# Patient Record
Sex: MALE | Race: WHITE | NOT HISPANIC OR LATINO | Employment: OTHER | URBAN - METROPOLITAN AREA
[De-identification: names, ages, dates, MRNs, and addresses within clinical notes are randomized per-mention and may not be internally consistent; named-entity substitution may affect disease eponyms.]

---

## 2017-08-04 ENCOUNTER — HOSPITAL ENCOUNTER (INPATIENT)
Facility: HOSPITAL | Age: 72
LOS: 2 days | Discharge: HOME | DRG: 841 | End: 2017-08-06
Attending: EMERGENCY MEDICINE | Admitting: INTERNAL MEDICINE
Payer: COMMERCIAL

## 2017-08-04 ENCOUNTER — APPOINTMENT (EMERGENCY)
Dept: RADIOLOGY | Facility: HOSPITAL | Age: 72
DRG: 841 | End: 2017-08-04
Payer: COMMERCIAL

## 2017-08-04 DIAGNOSIS — R55 SYNCOPE AND COLLAPSE: ICD-10-CM

## 2017-08-04 DIAGNOSIS — D72.829 LEUKOCYTOSIS: Primary | ICD-10-CM

## 2017-08-04 LAB
ALBUMIN SERPL BCP-MCNC: 3.4 G/DL (ref 3.5–5)
ALP SERPL-CCNC: 52 U/L (ref 46–116)
ALT SERPL W P-5'-P-CCNC: 26 U/L (ref 12–78)
AMPHETAMINES SERPL QL SCN: NEGATIVE
ANION GAP SERPL CALCULATED.3IONS-SCNC: 12 MMOL/L (ref 4–13)
AST SERPL W P-5'-P-CCNC: 25 U/L (ref 5–45)
BARBITURATES UR QL: NEGATIVE
BASOPHILS # BLD MANUAL: 0 THOUSAND/UL (ref 0–0.1)
BASOPHILS NFR MAR MANUAL: 0 % (ref 0–1)
BENZODIAZ UR QL: NEGATIVE
BILIRUB SERPL-MCNC: 0.43 MG/DL (ref 0.2–1)
BUN SERPL-MCNC: 27 MG/DL (ref 5–25)
CALCIUM SERPL-MCNC: 8.4 MG/DL (ref 8.3–10.1)
CHLORIDE SERPL-SCNC: 108 MMOL/L (ref 100–108)
CO2 SERPL-SCNC: 21 MMOL/L (ref 21–32)
COCAINE UR QL: NEGATIVE
CREAT SERPL-MCNC: 1.62 MG/DL (ref 0.6–1.3)
EOSINOPHIL # BLD MANUAL: 0 THOUSAND/UL (ref 0–0.4)
EOSINOPHIL NFR BLD MANUAL: 0 % (ref 0–6)
ERYTHROCYTE [DISTWIDTH] IN BLOOD BY AUTOMATED COUNT: 15.5 % (ref 11.6–15.1)
ETHANOL SERPL-MCNC: 53 MG/DL (ref 0–3)
GFR SERPL CREATININE-BSD FRML MDRD: 42 ML/MIN/1.73SQ M
GLUCOSE SERPL-MCNC: 125 MG/DL (ref 65–140)
HCT VFR BLD AUTO: 42.8 % (ref 36.5–49.3)
HGB BLD-MCNC: 13.8 G/DL (ref 12–17)
LYMPHOCYTES # BLD AUTO: 58.41 THOUSAND/UL (ref 0.6–4.47)
LYMPHOCYTES # BLD AUTO: 89 % (ref 14–44)
MCH RBC QN AUTO: 27.8 PG (ref 26.8–34.3)
MCHC RBC AUTO-ENTMCNC: 32.2 G/DL (ref 31.4–37.4)
MCV RBC AUTO: 86 FL (ref 82–98)
METHADONE UR QL: NEGATIVE
MONOCYTES # BLD AUTO: 0.66 THOUSAND/UL (ref 0–1.22)
MONOCYTES NFR BLD: 1 % (ref 4–12)
NEUTROPHILS # BLD MANUAL: 5.25 THOUSAND/UL (ref 1.85–7.62)
NEUTS SEG NFR BLD AUTO: 8 % (ref 43–75)
NRBC BLD AUTO-RTO: 0 /100 WBCS
OPIATES UR QL SCN: NEGATIVE
PCP UR QL: NEGATIVE
PLATELET # BLD AUTO: 109 THOUSANDS/UL (ref 149–390)
PLATELET BLD QL SMEAR: ABNORMAL
PMV BLD AUTO: 9.7 FL (ref 8.9–12.7)
POTASSIUM SERPL-SCNC: 4.2 MMOL/L (ref 3.5–5.3)
PROT SERPL-MCNC: 6.1 G/DL (ref 6.4–8.2)
RBC # BLD AUTO: 4.97 MILLION/UL (ref 3.88–5.62)
SODIUM SERPL-SCNC: 141 MMOL/L (ref 136–145)
THC UR QL: NEGATIVE
TOTAL CELLS COUNTED SPEC: 100
TROPONIN I SERPL-MCNC: <0.02 NG/ML
VARIANT LYMPHS # BLD AUTO: 2 %
WBC # BLD AUTO: 65.63 THOUSAND/UL (ref 4.31–10.16)

## 2017-08-04 PROCEDURE — 36415 COLL VENOUS BLD VENIPUNCTURE: CPT | Performed by: EMERGENCY MEDICINE

## 2017-08-04 PROCEDURE — 81263 IGH VARI REGIONAL MUTATION: CPT | Performed by: INTERNAL MEDICINE

## 2017-08-04 PROCEDURE — 80320 DRUG SCREEN QUANTALCOHOLS: CPT | Performed by: EMERGENCY MEDICINE

## 2017-08-04 PROCEDURE — 85007 BL SMEAR W/DIFF WBC COUNT: CPT | Performed by: EMERGENCY MEDICINE

## 2017-08-04 PROCEDURE — G0480 DRUG TEST DEF 1-7 CLASSES: HCPCS | Performed by: EMERGENCY MEDICINE

## 2017-08-04 PROCEDURE — 80053 COMPREHEN METABOLIC PANEL: CPT | Performed by: EMERGENCY MEDICINE

## 2017-08-04 PROCEDURE — 71020 HB CHEST X-RAY 2VW FRONTAL&LATL: CPT

## 2017-08-04 PROCEDURE — 80307 DRUG TEST PRSMV CHEM ANLYZR: CPT | Performed by: EMERGENCY MEDICINE

## 2017-08-04 PROCEDURE — 85027 COMPLETE CBC AUTOMATED: CPT | Performed by: EMERGENCY MEDICINE

## 2017-08-04 PROCEDURE — 93005 ELECTROCARDIOGRAM TRACING: CPT | Performed by: EMERGENCY MEDICINE

## 2017-08-04 PROCEDURE — 84484 ASSAY OF TROPONIN QUANT: CPT | Performed by: EMERGENCY MEDICINE

## 2017-08-04 PROCEDURE — 70450 CT HEAD/BRAIN W/O DYE: CPT

## 2017-08-04 PROCEDURE — 96360 HYDRATION IV INFUSION INIT: CPT

## 2017-08-04 RX ORDER — SIMVASTATIN 20 MG
20 TABLET ORAL
COMMUNITY
End: 2021-07-04 | Stop reason: HOSPADM

## 2017-08-04 RX ORDER — ASPIRIN 81 MG/1
81 TABLET, CHEWABLE ORAL DAILY
COMMUNITY
End: 2019-12-10

## 2017-08-04 RX ORDER — FOSINOPRIL SODIUM 20 MG/1
20 TABLET ORAL DAILY
COMMUNITY

## 2017-08-04 RX ORDER — MULTIVITAMIN
1 TABLET ORAL DAILY
COMMUNITY

## 2017-08-04 RX ORDER — MOMETASONE FUROATE 50 UG/1
2 SPRAY, METERED NASAL DAILY
COMMUNITY

## 2017-08-04 RX ORDER — UREA 10 %
500 LOTION (ML) TOPICAL ONCE
COMMUNITY
End: 2020-11-20 | Stop reason: HOSPADM

## 2017-08-04 RX ADMIN — SODIUM CHLORIDE 1000 ML: 0.9 INJECTION, SOLUTION INTRAVENOUS at 21:26

## 2017-08-04 RX ADMIN — SODIUM CHLORIDE 1000 ML: 0.9 INJECTION, SOLUTION INTRAVENOUS at 21:09

## 2017-08-05 ENCOUNTER — APPOINTMENT (INPATIENT)
Dept: RADIOLOGY | Facility: HOSPITAL | Age: 72
DRG: 841 | End: 2017-08-05
Payer: COMMERCIAL

## 2017-08-05 PROBLEM — I10 HYPERTENSION, ESSENTIAL: Status: ACTIVE | Noted: 2017-08-05

## 2017-08-05 PROBLEM — N28.9 ACUTE KIDNEY INSUFFICIENCY: Status: ACTIVE | Noted: 2017-08-05

## 2017-08-05 PROBLEM — R68.83 CHILLS: Status: ACTIVE | Noted: 2017-08-05

## 2017-08-05 PROBLEM — D72.820 LYMPHOCYTOSIS: Status: ACTIVE | Noted: 2017-08-05

## 2017-08-05 PROBLEM — R11.0 NAUSEA: Status: ACTIVE | Noted: 2017-08-05

## 2017-08-05 PROBLEM — E78.5 HYPERLIPIDEMIA, UNSPECIFIED: Status: ACTIVE | Noted: 2017-08-05

## 2017-08-05 LAB
ALBUMIN SERPL BCP-MCNC: 3.1 G/DL (ref 3.5–5)
ALP SERPL-CCNC: 54 U/L (ref 46–116)
ALT SERPL W P-5'-P-CCNC: 20 U/L (ref 12–78)
ANION GAP SERPL CALCULATED.3IONS-SCNC: 6 MMOL/L (ref 4–13)
APTT PPP: 24 SECONDS (ref 23–35)
AST SERPL W P-5'-P-CCNC: 22 U/L (ref 5–45)
ATRIAL RATE: 60 BPM
BASOPHILS # BLD MANUAL: 0 THOUSAND/UL (ref 0–0.1)
BASOPHILS NFR MAR MANUAL: 0 % (ref 0–1)
BILIRUB SERPL-MCNC: 0.37 MG/DL (ref 0.2–1)
BILIRUB UR QL STRIP: NEGATIVE
BUN SERPL-MCNC: 25 MG/DL (ref 5–25)
CALCIUM SERPL-MCNC: 8.2 MG/DL (ref 8.3–10.1)
CHLORIDE SERPL-SCNC: 109 MMOL/L (ref 100–108)
CLARITY UR: CLEAR
CO2 SERPL-SCNC: 25 MMOL/L (ref 21–32)
COLOR UR: YELLOW
CREAT SERPL-MCNC: 1.22 MG/DL (ref 0.6–1.3)
EOSINOPHIL # BLD MANUAL: 0 THOUSAND/UL (ref 0–0.4)
EOSINOPHIL NFR BLD MANUAL: 0 % (ref 0–6)
ERYTHROCYTE [DISTWIDTH] IN BLOOD BY AUTOMATED COUNT: 15.6 % (ref 11.6–15.1)
GFR SERPL CREATININE-BSD FRML MDRD: 59 ML/MIN/1.73SQ M
GLUCOSE SERPL-MCNC: 232 MG/DL (ref 65–140)
GLUCOSE UR STRIP-MCNC: NEGATIVE MG/DL
HCT VFR BLD AUTO: 41.4 % (ref 36.5–49.3)
HGB BLD-MCNC: 13.2 G/DL (ref 12–17)
HGB UR QL STRIP.AUTO: NEGATIVE
INR PPP: 1.01 (ref 0.86–1.16)
KETONES UR STRIP-MCNC: ABNORMAL MG/DL
LEUKOCYTE ESTERASE UR QL STRIP: NEGATIVE
LYMPHOCYTES # BLD AUTO: 58.74 THOUSAND/UL (ref 0.6–4.47)
LYMPHOCYTES # BLD AUTO: 82 % (ref 14–44)
MAGNESIUM SERPL-MCNC: 2.3 MG/DL (ref 1.6–2.6)
MCH RBC QN AUTO: 27.8 PG (ref 26.8–34.3)
MCHC RBC AUTO-ENTMCNC: 31.9 G/DL (ref 31.4–37.4)
MCV RBC AUTO: 87 FL (ref 82–98)
MONOCYTES # BLD AUTO: 0 THOUSAND/UL (ref 0–1.22)
MONOCYTES NFR BLD: 0 % (ref 4–12)
NEUTROPHILS # BLD MANUAL: 12.89 THOUSAND/UL (ref 1.85–7.62)
NEUTS SEG NFR BLD AUTO: 18 % (ref 43–75)
NITRITE UR QL STRIP: NEGATIVE
NRBC BLD AUTO-RTO: 0 /100 WBCS
P AXIS: -29 DEGREES
PH UR STRIP.AUTO: 5.5 [PH] (ref 4.5–8)
PHOSPHATE SERPL-MCNC: 2.8 MG/DL (ref 2.3–4.1)
PLATELET # BLD AUTO: 107 THOUSANDS/UL (ref 149–390)
PLATELET BLD QL SMEAR: ABNORMAL
PMV BLD AUTO: 9.8 FL (ref 8.9–12.7)
POIKILOCYTOSIS BLD QL SMEAR: PRESENT
POTASSIUM SERPL-SCNC: 4.8 MMOL/L (ref 3.5–5.3)
PR INTERVAL: 128 MS
PROT SERPL-MCNC: 5.8 G/DL (ref 6.4–8.2)
PROT UR STRIP-MCNC: NEGATIVE MG/DL
PROTHROMBIN TIME: 13.3 SECONDS (ref 12.1–14.4)
QRS AXIS: 60 DEGREES
QRSD INTERVAL: 86 MS
QT INTERVAL: 416 MS
QTC INTERVAL: 416 MS
RBC # BLD AUTO: 4.74 MILLION/UL (ref 3.88–5.62)
RBC MORPH BLD: PRESENT
RETICS # AUTO: NORMAL 10*3/UL (ref 14356–105094)
RETICS # CALC: 0.99 % (ref 0.37–1.87)
SMUDGE CELLS BLD QL SMEAR: PRESENT
SODIUM SERPL-SCNC: 140 MMOL/L (ref 136–145)
SP GR UR STRIP.AUTO: 1.02 (ref 1–1.03)
T WAVE AXIS: 54 DEGREES
TSH SERPL DL<=0.05 MIU/L-ACNC: 1 UIU/ML (ref 0.36–3.74)
URATE SERPL-MCNC: 5.7 MG/DL (ref 4.2–8)
UROBILINOGEN UR QL STRIP.AUTO: 0.2 E.U./DL
VENTRICULAR RATE: 60 BPM
WBC # BLD AUTO: 71.63 THOUSAND/UL (ref 4.31–10.16)

## 2017-08-05 PROCEDURE — 84165 PROTEIN E-PHORESIS SERUM: CPT | Performed by: INTERNAL MEDICINE

## 2017-08-05 PROCEDURE — 86618 LYME DISEASE ANTIBODY: CPT | Performed by: INTERNAL MEDICINE

## 2017-08-05 PROCEDURE — 71260 CT THORAX DX C+: CPT

## 2017-08-05 PROCEDURE — 85045 AUTOMATED RETICULOCYTE COUNT: CPT | Performed by: INTERNAL MEDICINE

## 2017-08-05 PROCEDURE — 88185 FLOWCYTOMETRY/TC ADD-ON: CPT

## 2017-08-05 PROCEDURE — 99285 EMERGENCY DEPT VISIT HI MDM: CPT

## 2017-08-05 PROCEDURE — 88373 M/PHMTRC ALYS ISHQUANT/SEMIQ: CPT | Performed by: INTERNAL MEDICINE

## 2017-08-05 PROCEDURE — 85007 BL SMEAR W/DIFF WBC COUNT: CPT | Performed by: INTERNAL MEDICINE

## 2017-08-05 PROCEDURE — 84443 ASSAY THYROID STIM HORMONE: CPT | Performed by: INTERNAL MEDICINE

## 2017-08-05 PROCEDURE — 84550 ASSAY OF BLOOD/URIC ACID: CPT | Performed by: INTERNAL MEDICINE

## 2017-08-05 PROCEDURE — 85730 THROMBOPLASTIN TIME PARTIAL: CPT | Performed by: INTERNAL MEDICINE

## 2017-08-05 PROCEDURE — 85027 COMPLETE CBC AUTOMATED: CPT | Performed by: INTERNAL MEDICINE

## 2017-08-05 PROCEDURE — 84166 PROTEIN E-PHORESIS/URINE/CSF: CPT | Performed by: INTERNAL MEDICINE

## 2017-08-05 PROCEDURE — 80053 COMPREHEN METABOLIC PANEL: CPT | Performed by: INTERNAL MEDICINE

## 2017-08-05 PROCEDURE — 81003 URINALYSIS AUTO W/O SCOPE: CPT | Performed by: INTERNAL MEDICINE

## 2017-08-05 PROCEDURE — 84100 ASSAY OF PHOSPHORUS: CPT | Performed by: INTERNAL MEDICINE

## 2017-08-05 PROCEDURE — 88367 INSITU HYBRIDIZATION AUTO: CPT | Performed by: INTERNAL MEDICINE

## 2017-08-05 PROCEDURE — 85610 PROTHROMBIN TIME: CPT | Performed by: INTERNAL MEDICINE

## 2017-08-05 PROCEDURE — 74177 CT ABD & PELVIS W/CONTRAST: CPT

## 2017-08-05 PROCEDURE — 88184 FLOWCYTOMETRY/ TC 1 MARKER: CPT | Performed by: INTERNAL MEDICINE

## 2017-08-05 PROCEDURE — 87040 BLOOD CULTURE FOR BACTERIA: CPT | Performed by: INTERNAL MEDICINE

## 2017-08-05 PROCEDURE — 83735 ASSAY OF MAGNESIUM: CPT | Performed by: INTERNAL MEDICINE

## 2017-08-05 RX ORDER — DOCUSATE SODIUM 100 MG/1
100 CAPSULE, LIQUID FILLED ORAL 2 TIMES DAILY PRN
Status: DISCONTINUED | OUTPATIENT
Start: 2017-08-05 | End: 2017-08-06 | Stop reason: HOSPADM

## 2017-08-05 RX ORDER — ACETAMINOPHEN 325 MG/1
650 TABLET ORAL EVERY 6 HOURS PRN
Status: DISCONTINUED | OUTPATIENT
Start: 2017-08-05 | End: 2017-08-06 | Stop reason: HOSPADM

## 2017-08-05 RX ORDER — UREA 10 %
500 LOTION (ML) TOPICAL 2 TIMES DAILY
Status: DISCONTINUED | OUTPATIENT
Start: 2017-08-05 | End: 2017-08-06 | Stop reason: HOSPADM

## 2017-08-05 RX ORDER — ONDANSETRON 2 MG/ML
4 INJECTION INTRAMUSCULAR; INTRAVENOUS EVERY 6 HOURS PRN
Status: DISCONTINUED | OUTPATIENT
Start: 2017-08-05 | End: 2017-08-06 | Stop reason: HOSPADM

## 2017-08-05 RX ORDER — MAGNESIUM HYDROXIDE/ALUMINUM HYDROXICE/SIMETHICONE 120; 1200; 1200 MG/30ML; MG/30ML; MG/30ML
30 SUSPENSION ORAL EVERY 6 HOURS PRN
Status: DISCONTINUED | OUTPATIENT
Start: 2017-08-05 | End: 2017-08-06 | Stop reason: HOSPADM

## 2017-08-05 RX ORDER — FLUTICASONE PROPIONATE 50 MCG
2 SPRAY, SUSPENSION (ML) NASAL DAILY
Status: DISCONTINUED | OUTPATIENT
Start: 2017-08-05 | End: 2017-08-06 | Stop reason: HOSPADM

## 2017-08-05 RX ORDER — SODIUM CHLORIDE 9 MG/ML
50 INJECTION, SOLUTION INTRAVENOUS CONTINUOUS
Status: DISCONTINUED | OUTPATIENT
Start: 2017-08-05 | End: 2017-08-06 | Stop reason: HOSPADM

## 2017-08-05 RX ORDER — PRAVASTATIN SODIUM 40 MG
40 TABLET ORAL
Status: DISCONTINUED | OUTPATIENT
Start: 2017-08-05 | End: 2017-08-06 | Stop reason: HOSPADM

## 2017-08-05 RX ORDER — ONDANSETRON 2 MG/ML
INJECTION INTRAMUSCULAR; INTRAVENOUS
Status: COMPLETED
Start: 2017-08-05 | End: 2017-08-05

## 2017-08-05 RX ORDER — ASPIRIN 81 MG/1
81 TABLET, CHEWABLE ORAL DAILY
Status: DISCONTINUED | OUTPATIENT
Start: 2017-08-05 | End: 2017-08-06 | Stop reason: HOSPADM

## 2017-08-05 RX ORDER — CHOLECALCIFEROL (VITAMIN D3) 125 MCG
200 CAPSULE ORAL DAILY
Status: DISCONTINUED | OUTPATIENT
Start: 2017-08-05 | End: 2017-08-06 | Stop reason: HOSPADM

## 2017-08-05 RX ORDER — PANTOPRAZOLE SODIUM 40 MG/1
40 TABLET, DELAYED RELEASE ORAL
Status: DISCONTINUED | OUTPATIENT
Start: 2017-08-05 | End: 2017-08-06 | Stop reason: HOSPADM

## 2017-08-05 RX ORDER — FOSINOPRIL SODIUM 20 MG/1
20 TABLET ORAL DAILY
Status: DISCONTINUED | OUTPATIENT
Start: 2017-08-05 | End: 2017-08-06 | Stop reason: HOSPADM

## 2017-08-05 RX ORDER — DIAZEPAM 2 MG/1
1 TABLET ORAL EVERY 12 HOURS PRN
Status: DISCONTINUED | OUTPATIENT
Start: 2017-08-05 | End: 2017-08-06 | Stop reason: HOSPADM

## 2017-08-05 RX ADMIN — ONDANSETRON 4 MG: 2 INJECTION INTRAMUSCULAR; INTRAVENOUS at 01:15

## 2017-08-05 RX ADMIN — PRAVASTATIN SODIUM 40 MG: 40 TABLET ORAL at 17:04

## 2017-08-05 RX ADMIN — FLUTICASONE PROPIONATE 2 SPRAY: 50 SPRAY, METERED NASAL at 11:42

## 2017-08-05 RX ADMIN — ASPIRIN 81 MG 81 MG: 81 TABLET ORAL at 11:41

## 2017-08-05 RX ADMIN — IOHEXOL 100 ML: 350 INJECTION, SOLUTION INTRAVENOUS at 10:51

## 2017-08-05 RX ADMIN — SODIUM CHLORIDE 50 ML/HR: 0.9 INJECTION, SOLUTION INTRAVENOUS at 18:38

## 2017-08-05 RX ADMIN — Medication 500 MG: at 11:41

## 2017-08-05 RX ADMIN — SERTRALINE HYDROCHLORIDE 50 MG: 50 TABLET ORAL at 11:41

## 2017-08-05 RX ADMIN — IOHEXOL 50 ML: 240 INJECTION, SOLUTION INTRATHECAL; INTRAVASCULAR; INTRAVENOUS; ORAL at 08:00

## 2017-08-05 RX ADMIN — Medication 200 MG: at 11:42

## 2017-08-05 RX ADMIN — PANTOPRAZOLE SODIUM 40 MG: 40 TABLET, DELAYED RELEASE ORAL at 18:44

## 2017-08-05 RX ADMIN — ZINC 1 TABLET: TAB ORAL at 11:42

## 2017-08-05 RX ADMIN — Medication 500 MG: at 17:04

## 2017-08-05 RX ADMIN — FOSINOPRIL 20 MG: 20 TABLET ORAL at 11:42

## 2017-08-06 VITALS
BODY MASS INDEX: 29.26 KG/M2 | HEART RATE: 56 BPM | TEMPERATURE: 98.7 F | HEIGHT: 72 IN | WEIGHT: 216.05 LBS | DIASTOLIC BLOOD PRESSURE: 83 MMHG | RESPIRATION RATE: 18 BRPM | OXYGEN SATURATION: 96 % | SYSTOLIC BLOOD PRESSURE: 154 MMHG

## 2017-08-06 LAB
ALBUMIN SERPL BCP-MCNC: 3 G/DL (ref 3.5–5)
ALP SERPL-CCNC: 49 U/L (ref 46–116)
ALT SERPL W P-5'-P-CCNC: 18 U/L (ref 12–78)
ANION GAP SERPL CALCULATED.3IONS-SCNC: 4 MMOL/L (ref 4–13)
AST SERPL W P-5'-P-CCNC: 15 U/L (ref 5–45)
BILIRUB SERPL-MCNC: 0.68 MG/DL (ref 0.2–1)
BUN SERPL-MCNC: 19 MG/DL (ref 5–25)
CALCIUM SERPL-MCNC: 8.4 MG/DL (ref 8.3–10.1)
CHLORIDE SERPL-SCNC: 108 MMOL/L (ref 100–108)
CO2 SERPL-SCNC: 28 MMOL/L (ref 21–32)
CREAT SERPL-MCNC: 1.04 MG/DL (ref 0.6–1.3)
EST. AVERAGE GLUCOSE BLD GHB EST-MCNC: 134 MG/DL
GFR SERPL CREATININE-BSD FRML MDRD: 72 ML/MIN/1.73SQ M
GLUCOSE SERPL-MCNC: 102 MG/DL (ref 65–140)
HBA1C MFR BLD: 6.3 % (ref 4.2–6.3)
POTASSIUM SERPL-SCNC: 4.3 MMOL/L (ref 3.5–5.3)
PROT SERPL-MCNC: 5.6 G/DL (ref 6.4–8.2)
PSA SERPL-MCNC: 1.6 NG/ML (ref 0–4)
SODIUM SERPL-SCNC: 140 MMOL/L (ref 136–145)

## 2017-08-06 PROCEDURE — 85007 BL SMEAR W/DIFF WBC COUNT: CPT | Performed by: NURSE PRACTITIONER

## 2017-08-06 PROCEDURE — 80053 COMPREHEN METABOLIC PANEL: CPT | Performed by: NURSE PRACTITIONER

## 2017-08-06 PROCEDURE — 84153 ASSAY OF PSA TOTAL: CPT | Performed by: NURSE PRACTITIONER

## 2017-08-06 PROCEDURE — 85027 COMPLETE CBC AUTOMATED: CPT | Performed by: NURSE PRACTITIONER

## 2017-08-06 PROCEDURE — 85025 COMPLETE CBC W/AUTO DIFF WBC: CPT | Performed by: NURSE PRACTITIONER

## 2017-08-06 PROCEDURE — 83036 HEMOGLOBIN GLYCOSYLATED A1C: CPT | Performed by: NURSE PRACTITIONER

## 2017-08-06 RX ORDER — PANTOPRAZOLE SODIUM 40 MG/1
40 TABLET, DELAYED RELEASE ORAL
Qty: 30 TABLET | Refills: 0 | Status: SHIPPED | OUTPATIENT
Start: 2017-08-06 | End: 2018-03-27

## 2017-08-06 RX ADMIN — Medication 500 MG: at 09:03

## 2017-08-06 RX ADMIN — FOSINOPRIL 20 MG: 20 TABLET ORAL at 09:03

## 2017-08-06 RX ADMIN — ENOXAPARIN SODIUM 40 MG: 40 INJECTION SUBCUTANEOUS at 09:03

## 2017-08-06 RX ADMIN — PANTOPRAZOLE SODIUM 40 MG: 40 TABLET, DELAYED RELEASE ORAL at 05:01

## 2017-08-06 RX ADMIN — ASPIRIN 81 MG 81 MG: 81 TABLET ORAL at 09:03

## 2017-08-06 RX ADMIN — SERTRALINE HYDROCHLORIDE 50 MG: 50 TABLET ORAL at 09:03

## 2017-08-06 RX ADMIN — ZINC 1 TABLET: TAB ORAL at 09:03

## 2017-08-06 RX ADMIN — FLUTICASONE PROPIONATE 2 SPRAY: 50 SPRAY, METERED NASAL at 09:04

## 2017-08-06 RX ADMIN — Medication 200 MG: at 09:03

## 2017-08-07 LAB
ALBUMIN SERPL ELPH-MCNC: 3.71 G/DL (ref 3.5–5)
ALBUMIN SERPL ELPH-MCNC: 66.3 % (ref 52–65)
ALBUMIN UR ELPH-MCNC: 41.8 %
ALPHA1 GLOB MFR UR ELPH: 10.2 %
ALPHA1 GLOB SERPL ELPH-MCNC: 0.25 G/DL (ref 0.1–0.4)
ALPHA1 GLOB SERPL ELPH-MCNC: 4.5 % (ref 2.5–5)
ALPHA2 GLOB MFR UR ELPH: 10.3 %
ALPHA2 GLOB SERPL ELPH-MCNC: 0.49 G/DL (ref 0.4–1.2)
ALPHA2 GLOB SERPL ELPH-MCNC: 8.8 % (ref 7–13)
B BURGDOR IGG SER IA-ACNC: 0.14
B BURGDOR IGM SER IA-ACNC: 0.18
B-GLOBULIN MFR UR ELPH: 14.8 %
BETA GLOB ABNORMAL SERPL ELPH-MCNC: 0.35 G/DL (ref 0.4–0.8)
BETA1 GLOB SERPL ELPH-MCNC: 6.3 % (ref 5–13)
BETA2 GLOB SERPL ELPH-MCNC: 4.4 % (ref 2–8)
BETA2+GAMMA GLOB SERPL ELPH-MCNC: 0.25 G/DL (ref 0.2–0.5)
GAMMA GLOB ABNORMAL SERPL ELPH-MCNC: 0.54 G/DL (ref 0.5–1.6)
GAMMA GLOB MFR UR ELPH: 22.9 %
GAMMA GLOB SERPL ELPH-MCNC: 9.7 % (ref 12–22)
IGG/ALB SER: 1.97 {RATIO} (ref 1.1–1.8)
PATHOLOGIST INTERPRETATION: NORMAL
PROT PATTERN SERPL ELPH-IMP: ABNORMAL
PROT PATTERN UR ELPH-IMP: NORMAL
PROT SERPL-MCNC: 5.6 G/DL (ref 6.4–8.2)
PROT UR-MCNC: 11 MG/DL

## 2017-08-08 LAB
BASOPHILS # BLD MANUAL: 0 THOUSAND/UL (ref 0–0.1)
BASOPHILS NFR MAR MANUAL: 0 % (ref 0–1)
EOSINOPHIL # BLD MANUAL: 0 THOUSAND/UL (ref 0–0.4)
EOSINOPHIL NFR BLD MANUAL: 0 % (ref 0–6)
ERYTHROCYTE [DISTWIDTH] IN BLOOD BY AUTOMATED COUNT: 15.9 % (ref 11.6–15.1)
HCT VFR BLD AUTO: 40.3 % (ref 36.5–49.3)
HGB BLD-MCNC: 12.9 G/DL (ref 12–17)
LYMPHOCYTES # BLD AUTO: 43.41 THOUSAND/UL (ref 0.6–4.47)
LYMPHOCYTES # BLD AUTO: 82 % (ref 14–44)
MCH RBC QN AUTO: 28 PG (ref 26.8–34.3)
MCHC RBC AUTO-ENTMCNC: 32 G/DL (ref 31.4–37.4)
MCV RBC AUTO: 87 FL (ref 82–98)
MONOCYTES # BLD AUTO: 1.59 THOUSAND/UL (ref 0–1.22)
MONOCYTES NFR BLD: 3 % (ref 4–12)
NEUTROPHILS # BLD MANUAL: 4.24 THOUSAND/UL (ref 1.85–7.62)
NEUTS SEG NFR BLD AUTO: 8 % (ref 43–75)
NRBC BLD AUTO-RTO: 0 /100 WBCS
PATHOLOGY REVIEW: YES
PLATELET # BLD AUTO: 89 THOUSANDS/UL (ref 149–390)
PLATELET BLD QL SMEAR: ABNORMAL
PMV BLD AUTO: 9.7 FL (ref 8.9–12.7)
POIKILOCYTOSIS BLD QL SMEAR: PRESENT
RBC # BLD AUTO: 4.61 MILLION/UL (ref 3.88–5.62)
SCAN RESULT: NORMAL
SMUDGE CELLS BLD QL SMEAR: PRESENT
TOTAL CELLS COUNTED SPEC: 100
VARIANT LYMPHS # BLD AUTO: 7 %
WBC # BLD AUTO: 52.94 THOUSAND/UL (ref 4.31–10.16)

## 2017-08-10 LAB
BACTERIA BLD CULT: NORMAL
BACTERIA BLD CULT: NORMAL

## 2017-08-15 LAB
MISCELLANEOUS LAB TEST RESULT: NORMAL
SCAN RESULT: NORMAL

## 2017-08-25 ENCOUNTER — ALLSCRIPTS OFFICE VISIT (OUTPATIENT)
Dept: OTHER | Facility: OTHER | Age: 72
End: 2017-08-25

## 2017-08-25 DIAGNOSIS — C91.10 CHRONIC LYMPHOCYTIC LEUKEMIA OF B-CELL TYPE NOT HAVING ACHIEVED REMISSION (HCC): ICD-10-CM

## 2017-08-28 ENCOUNTER — GENERIC CONVERSION - ENCOUNTER (OUTPATIENT)
Dept: OTHER | Facility: OTHER | Age: 72
End: 2017-08-28

## 2017-08-28 ENCOUNTER — APPOINTMENT (OUTPATIENT)
Dept: LAB | Facility: CLINIC | Age: 72
End: 2017-08-28
Payer: COMMERCIAL

## 2017-08-28 DIAGNOSIS — C91.10 CHRONIC LYMPHOCYTIC LEUKEMIA OF B-CELL TYPE NOT HAVING ACHIEVED REMISSION (HCC): ICD-10-CM

## 2017-08-28 LAB
ALBUMIN SERPL BCP-MCNC: 3.4 G/DL (ref 3.5–5)
ALP SERPL-CCNC: 53 U/L (ref 46–116)
ALT SERPL W P-5'-P-CCNC: 26 U/L (ref 12–78)
ANION GAP SERPL CALCULATED.3IONS-SCNC: 8 MMOL/L (ref 4–13)
ANISOCYTOSIS BLD QL SMEAR: PRESENT
AST SERPL W P-5'-P-CCNC: 20 U/L (ref 5–45)
BASOPHILS # BLD MANUAL: 0.51 THOUSAND/UL (ref 0–0.1)
BASOPHILS NFR MAR MANUAL: 1 % (ref 0–1)
BILIRUB SERPL-MCNC: 0.5 MG/DL (ref 0.2–1)
BUN SERPL-MCNC: 20 MG/DL (ref 5–25)
CALCIUM SERPL-MCNC: 8.5 MG/DL (ref 8.3–10.1)
CHLORIDE SERPL-SCNC: 104 MMOL/L (ref 100–108)
CO2 SERPL-SCNC: 27 MMOL/L (ref 21–32)
CREAT SERPL-MCNC: 1.14 MG/DL (ref 0.6–1.3)
EOSINOPHIL # BLD MANUAL: 0 THOUSAND/UL (ref 0–0.4)
EOSINOPHIL NFR BLD MANUAL: 0 % (ref 0–6)
ERYTHROCYTE [DISTWIDTH] IN BLOOD BY AUTOMATED COUNT: 15.2 % (ref 11.6–15.1)
GFR SERPL CREATININE-BSD FRML MDRD: 64 ML/MIN/1.73SQ M
GLUCOSE SERPL-MCNC: 180 MG/DL (ref 65–140)
HCT VFR BLD AUTO: 45 % (ref 36.5–49.3)
HGB BLD-MCNC: 14.2 G/DL (ref 12–17)
IGG SERPL-MCNC: 644 MG/DL (ref 700–1600)
LDH SERPL-CCNC: 164 U/L (ref 81–234)
LYMPHOCYTES # BLD AUTO: 43.82 THOUSAND/UL (ref 0.6–4.47)
LYMPHOCYTES # BLD AUTO: 86 % (ref 14–44)
MCH RBC QN AUTO: 27.7 PG (ref 26.8–34.3)
MCHC RBC AUTO-ENTMCNC: 31.6 G/DL (ref 31.4–37.4)
MCV RBC AUTO: 88 FL (ref 82–98)
MONOCYTES # BLD AUTO: 0 THOUSAND/UL (ref 0–1.22)
MONOCYTES NFR BLD: 0 % (ref 4–12)
NEUTROPHILS # BLD MANUAL: 4.59 THOUSAND/UL (ref 1.85–7.62)
NEUTS SEG NFR BLD AUTO: 9 % (ref 43–75)
PLATELET # BLD AUTO: 107 THOUSANDS/UL (ref 149–390)
PLATELET BLD QL SMEAR: ABNORMAL
PMV BLD AUTO: 9.5 FL (ref 8.9–12.7)
POTASSIUM SERPL-SCNC: 4.4 MMOL/L (ref 3.5–5.3)
PROT SERPL-MCNC: 6.2 G/DL (ref 6.4–8.2)
RBC # BLD AUTO: 5.12 MILLION/UL (ref 3.88–5.62)
SMUDGE CELLS BLD QL SMEAR: PRESENT
SODIUM SERPL-SCNC: 139 MMOL/L (ref 136–145)
TOTAL CELLS COUNTED SPEC: 100
URATE SERPL-MCNC: 5.5 MG/DL (ref 4.2–8)
VARIANT LYMPHS # BLD AUTO: 4 %
WBC # BLD AUTO: 50.95 THOUSAND/UL (ref 4.31–10.16)

## 2017-08-28 PROCEDURE — 82232 ASSAY OF BETA-2 PROTEIN: CPT

## 2017-08-28 PROCEDURE — 85007 BL SMEAR W/DIFF WBC COUNT: CPT

## 2017-08-28 PROCEDURE — 82784 ASSAY IGA/IGD/IGG/IGM EACH: CPT

## 2017-08-28 PROCEDURE — 36415 COLL VENOUS BLD VENIPUNCTURE: CPT

## 2017-08-28 PROCEDURE — 84550 ASSAY OF BLOOD/URIC ACID: CPT

## 2017-08-28 PROCEDURE — 83615 LACTATE (LD) (LDH) ENZYME: CPT

## 2017-08-28 PROCEDURE — 85027 COMPLETE CBC AUTOMATED: CPT

## 2017-08-28 PROCEDURE — 80053 COMPREHEN METABOLIC PANEL: CPT

## 2017-08-29 LAB — B2 MICROGLOB SERPL-MCNC: 2.8 MG/L (ref 0.6–2.4)

## 2017-09-28 ENCOUNTER — TRANSCRIBE ORDERS (OUTPATIENT)
Dept: LAB | Facility: CLINIC | Age: 72
End: 2017-09-28

## 2017-09-28 ENCOUNTER — GENERIC CONVERSION - ENCOUNTER (OUTPATIENT)
Dept: OTHER | Facility: OTHER | Age: 72
End: 2017-09-28

## 2017-09-28 ENCOUNTER — APPOINTMENT (OUTPATIENT)
Dept: LAB | Facility: CLINIC | Age: 72
End: 2017-09-28
Payer: COMMERCIAL

## 2017-09-28 DIAGNOSIS — C91.10 CHRONIC LYMPHOCYTIC LEUKEMIA OF B-CELL TYPE NOT HAVING ACHIEVED REMISSION (HCC): ICD-10-CM

## 2017-09-28 LAB
ALBUMIN SERPL BCP-MCNC: 3.6 G/DL (ref 3.5–5)
ALP SERPL-CCNC: 60 U/L (ref 46–116)
ALT SERPL W P-5'-P-CCNC: 32 U/L (ref 12–78)
ANION GAP SERPL CALCULATED.3IONS-SCNC: 2 MMOL/L (ref 4–13)
AST SERPL W P-5'-P-CCNC: 22 U/L (ref 5–45)
BASOPHILS # BLD MANUAL: 0 THOUSAND/UL (ref 0–0.1)
BASOPHILS NFR MAR MANUAL: 0 % (ref 0–1)
BILIRUB SERPL-MCNC: 0.6 MG/DL (ref 0.2–1)
BUN SERPL-MCNC: 26 MG/DL (ref 5–25)
CALCIUM SERPL-MCNC: 8.7 MG/DL (ref 8.3–10.1)
CHLORIDE SERPL-SCNC: 103 MMOL/L (ref 100–108)
CO2 SERPL-SCNC: 32 MMOL/L (ref 21–32)
CREAT SERPL-MCNC: 1.2 MG/DL (ref 0.6–1.3)
EOSINOPHIL # BLD MANUAL: 0 THOUSAND/UL (ref 0–0.4)
EOSINOPHIL NFR BLD MANUAL: 0 % (ref 0–6)
ERYTHROCYTE [DISTWIDTH] IN BLOOD BY AUTOMATED COUNT: 15.1 % (ref 11.6–15.1)
GFR SERPL CREATININE-BSD FRML MDRD: 60 ML/MIN/1.73SQ M
GLUCOSE SERPL-MCNC: 163 MG/DL (ref 65–140)
HCT VFR BLD AUTO: 46.5 % (ref 36.5–49.3)
HGB BLD-MCNC: 14.7 G/DL (ref 12–17)
IGG SERPL-MCNC: 695 MG/DL (ref 700–1600)
LDH SERPL-CCNC: 184 U/L (ref 81–234)
LYMPHOCYTES # BLD AUTO: 28.55 THOUSAND/UL (ref 0.6–4.47)
LYMPHOCYTES # BLD AUTO: 47 % (ref 14–44)
MCH RBC QN AUTO: 27.5 PG (ref 26.8–34.3)
MCHC RBC AUTO-ENTMCNC: 31.6 G/DL (ref 31.4–37.4)
MCV RBC AUTO: 87 FL (ref 82–98)
MONOCYTES # BLD AUTO: 0 THOUSAND/UL (ref 0–1.22)
MONOCYTES NFR BLD: 0 % (ref 4–12)
NEUTROPHILS # BLD MANUAL: 3.65 THOUSAND/UL (ref 1.85–7.62)
NEUTS SEG NFR BLD AUTO: 6 % (ref 43–75)
PLATELET # BLD AUTO: 111 THOUSANDS/UL (ref 149–390)
PLATELET BLD QL SMEAR: ABNORMAL
PMV BLD AUTO: 10 FL (ref 8.9–12.7)
POTASSIUM SERPL-SCNC: 4.8 MMOL/L (ref 3.5–5.3)
PROT SERPL-MCNC: 6.5 G/DL (ref 6.4–8.2)
RBC # BLD AUTO: 5.34 MILLION/UL (ref 3.88–5.62)
SMUDGE CELLS BLD QL SMEAR: PRESENT
SODIUM SERPL-SCNC: 137 MMOL/L (ref 136–145)
TOTAL CELLS COUNTED SPEC: 100
URATE SERPL-MCNC: 6.3 MG/DL (ref 4.2–8)
VARIANT LYMPHS # BLD AUTO: 47 %
WBC # BLD AUTO: 60.75 THOUSAND/UL (ref 4.31–10.16)

## 2017-09-28 PROCEDURE — 82232 ASSAY OF BETA-2 PROTEIN: CPT

## 2017-09-28 PROCEDURE — 80053 COMPREHEN METABOLIC PANEL: CPT

## 2017-09-28 PROCEDURE — 84550 ASSAY OF BLOOD/URIC ACID: CPT

## 2017-09-28 PROCEDURE — 82784 ASSAY IGA/IGD/IGG/IGM EACH: CPT

## 2017-09-28 PROCEDURE — 85007 BL SMEAR W/DIFF WBC COUNT: CPT

## 2017-09-28 PROCEDURE — 36415 COLL VENOUS BLD VENIPUNCTURE: CPT

## 2017-09-28 PROCEDURE — 83615 LACTATE (LD) (LDH) ENZYME: CPT

## 2017-09-28 PROCEDURE — 85027 COMPLETE CBC AUTOMATED: CPT

## 2017-09-29 LAB — B2 MICROGLOB SERPL-MCNC: 2.3 MG/L (ref 0.6–2.4)

## 2017-10-17 ENCOUNTER — ALLSCRIPTS OFFICE VISIT (OUTPATIENT)
Dept: OTHER | Facility: OTHER | Age: 72
End: 2017-10-17

## 2017-10-18 NOTE — PROGRESS NOTES
Assessment  1  CLL (chronic lymphocytic leukemia) (204 10) (C91 10)   2  Thrombocytopenia (287 5) (D69 6)    Plan  CLL (chronic lymphocytic leukemia)    · Drink plenty of fluids ; Status:Complete;   Done: [id number]   Ordered; For:CLL (chronic lymphocytic leukemia); Ordered By:Carroll Castro;   · Follow-up visit in 2 months Evaluation and Treatment  Follow-up  Status: Hold For -  Scheduling  Requested for: 43KUJ0848   Ordered; For: CLL (chronic lymphocytic leukemia); Ordered By: Martinez Schulz Performed:  Due: 40VYC9617   · (Q) IGVH MUTATION ANALYSIS; Status:Active; Requested IFI:[id number];    Perform:[facility]; Due:17Oct2018; Ordered; For:CLL (chronic lymphocytic leukemia); Ordered By:Carroll Castro;    Discussion/Summary  Discussion Summary:   In August 2017 patient was diagnosed to have 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, IgVH mutation, lack of CD38 expression, deletion of 13 q14 in 6% and no 11 q deletion    WBC count was 71,630  Lymphocyte count was 58,740  Hemoglobin 13 2  Platelets 173,706  There was no trisomy 12  Lymphocytes were CD5 and CD23 positive, dim kappa expression and moderate CD20 expressionhad splenomegaly on CT scan but not on palpation  No CLL related symptoms then and now  He was on a cruise and he has gained weight examination and test results are as recorded  No significant change and hematological picture  Patient has chronic lymphocytic leukemia as described above with unfavorable favorable features  Stage is zero  Spleen is not palpably enlarged  Platelet count is not less than 100,000  discussed treatment indications  No indication of treatment at this time  Because of 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 his first line of treatment will be IBRUTINIB when indicated  is surveillance for now  Condition and plan discussed  Questions answered  Counseling Documentation With Imm: The patient was counseled regarding diagnostic results,-- prognosis,-- patient and family education,-- impressions   total time of encounter was 30 minutes-- and-- 20 minutes was spent counseling  Goals and Barriers: The patient has the current Goals: Monitoring of CLL parameters  The patent has the current Barriers: No barriers  Patient's Capacity to Self-Care: Patient is able to Self-Care  Medication SE Review and Pt Understands Tx: The treatment plan was reviewed with the patient/guardian  The patient/guardian understands and agrees with the treatment plan   Self Referrals:   Self Referrals: No      Chief Complaint  Chief Complaint Free Text Note Form: CLL and follow-up visit      History of Present Illness  HPI: In August 2017 patient was diagnosed to have 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, IgVH mutation, lack of CD38 expression, deletion of 13 q14 in 6% and no 11 q deletion    WBC count was 71,630  Lymphocyte count was 58,740  Hemoglobin 13 2  Platelets 412,352  There was no trisomy 12  Lymphocytes were CD5 and CD23 positive, dim kappa expression and moderate CD20 expressionhad splenomegaly on CT scan but not on palpation  No CLL related symptoms then and now  He was on a cruise and he has gained weight  Review of Systems    No headaches, seizures, diplopia, dysphagia, hoarseness, angina pain, chest pain, palpitations, shortness of breath, cough, hemoptysis, abdominal pain, melena, or hematuria  No fever, chills, bleeding, bone pains, skin rash, weight loss, nausea, vomiting and no change in bowel habits  Appetite is fair  No night sweats  Patient has minor arthritic symptoms  No leg cramps  No swelling of the ankles  No swollen glands  Anxious  Not unusually sensitive to heat or cold  No recent or frequent infections  Reviewed 13 systems  ROS Reviewed:   ROS reviewed  Active Problems  1  CLL (chronic lymphocytic leukemia) (204 10) (C91 10)   2  Thrombocytopenia (287 5) (D69 6)      Reviewed     Past Medical History  Active Problems And Past Medical History Reviewed: Hypertension  Dyslipidemia     The active problems and past medical history were reviewed and updated today  Hypertension  Dyslipidemia  Surgical History  Surgical History Reviewed: The surgical history was reviewed and updated today  Family History  Father    1  Family history of cardiac disorder (V17 49) (Z82 49)  Family History Reviewed: The family history was reviewed and updated today  Social History   · Never a smoker   · Social alcohol use (Z78 9)  Social History Reviewed: The social history was reviewed and updated today  Current Meds   1  Baby Aspirin 81 MG CHEW; CHEW AND SWALLOW 1 TABLET DAILY; Therapy: (Dario James) to Recorded   2  Fosinopril Sodium 20 MG Oral Tablet; TAKE 1 TABLET DAILY; Therapy: (Dario James) to Recorded   3  Green Tea 250 MG Oral Capsule; TAKE 2 CAPSULE Daily; Therapy: (Recorded:17Oct2017) to Recorded   4  Magnesium 500 MG Oral Capsule; TAKE AS DIRECTED; Therapy: (Dario James) to Recorded   5  Multi Vitamin Mens TABS; TAKE 1 TABLET DAILY; Therapy: (Dario James) to Recorded   6  Nasacort Allergy 24HR 55 MCG/ACT Nasal Aerosol; SPRAY 2 PUFF Daily; Therapy: (Dario James) to Recorded   7  Pantoprazole Sodium 40 MG Oral Tablet Delayed Release; Take 1 tablet daily; Therapy: (Dario James) to Recorded   8  Sertraline HCl - 50 MG Oral Tablet; TAKE 1 TABLET DAILY; Therapy: (Dario James) to Recorded   9  Zocor 20 MG Oral Tablet; TAKE 1 TABLET DAILY; Therapy: (Dario James) to Recorded    Allergies  1  Sulfa Drugs  2  No Known Food Allergies    Vitals  Vital Signs    Recorded: [id number] 01:15PM   Temperature 96 9 F   Heart Rate 70   Respiration 16   Systolic 103   Diastolic 74   Height 5 ft 11 in   Weight 220 lb    BMI Calculated 30 68   BSA Calculated 2 2   O2 Saturation 96   Pain Scale 0     Physical Exam    Patient is alert and oriented and not in  distress  Stable vital signs  No icterus  No oral thrush  No palpable neck mass   Lung fields are clear to percussion and auscultation  Heart rate is regular  Systolic murmur  Abdomen soft and nontender  No palpable abdominal mass  No ascites  No edema of ankles  No calf tenderness  No focal neurological deficit  No skin rash  No palpable lymphadenopathy  Good arterial pulses  No clubbing  Anxious  Performance status 0  ECOG 0       Results/Data  (1) B-2 MICROGLOBULIN [id number] 08:34AM Jony Yousif Order Number: [id number]     Test Name Result Flag Reference   B-2 MICROGLOBUL 2 3 mg/L  0 6 - 2 4   Siemens Immulite 2000 Immunochemiluminometric assay [facility])    Performed at:  [employer] [facility] [email or web address] [address]  [id number]  : Danica Bradley MD, Phone:  [phone]     (1) CBC/PLT/DIFF [id number] 08:34AM Nury Castro    Order Number: [id number]     Test Name Result Flag Reference   WBC COUNT 60 75 Thousand/uL HH 4 31-10 16   This result has been called to [facility] by Jean Pierre Monaco on 09 28 2017 at [phone], and has been read back     RBC COUNT 5 34 Million/uL  3 88-5 62   HEMOGLOBIN 14 7 g/dL  12 0-17 0   HEMATOCRIT 46 5 %  36 5-49 3   MCV 87 fL  82-98   MCH 27 5 pg  26 8-34 3   MCHC 31 6 g/dL  31 4-37 4   RDW 15 1 %  11 6-15 1   MPV 10 0 fL  8 9-12 7   PLATELET COUNT 225 Thousands/uL L 149-390   NEUTROPHILS - REL 6 % L 43-75   LYMPHOCYTES - REL 47 % H 14-44   MONOCYTES - REL 0 % L 4-12   EOSINOPHILS - REL 0 %  0-6   BASOPHILS - REL 0 %  0-1   ATYPICAL LYMPH 47 % H <=0   NEUTROPHILS ABS 3 65 Thousand/uL  1 85-7 62   LYMPHOTCYTES ABS 28 55 Thousand/uL H 0 60-4 47   MONOCYTES ABS 0 00 Thousand/uL  0 00-1 22   EOSINOPHILS ABS 0 00 Thousand/uL  0 00-0 40   BASOPHILS ABS 0 00 Thousand/uL  0 00-0 10   TOTAL COUNTED 100     Smudge Cells Present     PLT ESTIMATE Borderline A Adequate     (1) COMPREHENSIVE METABOLIC PANEL [id number] 63:51EB Gema Castro Order Number: [id number]     Test Name Result Flag Reference   GLUCOSE,RANDM 163 mg/dL H    If the patient is fasting, the ADA then defines impaired fasting glucose as > 100 mg/dL and diabetes as > or equal to 123 mg/dL  Specimen collection should occur prior to Sulfasalazine administration due to the potential for falsely depressed results  Specimen collection should occur prior to Sulfapyridine administration due to the potential for falsely elevated results  SODIUM 137 mmol/L  136-145   POTASSIUM 4 8 mmol/L  3 5-5 3   CHLORIDE 103 mmol/L  100-108   CARBON DIOXIDE 32 mmol/L  21-32   ANION GAP (CALC) 2 mmol/L L 4-13   BLOOD UREA NITROGEN 26 mg/dL H 5-25   CREATININE 1 20 mg/dL  0 60-1 30   Standardized to IDMS reference method   CALCIUM 8 7 mg/dL  8 3-10 1   BILI, TOTAL 0 60 mg/dL  0 20-1 00   ALK PHOSPHATAS 60 U/L     ALT (SGPT) 32 U/L  12-78   Specimen collection should occur prior to Sulfasalazine administration due to the potential for falsely depressed results  AST(SGOT) 22 U/L  5-45   Specimen collection should occur prior to Sulfasalazine administration due to the potential for falsely depressed results  ALBUMIN 3 6 g/dL  3 5-5 0   TOTAL PROTEIN 6 5 g/dL  6 4-8 2   eGFR 60 ml/min/1 73sq m     [facility] Disease Education Program recommendations are as follows:  GFR calculation is accurate only with a steady state creatinine  Chronic Kidney disease less than 60 ml/min/1 73 sq  meters  Kidney failure less than 15 ml/min/1 73 sq  meters       (1) IGG [id number] 08:34AM Ted Six Order Number: [id number]     Test Name Result Flag Reference   GAMMAGLOBULIN;  0 mg/dL L 700 0-1600 0     (1) LD (LDH) [id number] 08:34AM ProothiDaniela Order Number: [id number]     Test Name Result Flag Reference   LD (LDH) 184 U/L       (1) URIC ACID [id number] 08:34AM Proothi Harlin Cullens Order Number: [id number]     Test Name Result Flag Reference   URIC ACID 6 3 mg/dL  4 2-8 0   Specimen collection should occur prior to Metamizole administration due to the potential for falsely depressed results       Future Appointments    Date/Time Provider Specialty Site   12/26/2017 08:15 AM Saniya Castro MD Hematology Oncology CANCER [facility] ONCOLOGY [place]     Signatures   Electronically signed by : Derk Dakin, MD; Oct 17 2017 11:12PM EST                       (Author)

## 2017-10-20 DIAGNOSIS — C91.10 CHRONIC LYMPHOCYTIC LEUKEMIA OF B-CELL TYPE NOT HAVING ACHIEVED REMISSION (HCC): ICD-10-CM

## 2017-10-27 ENCOUNTER — APPOINTMENT (OUTPATIENT)
Dept: LAB | Facility: CLINIC | Age: 72
End: 2017-10-27
Payer: COMMERCIAL

## 2017-10-27 ENCOUNTER — GENERIC CONVERSION - ENCOUNTER (OUTPATIENT)
Dept: OTHER | Facility: OTHER | Age: 72
End: 2017-10-27

## 2017-10-27 DIAGNOSIS — C91.10 CHRONIC LYMPHOCYTIC LEUKEMIA OF B-CELL TYPE NOT HAVING ACHIEVED REMISSION (HCC): ICD-10-CM

## 2017-10-27 LAB
ALBUMIN SERPL BCP-MCNC: 3.6 G/DL (ref 3.5–5)
ALP SERPL-CCNC: 66 U/L (ref 46–116)
ALT SERPL W P-5'-P-CCNC: 26 U/L (ref 12–78)
ANION GAP SERPL CALCULATED.3IONS-SCNC: 6 MMOL/L (ref 4–13)
AST SERPL W P-5'-P-CCNC: 23 U/L (ref 5–45)
BASOPHILS # BLD MANUAL: 0 THOUSAND/UL (ref 0–0.1)
BASOPHILS NFR MAR MANUAL: 0 % (ref 0–1)
BILIRUB SERPL-MCNC: 0.6 MG/DL (ref 0.2–1)
BUN SERPL-MCNC: 26 MG/DL (ref 5–25)
CALCIUM SERPL-MCNC: 8.8 MG/DL (ref 8.3–10.1)
CHLORIDE SERPL-SCNC: 103 MMOL/L (ref 100–108)
CO2 SERPL-SCNC: 30 MMOL/L (ref 21–32)
CREAT SERPL-MCNC: 1.18 MG/DL (ref 0.6–1.3)
EOSINOPHIL # BLD MANUAL: 0 THOUSAND/UL (ref 0–0.4)
EOSINOPHIL NFR BLD MANUAL: 0 % (ref 0–6)
ERYTHROCYTE [DISTWIDTH] IN BLOOD BY AUTOMATED COUNT: 14.8 % (ref 11.6–15.1)
GFR SERPL CREATININE-BSD FRML MDRD: 62 ML/MIN/1.73SQ M
GLUCOSE SERPL-MCNC: 139 MG/DL (ref 65–140)
HCT VFR BLD AUTO: 45.5 % (ref 36.5–49.3)
HGB BLD-MCNC: 14.6 G/DL (ref 12–17)
IGG SERPL-MCNC: 735 MG/DL (ref 700–1600)
LDH SERPL-CCNC: 203 U/L (ref 81–234)
LYMPHOCYTES # BLD AUTO: 51.96 THOUSAND/UL (ref 0.6–4.47)
LYMPHOCYTES # BLD AUTO: 82 % (ref 14–44)
MCH RBC QN AUTO: 27.9 PG (ref 26.8–34.3)
MCHC RBC AUTO-ENTMCNC: 32.1 G/DL (ref 31.4–37.4)
MCV RBC AUTO: 87 FL (ref 82–98)
MONOCYTES # BLD AUTO: 0.63 THOUSAND/UL (ref 0–1.22)
MONOCYTES NFR BLD: 1 % (ref 4–12)
NEUTROPHILS # BLD MANUAL: 4.44 THOUSAND/UL (ref 1.85–7.62)
NEUTS SEG NFR BLD AUTO: 7 % (ref 43–75)
PLATELET # BLD AUTO: 112 THOUSANDS/UL (ref 149–390)
PLATELET BLD QL SMEAR: ABNORMAL
PMV BLD AUTO: 9.8 FL (ref 8.9–12.7)
POTASSIUM SERPL-SCNC: 4.9 MMOL/L (ref 3.5–5.3)
PROT SERPL-MCNC: 6.6 G/DL (ref 6.4–8.2)
RBC # BLD AUTO: 5.24 MILLION/UL (ref 3.88–5.62)
SMUDGE CELLS BLD QL SMEAR: PRESENT
SODIUM SERPL-SCNC: 139 MMOL/L (ref 136–145)
TOTAL CELLS COUNTED SPEC: 100
URATE SERPL-MCNC: 5.6 MG/DL (ref 4.2–8)
VARIANT LYMPHS # BLD AUTO: 10 %
WBC # BLD AUTO: 63.36 THOUSAND/UL (ref 4.31–10.16)

## 2017-10-27 PROCEDURE — 82232 ASSAY OF BETA-2 PROTEIN: CPT

## 2017-10-27 PROCEDURE — 83615 LACTATE (LD) (LDH) ENZYME: CPT

## 2017-10-27 PROCEDURE — 85007 BL SMEAR W/DIFF WBC COUNT: CPT

## 2017-10-27 PROCEDURE — 85027 COMPLETE CBC AUTOMATED: CPT

## 2017-10-27 PROCEDURE — 36415 COLL VENOUS BLD VENIPUNCTURE: CPT

## 2017-10-27 PROCEDURE — 84550 ASSAY OF BLOOD/URIC ACID: CPT

## 2017-10-27 PROCEDURE — 82784 ASSAY IGA/IGD/IGG/IGM EACH: CPT

## 2017-10-27 PROCEDURE — 80053 COMPREHEN METABOLIC PANEL: CPT

## 2017-10-28 LAB — B2 MICROGLOB SERPL-MCNC: 2.3 MG/L (ref 0.6–2.4)

## 2017-11-28 ENCOUNTER — GENERIC CONVERSION - ENCOUNTER (OUTPATIENT)
Dept: OTHER | Facility: OTHER | Age: 72
End: 2017-11-28

## 2017-11-28 ENCOUNTER — APPOINTMENT (OUTPATIENT)
Dept: LAB | Facility: CLINIC | Age: 72
End: 2017-11-28
Payer: COMMERCIAL

## 2017-11-28 DIAGNOSIS — C91.10 CHRONIC LYMPHOCYTIC LEUKEMIA OF B-CELL TYPE NOT HAVING ACHIEVED REMISSION (HCC): ICD-10-CM

## 2017-11-28 LAB
ALBUMIN SERPL BCP-MCNC: 3.5 G/DL (ref 3.5–5)
ALP SERPL-CCNC: 65 U/L (ref 46–116)
ALT SERPL W P-5'-P-CCNC: 27 U/L (ref 12–78)
ANION GAP SERPL CALCULATED.3IONS-SCNC: 5 MMOL/L (ref 4–13)
AST SERPL W P-5'-P-CCNC: 21 U/L (ref 5–45)
BASOPHILS # BLD MANUAL: 0 THOUSAND/UL (ref 0–0.1)
BASOPHILS NFR MAR MANUAL: 0 % (ref 0–1)
BILIRUB SERPL-MCNC: 0.6 MG/DL (ref 0.2–1)
BUN SERPL-MCNC: 25 MG/DL (ref 5–25)
CALCIUM SERPL-MCNC: 8.7 MG/DL (ref 8.3–10.1)
CHLORIDE SERPL-SCNC: 102 MMOL/L (ref 100–108)
CO2 SERPL-SCNC: 30 MMOL/L (ref 21–32)
CREAT SERPL-MCNC: 1.23 MG/DL (ref 0.6–1.3)
EOSINOPHIL # BLD MANUAL: 0 THOUSAND/UL (ref 0–0.4)
EOSINOPHIL NFR BLD MANUAL: 0 % (ref 0–6)
ERYTHROCYTE [DISTWIDTH] IN BLOOD BY AUTOMATED COUNT: 15 % (ref 11.6–15.1)
GFR SERPL CREATININE-BSD FRML MDRD: 59 ML/MIN/1.73SQ M
GLUCOSE SERPL-MCNC: 138 MG/DL (ref 65–140)
HCT VFR BLD AUTO: 45.7 % (ref 36.5–49.3)
HGB BLD-MCNC: 14.3 G/DL (ref 12–17)
IGG SERPL-MCNC: 704 MG/DL (ref 700–1600)
LDH SERPL-CCNC: 203 U/L (ref 81–234)
LYMPHOCYTES # BLD AUTO: 59.02 THOUSAND/UL (ref 0.6–4.47)
LYMPHOCYTES # BLD AUTO: 82 % (ref 14–44)
MCH RBC QN AUTO: 27.4 PG (ref 26.8–34.3)
MCHC RBC AUTO-ENTMCNC: 31.3 G/DL (ref 31.4–37.4)
MCV RBC AUTO: 88 FL (ref 82–98)
MONOCYTES # BLD AUTO: 0.72 THOUSAND/UL (ref 0–1.22)
MONOCYTES NFR BLD: 1 % (ref 4–12)
NEUTROPHILS # BLD MANUAL: 4.3 THOUSAND/UL (ref 1.85–7.62)
NEUTS SEG NFR BLD AUTO: 6 % (ref 43–75)
PLATELET # BLD AUTO: 104 THOUSANDS/UL (ref 149–390)
PLATELET BLD QL SMEAR: ABNORMAL
PMV BLD AUTO: 9.9 FL (ref 8.9–12.7)
POTASSIUM SERPL-SCNC: 5.4 MMOL/L (ref 3.5–5.3)
PROT SERPL-MCNC: 6.6 G/DL (ref 6.4–8.2)
RBC # BLD AUTO: 5.21 MILLION/UL (ref 3.88–5.62)
SODIUM SERPL-SCNC: 137 MMOL/L (ref 136–145)
TOTAL CELLS COUNTED SPEC: 100
URATE SERPL-MCNC: 6.1 MG/DL (ref 4.2–8)
VARIANT LYMPHS # BLD AUTO: 11 %
WBC # BLD AUTO: 71.97 THOUSAND/UL (ref 4.31–10.16)

## 2017-11-28 PROCEDURE — 83615 LACTATE (LD) (LDH) ENZYME: CPT

## 2017-11-28 PROCEDURE — 84550 ASSAY OF BLOOD/URIC ACID: CPT

## 2017-11-28 PROCEDURE — 80053 COMPREHEN METABOLIC PANEL: CPT

## 2017-11-28 PROCEDURE — 85027 COMPLETE CBC AUTOMATED: CPT

## 2017-11-28 PROCEDURE — 82784 ASSAY IGA/IGD/IGG/IGM EACH: CPT

## 2017-11-28 PROCEDURE — 36415 COLL VENOUS BLD VENIPUNCTURE: CPT

## 2017-11-28 PROCEDURE — 82232 ASSAY OF BETA-2 PROTEIN: CPT

## 2017-11-28 PROCEDURE — 85007 BL SMEAR W/DIFF WBC COUNT: CPT

## 2017-11-30 LAB — B2 MICROGLOB SERPL-MCNC: 2.2 MG/L (ref 0.6–2.4)

## 2017-12-22 ENCOUNTER — APPOINTMENT (OUTPATIENT)
Dept: LAB | Facility: CLINIC | Age: 72
End: 2017-12-22
Payer: COMMERCIAL

## 2017-12-22 ENCOUNTER — TRANSCRIBE ORDERS (OUTPATIENT)
Dept: LAB | Facility: CLINIC | Age: 72
End: 2017-12-22

## 2017-12-22 DIAGNOSIS — C91.10 LEUKEMIA, LYMPHOCYTIC, CHRONIC (HCC): ICD-10-CM

## 2017-12-22 DIAGNOSIS — C91.10 LEUKEMIA, LYMPHOCYTIC, CHRONIC (HCC): Primary | ICD-10-CM

## 2017-12-22 LAB
ALBUMIN SERPL BCP-MCNC: 3.6 G/DL (ref 3.5–5)
ALP SERPL-CCNC: 63 U/L (ref 46–116)
ALT SERPL W P-5'-P-CCNC: 27 U/L (ref 12–78)
ANION GAP SERPL CALCULATED.3IONS-SCNC: 7 MMOL/L (ref 4–13)
AST SERPL W P-5'-P-CCNC: 20 U/L (ref 5–45)
BASOPHILS # BLD MANUAL: 0 THOUSAND/UL (ref 0–0.1)
BASOPHILS NFR MAR MANUAL: 0 % (ref 0–1)
BILIRUB SERPL-MCNC: 0.6 MG/DL (ref 0.2–1)
BUN SERPL-MCNC: 32 MG/DL (ref 5–25)
CALCIUM SERPL-MCNC: 8.8 MG/DL (ref 8.3–10.1)
CHLORIDE SERPL-SCNC: 103 MMOL/L (ref 100–108)
CO2 SERPL-SCNC: 29 MMOL/L (ref 21–32)
CREAT SERPL-MCNC: 1.21 MG/DL (ref 0.6–1.3)
EOSINOPHIL # BLD MANUAL: 0.72 THOUSAND/UL (ref 0–0.4)
EOSINOPHIL NFR BLD MANUAL: 1 % (ref 0–6)
ERYTHROCYTE [DISTWIDTH] IN BLOOD BY AUTOMATED COUNT: 15.3 % (ref 11.6–15.1)
GFR SERPL CREATININE-BSD FRML MDRD: 60 ML/MIN/1.73SQ M
GLUCOSE SERPL-MCNC: 169 MG/DL (ref 65–140)
HCT VFR BLD AUTO: 45.7 % (ref 36.5–49.3)
HGB BLD-MCNC: 14.4 G/DL (ref 12–17)
IGA SERPL-MCNC: 145 MG/DL (ref 70–400)
IGG SERPL-MCNC: 641 MG/DL (ref 700–1600)
IGM SERPL-MCNC: 30 MG/DL (ref 40–230)
LDH SERPL-CCNC: 197 U/L (ref 81–234)
LYMPHOCYTES # BLD AUTO: 51.72 THOUSAND/UL (ref 0.6–4.47)
LYMPHOCYTES # BLD AUTO: 72 % (ref 14–44)
MCH RBC QN AUTO: 27.4 PG (ref 26.8–34.3)
MCHC RBC AUTO-ENTMCNC: 31.5 G/DL (ref 31.4–37.4)
MCV RBC AUTO: 87 FL (ref 82–98)
MONOCYTES # BLD AUTO: 1.44 THOUSAND/UL (ref 0–1.22)
MONOCYTES NFR BLD: 2 % (ref 4–12)
NEUTROPHILS # BLD MANUAL: 3.59 THOUSAND/UL (ref 1.85–7.62)
NEUTS SEG NFR BLD AUTO: 5 % (ref 43–75)
PLATELET # BLD AUTO: 105 THOUSANDS/UL (ref 149–390)
PLATELET BLD QL SMEAR: ABNORMAL
PMV BLD AUTO: 9.6 FL (ref 8.9–12.7)
POTASSIUM SERPL-SCNC: 5.2 MMOL/L (ref 3.5–5.3)
PROT SERPL-MCNC: 6.6 G/DL (ref 6.4–8.2)
RBC # BLD AUTO: 5.26 MILLION/UL (ref 3.88–5.62)
SODIUM SERPL-SCNC: 139 MMOL/L (ref 136–145)
TOTAL CELLS COUNTED SPEC: 100
URATE SERPL-MCNC: 5.7 MG/DL (ref 4.2–8)
VARIANT LYMPHS # BLD AUTO: 20 %
WBC # BLD AUTO: 71.83 THOUSAND/UL (ref 4.31–10.16)

## 2017-12-22 PROCEDURE — 83615 LACTATE (LD) (LDH) ENZYME: CPT

## 2017-12-22 PROCEDURE — 85027 COMPLETE CBC AUTOMATED: CPT

## 2017-12-22 PROCEDURE — 36415 COLL VENOUS BLD VENIPUNCTURE: CPT

## 2017-12-22 PROCEDURE — 82232 ASSAY OF BETA-2 PROTEIN: CPT

## 2017-12-22 PROCEDURE — 80053 COMPREHEN METABOLIC PANEL: CPT

## 2017-12-22 PROCEDURE — 84550 ASSAY OF BLOOD/URIC ACID: CPT

## 2017-12-22 PROCEDURE — 82784 ASSAY IGA/IGD/IGG/IGM EACH: CPT

## 2017-12-22 PROCEDURE — 85007 BL SMEAR W/DIFF WBC COUNT: CPT

## 2017-12-23 LAB — B2 MICROGLOB SERPL-MCNC: 2.5 MG/L (ref 0.6–2.4)

## 2017-12-28 ENCOUNTER — GENERIC CONVERSION - ENCOUNTER (OUTPATIENT)
Dept: OTHER | Facility: OTHER | Age: 72
End: 2017-12-28

## 2018-01-10 NOTE — MISCELLANEOUS
Message   Recorded as Task   Date: 10/27/2017 01:12 PM, Created By: Milagros Ibrahim   Task Name: Call Back   Assigned To: Roxane Buenrostro   Regarding Patient: Jorge Mckeon, Status: Active   Comment:    Soo Rodriguez - 27 Oct 4645 5:28 PM     TASK CREATED  critical results    wbc 63  36    called by lavell toure   Noted  Active Problems    1  CLL (chronic lymphocytic leukemia) (204 10) (C91 10)   2  Thrombocytopenia (287 5) (D69 6)    Current Meds   1  Baby Aspirin 81 MG CHEW; CHEW AND SWALLOW 1 TABLET DAILY; Therapy: (Ignacio Rodriguez) to Recorded   2  Fosinopril Sodium 20 MG Oral Tablet; TAKE 1 TABLET DAILY; Therapy: (Sinda Michael) to Recorded   3  Green Tea 250 MG Oral Capsule; TAKE 2 CAPSULE Daily; Therapy: (Recorded:17Oct2017) to Recorded   4  Magnesium 500 MG Oral Capsule; TAKE AS DIRECTED; Therapy: (Sinda Michael) to Recorded   5  Multi Vitamin Mens TABS; TAKE 1 TABLET DAILY; Therapy: (Sinda Michael) to Recorded   6  Nasacort Allergy 24HR 55 MCG/ACT Nasal Aerosol; SPRAY 2 PUFF Daily; Therapy: (Sinda Michael) to Recorded   7  Pantoprazole Sodium 40 MG Oral Tablet Delayed Release; Take 1 tablet daily; Therapy: (Sinda Michael) to Recorded   8  Sertraline HCl - 50 MG Oral Tablet; TAKE 1 TABLET DAILY; Therapy: (Sinda Michael) to Recorded   9  Zocor 20 MG Oral Tablet (Simvastatin); TAKE 1 TABLET DAILY; Therapy: (Sinda Michael) to Recorded    Allergies    1  Sulfa Drugs    2   No Known Food Allergies    Signatures   Electronically signed by : Akshat Murguia RN; Oct 27 2017  1:50PM EST                       (Author)

## 2018-01-10 NOTE — MISCELLANEOUS
Message   Recorded as Task   Date: 09/28/2017 09:43 AM, Created By: Darcy Russell   Task Name: Hospital Call   Assigned To: Racheal Osuna   Regarding Patient: Allison Vásquez, Status: Active   Yenny Jean - 28 Sep 2017 9:43 AM     TASK CREATED  Caller: Patric Sheehan; Hospital Call  Blanca Marin rehana the lab called with a Critical WBC 60 75   Noted  Active Problems    1  CLL (chronic lymphocytic leukemia) (204 10) (C91 10)   2  Thrombocytopenia (287 5) (D69 6)    Current Meds   1  Baby Aspirin 81 MG CHEW; CHEW AND SWALLOW 1 TABLET DAILY; [facility]ed   2  Co Q10 200 MG Oral Capsule; TAKE AS DIRECTED; [facility] to Recorded   3  Fosinopril Sodium 20 MG Oral Tablet; TAKE 1 TABLET DAILY; [facility] to Recorded   4  Magnesium 500 MG Oral Capsule; TAKE AS DIRECTED; [facility] to Recorded   5  Multi Vitamin Mens TABS; TAKE 1 TABLET DAILY; [facility] to Recorded   6  Nasacort Allergy 24HR 55 MCG/ACT Nasal Aerosol; SPRAY 2 PUFF Daily; Therapy: (Pheobe Lesley) to Recorded   7  Pantoprazole Sodium 40 MG Oral Tablet Delayed Release; Take 1 tablet daily; Therapy: (Pheobe Lesley) to Recorded   8  Sertraline HCl - 50 MG Oral Tablet; TAKE 1 TABLET DAILY; Therapy: (Pheobe Lesley) to Recorded   9  Zocor 20 MG Oral Tablet (Simvastatin); TAKE 1 TABLET DAILY; Therapy: (Pheobe Lesley) to Recorded    Allergies    1  Sulfa Drugs    2   No Known Food Allergies    Signatures   Electronically signed by : Aram Koch RN; Sep 28 2017 10:17AM EST                       (Author)

## 2018-01-13 VITALS
TEMPERATURE: 96.9 F | DIASTOLIC BLOOD PRESSURE: 74 MMHG | WEIGHT: 220 LBS | SYSTOLIC BLOOD PRESSURE: 140 MMHG | BODY MASS INDEX: 30.8 KG/M2 | OXYGEN SATURATION: 96 % | RESPIRATION RATE: 16 BRPM | HEART RATE: 70 BPM | HEIGHT: 71 IN

## 2018-01-13 VITALS
SYSTOLIC BLOOD PRESSURE: 146 MMHG | HEART RATE: 72 BPM | OXYGEN SATURATION: 97 % | DIASTOLIC BLOOD PRESSURE: 72 MMHG | RESPIRATION RATE: 15 BRPM | WEIGHT: 211.13 LBS | BODY MASS INDEX: 29.56 KG/M2 | TEMPERATURE: 97 F | HEIGHT: 71 IN

## 2018-01-16 NOTE — MISCELLANEOUS
Message   Recorded as Task   Date: 08/28/2017 10:53 AM, Created By: Heaven Coley   Task Name: Miscellaneous   Assigned To: Roxane Buenrostro   Regarding Patient: Magi Murphy, Status: Active   CommentKaran Lozano - 28 Aug 2017 10:53 AM     TASK CREATED  Caller: PAT, Other; Other  PAT CALLED FROM [facility] WITH CRITICAL VALUE ON PT-DRAWN TODAY  WBC-50 95    HEMOG-14 2   Noted  Active Problems    1  CLL (chronic lymphocytic leukemia) (204 10) (C91 10)   2  Thrombocytopenia (287 5) (D69 6)    Current Meds   1  Baby Aspirin 81 MG CHEW; CHEW AND SWALLOW 1 TABLET DAILY; Therapy: (Wilhelminia Red) to Recorded   2  Co Q10 200 MG Oral Capsule; TAKE AS DIRECTED; Therapy: (Wilhelminia Red) to Recorded   3  Fosinopril Sodium 20 MG Oral Tablet; TAKE 1 TABLET DAILY; Therapy: (Wilhelminia Red) to Recorded   4  Magnesium 500 MG Oral Capsule; TAKE AS DIRECTED; Therapy: (Wilhelminia Red) to Recorded   5  Multi Vitamin Mens TABS; TAKE 1 TABLET DAILY; Therapy: (Wilhelminia Red) to Recorded   6  Nasacort Allergy 24HR 55 MCG/ACT Nasal Aerosol; SPRAY 2 PUFF Daily; Therapy: (Wilhelminia Red) to Recorded   7  Pantoprazole Sodium 40 MG Oral Tablet Delayed Release; Take 1 tablet daily; Therapy: (Wilhelminia Red) to Recorded   8  Sertraline HCl - 50 MG Oral Tablet; TAKE 1 TABLET DAILY; Therapy: (Wilhelminia Red) to Recorded   9  Zocor 20 MG Oral Tablet (Simvastatin); TAKE 1 TABLET DAILY; Therapy: (Wilhelminia Red) to Recorded    Allergies    1  Sulfa Drugs    2   No Known Food Allergies    Signatures   Electronically signed by : Neelima Erazo RN; Aug 28 2017 10:58AM EST                       (Author)

## 2018-01-24 VITALS
BODY MASS INDEX: 31.11 KG/M2 | HEIGHT: 71 IN | DIASTOLIC BLOOD PRESSURE: 82 MMHG | SYSTOLIC BLOOD PRESSURE: 144 MMHG | HEART RATE: 70 BPM | RESPIRATION RATE: 16 BRPM | OXYGEN SATURATION: 95 % | TEMPERATURE: 96.8 F | WEIGHT: 222.25 LBS

## 2018-01-26 ENCOUNTER — TELEPHONE (OUTPATIENT)
Dept: HEMATOLOGY ONCOLOGY | Facility: CLINIC | Age: 73
End: 2018-01-26

## 2018-01-26 ENCOUNTER — TRANSCRIBE ORDERS (OUTPATIENT)
Dept: LAB | Facility: CLINIC | Age: 73
End: 2018-01-26

## 2018-01-26 ENCOUNTER — APPOINTMENT (OUTPATIENT)
Dept: LAB | Facility: CLINIC | Age: 73
End: 2018-01-26
Payer: COMMERCIAL

## 2018-01-26 DIAGNOSIS — C91.10 CHRONIC LYMPHOCYTIC LEUKEMIA OF B-CELL TYPE NOT HAVING ACHIEVED REMISSION (HCC): ICD-10-CM

## 2018-01-26 DIAGNOSIS — R53.83 OTHER FATIGUE: ICD-10-CM

## 2018-01-26 LAB
ALBUMIN SERPL BCP-MCNC: 3.6 G/DL (ref 3.5–5)
ALP SERPL-CCNC: 71 U/L (ref 46–116)
ALT SERPL W P-5'-P-CCNC: 26 U/L (ref 12–78)
ANION GAP SERPL CALCULATED.3IONS-SCNC: 8 MMOL/L (ref 4–13)
AST SERPL W P-5'-P-CCNC: 23 U/L (ref 5–45)
BASOPHILS # BLD MANUAL: 0 THOUSAND/UL (ref 0–0.1)
BASOPHILS NFR MAR MANUAL: 0 % (ref 0–1)
BILIRUB SERPL-MCNC: 0.5 MG/DL (ref 0.2–1)
BUN SERPL-MCNC: 28 MG/DL (ref 5–25)
CALCIUM SERPL-MCNC: 8.6 MG/DL (ref 8.3–10.1)
CHLORIDE SERPL-SCNC: 104 MMOL/L (ref 100–108)
CO2 SERPL-SCNC: 29 MMOL/L (ref 21–32)
CREAT SERPL-MCNC: 1.11 MG/DL (ref 0.6–1.3)
EOSINOPHIL # BLD MANUAL: 0 THOUSAND/UL (ref 0–0.4)
EOSINOPHIL NFR BLD MANUAL: 0 % (ref 0–6)
ERYTHROCYTE [DISTWIDTH] IN BLOOD BY AUTOMATED COUNT: 15.5 % (ref 11.6–15.1)
GFR SERPL CREATININE-BSD FRML MDRD: 66 ML/MIN/1.73SQ M
GLUCOSE SERPL-MCNC: 132 MG/DL (ref 65–140)
HCT VFR BLD AUTO: 43.4 % (ref 36.5–49.3)
HGB BLD-MCNC: 13.6 G/DL (ref 12–17)
IGG SERPL-MCNC: 652 MG/DL (ref 700–1600)
LDH SERPL-CCNC: 231 U/L (ref 81–234)
LYMPHOCYTES # BLD AUTO: 53.69 THOUSAND/UL (ref 0.6–4.47)
LYMPHOCYTES # BLD AUTO: 76 % (ref 14–44)
MCH RBC QN AUTO: 27.3 PG (ref 26.8–34.3)
MCHC RBC AUTO-ENTMCNC: 31.3 G/DL (ref 31.4–37.4)
MCV RBC AUTO: 87 FL (ref 82–98)
MONOCYTES # BLD AUTO: 2.12 THOUSAND/UL (ref 0–1.22)
MONOCYTES NFR BLD: 3 % (ref 4–12)
NEUTROPHILS # BLD MANUAL: 4.94 THOUSAND/UL (ref 1.85–7.62)
NEUTS SEG NFR BLD AUTO: 7 % (ref 43–75)
PLATELET # BLD AUTO: 111 THOUSANDS/UL (ref 149–390)
PLATELET BLD QL SMEAR: ABNORMAL
PMV BLD AUTO: 9.8 FL (ref 8.9–12.7)
POTASSIUM SERPL-SCNC: 5.3 MMOL/L (ref 3.5–5.3)
PROT SERPL-MCNC: 6.2 G/DL (ref 6.4–8.2)
RBC # BLD AUTO: 4.99 MILLION/UL (ref 3.88–5.62)
SMUDGE CELLS BLD QL SMEAR: PRESENT
SODIUM SERPL-SCNC: 141 MMOL/L (ref 136–145)
T3FREE SERPL-MCNC: 2.39 PG/ML (ref 2.3–4.2)
T4 FREE SERPL-MCNC: 1.02 NG/DL (ref 0.76–1.46)
TOTAL CELLS COUNTED SPEC: 100
TSH SERPL DL<=0.05 MIU/L-ACNC: 1.7 UIU/ML (ref 0.36–3.74)
URATE SERPL-MCNC: 5.8 MG/DL (ref 4.2–8)
VARIANT LYMPHS # BLD AUTO: 14 %
WBC # BLD AUTO: 70.64 THOUSAND/UL (ref 4.31–10.16)

## 2018-01-26 PROCEDURE — 84443 ASSAY THYROID STIM HORMONE: CPT

## 2018-01-26 PROCEDURE — 82784 ASSAY IGA/IGD/IGG/IGM EACH: CPT

## 2018-01-26 PROCEDURE — 84439 ASSAY OF FREE THYROXINE: CPT

## 2018-01-26 PROCEDURE — 85027 COMPLETE CBC AUTOMATED: CPT

## 2018-01-26 PROCEDURE — 84550 ASSAY OF BLOOD/URIC ACID: CPT

## 2018-01-26 PROCEDURE — 85007 BL SMEAR W/DIFF WBC COUNT: CPT

## 2018-01-26 PROCEDURE — 82232 ASSAY OF BETA-2 PROTEIN: CPT

## 2018-01-26 PROCEDURE — 84481 FREE ASSAY (FT-3): CPT

## 2018-01-26 PROCEDURE — 83615 LACTATE (LD) (LDH) ENZYME: CPT

## 2018-01-26 PROCEDURE — 80053 COMPREHEN METABOLIC PANEL: CPT

## 2018-01-26 PROCEDURE — 36415 COLL VENOUS BLD VENIPUNCTURE: CPT

## 2018-01-29 LAB — B2 MICROGLOB SERPL-MCNC: 2.3 MG/L (ref 0.6–2.4)

## 2018-02-13 NOTE — MISCELLANEOUS
Message   Recorded as Task   Date: 11/28/2017 10:20 AM, Created By: Daljit Stiles   Task Name: Call Back   Assigned To: Roxane Buenrostro   Regarding Patient: Monica Morillo, Status: Active   CommentBuren Bijou - 28 Nov 2017 10:20 AM     TASK CREATED  Caller: Anny Mcmanus; Results Inquiry; [phone]  called from [facility] lab with critical results of 71 63 wbc  Call back at [phone]   NOTED  Active Problems    1  CLL (chronic lymphocytic leukemia) (204 10) (C91 10)   2  Thrombocytopenia (287 5) (D69 6)    Current Meds   1  Baby Aspirin 81 MG CHEW; CHEW AND SWALLOW 1 TABLET DAILY; Therapy: (Kim Noel) to Recorded   2  Fosinopril Sodium 20 MG Oral Tablet; TAKE 1 TABLET DAILY; Therapy: (Kim Noel) to Recorded   3  Green Tea 250 MG Oral Capsule; TAKE 2 CAPSULE Daily; Therapy: (Recorded:17Oct2017) to Recorded   4  Magnesium 500 MG Oral Capsule; TAKE AS DIRECTED; Therapy: (Kim Noel) to Recorded   5  Multi Vitamin Mens TABS; TAKE 1 TABLET DAILY; Therapy: (Kim Noel) to Recorded   6  Nasacort Allergy 24HR 55 MCG/ACT Nasal Aerosol; SPRAY 2 PUFF Daily; Therapy: (Kim Noel) to Recorded   7  Pantoprazole Sodium 40 MG Oral Tablet Delayed Release; Take 1 tablet daily; Therapy: (Kim Noel) to Recorded   8  Sertraline HCl - 50 MG Oral Tablet; TAKE 1 TABLET DAILY; Therapy: (Kim Noel) to Recorded   9  Zocor 20 MG Oral Tablet (Simvastatin); TAKE 1 TABLET DAILY; Therapy: (Kim Noel) to Recorded    Allergies    1  Sulfa Drugs    2   No Known Food Allergies    Signatures   Electronically signed by : Sony Reina RN; Nov 28 2017 10:32AM EST                       (Author)

## 2018-02-28 ENCOUNTER — TRANSCRIBE ORDERS (OUTPATIENT)
Dept: LAB | Facility: CLINIC | Age: 73
End: 2018-02-28

## 2018-02-28 ENCOUNTER — TELEPHONE (OUTPATIENT)
Dept: HEMATOLOGY ONCOLOGY | Facility: CLINIC | Age: 73
End: 2018-02-28

## 2018-02-28 ENCOUNTER — APPOINTMENT (OUTPATIENT)
Dept: LAB | Facility: CLINIC | Age: 73
End: 2018-02-28
Payer: COMMERCIAL

## 2018-02-28 PROCEDURE — 84550 ASSAY OF BLOOD/URIC ACID: CPT | Performed by: INTERNAL MEDICINE

## 2018-02-28 PROCEDURE — 80053 COMPREHEN METABOLIC PANEL: CPT | Performed by: INTERNAL MEDICINE

## 2018-02-28 PROCEDURE — 85007 BL SMEAR W/DIFF WBC COUNT: CPT | Performed by: INTERNAL MEDICINE

## 2018-02-28 PROCEDURE — 82232 ASSAY OF BETA-2 PROTEIN: CPT | Performed by: INTERNAL MEDICINE

## 2018-02-28 PROCEDURE — 82784 ASSAY IGA/IGD/IGG/IGM EACH: CPT | Performed by: INTERNAL MEDICINE

## 2018-02-28 PROCEDURE — 36415 COLL VENOUS BLD VENIPUNCTURE: CPT | Performed by: INTERNAL MEDICINE

## 2018-02-28 PROCEDURE — 83615 LACTATE (LD) (LDH) ENZYME: CPT | Performed by: INTERNAL MEDICINE

## 2018-02-28 PROCEDURE — 85027 COMPLETE CBC AUTOMATED: CPT | Performed by: INTERNAL MEDICINE

## 2018-02-28 NOTE — TELEPHONE ENCOUNTER
Patient's WBC was 80 49 on 2/28/18 and 70 64  Patient is currently on surveillance  Patient is scheduled for f/u appt with Dr Scooter Gimenez on 3/12/18

## 2018-03-12 DIAGNOSIS — C91.10 CHRONIC LYMPHOCYTIC LEUKEMIA OF B-CELL TYPE NOT HAVING ACHIEVED REMISSION (HCC): ICD-10-CM

## 2018-03-12 DIAGNOSIS — R53.83 OTHER FATIGUE: ICD-10-CM

## 2018-03-26 ENCOUNTER — APPOINTMENT (OUTPATIENT)
Dept: LAB | Facility: CLINIC | Age: 73
End: 2018-03-26
Payer: COMMERCIAL

## 2018-03-26 ENCOUNTER — TRANSCRIBE ORDERS (OUTPATIENT)
Dept: LAB | Facility: CLINIC | Age: 73
End: 2018-03-26

## 2018-03-26 ENCOUNTER — TELEPHONE (OUTPATIENT)
Dept: HEMATOLOGY ONCOLOGY | Facility: CLINIC | Age: 73
End: 2018-03-26

## 2018-03-26 DIAGNOSIS — C91.10 LEUKEMIA, LYMPHOCYTIC, CHRONIC (HCC): Primary | ICD-10-CM

## 2018-03-26 DIAGNOSIS — C91.10 LEUKEMIA, LYMPHOCYTIC, CHRONIC (HCC): ICD-10-CM

## 2018-03-26 PROCEDURE — 84550 ASSAY OF BLOOD/URIC ACID: CPT | Performed by: INTERNAL MEDICINE

## 2018-03-26 PROCEDURE — 80053 COMPREHEN METABOLIC PANEL: CPT | Performed by: INTERNAL MEDICINE

## 2018-03-26 PROCEDURE — 36415 COLL VENOUS BLD VENIPUNCTURE: CPT | Performed by: INTERNAL MEDICINE

## 2018-03-26 PROCEDURE — 83615 LACTATE (LD) (LDH) ENZYME: CPT | Performed by: INTERNAL MEDICINE

## 2018-03-26 PROCEDURE — 85027 COMPLETE CBC AUTOMATED: CPT | Performed by: INTERNAL MEDICINE

## 2018-03-26 PROCEDURE — 85007 BL SMEAR W/DIFF WBC COUNT: CPT | Performed by: INTERNAL MEDICINE

## 2018-03-26 PROCEDURE — 82784 ASSAY IGA/IGD/IGG/IGM EACH: CPT | Performed by: INTERNAL MEDICINE

## 2018-03-26 PROCEDURE — 82232 ASSAY OF BETA-2 PROTEIN: CPT | Performed by: INTERNAL MEDICINE

## 2018-03-26 NOTE — TELEPHONE ENCOUNTER
Patient with hx of CLL had a WBC of 88 70 on 3/26/18 (was previously 80 49 on 2/28/18)  Patient is scheduled for an appt with Dr Marylene Bottom tomorrow on 3/27/18

## 2018-03-27 ENCOUNTER — OFFICE VISIT (OUTPATIENT)
Dept: HEMATOLOGY ONCOLOGY | Facility: CLINIC | Age: 73
End: 2018-03-27
Payer: COMMERCIAL

## 2018-03-27 VITALS
WEIGHT: 230 LBS | OXYGEN SATURATION: 95 % | BODY MASS INDEX: 32.2 KG/M2 | RESPIRATION RATE: 16 BRPM | DIASTOLIC BLOOD PRESSURE: 84 MMHG | HEART RATE: 69 BPM | HEIGHT: 71 IN | SYSTOLIC BLOOD PRESSURE: 142 MMHG | TEMPERATURE: 97 F

## 2018-03-27 DIAGNOSIS — C91.10 CLL (CHRONIC LYMPHOCYTIC LEUKEMIA) (HCC): Primary | ICD-10-CM

## 2018-03-27 PROCEDURE — 99214 OFFICE O/P EST MOD 30 MIN: CPT | Performed by: INTERNAL MEDICINE

## 2018-03-27 NOTE — PROGRESS NOTES
HPI:   Charlette Spence is a 71 y o  male with 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 ,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, IgVH mutation, lack of CD38 expression, deletion of 13 q14 in 6%, no 11 q deletion and no trisomy 15  Lymphocytes were CD5 and CD23 positive, dim kappa expression and moderate CD20 expression  CT scan showed  splenomegaly but not on palpation  Patient feels tired  He feels anxious  No CLL related symptoms   He has not received any treatment and is on surveillance  Current Outpatient Prescriptions:     aspirin 81 mg chewable tablet, Chew 81 mg daily, Disp: , Rfl:     co-enzyme Q-10 50 MG capsule, Take 200 mg by mouth daily, Disp: , Rfl:     fosinopril (MONOPRIL) 20 mg tablet, Take 20 mg by mouth daily, Disp: , Rfl:     magnesium gluconate (MAGONATE) 500 mg tablet, Take 500 mg by mouth 2 (two) times a day, Disp: , Rfl:     mometasone (NASONEX) 50 mcg/act nasal spray, 2 sprays into each nostril daily, Disp: , Rfl:     Multiple Vitamin (MULTIVITAMIN) tablet, Take 1 tablet by mouth daily, Disp: , Rfl:     sertraline (ZOLOFT) 50 mg tablet, Take 75 mg by mouth daily  , Disp: , Rfl:     simvastatin (ZOCOR) 20 mg tablet, Take 20 mg by mouth daily at bedtime, Disp: , Rfl:     Allergies   Allergen Reactions    Sulfa Antibiotics        ROS:  03/27/18 Reviewed 13 systems:  Presently no headaches, seizures, dizziness, diplopia, dysphagia, hoarseness, chest pain, palpitations, shortness of breath, cough, hemoptysis, abdominal pain, nausea, vomiting, change in bowel habits, melena, hematuria, fever, chills, bleeding, bone pains, skin rash, weight loss, arthritic symptoms, numbness,  weakness, claudication and gait problem  No frequent infections  No hot flashes  Not unusually sensitive to heat or cold  No swelling of the ankles  No swollen glands  Patient is anxious   Other symptoms are in HPI        /84 (BP Location: Left arm, Cuff Size: Adult)   Pulse 69   Temp (!) 97 °F (36 1 °C) (Tympanic)   Resp 16   Ht 5' 11" (1 803 m)   Wt 104 kg (230 lb)   SpO2 95%   BMI 32 08 kg/m²     Physical Exam:  Alert, oriented, not in distress, no icterus, no oral thrush, no palpable neck mass, clear lung fields, regular heart rate, abdomen  soft and non tender, no palpable abdominal mass, no ascites, no edema of ankles, no calf tenderness, no focal neurological deficit, no skin rash, no palpable lymphadenopathy, good arterial pulses, no clubbing  Patient is anxious  Performance status 0      IMAGING:  No orders to display       LABS:  Results for orders placed or performed in visit on 03/16/18   CBC and differential   Result Value Ref Range    WBC 88 70 (HH) 4 31 - 10 16 Thousand/uL    RBC 5 11 3 88 - 5 62 Million/uL    Hemoglobin 13 9 12 0 - 17 0 g/dL    Hematocrit 44 3 36 5 - 49 3 %    MCV 87 82 - 98 fL    MCH 27 2 26 8 - 34 3 pg    MCHC 31 4 31 4 - 37 4 g/dL    RDW 15 1 11 6 - 15 1 %    MPV 9 6 8 9 - 12 7 fL    Platelets 023 (L) 144 - 390 Thousands/uL   Comprehensive metabolic panel   Result Value Ref Range    Sodium 139 136 - 145 mmol/L    Potassium 5 1 3 5 - 5 3 mmol/L    Chloride 104 100 - 108 mmol/L    CO2 30 21 - 32 mmol/L    Anion Gap 5 4 - 13 mmol/L    BUN 29 (H) 5 - 25 mg/dL    Creatinine 1 19 0 60 - 1 30 mg/dL    Glucose 160 (H) 65 - 140 mg/dL    Calcium 8 4 8 3 - 10 1 mg/dL    AST 20 5 - 45 U/L    ALT 23 12 - 78 U/L    Alkaline Phosphatase 67 46 - 116 U/L    Total Protein 6 4 6 4 - 8 2 g/dL    Albumin 3 6 3 5 - 5 0 g/dL    Total Bilirubin 0 50 0 20 - 1 00 mg/dL    eGFR 61 ml/min/1 73sq m   IgG, IgA, IgM   Result Value Ref Range     0 70 0 - 400 0 mg/dL     0 (L) 700 0 - 1,600 0 mg/dL    IGM 35 0 (L) 40 0 - 230 0 mg/dL   LD,Blood   Result Value Ref Range     81 - 234 U/L   Uric acid   Result Value Ref Range    Uric Acid 4 8 4 2 - 8 0 mg/dL   Manual Differential(PHLEBS Do Not Order)   Result Value Ref Range    Segmented % 1 (L) 43 - 75 %    Lymphocytes % 85 (H) 14 - 44 %    Monocytes % 3 (L) 4 - 12 %    Eosinophils % 0 0 - 6 %    Basophils % 0 0 - 1 %    Atypical Lymphocytes % 11 (H) <=0 %    Absolute Neutrophils 0 89 (L) 1 85 - 7 62 Thousand/uL    Lymphocytes Absolute 75 40 (H) 0 60 - 4 47 Thousand/uL    Monocytes Absolute 2 66 (H) 0 00 - 1 22 Thousand/uL    Eosinophils Absolute 0 00 0 00 - 0 40 Thousand/uL    Basophils Absolute 0 00 0 00 - 0 10 Thousand/uL    Total Counted 100     Toxic Granulation Present     Platelet Estimate Decreased (A) Adequate         Reviewed and discussed with patient  Assessment and plan:  Salma Jha is a 71 y o  male with 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 ,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, IgVH mutation, lack of CD38 expression, deletion of 13 q14 in 6%, no 11 q deletion and no trisomy 15  Lymphocytes were CD5 and CD23 positive, dim kappa expression and moderate CD20 expression  CT scan showed  splenomegaly but not on palpation  Patient feels tired  He feels anxious  No CLL related symptoms   He has not received any treatment and is on surveillance  [facility] Physical examination and test results are as recorded and discussed  There is increase in WBC and lymphocyte count and no other change     Chances are he will be needing treatment for CLL sooner rather than later  I have discussed this with patient  He has information on ibrutinib  Blood counts and CLL related blood tests are being monitored every month  He plans to come back in 3 months  Condition and plan discussed  Questions answered  He will stay in touch  He will keep himself hydrated  Patient voiced understanding and agreement in the discussion  Counseling / Coordination of Care   Greater than 50% of total time was spent with the patient and / or family counseling and / or coordination of care

## 2018-03-27 NOTE — LETTER
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 ,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, IgVH mutation, lack of CD38 expression, deletion of 13 q14 in 6%, no 11 q deletion and no trisomy 12  Lymphocytes were CD5 and CD23 positive, dim kappa expression and moderate CD20 expression  CT scan showed  splenomegaly but not on palpation  Patient feels tired  He feels anxious  No CLL related symptoms   He has not received any treatment and is on surveillance           Current Outpatient Prescriptions:     aspirin 81 mg chewable tablet, Chew 81 mg daily, Disp: , Rfl:     co-enzyme Q-10 50 MG capsule, Take 200 mg by mouth daily, Disp: , Rfl:     fosinopril (MONOPRIL) 20 mg tablet, Take 20 mg by mouth daily, Disp: , Rfl:     magnesium gluconate (MAGONATE) 500 mg tablet, Take 500 mg by mouth 2 (two) times a day, Disp: , Rfl:     mometasone (NASONEX) 50 mcg/act nasal spray, 2 sprays into each nostril daily, Disp: , Rfl:     Multiple Vitamin (MULTIVITAMIN) tablet, Take 1 tablet by mouth daily, Disp: , Rfl:     sertraline (ZOLOFT) 50 mg tablet, Take 75 mg by mouth daily  , Disp: , Rfl:     simvastatin (ZOCOR) 20 mg tablet, Take 20 mg by mouth daily at bedtime, Disp: , Rfl:     Allergies   Allergen Reactions    Sulfa Antibiotics        ROS:  03/27/18 Reviewed 13 systems:  Presently no headaches, seizures, dizziness, diplopia, dysphagia, hoarseness, chest pain, palpitations, shortness of breath, cough, hemoptysis, abdominal pain, nausea, vomiting, change in bowel habits, melena, hematuria, fever, chills, bleeding, bone pains, skin rash, weight loss, arthritic symptoms, numbness,  weakness, claudication and gait problem  No frequent infections  No hot flashes  Not unusually sensitive to heat or cold  No swelling of the ankles  No swollen glands  Patient is anxious  Other symptoms are in HPI        /84 (BP Location: Left arm, Cuff Size: Adult)   Pulse 69   Temp (!) 97 °F (36 1 °C) (Tympanic)   Resp 16   Ht 5' 11" (1 803 m)   Wt 104 kg (230 lb)   SpO2 95%   BMI 32 08 kg/m²      Physical Exam:  Alert, oriented, not in distress, no icterus, no oral thrush, no palpable neck mass, clear lung fields, regular heart rate, abdomen  soft and non tender, no palpable abdominal mass, no ascites, no edema of ankles, no calf tenderness, no focal neurological deficit, no skin rash, no palpable lymphadenopathy, good arterial pulses, no clubbing  Patient is anxious  Performance status 0      IMAGING:  No orders to display       LABS:  Results for orders placed or performed in visit on 03/16/18   CBC and differential   Result Value Ref Range    WBC 88 70 (HH) 4 31 - 10 16 Thousand/uL    RBC 5 11 3 88 - 5 62 Million/uL    Hemoglobin 13 9 12 0 - 17 0 g/dL    Hematocrit 44 3 36 5 - 49 3 %    MCV 87 82 - 98 fL    MCH 27 2 26 8 - 34 3 pg    MCHC 31 4 31 4 - 37 4 g/dL    RDW 15 1 11 6 - 15 1 %    MPV 9 6 8 9 - 12 7 fL    Platelets 061 (L) 577 - 390 Thousands/uL   Comprehensive metabolic panel   Result Value Ref Range    Sodium 139 136 - 145 mmol/L    Potassium 5 1 3 5 - 5 3 mmol/L    Chloride 104 100 - 108 mmol/L    CO2 30 21 - 32 mmol/L    Anion Gap 5 4 - 13 mmol/L    BUN 29 (H) 5 - 25 mg/dL    Creatinine 1 19 0 60 - 1 30 mg/dL    Glucose 160 (H) 65 - 140 mg/dL    Calcium 8 4 8 3 - 10 1 mg/dL    AST 20 5 - 45 U/L    ALT 23 12 - 78 U/L    Alkaline Phosphatase 67 46 - 116 U/L    Total Protein 6 4 6 4 - 8 2 g/dL    Albumin 3 6 3 5 - 5 0 g/dL    Total Bilirubin 0 50 0 20 - 1 00 mg/dL    eGFR 61 ml/min/1 73sq m   IgG, IgA, IgM   Result Value Ref Range     0 70 0 - 400 0 mg/dL     0 (L) 700 0 - 1,600 0 mg/dL    IGM 35 0 (L) 40 0 - 230 0 mg/dL   LD,Blood   Result Value Ref Range     81 - 234 U/L   Uric acid   Result Value Ref Range    Uric Acid 4 8 4 2 - 8 0 mg/dL   Manual Differential(PHLEBS Do Not Order)   Result Value Ref Range    Segmented % 1 (L) 43 - 75 %    Lymphocytes % 85 (H) 14 - 44 %    Monocytes % 3 (L) 4 - 12 %    Eosinophils % 0 0 - 6 %    Basophils % 0 0 - 1 %    Atypical Lymphocytes % 11 (H) <=0 %    Absolute Neutrophils 0 89 (L) 1 85 - 7 62 Thousand/uL    Lymphocytes Absolute 75 40 (H) 0 60 - 4 47 Thousand/uL    Monocytes Absolute 2 66 (H) 0 00 - 1 22 Thousand/uL    Eosinophils Absolute 0 00 0 00 - 0 40 Thousand/uL    Basophils Absolute 0 00 0 00 - 0 10 Thousand/uL    Total Counted 100     Toxic Granulation Present     Platelet Estimate Decreased (A) Adequate         Reviewed and discussed with patient  Assessment and plan:  Yuko Harrison is a 71 y o  male with 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 ,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, IgVH mutation, lack of CD38 expression, deletion of 13 q14 in 6%, no 11 q deletion and no trisomy 15  Lymphocytes were CD5 and CD23 positive, dim kappa expression and moderate CD20 expression  CT scan showed  splenomegaly but not on palpation  Patient feels tired  He feels anxious  No CLL related symptoms   He has not received any treatment and is on surveillance  Buster Manifold Physical examination and test results are as recorded and discussed  There is increase in WBC and lymphocyte count and no other change     Chances are he will be needing treatment for CLL sooner rather than later  I have discussed this with patient  He has information on ibrutinib    Blood counts and CLL related blood tests are being monitored every month  He plans to come back in 3 months  Condition and plan discussed  Questions answered  He will stay in touch  He will keep himself hydrated  Patient voiced understanding and agreement in the discussion  Counseling / Coordination of Care   Greater than 50% of total time was spent with the patient and / or family counseling and / or coordination of care

## 2018-04-27 ENCOUNTER — APPOINTMENT (OUTPATIENT)
Dept: LAB | Facility: CLINIC | Age: 73
End: 2018-04-27
Payer: COMMERCIAL

## 2018-04-27 ENCOUNTER — TELEPHONE (OUTPATIENT)
Dept: HEMATOLOGY ONCOLOGY | Facility: CLINIC | Age: 73
End: 2018-04-27

## 2018-04-27 NOTE — TELEPHONE ENCOUNTER
Patient with history of CLL and is currently on surveillance  Patient's 3/26/18 WBC was 88 70  Patient's WBC is stable

## 2018-05-29 ENCOUNTER — TELEPHONE (OUTPATIENT)
Dept: HEMATOLOGY ONCOLOGY | Facility: CLINIC | Age: 73
End: 2018-05-29

## 2018-05-29 ENCOUNTER — LAB (OUTPATIENT)
Dept: LAB | Facility: CLINIC | Age: 73
End: 2018-05-29
Payer: COMMERCIAL

## 2018-05-29 NOTE — TELEPHONE ENCOUNTER
Patient with a hx of CLL had a WBC of 91 13 on 5/29/18  Patient's 4/27 WBC was 84 59 and 3/26 was 88 70  Patient is currently on surveillance and is to have monthly CBC's drawn

## 2018-06-08 ENCOUNTER — HOSPITAL ENCOUNTER (EMERGENCY)
Facility: HOSPITAL | Age: 73
Discharge: HOME | End: 2018-06-08
Attending: EMERGENCY MEDICINE | Admitting: EMERGENCY MEDICINE
Payer: COMMERCIAL

## 2018-06-08 VITALS
HEIGHT: 71 IN | OXYGEN SATURATION: 98 % | BODY MASS INDEX: 31.11 KG/M2 | WEIGHT: 222.22 LBS | HEART RATE: 65 BPM | SYSTOLIC BLOOD PRESSURE: 159 MMHG | RESPIRATION RATE: 16 BRPM | TEMPERATURE: 98.4 F | DIASTOLIC BLOOD PRESSURE: 83 MMHG

## 2018-06-08 DIAGNOSIS — F41.0 ANXIETY ATTACK: ICD-10-CM

## 2018-06-08 DIAGNOSIS — R55 NEAR SYNCOPE: Primary | ICD-10-CM

## 2018-06-08 LAB
ANION GAP SERPL CALCULATED.3IONS-SCNC: 6 MMOL/L (ref 4–13)
ATRIAL RATE: 60 BPM
BASOPHILS # BLD MANUAL: 0 THOUSAND/UL (ref 0–0.1)
BASOPHILS NFR MAR MANUAL: 0 % (ref 0–1)
BUN SERPL-MCNC: 34 MG/DL (ref 5–25)
CALCIUM SERPL-MCNC: 8.9 MG/DL (ref 8.3–10.1)
CHLORIDE SERPL-SCNC: 103 MMOL/L (ref 100–108)
CO2 SERPL-SCNC: 27 MMOL/L (ref 21–32)
CREAT SERPL-MCNC: 0.95 MG/DL (ref 0.6–1.3)
EOSINOPHIL # BLD MANUAL: 1.85 THOUSAND/UL (ref 0–0.4)
EOSINOPHIL NFR BLD MANUAL: 2 % (ref 0–6)
ERYTHROCYTE [DISTWIDTH] IN BLOOD BY AUTOMATED COUNT: 15.2 % (ref 11.6–15.1)
GFR SERPL CREATININE-BSD FRML MDRD: 80 ML/MIN/1.73SQ M
GLUCOSE SERPL-MCNC: 114 MG/DL (ref 65–140)
HCT VFR BLD AUTO: 43 % (ref 36.5–49.3)
HGB BLD-MCNC: 13.8 G/DL (ref 12–17)
LYMPHOCYTES # BLD AUTO: 77.53 THOUSAND/UL (ref 0.6–4.47)
LYMPHOCYTES # BLD AUTO: 84 % (ref 14–44)
MAGNESIUM SERPL-MCNC: 2.1 MG/DL (ref 1.6–2.6)
MCH RBC QN AUTO: 27.4 PG (ref 26.8–34.3)
MCHC RBC AUTO-ENTMCNC: 32.1 G/DL (ref 31.4–37.4)
MCV RBC AUTO: 86 FL (ref 82–98)
MONOCYTES # BLD AUTO: 2.77 THOUSAND/UL (ref 0–1.22)
MONOCYTES NFR BLD: 3 % (ref 4–12)
NEUTROPHILS # BLD MANUAL: 6.46 THOUSAND/UL (ref 1.85–7.62)
NEUTS SEG NFR BLD AUTO: 7 % (ref 43–75)
P AXIS: -39 DEGREES
PLATELET # BLD AUTO: 110 THOUSANDS/UL (ref 149–390)
PLATELET BLD QL SMEAR: ABNORMAL
PMV BLD AUTO: 9.3 FL (ref 8.9–12.7)
POTASSIUM SERPL-SCNC: 4.6 MMOL/L (ref 3.5–5.3)
PR INTERVAL: 128 MS
QRS AXIS: 87 DEGREES
QRSD INTERVAL: 82 MS
QT INTERVAL: 410 MS
QTC INTERVAL: 410 MS
RBC # BLD AUTO: 5.03 MILLION/UL (ref 3.88–5.62)
SMUDGE CELLS BLD QL SMEAR: PRESENT
SODIUM SERPL-SCNC: 136 MMOL/L (ref 136–145)
T WAVE AXIS: 34 DEGREES
TOTAL CELLS COUNTED SPEC: 100
TROPONIN I SERPL-MCNC: <0.02 NG/ML
TSH SERPL DL<=0.05 MIU/L-ACNC: 1.37 UIU/ML (ref 0.36–3.74)
VARIANT LYMPHS # BLD AUTO: 4 %
VENTRICULAR RATE: 60 BPM
WBC # BLD AUTO: 92.3 THOUSAND/UL (ref 4.31–10.16)

## 2018-06-08 PROCEDURE — 36415 COLL VENOUS BLD VENIPUNCTURE: CPT | Performed by: NURSE PRACTITIONER

## 2018-06-08 PROCEDURE — 80048 BASIC METABOLIC PNL TOTAL CA: CPT | Performed by: NURSE PRACTITIONER

## 2018-06-08 PROCEDURE — 93010 ELECTROCARDIOGRAM REPORT: CPT | Performed by: INTERNAL MEDICINE

## 2018-06-08 PROCEDURE — 85027 COMPLETE CBC AUTOMATED: CPT | Performed by: NURSE PRACTITIONER

## 2018-06-08 PROCEDURE — 84443 ASSAY THYROID STIM HORMONE: CPT | Performed by: NURSE PRACTITIONER

## 2018-06-08 PROCEDURE — 83735 ASSAY OF MAGNESIUM: CPT | Performed by: NURSE PRACTITIONER

## 2018-06-08 PROCEDURE — 85007 BL SMEAR W/DIFF WBC COUNT: CPT | Performed by: NURSE PRACTITIONER

## 2018-06-08 PROCEDURE — 96360 HYDRATION IV INFUSION INIT: CPT

## 2018-06-08 PROCEDURE — 93005 ELECTROCARDIOGRAM TRACING: CPT

## 2018-06-08 PROCEDURE — 99284 EMERGENCY DEPT VISIT MOD MDM: CPT

## 2018-06-08 PROCEDURE — 84484 ASSAY OF TROPONIN QUANT: CPT | Performed by: NURSE PRACTITIONER

## 2018-06-08 PROCEDURE — 96361 HYDRATE IV INFUSION ADD-ON: CPT

## 2018-06-08 RX ORDER — SODIUM CHLORIDE, SODIUM GLUCONATE, SODIUM ACETATE, POTASSIUM CHLORIDE, MAGNESIUM CHLORIDE, SODIUM PHOSPHATE, DIBASIC, AND POTASSIUM PHOSPHATE .53; .5; .37; .037; .03; .012; .00082 G/100ML; G/100ML; G/100ML; G/100ML; G/100ML; G/100ML; G/100ML
1000 INJECTION, SOLUTION INTRAVENOUS ONCE
Status: DISCONTINUED | OUTPATIENT
Start: 2018-06-08 | End: 2018-06-08

## 2018-06-08 RX ADMIN — SODIUM CHLORIDE 1000 ML: 0.9 INJECTION, SOLUTION INTRAVENOUS at 12:28

## 2018-06-08 NOTE — ED PROVIDER NOTES
History  Chief Complaint   Patient presents with    Fatigue     C/o near syncopal event x2 occuring today  Pt was outside doing lawn work (felt fine), went inside to have a BM (felt "like I was going to pass out"), drank two bottles of water, went for mail, felt near syncopal event again  Patient fells fatigued  Denies CP, SOB, palpitations, nausea  Pt wears HR watch, WNL this AM       73-year-old male presents to the emergency department with the complaints of near-syncope,  Relays that he has similar episode last summer where he  syncopized after consuming wine while outside in the summer heat  Has medical history of CLL, hypertension, anxiety  Reports that   He has recently been involved and more strenuous activity than usual and today he was working outside completing lawn work for approximately 2 hr, he went to the bathroom to have a bowel movement where he became diaphoretic and felt as though he was going to pass out and extreme fatigue  Denies loss of consciousness at this time patient was able to transition his of to living room where he began to hydrate with 2 bottles of water and a protein shake  Rested for approximately 45 mins  He then attempted to walk to the mailbox and symptoms reoccurred and did not resolve as previous  Denies loss consciousness was able to transition to house call wife  Denies cardiac history or history of arrhythmias,  DVTs, chest pain/tenderness, shortness of breath, nausea, vomiting, open wounds or sores to skin, pain in extremities,  Visual disturbances, headache, changes in mental status,  Difficulty concentrating,  Fever, lightheadedness  History provided by:  Patient   used: No    Fatigue   Associated symptoms: no abdominal pain, no dizziness, no fever, no headaches, no myalgias, no seizures and no shortness of breath        Prior to Admission Medications   Prescriptions Last Dose Informant Patient Reported? Taking?    Multiple Vitamin (MULTIVITAMIN) tablet  Self Yes No   Sig: Take 1 tablet by mouth daily   aspirin 81 mg chewable tablet  Self Yes No   Sig: Chew 81 mg daily   co-enzyme Q-10 50 MG capsule  Self Yes No   Sig: Take 200 mg by mouth daily   fosinopril (MONOPRIL) 20 mg tablet  Self Yes No   Sig: Take 20 mg by mouth daily   magnesium gluconate (MAGONATE) 500 mg tablet  Self Yes No   Sig: Take 500 mg by mouth 2 (two) times a day   mometasone (NASONEX) 50 mcg/act nasal spray  Self Yes No   Si sprays into each nostril daily   sertraline (ZOLOFT) 50 mg tablet  Self Yes No   Sig: Take 75 mg by mouth daily     simvastatin (ZOCOR) 20 mg tablet  Self Yes No   Sig: Take 20 mg by mouth daily at bedtime      Facility-Administered Medications: None       Past Medical History:   Diagnosis Date    Anxiety     Hypertension     Leukemia ([facility] [address] )        Past Surgical History:   Procedure Laterality Date    APPENDECTOMY      COLONOSCOPY      TONSILLECTOMY         Family History   Problem Relation Age of Onset    No Known Problems Mother     No Known Problems Father      I have reviewed and agree with the history as documented  Social History   Substance Use Topics    Smoking status: Never Smoker    Smokeless tobacco: Never Used    Alcohol use Yes      Comment: minimal        Review of Systems   Constitutional: Positive for diaphoresis and fatigue  Negative for chills and fever  Eyes: Negative for photophobia and visual disturbance  Respiratory: Negative for chest tightness, shortness of breath and wheezing  Gastrointestinal: Negative for abdominal distention and abdominal pain  Genitourinary: Negative for difficulty urinating  Musculoskeletal: Negative for back pain, gait problem, myalgias and neck pain  Neurological: Negative for dizziness, seizures, weakness, light-headedness and headaches  Psychiatric/Behavioral: Negative for agitation  All other systems reviewed and are negative        Physical Exam  Physical Exam Constitutional: He is oriented to person, place, and time  He appears well-developed and well-nourished  No distress  HENT:   Head: Normocephalic  Mouth/Throat: Oropharynx is clear and moist    Eyes: EOM are normal  Pupils are equal, round, and reactive to light  Neck: Normal range of motion  Neck supple  Cardiovascular: Normal rate and intact distal pulses  Exam reveals no gallop and no friction rub  No murmur heard  Pulmonary/Chest: Effort normal and breath sounds normal  No stridor  No respiratory distress  He has no wheezes  He has no rales  Abdominal: Soft  Bowel sounds are normal    Musculoskeletal: Normal range of motion  Neurological: He is alert and oriented to person, place, and time  He displays normal reflexes  No cranial nerve deficit or sensory deficit  He exhibits normal muscle tone  Coordination normal    Skin: Skin is warm and dry  Capillary refill takes less than 2 seconds  He is not diaphoretic  Psychiatric: He has a normal mood and affect  Nursing note and vitals reviewed        Vital Signs  ED Triage Vitals   Temperature Pulse Respirations Blood Pressure SpO2   06/08/18 1230 06/08/18 1103 06/08/18 1103 06/08/18 1103 06/08/18 1103   98 4 °F (36 9 °C) 63 18 166/75 98 %      Temp Source Heart Rate Source Patient Position - Orthostatic VS BP Location FiO2 (%)   06/08/18 1103 06/08/18 1103 06/08/18 1103 06/08/18 1103 --   Oral Monitor Sitting Right arm       Pain Score       06/08/18 1103       No Pain           Vitals:    06/08/18 1231 06/08/18 1233 06/08/18 1235 06/08/18 1241   BP: 141/68 146/77 149/82 159/83   Pulse: 60 66 66 65   Patient Position - Orthostatic VS: Lying - Orthostatic VS Sitting - Orthostatic VS Standing - Orthostatic VS Standing for 3 minutes - Orthostatic VS       Visual Acuity  Visual Acuity      Most Recent Value   L Pupil Size (mm)  4   R Pupil Size (mm)  4          ED Medications  Medications   sodium chloride 0 9 % bolus 1,000 mL (0 mL Intravenous Stopped 6/8/18 1409)       Diagnostic Studies  Results Reviewed     Procedure Component Value Units Date/Time    Troponin I [[id number]]  (Normal) Collected:  06/08/18 1203    Lab Status:  Final result Specimen:  Blood from Arm, Left Updated:  06/08/18 1408     Troponin I <0 02 ng/mL     Narrative:         [employer] Chemistry analyzer 99% cutoff is > 0 04 ng/mL in network labs    o cTnI 99% cutoff is useful only when applied to patients in the clinical setting of myocardial ischemia  o cTnI 99% cutoff should be interpreted in the context of clinical history, ECG findings and possibly cardiac imaging to establish correct diagnosis  o cTnI 99% cutoff may be suggestive but clearly not indicative of a coronary event without the clinical setting of myocardial ischemia  CBC and differential [[id number]]  (Abnormal) Collected:  06/08/18 1203    Lab Status:  Final result Specimen:  Blood from Arm, Left Updated:  06/08/18 1250     WBC 92 30 (HH) Thousand/uL      RBC 5 03 Million/uL      Hemoglobin 13 8 g/dL      Hematocrit 43 0 %      MCV 86 fL      MCH 27 4 pg      MCHC 32 1 g/dL      RDW 15 2 (H) %      MPV 9 3 fL      Platelets 386 (L) Thousands/uL     TSH [[id number]]  (Normal) Collected:  06/08/18 1203    Lab Status:  Final result Specimen:  Blood from Arm, Left Updated:  06/08/18 1240     TSH 3RD GENERATON 1 373 uIU/mL     Narrative:         Patients undergoing fluorescein dye angiography may retain small amounts of fluorescein in the body for 48-72 hours post procedure  Samples containing fluorescein can produce falsely depressed TSH values  If the patient had this procedure,a specimen should be resubmitted post fluorescein clearance      Basic metabolic panel [[id number]]  (Abnormal) Collected:  06/08/18 1203    Lab Status:  Final result Specimen:  Blood from Arm, Left Updated:  06/08/18 1240     Sodium 136 mmol/L      Potassium 4 6 mmol/L      Chloride 103 mmol/L      CO2 27 mmol/L      Anion Gap 6 mmol/L      BUN 34 (H) mg/dL      Creatinine 0 95 mg/dL      Glucose 114 mg/dL      Calcium 8 9 mg/dL      eGFR 80 ml/min/1 73sq m     Narrative:         National Kidney Disease Education Program recommendations are as follows:  GFR calculation is accurate only with a steady state creatinine  Chronic Kidney disease less than 60 ml/min/1 73 sq  meters  Kidney failure less than 15 ml/min/1 73 sq  meters  Magnesium [[id number]]  (Normal) Collected:  06/08/18 1203    Lab Status:  Final result Specimen:  Blood from Arm, Left Updated:  06/08/18 1240     Magnesium 2 1 mg/dL                  No orders to display              Procedures  Procedures       Phone Contacts  ED Phone Contact    ED Course  ED Course as of Jun 08 1427 Fri Jun 08, 2018   1320  Reassessment, patient continues to do well without any complaints of anxiety, diaphoretic after passing out  Awaiting troponin  Lab result                                MDM  Number of Diagnoses or Management Options  Anxiety attack: established and worsening  Near syncope: new and requires workup  Diagnosis management comments: 58-year-old male presents to the emergency department with the complaints of near-syncope,  Relays that he has similar episode last summer where he  syncopized after consuming wine while outside in the summer heat  Has medical history of CLL, hypertension, anxiety  Differential diagnosis likely but not limited to anxiety, anemia,  fatigue secondary to CLL, electrolyte imbalances, cardiac arrhythmia,  DVT,  dehydration  Obtain baseline labs, EKG,  give crystalloid bolus  During reassessment patient reveals that he has greatly improved since the initiation of IV fluids,  Baseline labs consistent with previous lab work 1 month prior, no cardiac arrhythmias noted on EKG    Patient informed that we would like to keep for observation due to the unknown etiology of the syncope episodes, however patient declines admission  for observation at this time and contributes to first episode near-syncope to vagal during bowel movement and second episode of near-syncope to anxiety  Instructed to return to emergency department if symptoms worsen or become worsen  Follow up with primary care provider for further management of anxiety and adjustment of IV medications  The patient (and any family present) verbalized understanding of the discharge instructions and warnings that would necessitate return to the Emergency Department  Gave verbal in addition to written discharge instructions  Specifically highlighted areas of special concern regarding the written and verbal discharge instructions and return precautions  All questions were answered prior to discharge  Amount and/or Complexity of Data Reviewed  Clinical lab tests: ordered and reviewed    Risk of Complications, Morbidity, and/or Mortality  Presenting problems: high  Diagnostic procedures: high  Management options: high    Patient Progress  Patient progress: stable    CritCare Time    Disposition  Final diagnoses:   Near syncope   Anxiety attack     Time reflects when diagnosis was documented in both MDM as applicable and the Disposition within this note     Time User Action Codes Description Comment    6/8/2018  1:53 PM Deloris Mendez [R55] Near syncope     6/8/2018  1:53 PM Deloris Mendez [F41 0] Anxiety attack       ED Disposition     ED Disposition Condition Comment    Discharge  Carlos Enrique Burrell discharge to home/self care      Condition at discharge: Stable        Follow-up Information     Follow up With Specialties Details Why Contact Info Additional Information    Nitin Erickson MD   As neededFor further management assessment of anxiety disorder and medication [address]  [phone]       [facility] Emergency Department Emergency Medicine  If symptoms worsen or worrisome [facility], [place] [address]  [address], [place], [address]          Discharge Medication List as of 6/8/2018  1:55 PM      CONTINUE these medications which have NOT CHANGED    Details   aspirin 81 mg chewable tablet Chew 81 mg daily, Historical Med      co-enzyme Q-10 50 MG capsule Take 200 mg by mouth daily, Historical Med      fosinopril (MONOPRIL) 20 mg tablet Take 20 mg by mouth daily, Historical Med      magnesium gluconate (MAGONATE) 500 mg tablet Take 500 mg by mouth 2 (two) times a day, Historical Med      mometasone (NASONEX) 50 mcg/act nasal spray 2 sprays into each nostril daily, Historical Med      Multiple Vitamin (MULTIVITAMIN) tablet Take 1 tablet by mouth daily, Historical Med      sertraline (ZOLOFT) 50 mg tablet Take 75 mg by mouth daily  , Historical Med      simvastatin (ZOCOR) 20 mg tablet Take 20 mg by mouth daily at bedtime, Historical Med           No discharge procedures on file      ED Provider  Electronically Signed by           Alysia Ruby  06/08/18 0058

## 2018-06-11 NOTE — ED ATTENDING ATTESTATION
Ngozi Jc MD, saw and evaluated the patient  I have discussed the patient with the resident/non-physician practitioner and agree with the resident's/non-physician practitioner's findings, Plan of Care, and MDM as documented in the resident's/non-physician practitioner's note, except where noted  All available labs and Radiology studies were reviewed  At this point I agree with the current assessment done in the Emergency Department  I have conducted an independent evaluation of this patient a history and physical is as follows:    59-year-old male presents to the emergency department for evaluation after not feeling right    He explains that he was doing ho work for couple of hours this morning  He came inside and went to the bathroom where he had a bowel movement  He relates that he became sweaty and lightheaded  He sat for 30 to 45 minutes, drank a couple of bottles of water as well as a protein shake and felt improved  He then walks to the mailbox and had recurrence of diaphoresis and clamminess  He felt a bit lightheaded  He notes that he has been more active than usual over the past several days and likely would have rest home  Due to his wife's concern came in for evaluation  Patient denies having had any abnormal sensation in the chest   No palpitations, chest discomfort or shortness of breath  No headache, vision changes, focal weakness or paresthesias  No recent injury or illness  He has been eating and drinking normally  He did have 1 prior episode of syncope which was in the setting of dehydration and at the time of CLL diagnosis  Medical history otherwise significant for hypertension, elevated cholesterol and anxiety  He reports compliance with all medications  On exam patient is well-appearing  Mucous membranes are moist  Heart sounds regular  Lungs clear to auscultation bilaterally  No recurrence of dizziness upon sitting forward  Patient normocephalic  Pupils brisk relate active to light bilaterally  EOMI  No facial asymmetry appreciated  Good strength in the upper and lower extremities with all maneuvers  No lower extremity edema or tenderness  Abdomen soft & NT      Pt  w/ near syncope X2 of unclear etiology  First episode likely vagal in origin  Uncertain cause of 2nd event  Will monitor, check EKG, basic labs & troponin  Anticipate observation stay       Critical Care Time  CritCare Time    Procedures

## 2018-06-28 ENCOUNTER — TELEPHONE (OUTPATIENT)
Dept: HEMATOLOGY ONCOLOGY | Facility: CLINIC | Age: 73
End: 2018-06-28

## 2018-06-28 ENCOUNTER — APPOINTMENT (OUTPATIENT)
Dept: LAB | Facility: CLINIC | Age: 73
End: 2018-06-28
Payer: COMMERCIAL

## 2018-06-28 NOTE — TELEPHONE ENCOUNTER
Patient with a hx of CLL had a WBC of 97 08 on 5/29/18  Patient's 5/29/18 WBC was 91 13, 4/27 WBC was 84 59 and 3/26 was 88 70  Patient is currently on surveillance and is to have monthly CBC's drawn

## 2018-07-10 ENCOUNTER — OFFICE VISIT (OUTPATIENT)
Dept: HEMATOLOGY ONCOLOGY | Facility: CLINIC | Age: 73
End: 2018-07-10
Payer: COMMERCIAL

## 2018-07-10 VITALS
WEIGHT: 221 LBS | OXYGEN SATURATION: 95 % | SYSTOLIC BLOOD PRESSURE: 146 MMHG | BODY MASS INDEX: 30.94 KG/M2 | HEIGHT: 71 IN | TEMPERATURE: 97.5 F | DIASTOLIC BLOOD PRESSURE: 78 MMHG | RESPIRATION RATE: 17 BRPM | HEART RATE: 67 BPM

## 2018-07-10 DIAGNOSIS — C91.10 CLL (CHRONIC LYMPHOCYTIC LEUKEMIA) (HCC): Primary | ICD-10-CM

## 2018-07-10 PROCEDURE — 99214 OFFICE O/P EST MOD 30 MIN: CPT | Performed by: INTERNAL MEDICINE

## 2018-07-10 RX ORDER — DIAZEPAM 2 MG/1
1 TABLET ORAL
COMMUNITY
Start: 2018-04-03 | End: 2019-05-07

## 2018-07-10 NOTE — PROGRESS NOTES
HPI:  Patient is here with his wife  In August 2017 he was diagnosed to CLL and he has favorable as well as unfavorable prognostic features,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, IgVH mutation, lack of CD38 expression, deletion of 13 q14 in 6%, no 11 q deletion and no trisomy 12  Lymphocytes were CD5 and CD23 positive, dim kappa expression and moderate CD20 expression  CT scan showed  splenomegaly but not on palpation  No CLL related symptoms  He has anxiety and tiredness and occasionally some stiffness of the neck         Current Outpatient Prescriptions:     aspirin 81 mg chewable tablet, Chew 81 mg daily, Disp: , Rfl:     co-enzyme Q-10 50 MG capsule, Take 200 mg by mouth daily, Disp: , Rfl:     diazepam (VALIUM) 2 mg tablet, , Disp: , Rfl:     fosinopril (MONOPRIL) 20 mg tablet, Take 20 mg by mouth daily, Disp: , Rfl:     magnesium gluconate (MAGONATE) 500 mg tablet, Take 500 mg by mouth 2 (two) times a day, Disp: , Rfl:     mometasone (NASONEX) 50 mcg/act nasal spray, 2 sprays into each nostril daily, Disp: , Rfl:     Multiple Vitamin (MULTIVITAMIN) tablet, Take 1 tablet by mouth daily, Disp: , Rfl:     sertraline (ZOLOFT) 50 mg tablet, Take 75 mg by mouth daily  , Disp: , Rfl:     simvastatin (ZOCOR) 20 mg tablet, Take 20 mg by mouth daily at bedtime, Disp: , Rfl:     Allergies   Allergen Reactions    Sulfa Antibiotics        Oncology History    chronic lymphocytic leukemia, diagnosed in August 2017  CLL has mixed good and bad prognostic features, 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, IgVH mutation, lack of CD38 expression, deletion of 13 q14 in 6% and no 11 q deletion        There was no trisomy 12   Lymphocytes were CD5 and CD23 positive, dim kappa expression and moderate CD20 expression had splenomegaly on CT scan but not on palpation        [facility]    3/27/2018 Initial Diagnosis     CLL (chronic lymphocytic leukemia) ([facility])          ROS:  07/10/18 Reviewed 13 systems:  Presently no headaches, seizures, dizziness, diplopia, dysphagia, hoarseness, chest pain, palpitations, shortness of breath, cough, hemoptysis, abdominal pain, nausea, vomiting, change in bowel habits, melena, hematuria, fever, chills, bleeding, bone pains, skin rash, weight loss,   weakness, numbness,  claudication and gait problem  No frequent infections  Not unusually sensitive to heat or cold  No swelling of the ankles  No swollen glands  Patient is anxious  Other symptoms are in HPI        /78 (BP Location: Left arm, Cuff Size: Adult)   Pulse 67   Temp 97 5 °F (36 4 °C) (Tympanic)   Resp 17   Ht 5' 11" (1 803 m)   Wt 100 kg (221 lb)   SpO2 95%   BMI 30 82 kg/m²     Physical Exam:  Alert, oriented, not in distress, no icterus, no oral thrush, no palpable neck mass, clear lung fields, regular heart rate, abdomen  soft and non tender, no palpable abdominal mass, no ascites, no edema of ankles, no calf tenderness, no focal neurological deficit, no skin rash, no palpable lymphadenopathy, good arterial pulses, no clubbing  Patient is anxious  Performance status 1      IMAGING:  No orders to display       LABS:  Results for orders placed or performed in visit on 06/15/18   IgG   Result Value Ref Range     0 (L) 700 0-1,600 0 mg/dL     Labs, Imaging, & Other studies:   All pertinent labs and imaging studies were personally reviewed    Lab Results   Component Value Date     06/28/2018    K 5 0 06/28/2018     06/28/2018    CO2 30 06/28/2018    ANIONGAP 6 06/28/2018    BUN 28 (H) 06/28/2018    CREATININE 1 21 06/28/2018    GLUCOSE 171 (H) 06/28/2018    CALCIUM 8 9 06/28/2018    AST 21 06/28/2018    ALT 26 06/28/2018    ALKPHOS 65 06/28/2018    PROT 6 7 06/28/2018    BILITOT 0 40 06/28/2018    EGFR 59 06/28/2018     Lab Results   Component Value Date    WBC 97 08 (HH) 06/28/2018    HGB 14 3 06/28/2018    HCT 45 5 06/28/2018    MCV 87 06/28/2018     (L) 06/28/2018       Reviewed and discussed with patient  Assessment and plan:  Patient is here with his wife  In August 2017 he was diagnosed to CLL and he has favorable as well as unfavorable prognostic features,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, IgVH mutation, lack of CD38 expression, deletion of 13 q14 in 6%, no 11 q deletion and no trisomy 12  Lymphocytes were CD5 and CD23 positive, dim kappa expression and moderate CD20 expression  CT scan showed  splenomegaly but not on palpation  No CLL related symptoms  He has anxiety and tiredness and occasionally some stiffness of the neck     Physical examination and test results are as recorded and discussed  There is increase in WBC and lymphocyte count and no other change     We rediscussed his disease status and prognostic features and treatment indications  Present plan is surveillance  He will have follow-up CT scans in addition to blood tests that he gets every month  Chances are he will be needing treatment for CLL sooner rather than later  I have discussed this with patient  He will come back in 2 months  Condition and plan discussed  Questions answered  He will stay in touch  He will keep himself hydrated  1  [facility]    - CT chest abdomen pelvis w wo contrast; Future        Patient voiced understanding and agreement in the discussion  Counseling / Coordination of Care   Greater than 50% of total time was spent with the patient and / or family counseling and / or coordination of care

## 2018-07-10 NOTE — LETTER
July 10, 2018     Zaid Bello MD  [facility]  [address] [address]    Patient: Radha Gustafson   YOB: 1945   Date of Visit: 7/10/2018       Dear Dr Chetna Anaya: Thank you for referring Radha Gustafson to me for evaluation  Below are my notes for this consultation  If you have questions, please do not hesitate to call me  I look forward to following your patient along with you  Sincerely,        Smita Nickerson MD        CC: No Recipients  Smita Nickerson MD  7/10/2018 10:56 AM  Sign at close encounter    HPI:  Patient is here with his wife  In August 2017 he was diagnosed to CLL and he has favorable as well as unfavorable prognostic features,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, IgVH mutation, lack of CD38 expression, deletion of 13 q14 in 6%, no 11 q deletion and no trisomy 12  Lymphocytes were CD5 and CD23 positive, dim kappa expression and moderate CD20 expression  CT scan showed  splenomegaly but not on palpation  No CLL related symptoms  He has anxiety and tiredness and occasionally some stiffness of the neck            Current Outpatient Prescriptions:     aspirin 81 mg chewable tablet, Chew 81 mg daily, Disp: , Rfl:     co-enzyme Q-10 50 MG capsule, Take 200 mg by mouth daily, Disp: , Rfl:     diazepam (VALIUM) 2 mg tablet, , Disp: , Rfl:     fosinopril (MONOPRIL) 20 mg tablet, Take 20 mg by mouth daily, Disp: , Rfl:     magnesium gluconate (MAGONATE) 500 mg tablet, Take 500 mg by mouth 2 (two) times a day, Disp: , Rfl:     mometasone (NASONEX) 50 mcg/act nasal spray, 2 sprays into each nostril daily, Disp: , Rfl:     Multiple Vitamin (MULTIVITAMIN) tablet, Take 1 tablet by mouth daily, Disp: , Rfl:     sertraline (ZOLOFT) 50 mg tablet, Take 75 mg by mouth daily  , Disp: , Rfl:     simvastatin (ZOCOR) 20 mg tablet, Take 20 mg by mouth daily at bedtime, Disp: , Rfl:     Allergies   Allergen Reactions    Sulfa Antibiotics        Oncology History    chronic lymphocytic leukemia, diagnosed in August 2017  CLL has mixed good and bad prognostic features, 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, IgVH mutation, lack of CD38 expression, deletion of 13 q14 in 6% and no 11 q deletion        There was no trisomy 12  Lymphocytes were CD5 and CD23 positive, dim kappa expression and moderate CD20 expression had splenomegaly on CT scan but not on palpation        CLL (chronic lymphocytic leukemia) (HCC)    3/27/2018 Initial Diagnosis     CLL [facility]ch[facility] lymphocytic leukemia) ([facility])          ROS:  07/10/18 Reviewed 13 systems:  Presently no headaches, seizures, dizziness, diplopia, dysphagia, hoarseness, chest pain, palpitations, shortness of breath, cough, hemoptysis, abdominal pain, nausea, vomiting, change in bowel habits, melena, hematuria, fever, chills, bleeding, bone pains, skin rash, weight loss,   weakness, numbness,  claudication and gait problem  No frequent infections  Not unusually sensitive to heat or cold  No swelling of the ankles  No swollen glands  Patient is anxious  Other symptoms are in HPI        /78 (BP Location: Left arm, Cuff Size: Adult)   Pulse 67   Temp 97 5 °F (36 4 °C) (Tympanic)   Resp 17   Ht 5' 11" (1 803 m)   Wt 100 kg (221 lb)   SpO2 95%   BMI 30 82 kg/m²      Physical Exam:  Alert, oriented, not in distress, no icterus, no oral thrush, no palpable neck mass, clear lung fields, regular heart rate, abdomen  soft and non tender, no palpable abdominal mass, no ascites, no edema of ankles, no calf tenderness, no focal neurological deficit, no skin rash, no palpable lymphadenopathy, good arterial pulses, no clubbing  Patient is anxious  Performance status 1      IMAGING:  No orders to display       LABS:  Results for orders placed or performed in visit on 06/15/18   IgG   Result Value Ref Range     0 (L) 700 0-1,600 0 mg/dL     Labs, Imaging, & Other studies:   All pertinent labs and imaging studies were personally reviewed    Lab Results   Component Value Date     06/28/2018    K 5 0 06/28/2018     06/28/2018    CO2 30 06/28/2018    ANIONGAP 6 06/28/2018    BUN 28 (H) 06/28/2018    CREATININE 1 21 06/28/2018    GLUCOSE 171 (H) 06/28/2018    CALCIUM 8 9 06/28/2018    AST 21 06/28/2018    ALT 26 06/28/2018    ALKPHOS 65 06/28/2018    PROT 6 7 06/28/2018    BILITOT 0 40 06/28/2018    EGFR 59 06/28/2018     Lab Results   Component Value Date    WBC 97 08 (HH) 06/28/2018    HGB 14 3 06/28/2018    HCT 45 5 06/28/2018    MCV 87 06/28/2018     (L) 06/28/2018       Reviewed and discussed with patient  Assessment and plan:  Patient is here with his wife  In August 2017 he was diagnosed to CLL and he has favorable as well as unfavorable prognostic features,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, IgVH mutation, lack of CD38 expression, deletion of 13 q14 in 6%, no 11 q deletion and no trisomy 12  Lymphocytes were CD5 and CD23 positive, dim kappa expression and moderate CD20 expression  CT scan showed  splenomegaly but not on palpation  No CLL related symptoms  He has anxiety and tiredness and occasionally some stiffness of the neck     Physical examination and test results are as recorded and discussed  There is increase in WBC and lymphocyte count and no other change     We rediscussed his disease status and prognostic features and treatment indications  Present plan is surveillance  He will have follow-up CT scans in addition to blood tests that he gets every month  Chances are he will be needing treatment for CLL sooner rather than later  I have discussed this with patient  He will come back in 2 months  Condition and plan discussed  Questions answered  He will stay in touch  He will keep himself hydrated  1  [facility]    - CT chest abdomen pelvis w wo contrast; Future        Patient voiced understanding and agreement in the discussion         Counseling / Coordination of Care   Greater than 50% of total time was spent with the patient and / or family counseling and / or coordination of care

## 2018-07-27 ENCOUNTER — APPOINTMENT (OUTPATIENT)
Dept: LAB | Facility: CLINIC | Age: 73
End: 2018-07-27
Payer: COMMERCIAL

## 2018-07-27 ENCOUNTER — TRANSCRIBE ORDERS (OUTPATIENT)
Dept: LAB | Facility: CLINIC | Age: 73
End: 2018-07-27

## 2018-07-27 ENCOUNTER — TELEPHONE (OUTPATIENT)
Dept: HEMATOLOGY ONCOLOGY | Facility: CLINIC | Age: 73
End: 2018-07-27

## 2018-07-27 DIAGNOSIS — D63.0 ANEMIA ASSOCIATED WITH CHRONIC LYMPHOCYTIC LEUKEMIA TREATED WITH ERYTHROPOIETIN (HCC): ICD-10-CM

## 2018-07-27 DIAGNOSIS — C91.10 ANEMIA ASSOCIATED WITH CHRONIC LYMPHOCYTIC LEUKEMIA TREATED WITH ERYTHROPOIETIN (HCC): ICD-10-CM

## 2018-07-27 DIAGNOSIS — C91.10 ANEMIA ASSOCIATED WITH CHRONIC LYMPHOCYTIC LEUKEMIA TREATED WITH ERYTHROPOIETIN (HCC): Primary | ICD-10-CM

## 2018-07-27 DIAGNOSIS — D63.0 ANEMIA ASSOCIATED WITH CHRONIC LYMPHOCYTIC LEUKEMIA TREATED WITH ERYTHROPOIETIN (HCC): Primary | ICD-10-CM

## 2018-07-27 LAB — IGG SERPL-MCNC: 597 MG/DL (ref 700–1600)

## 2018-07-27 PROCEDURE — 82784 ASSAY IGA/IGD/IGG/IGM EACH: CPT

## 2018-07-27 NOTE — TELEPHONE ENCOUNTER
Patient with a hx of CLL had a WBC of 87 92 on 7/27/18  Patient's 5/29/18 WBC was 97 08, 4/27 WBC was 84 59 and 3/26 was 88 70  Patient is currently on surveillance and is to have monthly CBC's drawn

## 2018-08-28 ENCOUNTER — APPOINTMENT (OUTPATIENT)
Dept: LAB | Facility: CLINIC | Age: 73
End: 2018-08-28
Payer: COMMERCIAL

## 2018-08-28 ENCOUNTER — TELEPHONE (OUTPATIENT)
Dept: HEMATOLOGY ONCOLOGY | Facility: CLINIC | Age: 73
End: 2018-08-28

## 2018-08-28 ENCOUNTER — TRANSCRIBE ORDERS (OUTPATIENT)
Dept: LAB | Facility: CLINIC | Age: 73
End: 2018-08-28

## 2018-08-28 DIAGNOSIS — C91.90 LYMPHOID LEUKEMIA NOT HAVING ACHIEVED REMISSION, UNSPECIFIED LYMPHOID LEUKEMIA TYPE (HCC): ICD-10-CM

## 2018-08-28 DIAGNOSIS — C91.90 LYMPHOID LEUKEMIA NOT HAVING ACHIEVED REMISSION, UNSPECIFIED LYMPHOID LEUKEMIA TYPE (HCC): Primary | ICD-10-CM

## 2018-08-28 LAB
ALBUMIN SERPL BCP-MCNC: 3.6 G/DL (ref 3.5–5)
ALP SERPL-CCNC: 70 U/L (ref 46–116)
ALT SERPL W P-5'-P-CCNC: 27 U/L (ref 12–78)
ANION GAP SERPL CALCULATED.3IONS-SCNC: 6 MMOL/L (ref 4–13)
ANISOCYTOSIS BLD QL SMEAR: PRESENT
AST SERPL W P-5'-P-CCNC: 21 U/L (ref 5–45)
BASOPHILS # BLD MANUAL: 0 THOUSAND/UL (ref 0–0.1)
BASOPHILS NFR MAR MANUAL: 0 % (ref 0–1)
BILIRUB SERPL-MCNC: 0.5 MG/DL (ref 0.2–1)
BUN SERPL-MCNC: 26 MG/DL (ref 5–25)
CALCIUM SERPL-MCNC: 9 MG/DL (ref 8.3–10.1)
CHLORIDE SERPL-SCNC: 104 MMOL/L (ref 100–108)
CO2 SERPL-SCNC: 30 MMOL/L (ref 21–32)
CREAT SERPL-MCNC: 1.2 MG/DL (ref 0.6–1.3)
EOSINOPHIL # BLD MANUAL: 0 THOUSAND/UL (ref 0–0.4)
EOSINOPHIL NFR BLD MANUAL: 0 % (ref 0–6)
ERYTHROCYTE [DISTWIDTH] IN BLOOD BY AUTOMATED COUNT: 15.1 % (ref 11.6–15.1)
GFR SERPL CREATININE-BSD FRML MDRD: 60 ML/MIN/1.73SQ M
GLUCOSE SERPL-MCNC: 106 MG/DL (ref 65–140)
HCT VFR BLD AUTO: 46.5 % (ref 36.5–49.3)
HGB BLD-MCNC: 14.3 G/DL (ref 12–17)
LYMPHOCYTES # BLD AUTO: 82.4 THOUSAND/UL (ref 0.6–4.47)
LYMPHOCYTES # BLD AUTO: 84 % (ref 14–44)
MCH RBC QN AUTO: 27 PG (ref 26.8–34.3)
MCHC RBC AUTO-ENTMCNC: 30.8 G/DL (ref 31.4–37.4)
MCV RBC AUTO: 88 FL (ref 82–98)
MONOCYTES # BLD AUTO: 0.98 THOUSAND/UL (ref 0–1.22)
MONOCYTES NFR BLD: 1 % (ref 4–12)
NEUTROPHILS # BLD MANUAL: 6.87 THOUSAND/UL (ref 1.85–7.62)
NEUTS SEG NFR BLD AUTO: 7 % (ref 43–75)
NRBC BLD AUTO-RTO: 0 /100 WBCS
PLATELET # BLD AUTO: 110 THOUSANDS/UL (ref 149–390)
PLATELET BLD QL SMEAR: ABNORMAL
PMV BLD AUTO: 9.7 FL (ref 8.9–12.7)
POTASSIUM SERPL-SCNC: 5 MMOL/L (ref 3.5–5.3)
PROT SERPL-MCNC: 6.9 G/DL (ref 6.4–8.2)
RBC # BLD AUTO: 5.29 MILLION/UL (ref 3.88–5.62)
SMUDGE CELLS BLD QL SMEAR: PRESENT
SODIUM SERPL-SCNC: 140 MMOL/L (ref 136–145)
TOTAL CELLS COUNTED SPEC: 100
VARIANT LYMPHS # BLD AUTO: 8 %
WBC # BLD AUTO: 98.09 THOUSAND/UL (ref 4.31–10.16)

## 2018-08-28 PROCEDURE — 82232 ASSAY OF BETA-2 PROTEIN: CPT

## 2018-08-28 PROCEDURE — 80053 COMPREHEN METABOLIC PANEL: CPT

## 2018-08-28 PROCEDURE — 85027 COMPLETE CBC AUTOMATED: CPT

## 2018-08-28 PROCEDURE — 36415 COLL VENOUS BLD VENIPUNCTURE: CPT

## 2018-08-28 PROCEDURE — 85007 BL SMEAR W/DIFF WBC COUNT: CPT

## 2018-08-28 NOTE — TELEPHONE ENCOUNTER
Patient with a hx of CLL had a WBC of 98 09 on 8/28/18    Patient's WBC was 87 92 on 7/27/18, 5/29/18 WBC was 97 08, 4/27 WBC was 84 59 and 3/26 was 88 70  Patient is currently on surveillance and is to have monthly CBC's drawn

## 2018-08-29 ENCOUNTER — HOSPITAL ENCOUNTER (OUTPATIENT)
Dept: CT IMAGING | Facility: HOSPITAL | Age: 73
Discharge: HOME | End: 2018-08-29
Attending: INTERNAL MEDICINE
Payer: COMMERCIAL

## 2018-08-29 ENCOUNTER — TELEPHONE (OUTPATIENT)
Dept: HEMATOLOGY ONCOLOGY | Facility: CLINIC | Age: 73
End: 2018-08-29

## 2018-08-29 DIAGNOSIS — C91.10 CLL (CHRONIC LYMPHOCYTIC LEUKEMIA) (HCC): ICD-10-CM

## 2018-08-29 LAB — B2 MICROGLOB SERPL-MCNC: 2.8 MG/L (ref 0.6–2.4)

## 2018-08-29 PROCEDURE — 71260 CT THORAX DX C+: CPT

## 2018-08-29 PROCEDURE — 74177 CT ABD & PELVIS W/CONTRAST: CPT

## 2018-08-29 RX ADMIN — IOHEXOL 100 ML: 350 INJECTION, SOLUTION INTRAVENOUS at 07:14

## 2018-08-29 NOTE — TELEPHONE ENCOUNTER
Sary from [address] [facility] called to check that Dr Jeannie Ramírez is managing WBC   Informed yes as dx is CLL>

## 2018-08-30 ENCOUNTER — TELEPHONE (OUTPATIENT)
Dept: HEMATOLOGY ONCOLOGY | Facility: CLINIC | Age: 73
End: 2018-08-30

## 2018-08-30 NOTE — TELEPHONE ENCOUNTER
Linda Corea called from [facility] radiology with significant findings on CT -- c/a/p--results in  [place]

## 2018-09-04 ENCOUNTER — OFFICE VISIT (OUTPATIENT)
Dept: HEMATOLOGY ONCOLOGY | Facility: CLINIC | Age: 73
End: 2018-09-04
Payer: COMMERCIAL

## 2018-09-04 VITALS
RESPIRATION RATE: 18 BRPM | HEART RATE: 73 BPM | DIASTOLIC BLOOD PRESSURE: 80 MMHG | BODY MASS INDEX: 30.8 KG/M2 | OXYGEN SATURATION: 98 % | WEIGHT: 220 LBS | TEMPERATURE: 98.5 F | HEIGHT: 71 IN | SYSTOLIC BLOOD PRESSURE: 138 MMHG

## 2018-09-04 DIAGNOSIS — C91.10 CLL (CHRONIC LYMPHOCYTIC LEUKEMIA) (HCC): Primary | ICD-10-CM

## 2018-09-04 PROCEDURE — 99214 OFFICE O/P EST MOD 30 MIN: CPT | Performed by: INTERNAL MEDICINE

## 2018-09-04 NOTE — LETTER
September 4, 2018     Chu Babcock MD  [place] [address]  [address]    Patient: Siobhan Johnston   YOB: 1945   Date of Visit: 9/4/2018       Dear Dr Frankie Asif: Thank you for referring Siobhan Johnston to me for evaluation  Below are my notes for this consultation  If you have questions, please do not hesitate to call me  I look forward to following your patient along with you  Sincerely,        Daniela Garvey MD        CC: No Recipients  Daniela Garvey MD  9/4/2018 11:53 PM  Sign at close encounter  HPI:   Follow-up visit for asymptomatic CLL that has favorable as well as unfavorable prognostic features,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, IgVH mutation, lack of CD38 expression, deletion of 13 q14 in 6%, no 11 q deletion and no trisomy 12  Lymphocytes were CD5 and CD23 positive, dim kappa expression and moderate CD20 expression   CT scan showed  splenomegaly  No CLL related symptoms  He has anxiety and tiredness and occasionally some stiffness of the neck  Patient is on surveillance for CLL            Current Outpatient Prescriptions:     aspirin 81 mg chewable tablet, Chew 81 mg daily, Disp: , Rfl:     co-enzyme Q-10 50 MG capsule, Take 200 mg by mouth daily, Disp: , Rfl:     diazepam (VALIUM) 2 mg tablet, , Disp: , Rfl:     fosinopril (MONOPRIL) 20 mg tablet, Take 20 mg by mouth daily, Disp: , Rfl:     magnesium gluconate (MAGONATE) 500 mg tablet, Take 500 mg by mouth 2 (two) times a day, Disp: , Rfl:     mometasone (NASONEX) 50 mcg/act nasal spray, 2 sprays into each nostril daily, Disp: , Rfl:     Multiple Vitamin (MULTIVITAMIN) tablet, Take 1 tablet by mouth daily, Disp: , Rfl:     sertraline (ZOLOFT) 50 mg tablet, Take 75 mg by mouth daily  , Disp: , Rfl:     simvastatin (ZOCOR) 20 mg tablet, Take 20 mg by mouth daily at bedtime, Disp: , Rfl:     Allergies   Allergen Reactions    Sulfa Antibiotics          ROS:  09/04/18 Reviewed 13 systems:  Presently no headaches, seizures, dizziness, diplopia, dysphagia, hoarseness, chest pain, palpitations, shortness of breath, cough, hemoptysis, abdominal pain, nausea, vomiting, change in bowel habits, melena, hematuria, fever, chills, bleeding, bone pains, skin rash, weight loss,   weakness, numbness,  claudication and gait problem  No frequent infections  Not unusually sensitive to heat or cold  No swelling of the ankles  No swollen glands  Patient is anxious  Other symptoms are in HPI        /80 (BP Location: Left arm, Patient Position: Sitting, Cuff Size: Adult)   Pulse 73   Temp 98 5 °F (36 9 °C)   Resp 18   Ht 5' 11" (1 803 m)   Wt 99 8 kg (220 lb)   SpO2 98%   BMI 30 68 kg/m²      Physical Exam:  Alert, oriented, not in distress, no icterus, no oral thrush, no palpable neck mass, clear lung fields, regular heart rate, abdomen  soft and non tender, no palpable abdominal mass, no ascites, no edema of ankles, no calf tenderness, no focal neurological deficit, no skin rash, no palpable lymphadenopathy, good arterial pulses, no clubbing  Patient is anxious  Performance status 1  IMAGING:  IMPRESSION:        1  Mild interval progression of disease with enlarging bilateral axillary, subpectoral, bilateral external iliac chain, and bilateral inguinal lymphadenopathy as detailed above      2  Moderate splenomegaly measuring up to 15 7 cm, increased from the prior exam      3   Enlarging 2 7 cm hypoattenuating splenic lesion, possibly related to patient's underlying CLL       4   Calcified hilar and mediastinal lymph nodes, right upper lobe scarring and multiple splenic calcified granulomas, similar to the prior exam, likely on the basis of an old granulomatous disease      Other findings as above      The study was marked in EPIC for significant notification            Workstation performed: [id number]      Imaging     CT chest abdomen pelvis w contrast (Order #[id number]) on 8/29/2018 - Imaging Information LABS:  Results for orders placed or performed in visit on 08/28/18   Beta 2 microglobulin, serum   Result Value Ref Range    Beta-2 Microglobulin 2 8 (H) 0 6 - 2 4 mg/L   CBC and differential   Result Value Ref Range    WBC 98 09 (HH) 4 31 - 10 16 Thousand/uL    RBC 5 29 3 88 - 5 62 Million/uL    Hemoglobin 14 3 12 0 - 17 0 g/dL    Hematocrit 46 5 36 5 - 49 3 %    MCV 88 82 - 98 fL    MCH 27 0 26 8 - 34 3 pg    MCHC 30 8 (L) 31 4 - 37 4 g/dL    RDW 15 1 11 6 - 15 1 %    MPV 9 7 8 9 - 12 7 fL    Platelets 994 (L) 304 - 390 Thousands/uL    nRBC 0 /100 WBCs   Comprehensive metabolic panel   Result Value Ref Range    Sodium 140 136 - 145 mmol/L    Potassium 5 0 3 5 - 5 3 mmol/L    Chloride 104 100 - 108 mmol/L    CO2 30 21 - 32 mmol/L    ANION GAP 6 4 - 13 mmol/L    BUN 26 (H) 5 - 25 mg/dL    Creatinine 1 20 0 60 - 1 30 mg/dL    Glucose 106 65 - 140 mg/dL    Calcium 9 0 8 3 - 10 1 mg/dL    AST 21 5 - 45 U/L    ALT 27 12 - 78 U/L    Alkaline Phosphatase 70 46 - 116 U/L    Total Protein 6 9 6 4 - 8 2 g/dL    Albumin 3 6 3 5 - 5 0 g/dL    Total Bilirubin 0 50 0 20 - 1 00 mg/dL    eGFR 60 ml/min/1 73sq m   Manual Differential(PHLEBS Do Not Order)   Result Value Ref Range    Segmented % 7 (L) 43 - 75 %    Lymphocytes % 84 (H) 14 - 44 %    Monocytes % 1 (L) 4 - 12 %    Eosinophils % 0 0 - 6 %    Basophils % 0 0 - 1 %    Atypical Lymphocytes % 8 (H) <=0 %    Absolute Neutrophils 6 87 1 85 - 7 62 Thousand/uL    Lymphocytes Absolute 82 40 (H) 0 60 - 4 47 Thousand/uL    Monocytes Absolute 0 98 0 00 - 1 22 Thousand/uL    Eosinophils Absolute 0 00 0 00 - 0 40 Thousand/uL    Basophils Absolute 0 00 0 00 - 0 10 Thousand/uL    Total Counted 100     Smudge Cells Present     Anisocytosis Present     Platelet Estimate Borderline (A) Adequate     Labs, Imaging, & Other studies:   All pertinent labs and imaging studies were personally reviewed    Lab Results   Component Value Date     08/28/2018    K 5 0 08/28/2018     08/28/2018    CO2 30 08/28/2018    BUN 26 (H) 08/28/2018    CREATININE 1 20 08/28/2018    CALCIUM 9 0 08/28/2018    AST 21 08/28/2018    ALT 27 08/28/2018    ALKPHOS 70 08/28/2018    EGFR 60 08/28/2018     Lab Results   Component Value Date    WBC 98 09 (HH) 08/28/2018    HGB 14 3 08/28/2018    HCT 46 5 08/28/2018    MCV 88 08/28/2018     (L) 08/28/2018       Reviewed and discussed with patient  Assessment and plan: Follow-up visit for asymptomatic CLL that has favorable as well as unfavorable prognostic features,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, IgVH mutation, lack of CD38 expression, deletion of 13 q14 in 6%, no 11 q deletion and no trisomy 12  Lymphocytes were CD5 and CD23 positive, dim kappa expression and moderate CD20 expression   CT scan showed  splenomegaly  No CLL related symptoms  He has anxiety and tiredness and occasionally some stiffness of the neck  Patient is on surveillance for CLL    Physical examination and test results especially blood counts and the CT scan results discussed in detail  Spleen size 15 7 cm but not palpably enlarged  Abdominal lymphadenopathy  No indication for treatment of CLL at this time  Rediscussed treatment indications  Questions answered  Plan is surveillance  Also discussed the importance of eating healthy foods, staying active and health screening tests  1  CLL (chronic lymphocytic leukemia) (HCC)      - CBC and differential; Standing  - Comprehensive metabolic panel; Standing  - LD,Blood; Standing  - Uric acid; Standing  - IgG; Standing  - CBC and differential  - Comprehensive metabolic panel  - LD,Blood  - Uric acid  - IgG            Patient voiced understanding and agreement in the discussion  Counseling / Coordination of Care   Greater than 50% of total time was spent with the patient and / or family counseling and / or coordination of care

## 2018-09-04 NOTE — PROGRESS NOTES
HPI:   Follow-up visit for asymptomatic CLL that has favorable as well as unfavorable prognostic features,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, IgVH mutation, lack of CD38 expression, deletion of 13 q14 in 6%, no 11 q deletion and no trisomy 12  Lymphocytes were CD5 and CD23 positive, dim kappa expression and moderate CD20 expression   CT scan showed  splenomegaly  No CLL related symptoms  He has anxiety and tiredness and occasionally some stiffness of the neck  Patient is on surveillance for CLL    Current Outpatient Prescriptions:     aspirin 81 mg chewable tablet, Chew 81 mg daily, Disp: , Rfl:     co-enzyme Q-10 50 MG capsule, Take 200 mg by mouth daily, Disp: , Rfl:     diazepam (VALIUM) 2 mg tablet, , Disp: , Rfl:     fosinopril (MONOPRIL) 20 mg tablet, Take 20 mg by mouth daily, Disp: , Rfl:     magnesium gluconate (MAGONATE) 500 mg tablet, Take 500 mg by mouth 2 (two) times a day, Disp: , Rfl:     mometasone (NASONEX) 50 mcg/act nasal spray, 2 sprays into each nostril daily, Disp: , Rfl:     Multiple Vitamin (MULTIVITAMIN) tablet, Take 1 tablet by mouth daily, Disp: , Rfl:     sertraline (ZOLOFT) 50 mg tablet, Take 75 mg by mouth daily  , Disp: , Rfl:     simvastatin (ZOCOR) 20 mg tablet, Take 20 mg by mouth daily at bedtime, Disp: , Rfl:     Allergies   Allergen Reactions    Sulfa Antibiotics          ROS:  09/04/18 Reviewed 13 systems:  Presently no headaches, seizures, dizziness, diplopia, dysphagia, hoarseness, chest pain, palpitations, shortness of breath, cough, hemoptysis, abdominal pain, nausea, vomiting, change in bowel habits, melena, hematuria, fever, chills, bleeding, bone pains, skin rash, weight loss,   weakness, numbness,  claudication and gait problem  No frequent infections  Not unusually sensitive to heat or cold  No swelling of the ankles  No swollen glands  Patient is anxious   Other symptoms are in HPI        /80 (BP Location: Left arm, Patient Position: Sitting, Cuff Size: Adult) Pulse 73   Temp 98 5 °F (36 9 °C)   Resp 18   Ht 5' 11" (1 803 m)   Wt 99 8 kg (220 lb)   SpO2 98%   BMI 30 68 kg/m²     Physical Exam:  Alert, oriented, not in distress, no icterus, no oral thrush, no palpable neck mass, clear lung fields, regular heart rate, abdomen  soft and non tender, no palpable abdominal mass, no ascites, no edema of ankles, no calf tenderness, no focal neurological deficit, no skin rash, no palpable lymphadenopathy, good arterial pulses, no clubbing  Patient is anxious  Performance status 1  IMAGING:  IMPRESSION:        1  Mild interval progression of disease with enlarging bilateral axillary, subpectoral, bilateral external iliac chain, and bilateral inguinal lymphadenopathy as detailed above      2  Moderate splenomegaly measuring up to 15 7 cm, increased from the prior exam      3   Enlarging 2 7 cm hypoattenuating splenic lesion, possibly related to patient's underlying CLL       4   Calcified hilar and mediastinal lymph nodes, right upper lobe scarring and multiple splenic calcified granulomas, similar to the prior exam, likely on the basis of an old granulomatous disease      Other findings as above      The study was marked in EPIC for significant notification            Workstation performed: [id number]      Imaging     CT chest abdomen pelvis w contrast (Order #[id number]) on 8/29/2018 - Imaging Information       LABS:  Results for orders placed or performed in visit on 08/28/18   Beta 2 microglobulin, serum   Result Value Ref Range    Beta-2 Microglobulin 2 8 (H) 0 6 - 2 4 mg/L   CBC and differential   Result Value Ref Range    WBC 98 09 (HH) 4 31 - 10 16 Thousand/uL    RBC 5 29 3 88 - 5 62 Million/uL    Hemoglobin 14 3 12 0 - 17 0 g/dL    Hematocrit 46 5 36 5 - 49 3 %    MCV 88 82 - 98 fL    MCH 27 0 26 8 - 34 3 pg    MCHC 30 8 (L) 31 4 - 37 4 g/dL    RDW 15 1 11 6 - 15 1 %    MPV 9 7 8 9 - 12 7 fL    Platelets 259 (L) 132 - 390 Thousands/uL    nRBC 0 /100 WBCs Comprehensive metabolic panel   Result Value Ref Range    Sodium 140 136 - 145 mmol/L    Potassium 5 0 3 5 - 5 3 mmol/L    Chloride 104 100 - 108 mmol/L    CO2 30 21 - 32 mmol/L    ANION GAP 6 4 - 13 mmol/L    BUN 26 (H) 5 - 25 mg/dL    Creatinine 1 20 0 60 - 1 30 mg/dL    Glucose 106 65 - 140 mg/dL    Calcium 9 0 8 3 - 10 1 mg/dL    AST 21 5 - 45 U/L    ALT 27 12 - 78 U/L    Alkaline Phosphatase 70 46 - 116 U/L    Total Protein 6 9 6 4 - 8 2 g/dL    Albumin 3 6 3 5 - 5 0 g/dL    Total Bilirubin 0 50 0 20 - 1 00 mg/dL    eGFR 60 ml/min/1 73sq m   Manual Differential(PHLEBS Do Not Order)   Result Value Ref Range    Segmented % 7 (L) 43 - 75 %    Lymphocytes % 84 (H) 14 - 44 %    Monocytes % 1 (L) 4 - 12 %    Eosinophils % 0 0 - 6 %    Basophils % 0 0 - 1 %    Atypical Lymphocytes % 8 (H) <=0 %    Absolute Neutrophils 6 87 1 85 - 7 62 Thousand/uL    Lymphocytes Absolute 82 40 (H) 0 60 - 4 47 Thousand/uL    Monocytes Absolute 0 98 0 00 - 1 22 Thousand/uL    Eosinophils Absolute 0 00 0 00 - 0 40 Thousand/uL    Basophils Absolute 0 00 0 00 - 0 10 Thousand/uL    Total Counted 100     Smudge Cells Present     Anisocytosis Present     Platelet Estimate Borderline (A) Adequate     Labs, Imaging, & Other studies:   All pertinent labs and imaging studies were personally reviewed    Lab Results   Component Value Date     08/28/2018    K 5 0 08/28/2018     08/28/2018    CO2 30 08/28/2018    BUN 26 (H) 08/28/2018    CREATININE 1 20 08/28/2018    CALCIUM 9 0 08/28/2018    AST 21 08/28/2018    ALT 27 08/28/2018    ALKPHOS 70 08/28/2018    EGFR 60 08/28/2018     Lab Results   Component Value Date    WBC 98 09 (HH) 08/28/2018    HGB 14 3 08/28/2018    HCT 46 5 08/28/2018    MCV 88 08/28/2018     (L) 08/28/2018       Reviewed and discussed with patient  Assessment and plan:   Follow-up visit for asymptomatic CLL that has favorable as well as unfavorable prognostic features,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, IgVH mutation, lack of CD38 expression, deletion of 13 q14 in 6%, no 11 q deletion and no trisomy 12  Lymphocytes were CD5 and CD23 positive, dim kappa expression and moderate CD20 expression   CT scan showed  splenomegaly  No CLL related symptoms  He has anxiety and tiredness and occasionally some stiffness of the neck  Patient is on surveillance for CLL    Physical examination and test results especially blood counts and the CT scan results discussed in detail  Spleen size 15 7 cm but not palpably enlarged  Abdominal lymphadenopathy  No indication for treatment of CLL at this time  Rediscussed treatment indications  Questions answered  Plan is surveillance  Also discussed the importance of eating healthy foods, staying active and health screening tests  1  CLL (chronic lymphocytic leukemia) (HCC)      - CBC and differential; Standing  - Comprehensive metabolic panel; Standing  - LD,Blood; Standing  - Uric acid; Standing  - IgG; Standing  - CBC and differential  - Comprehensive metabolic panel  - LD,Blood  - Uric acid  - IgG            Patient voiced understanding and agreement in the discussion  Counseling / Coordination of Care   Greater than 50% of total time was spent with the patient and / or family counseling and / or coordination of care

## 2018-10-01 ENCOUNTER — TELEPHONE (OUTPATIENT)
Dept: HEMATOLOGY ONCOLOGY | Facility: CLINIC | Age: 73
End: 2018-10-01

## 2018-10-01 ENCOUNTER — APPOINTMENT (OUTPATIENT)
Dept: LAB | Facility: CLINIC | Age: 73
End: 2018-10-01
Payer: COMMERCIAL

## 2018-10-01 LAB
ALBUMIN SERPL BCP-MCNC: 3.6 G/DL (ref 3.5–5)
ALP SERPL-CCNC: 70 U/L (ref 46–116)
ALT SERPL W P-5'-P-CCNC: 31 U/L (ref 12–78)
ANION GAP SERPL CALCULATED.3IONS-SCNC: 7 MMOL/L (ref 4–13)
ANISOCYTOSIS BLD QL SMEAR: PRESENT
AST SERPL W P-5'-P-CCNC: 25 U/L (ref 5–45)
BASOPHILS # BLD MANUAL: 0 THOUSAND/UL (ref 0–0.1)
BASOPHILS NFR MAR MANUAL: 0 % (ref 0–1)
BILIRUB SERPL-MCNC: 0.5 MG/DL (ref 0.2–1)
BUN SERPL-MCNC: 24 MG/DL (ref 5–25)
CALCIUM SERPL-MCNC: 8.4 MG/DL (ref 8.3–10.1)
CHLORIDE SERPL-SCNC: 103 MMOL/L (ref 100–108)
CO2 SERPL-SCNC: 26 MMOL/L (ref 21–32)
CREAT SERPL-MCNC: 1.17 MG/DL (ref 0.6–1.3)
EOSINOPHIL # BLD MANUAL: 0.88 THOUSAND/UL (ref 0–0.4)
EOSINOPHIL NFR BLD MANUAL: 1 % (ref 0–6)
ERYTHROCYTE [DISTWIDTH] IN BLOOD BY AUTOMATED COUNT: 15.2 % (ref 11.6–15.1)
GFR SERPL CREATININE-BSD FRML MDRD: 62 ML/MIN/1.73SQ M
GLUCOSE SERPL-MCNC: 156 MG/DL (ref 65–140)
HCT VFR BLD AUTO: 46.2 % (ref 36.5–49.3)
HGB BLD-MCNC: 14.1 G/DL (ref 12–17)
IGG SERPL-MCNC: 655 MG/DL (ref 700–1600)
LDH SERPL-CCNC: 224 U/L (ref 81–234)
LYMPHOCYTES # BLD AUTO: 82.25 THOUSAND/UL (ref 0.6–4.47)
LYMPHOCYTES # BLD AUTO: 93 % (ref 14–44)
MCH RBC QN AUTO: 26.5 PG (ref 26.8–34.3)
MCHC RBC AUTO-ENTMCNC: 30.5 G/DL (ref 31.4–37.4)
MCV RBC AUTO: 87 FL (ref 82–98)
MONOCYTES # BLD AUTO: 0 THOUSAND/UL (ref 0–1.22)
MONOCYTES NFR BLD: 0 % (ref 4–12)
NEUTROPHILS # BLD MANUAL: 2.65 THOUSAND/UL (ref 1.85–7.62)
NEUTS SEG NFR BLD AUTO: 3 % (ref 43–75)
NRBC BLD AUTO-RTO: 0 /100 WBCS
PLATELET # BLD AUTO: 89 THOUSANDS/UL (ref 149–390)
PLATELET BLD QL SMEAR: ABNORMAL
PMV BLD AUTO: 9.5 FL (ref 8.9–12.7)
POTASSIUM SERPL-SCNC: 4.5 MMOL/L (ref 3.5–5.3)
PROT SERPL-MCNC: 6.5 G/DL (ref 6.4–8.2)
RBC # BLD AUTO: 5.32 MILLION/UL (ref 3.88–5.62)
SMUDGE CELLS BLD QL SMEAR: PRESENT
SODIUM SERPL-SCNC: 136 MMOL/L (ref 136–145)
TOTAL CELLS COUNTED SPEC: 100
URATE SERPL-MCNC: 6.2 MG/DL (ref 4.2–8)
VARIANT LYMPHS # BLD AUTO: 3 %
WBC # BLD AUTO: 88.44 THOUSAND/UL (ref 4.31–10.16)

## 2018-10-01 PROCEDURE — 83615 LACTATE (LD) (LDH) ENZYME: CPT | Performed by: INTERNAL MEDICINE

## 2018-10-01 PROCEDURE — 84550 ASSAY OF BLOOD/URIC ACID: CPT | Performed by: INTERNAL MEDICINE

## 2018-10-01 PROCEDURE — 85007 BL SMEAR W/DIFF WBC COUNT: CPT | Performed by: INTERNAL MEDICINE

## 2018-10-01 PROCEDURE — 82232 ASSAY OF BETA-2 PROTEIN: CPT | Performed by: INTERNAL MEDICINE

## 2018-10-01 PROCEDURE — 82784 ASSAY IGA/IGD/IGG/IGM EACH: CPT | Performed by: INTERNAL MEDICINE

## 2018-10-01 PROCEDURE — 80053 COMPREHEN METABOLIC PANEL: CPT | Performed by: INTERNAL MEDICINE

## 2018-10-01 PROCEDURE — 36415 COLL VENOUS BLD VENIPUNCTURE: CPT | Performed by: INTERNAL MEDICINE

## 2018-10-01 PROCEDURE — 85027 COMPLETE CBC AUTOMATED: CPT | Performed by: INTERNAL MEDICINE

## 2018-10-01 NOTE — TELEPHONE ENCOUNTER
Patient with a hx of CLL had a WBC of 88 44 on 10/1/18  Patient's WBC was 98 09 on 8/28/18, 87 92 on 7/27/18, 5/29/18 WBC was 97 08, 4/27 WBC was 84 59 and 3/26 was 88 70  Patient is currently on surveillance and is to have monthly CBC's drawn

## 2018-10-02 LAB — B2 MICROGLOB SERPL-MCNC: 2.9 MG/L (ref 0.6–2.4)

## 2018-10-29 ENCOUNTER — TRANSCRIBE ORDERS (OUTPATIENT)
Dept: LAB | Facility: CLINIC | Age: 73
End: 2018-10-29

## 2018-10-29 ENCOUNTER — APPOINTMENT (OUTPATIENT)
Dept: LAB | Facility: CLINIC | Age: 73
End: 2018-10-29
Payer: COMMERCIAL

## 2018-10-29 ENCOUNTER — TELEPHONE (OUTPATIENT)
Dept: HEMATOLOGY ONCOLOGY | Facility: CLINIC | Age: 73
End: 2018-10-29

## 2018-10-29 DIAGNOSIS — C91.90 LYMPHOID LEUKEMIA NOT HAVING ACHIEVED REMISSION, UNSPECIFIED LYMPHOID LEUKEMIA TYPE (HCC): ICD-10-CM

## 2018-10-29 DIAGNOSIS — C91.90 LYMPHOID LEUKEMIA NOT HAVING ACHIEVED REMISSION, UNSPECIFIED LYMPHOID LEUKEMIA TYPE (HCC): Primary | ICD-10-CM

## 2018-10-29 LAB
ALBUMIN SERPL BCP-MCNC: 3.5 G/DL (ref 3.5–5)
ALP SERPL-CCNC: 81 U/L (ref 46–116)
ALT SERPL W P-5'-P-CCNC: 17 U/L (ref 12–78)
ANION GAP SERPL CALCULATED.3IONS-SCNC: 9 MMOL/L (ref 4–13)
AST SERPL W P-5'-P-CCNC: 21 U/L (ref 5–45)
BASOPHILS # BLD MANUAL: 0 THOUSAND/UL (ref 0–0.1)
BASOPHILS NFR MAR MANUAL: 0 % (ref 0–1)
BILIRUB SERPL-MCNC: 0.5 MG/DL (ref 0.2–1)
BUN SERPL-MCNC: 23 MG/DL (ref 5–25)
CALCIUM SERPL-MCNC: 8.9 MG/DL (ref 8.3–10.1)
CHLORIDE SERPL-SCNC: 103 MMOL/L (ref 100–108)
CO2 SERPL-SCNC: 28 MMOL/L (ref 21–32)
CREAT SERPL-MCNC: 1.12 MG/DL (ref 0.6–1.3)
EOSINOPHIL # BLD MANUAL: 0.92 THOUSAND/UL (ref 0–0.4)
EOSINOPHIL NFR BLD MANUAL: 1 % (ref 0–6)
ERYTHROCYTE [DISTWIDTH] IN BLOOD BY AUTOMATED COUNT: 15.3 % (ref 11.6–15.1)
GFR SERPL CREATININE-BSD FRML MDRD: 65 ML/MIN/1.73SQ M
GLUCOSE P FAST SERPL-MCNC: 109 MG/DL (ref 65–99)
HCT VFR BLD AUTO: 47.1 % (ref 36.5–49.3)
HGB BLD-MCNC: 14.1 G/DL (ref 12–17)
LDH SERPL-CCNC: 244 U/L (ref 81–234)
LYMPHOCYTES # BLD AUTO: 87.08 THOUSAND/UL (ref 0.6–4.47)
LYMPHOCYTES # BLD AUTO: 95 % (ref 14–44)
MCH RBC QN AUTO: 26.8 PG (ref 26.8–34.3)
MCHC RBC AUTO-ENTMCNC: 29.9 G/DL (ref 31.4–37.4)
MCV RBC AUTO: 89 FL (ref 82–98)
MONOCYTES # BLD AUTO: 0 THOUSAND/UL (ref 0–1.22)
MONOCYTES NFR BLD: 0 % (ref 4–12)
NEUTROPHILS # BLD MANUAL: 3.67 THOUSAND/UL (ref 1.85–7.62)
NEUTS SEG NFR BLD AUTO: 4 % (ref 43–75)
NRBC BLD AUTO-RTO: 0 /100 WBCS
PLATELET # BLD AUTO: 95 THOUSANDS/UL (ref 149–390)
PLATELET BLD QL SMEAR: ABNORMAL
PMV BLD AUTO: 9.9 FL (ref 8.9–12.7)
POTASSIUM SERPL-SCNC: 5 MMOL/L (ref 3.5–5.3)
PROT SERPL-MCNC: 6.8 G/DL (ref 6.4–8.2)
RBC # BLD AUTO: 5.27 MILLION/UL (ref 3.88–5.62)
SMUDGE CELLS BLD QL SMEAR: PRESENT
SODIUM SERPL-SCNC: 140 MMOL/L (ref 136–145)
TOTAL CELLS COUNTED SPEC: 100
URATE SERPL-MCNC: 5.3 MG/DL (ref 4.2–8)
WBC # BLD AUTO: 91.66 THOUSAND/UL (ref 4.31–10.16)

## 2018-10-29 PROCEDURE — 85027 COMPLETE CBC AUTOMATED: CPT

## 2018-10-29 PROCEDURE — 83615 LACTATE (LD) (LDH) ENZYME: CPT

## 2018-10-29 PROCEDURE — 84550 ASSAY OF BLOOD/URIC ACID: CPT

## 2018-10-29 PROCEDURE — 80053 COMPREHEN METABOLIC PANEL: CPT

## 2018-10-29 PROCEDURE — 85007 BL SMEAR W/DIFF WBC COUNT: CPT

## 2018-10-29 NOTE — TELEPHONE ENCOUNTER
Patient with a hx of CLL had a WBC of 91 66 on 10/29/18  Patient's WBC was 88 44 on 10/1/18, 98 09 on 8/28/18, 87 92 on 7/27/18, 5/29/18 WBC was 97 08, 4/27 WBC was 84 59 and 3/26 was 88 70  Patient is currently on surveillance and is to have monthly CBC's drawn

## 2018-11-13 ENCOUNTER — OFFICE VISIT (OUTPATIENT)
Dept: HEMATOLOGY ONCOLOGY | Facility: CLINIC | Age: 73
End: 2018-11-13
Payer: COMMERCIAL

## 2018-11-13 VITALS
HEART RATE: 74 BPM | BODY MASS INDEX: 31.22 KG/M2 | RESPIRATION RATE: 18 BRPM | SYSTOLIC BLOOD PRESSURE: 138 MMHG | TEMPERATURE: 98 F | HEIGHT: 71 IN | OXYGEN SATURATION: 98 % | WEIGHT: 223 LBS | DIASTOLIC BLOOD PRESSURE: 82 MMHG

## 2018-11-13 DIAGNOSIS — C91.10 CLL (CHRONIC LYMPHOCYTIC LEUKEMIA) (HCC): Primary | ICD-10-CM

## 2018-11-13 PROCEDURE — 99214 OFFICE O/P EST MOD 30 MIN: CPT | Performed by: INTERNAL MEDICINE

## 2018-11-13 NOTE — LETTER
November 14, 2018     Carl Ramírez MD  [facility]  [address] [address]    Patient: Aldo Alvarez   YOB: 1945   Date of Visit: 11/13/2018       Dear Dr Humza Pham: Thank you for referring Aldo Alvarez to me for evaluation  Below are my notes for this consultation  If you have questions, please do not hesitate to call me  I look forward to following your patient along with you  Sincerely,        Larry Cordova MD        CC: No Recipients  Larry Cordova MD  11/14/2018 12:12 AM  Sign at close encounter  HPI:   Patient has stage 4 CLL because platelet count is less than 100,000  He is not symptomatic from CLL  He has both on favorable and favorable features like 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, IgVH mutation, lack of CD38 expression, deletion of 13 q14 in 6%, no 11 q deletion and no trisomy 12  CT scan showed  splenomegaly, 15 7 cm  There has been slow increase in WBC and lymphocyte counts       He has  tiredness and minor arthritic symptoms  Patient  has been on surveillance for CLL              Current Outpatient Prescriptions:     aspirin 81 mg chewable tablet, Chew 81 mg daily, Disp: , Rfl:     co-enzyme Q-10 50 MG capsule, Take 200 mg by mouth daily, Disp: , Rfl:     diazepam (VALIUM) 2 mg tablet, 1 mg  , Disp: , Rfl:     fosinopril (MONOPRIL) 20 mg tablet, Take 20 mg by mouth daily, Disp: , Rfl:     magnesium gluconate (MAGONATE) 500 mg tablet, Take 500 mg by mouth 2 (two) times a day, Disp: , Rfl:     mometasone (NASONEX) 50 mcg/act nasal spray, 2 sprays into each nostril daily, Disp: , Rfl:     Multiple Vitamin (MULTIVITAMIN) tablet, Take 1 tablet by mouth daily, Disp: , Rfl:     sertraline (ZOLOFT) 50 mg tablet, Take 75 mg by mouth daily  , Disp: , Rfl:     simvastatin (ZOCOR) 20 mg tablet, Take 20 mg by mouth daily at bedtime, Disp: , Rfl:     Allergies   Allergen Reactions    Sulfa Antibiotics          ROS:  11/13/18 Reviewed 13 systems:  No symptoms except for some tiredness and minor arthritic symptoms    Patient does not have neurological, cardiac, pulmonary, GI and  symptoms  No B symptoms  No  dizziness, diplopia, dysphagia, hoarseness,  fever, chills, bleeding, bone pains, skin rash, weight loss,   weakness, numbness,  claudication and gait problem  No frequent infections  Not unusually sensitive to heat or cold  No swelling of the ankles  No swollen glands  Patient is anxious  Other symptoms are in HPI        /82 (BP Location: Left arm, Patient Position: Sitting, Cuff Size: Adult)   Pulse 74   Temp 98 °F (36 7 °C)   Resp 18   Ht 5' 10 75" (1 797 m)   Wt 101 kg (223 lb)   SpO2 98%   BMI 31 32 kg/m²      Physical Exam:    Patient is alert and oriented  Patient is not in distress  Vital signs are stable  There is no icterus, no oral thrush, no palpable neck mass, lung fields are clear to percussion and auscultation, regular heart rate, abdomen  soft and non tender, no palpable abdominal mass, no ascites, no edema of ankles, no calf tenderness, no focal neurological deficit, no skin rash, no palpable lymphadenopathy, good arterial pulses, no clubbing  Patient is anxious  Performance status 0      IMAGING:      LABS:  Results for orders placed or performed in visit on 10/29/18   CBC and differential   Result Value Ref Range    WBC 91 66 (HH) 4 31 - 10 16 Thousand/uL    RBC 5 27 3 88 - 5 62 Million/uL    Hemoglobin 14 1 12 0 - 17 0 g/dL    Hematocrit 47 1 36 5 - 49 3 %    MCV 89 82 - 98 fL    MCH 26 8 26 8 - 34 3 pg    MCHC 29 9 (L) 31 4 - 37 4 g/dL    RDW 15 3 (H) 11 6 - 15 1 %    MPV 9 9 8 9 - 12 7 fL    Platelets 95 (L) 677 - 390 Thousands/uL    nRBC 0 /100 WBCs   Comprehensive metabolic panel   Result Value Ref Range    Sodium 140 136 - 145 mmol/L    Potassium 5 0 3 5 - 5 3 mmol/L    Chloride 103 100 - 108 mmol/L    CO2 28 21 - 32 mmol/L    ANION GAP 9 4 - 13 mmol/L    BUN 23 5 - 25 mg/dL    Creatinine 1 12 0 60 - 1 30 mg/dL    Glucose, Fasting 109 (H) 65 - 99 mg/dL    Calcium 8 9 8 3 - 10 1 mg/dL    AST 21 5 - 45 U/L    ALT 17 12 - 78 U/L    Alkaline Phosphatase 81 46 - 116 U/L    Total Protein 6 8 6 4 - 8 2 g/dL    Albumin 3 5 3 5 - 5 0 g/dL    Total Bilirubin 0 50 0 20 - 1 00 mg/dL    eGFR 65 ml/min/1 73sq m   Uric acid   Result Value Ref Range    Uric Acid 5 3 4 2 - 8 0 mg/dL   LD,Blood   Result Value Ref Range     (H) 81 - 234 U/L   Manual Differential(PHLEBS Do Not Order)   Result Value Ref Range    Segmented % 4 (L) 43 - 75 %    Lymphocytes % 95 (H) 14 - 44 %    Monocytes % 0 (L) 4 - 12 %    Eosinophils, % 1 0 - 6 %    Basophils % 0 0 - 1 %    Absolute Neutrophils 3 67 1 85 - 7 62 Thousand/uL    Lymphocytes Absolute 87 08 (H) 0 60 - 4 47 Thousand/uL    Monocytes Absolute 0 00 0 00 - 1 22 Thousand/uL    Eosinophils Absolute 0 92 (H) 0 00 - 0 40 Thousand/uL    Basophils Absolute 0 00 0 00 - 0 10 Thousand/uL    Total Counted 100     Smudge Cells Present     Platelet Estimate Decreased (A) Adequate     Labs, Imaging, & Other studies:   All pertinent labs and imaging studies were personally reviewed    Lab Results   Component Value Date    K 5 0 10/29/2018     10/29/2018    CO2 28 10/29/2018    BUN 23 10/29/2018    CREATININE 1 12 10/29/2018    GLUF 109 (H) 10/29/2018    CALCIUM 8 9 10/29/2018    AST 21 10/29/2018    ALT 17 10/29/2018    ALKPHOS 81 10/29/2018    EGFR 65 10/29/2018     Lab Results   Component Value Date    WBC 91 66 (HH) 10/29/2018    HGB 14 1 10/29/2018    HCT 47 1 10/29/2018    MCV 89 10/29/2018    PLT 95 (L) 10/29/2018       Reviewed and discussed with patient  Assessment and plan:  Patient has stage 4 CLL because platelet count is less than 100,000  He is not symptomatic from CLL  He has both on favorable and favorable features like 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, IgVH mutation, lack of CD38 expression, deletion of 13 q14 in 6%, no 11 q deletion and no trisomy 12  CT scan showed  splenomegaly, 15 7 cm     There has been slow increase in WBC and lymphocyte counts       He has  tiredness and minor arthritic symptoms  Patient  has been on surveillance for CLL    Physical examination and test results are as recorded and discussed  Thrombocytopenia is an indication for treatment  We discussed and mutually agreed to monitor counts once a month and decide treatment and that will be ibrutinib  Discussed the amber weston with the patient in detail  All discussed in detail  Questions answered  Discussed treatment indications for CLL  Plan is surveillance for now  Also discussed the importance of eating healthy foods, staying active and health screening tests  Patient is capable of self-care  1  CLL (chronic lymphocytic leukemia) (HCC)    - CBC and differential; Standing  - Comprehensive metabolic panel; Standing  - IgG, IgA, IgM; Standing  - LD,Blood; Standing  - Uric acid; Standing  - CBC and differential  - Comprehensive metabolic panel  - IgG, IgA, IgM  - LD,Blood  - Uric acid  - US abdomen complete; Future            Patient voiced understanding and agreement in the discussion  Counseling / Coordination of Care   Greater than 50% of total time was spent with the patient and / or family counseling and / or coordination of care

## 2018-11-13 NOTE — PROGRESS NOTES
HPI:   Patient has stage 4 CLL because platelet count is less than 100,000  He is not symptomatic from CLL  He has both on favorable and favorable features like 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, IgVH mutation, lack of CD38 expression, deletion of 13 q14 in 6%, no 11 q deletion and no trisomy 12  CT scan showed  splenomegaly, 15 7 cm  There has been slow increase in WBC and lymphocyte counts       He has  tiredness and minor arthritic symptoms  Patient has been on surveillance for CLL    Current Outpatient Prescriptions:     aspirin 81 mg chewable tablet, Chew 81 mg daily, Disp: , Rfl:     co-enzyme Q-10 50 MG capsule, Take 200 mg by mouth daily, Disp: , Rfl:     diazepam (VALIUM) 2 mg tablet, 1 mg  , Disp: , Rfl:     fosinopril (MONOPRIL) 20 mg tablet, Take 20 mg by mouth daily, Disp: , Rfl:     magnesium gluconate (MAGONATE) 500 mg tablet, Take 500 mg by mouth 2 (two) times a day, Disp: , Rfl:     mometasone (NASONEX) 50 mcg/act nasal spray, 2 sprays into each nostril daily, Disp: , Rfl:     Multiple Vitamin (MULTIVITAMIN) tablet, Take 1 tablet by mouth daily, Disp: , Rfl:     sertraline (ZOLOFT) 50 mg tablet, Take 75 mg by mouth daily  , Disp: , Rfl:     simvastatin (ZOCOR) 20 mg tablet, Take 20 mg by mouth daily at bedtime, Disp: , Rfl:     Allergies   Allergen Reactions    Sulfa Antibiotics          ROS:  11/13/18 Reviewed 13 systems:  No symptoms except for some tiredness and minor arthritic symptoms    Patient does not have neurological, cardiac, pulmonary, GI and  symptoms  No B symptoms  No  dizziness, diplopia, dysphagia, hoarseness,  fever, chills, bleeding, bone pains, skin rash, weight loss,   weakness, numbness,  claudication and gait problem  No frequent infections  Not unusually sensitive to heat or cold  No swelling of the ankles  No swollen glands  Patient is anxious   Other symptoms are in HPI        /82 (BP Location: Left arm, Patient Position: Sitting, Cuff Size: Adult)   Pulse 74 Temp 98 °F (36 7 °C)   Resp 18   Ht 5' 10 75" (1 797 m)   Wt 101 kg (223 lb)   SpO2 98%   BMI 31 32 kg/m²     Physical Exam:    Patient is alert and oriented  Patient is not in distress  Vital signs are stable  There is no icterus, no oral thrush, no palpable neck mass, lung fields are clear to percussion and auscultation, regular heart rate, abdomen  soft and non tender, no palpable abdominal mass, no ascites, no edema of ankles, no calf tenderness, no focal neurological deficit, no skin rash, no palpable lymphadenopathy, good arterial pulses, no clubbing  Patient is anxious  Performance status 0      IMAGING:      LABS:  Results for orders placed or performed in visit on 10/29/18   CBC and differential   Result Value Ref Range    WBC 91 66 (HH) 4 31 - 10 16 Thousand/uL    RBC 5 27 3 88 - 5 62 Million/uL    Hemoglobin 14 1 12 0 - 17 0 g/dL    Hematocrit 47 1 36 5 - 49 3 %    MCV 89 82 - 98 fL    MCH 26 8 26 8 - 34 3 pg    MCHC 29 9 (L) 31 4 - 37 4 g/dL    RDW 15 3 (H) 11 6 - 15 1 %    MPV 9 9 8 9 - 12 7 fL    Platelets 95 (L) 868 - 390 Thousands/uL    nRBC 0 /100 WBCs   Comprehensive metabolic panel   Result Value Ref Range    Sodium 140 136 - 145 mmol/L    Potassium 5 0 3 5 - 5 3 mmol/L    Chloride 103 100 - 108 mmol/L    CO2 28 21 - 32 mmol/L    ANION GAP 9 4 - 13 mmol/L    BUN 23 5 - 25 mg/dL    Creatinine 1 12 0 60 - 1 30 mg/dL    Glucose, Fasting 109 (H) 65 - 99 mg/dL    Calcium 8 9 8 3 - 10 1 mg/dL    AST 21 5 - 45 U/L    ALT 17 12 - 78 U/L    Alkaline Phosphatase 81 46 - 116 U/L    Total Protein 6 8 6 4 - 8 2 g/dL    Albumin 3 5 3 5 - 5 0 g/dL    Total Bilirubin 0 50 0 20 - 1 00 mg/dL    eGFR 65 ml/min/1 73sq m   Uric acid   Result Value Ref Range    Uric Acid 5 3 4 2 - 8 0 mg/dL   LD,Blood   Result Value Ref Range     (H) 81 - 234 U/L   Manual Differential(PHLEBS Do Not Order)   Result Value Ref Range    Segmented % 4 (L) 43 - 75 %    Lymphocytes % 95 (H) 14 - 44 %    Monocytes % 0 (L) 4 - 12 %    Eosinophils, % 1 0 - 6 %    Basophils % 0 0 - 1 %    Absolute Neutrophils 3 67 1 85 - 7 62 Thousand/uL    Lymphocytes Absolute 87 08 (H) 0 60 - 4 47 Thousand/uL    Monocytes Absolute 0 00 0 00 - 1 22 Thousand/uL    Eosinophils Absolute 0 92 (H) 0 00 - 0 40 Thousand/uL    Basophils Absolute 0 00 0 00 - 0 10 Thousand/uL    Total Counted 100     Smudge Cells Present     Platelet Estimate Decreased (A) Adequate     Labs, Imaging, & Other studies:   All pertinent labs and imaging studies were personally reviewed    Lab Results   Component Value Date    K 5 0 10/29/2018     10/29/2018    CO2 28 10/29/2018    BUN 23 10/29/2018    CREATININE 1 12 10/29/2018    GLUF 109 (H) 10/29/2018    CALCIUM 8 9 10/29/2018    AST 21 10/29/2018    ALT 17 10/29/2018    ALKPHOS 81 10/29/2018    EGFR 65 10/29/2018     Lab Results   Component Value Date    WBC 91 66 (HH) 10/29/2018    HGB 14 1 10/29/2018    HCT 47 1 10/29/2018    MCV 89 10/29/2018    PLT 95 (L) 10/29/2018       Reviewed and discussed with patient  Assessment and plan:  Patient has stage 4 CLL because platelet count is less than 100,000  He is not symptomatic from CLL  He has both on favorable and favorable features like 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, IgVH mutation, lack of CD38 expression, deletion of 13 q14 in 6%, no 11 q deletion and no trisomy 12  CT scan showed  splenomegaly, 15 7 cm  There has been slow increase in WBC and lymphocyte counts       He has  tiredness and minor arthritic symptoms  Patient has been on surveillance for CLL    Physical examination and test results are as recorded and discussed  Thrombocytopenia is an indication for treatment  We discussed and mutually agreed to monitor counts once a month and decide treatment and that will be ibrutinib  Discussed the amber weston with the patient in detail  All discussed in detail  Questions answered  Discussed treatment indications for CLL  Plan is surveillance for now     Also discussed the importance of eating healthy foods, staying active and health screening tests  Patient is capable of self-care  1  CLL (chronic lymphocytic leukemia) (HCC)    - CBC and differential; Standing  - Comprehensive metabolic panel; Standing  - IgG, IgA, IgM; Standing  - LD,Blood; Standing  - Uric acid; Standing  - CBC and differential  - Comprehensive metabolic panel  - IgG, IgA, IgM  - LD,Blood  - Uric acid  - US abdomen complete; Future            Patient voiced understanding and agreement in the discussion  Counseling / Coordination of Care   Greater than 50% of total time was spent with the patient and / or family counseling and / or coordination of care

## 2018-11-28 ENCOUNTER — TELEPHONE (OUTPATIENT)
Dept: HEMATOLOGY ONCOLOGY | Facility: CLINIC | Age: 73
End: 2018-11-28

## 2018-11-28 ENCOUNTER — APPOINTMENT (OUTPATIENT)
Dept: LAB | Facility: CLINIC | Age: 73
End: 2018-11-28
Payer: COMMERCIAL

## 2018-11-28 LAB
ALBUMIN SERPL BCP-MCNC: 4 G/DL (ref 3.5–5)
ALP SERPL-CCNC: 77 U/L (ref 46–116)
ALT SERPL W P-5'-P-CCNC: 26 U/L (ref 12–78)
ANION GAP SERPL CALCULATED.3IONS-SCNC: 7 MMOL/L (ref 4–13)
AST SERPL W P-5'-P-CCNC: 20 U/L (ref 5–45)
BASOPHILS # BLD MANUAL: 0 THOUSAND/UL (ref 0–0.1)
BASOPHILS NFR MAR MANUAL: 0 % (ref 0–1)
BILIRUB SERPL-MCNC: 0.7 MG/DL (ref 0.2–1)
BUN SERPL-MCNC: 30 MG/DL (ref 5–25)
CALCIUM SERPL-MCNC: 9.3 MG/DL (ref 8.3–10.1)
CHLORIDE SERPL-SCNC: 103 MMOL/L (ref 100–108)
CO2 SERPL-SCNC: 30 MMOL/L (ref 21–32)
CREAT SERPL-MCNC: 1.38 MG/DL (ref 0.6–1.3)
EOSINOPHIL # BLD MANUAL: 0 THOUSAND/UL (ref 0–0.4)
EOSINOPHIL NFR BLD MANUAL: 0 % (ref 0–6)
ERYTHROCYTE [DISTWIDTH] IN BLOOD BY AUTOMATED COUNT: 15.4 % (ref 11.6–15.1)
GFR SERPL CREATININE-BSD FRML MDRD: 51 ML/MIN/1.73SQ M
GLUCOSE P FAST SERPL-MCNC: 105 MG/DL (ref 65–99)
HCT VFR BLD AUTO: 47.8 % (ref 36.5–49.3)
HGB BLD-MCNC: 14.7 G/DL (ref 12–17)
IGA SERPL-MCNC: 149 MG/DL (ref 70–400)
IGG SERPL-MCNC: 712 MG/DL (ref 700–1600)
IGM SERPL-MCNC: 28 MG/DL (ref 40–230)
LDH SERPL-CCNC: 205 U/L (ref 81–234)
LYMPHOCYTES # BLD AUTO: 90 % (ref 14–44)
LYMPHOCYTES # BLD AUTO: 91.68 THOUSAND/UL (ref 0.6–4.47)
MCH RBC QN AUTO: 27.1 PG (ref 26.8–34.3)
MCHC RBC AUTO-ENTMCNC: 30.8 G/DL (ref 31.4–37.4)
MCV RBC AUTO: 88 FL (ref 82–98)
MONOCYTES # BLD AUTO: 2.04 THOUSAND/UL (ref 0–1.22)
MONOCYTES NFR BLD: 2 % (ref 4–12)
NEUTROPHILS # BLD MANUAL: 8.15 THOUSAND/UL (ref 1.85–7.62)
NEUTS SEG NFR BLD AUTO: 8 % (ref 43–75)
NRBC BLD AUTO-RTO: 0 /100 WBCS
PLATELET # BLD AUTO: 100 THOUSANDS/UL (ref 149–390)
PLATELET BLD QL SMEAR: ABNORMAL
PMV BLD AUTO: 9.9 FL (ref 8.9–12.7)
POTASSIUM SERPL-SCNC: 5.3 MMOL/L (ref 3.5–5.3)
PROT SERPL-MCNC: 7.2 G/DL (ref 6.4–8.2)
RBC # BLD AUTO: 5.43 MILLION/UL (ref 3.88–5.62)
SMUDGE CELLS BLD QL SMEAR: PRESENT
SODIUM SERPL-SCNC: 140 MMOL/L (ref 136–145)
TOTAL CELLS COUNTED SPEC: 100
URATE SERPL-MCNC: 5.9 MG/DL (ref 4.2–8)
WBC # BLD AUTO: 101.87 THOUSAND/UL (ref 4.31–10.16)

## 2018-11-28 PROCEDURE — 80053 COMPREHEN METABOLIC PANEL: CPT | Performed by: INTERNAL MEDICINE

## 2018-11-28 PROCEDURE — 85007 BL SMEAR W/DIFF WBC COUNT: CPT | Performed by: INTERNAL MEDICINE

## 2018-11-28 PROCEDURE — 84550 ASSAY OF BLOOD/URIC ACID: CPT | Performed by: INTERNAL MEDICINE

## 2018-11-28 PROCEDURE — 83615 LACTATE (LD) (LDH) ENZYME: CPT | Performed by: INTERNAL MEDICINE

## 2018-11-28 PROCEDURE — 85027 COMPLETE CBC AUTOMATED: CPT | Performed by: INTERNAL MEDICINE

## 2018-11-28 PROCEDURE — 36415 COLL VENOUS BLD VENIPUNCTURE: CPT | Performed by: INTERNAL MEDICINE

## 2018-11-28 PROCEDURE — 82784 ASSAY IGA/IGD/IGG/IGM EACH: CPT | Performed by: INTERNAL MEDICINE

## 2018-11-28 NOTE — TELEPHONE ENCOUNTER
Patient with a hx of CLL had a WBC of 101 87 on 11/28/18  Patient's WBC was 91 66 on 10/29/18, 88 44 on 10/1/18, 98 09 on 8/28/18, 87 92 on 7/27/18, 5/29/18 WBC was 97 08, 4/27 WBC was 84 59 and 3/26 was 88 70  Patient is currently on surveillance and is to have monthly CBC's drawn

## 2018-12-28 ENCOUNTER — APPOINTMENT (OUTPATIENT)
Dept: LAB | Facility: CLINIC | Age: 73
End: 2018-12-28
Payer: COMMERCIAL

## 2018-12-31 ENCOUNTER — TELEPHONE (OUTPATIENT)
Dept: HEMATOLOGY ONCOLOGY | Facility: CLINIC | Age: 73
End: 2018-12-31

## 2018-12-31 NOTE — TELEPHONE ENCOUNTER
Called and LVM informing patient that Dr Kaylie Cabrera would like to see him in the office sooner than his scheduled appt  I R/S his appt to 01/29/18 at 9:00 am  I Informed him to call our office if this appt change does not work for his schedule

## 2018-12-31 NOTE — TELEPHONE ENCOUNTER
----- Message from Chon Weinberg MD sent at 12/28/2018 10:30 AM EST -----  Please give patient an earlier appointment within next 2-3 weeks    Thanks  Proothi

## 2019-01-02 ENCOUNTER — TELEPHONE (OUTPATIENT)
Dept: HEMATOLOGY ONCOLOGY | Facility: CLINIC | Age: 74
End: 2019-01-02

## 2019-01-02 NOTE — TELEPHONE ENCOUNTER
I have left message for the patient on his answering machine/voicemail to please call me tomorrow  I left my name and phone number

## 2019-01-03 ENCOUNTER — TELEPHONE (OUTPATIENT)
Dept: HEMATOLOGY ONCOLOGY | Facility: CLINIC | Age: 74
End: 2019-01-03

## 2019-01-03 NOTE — TELEPHONE ENCOUNTER
Patient called back and we discussed the results  He will have another blood counts prior to coming to the office in 4 weeks  He is almost ready to get started on treatment for CLL

## 2019-01-25 ENCOUNTER — APPOINTMENT (OUTPATIENT)
Dept: LAB | Facility: CLINIC | Age: 74
End: 2019-01-25
Payer: COMMERCIAL

## 2019-01-25 ENCOUNTER — TELEPHONE (OUTPATIENT)
Dept: HEMATOLOGY ONCOLOGY | Facility: CLINIC | Age: 74
End: 2019-01-25

## 2019-01-25 NOTE — TELEPHONE ENCOUNTER
Emely Fresh from [facility] called to report critical  67    Emely Fresh can be reached at [phone]

## 2019-01-29 ENCOUNTER — OFFICE VISIT (OUTPATIENT)
Dept: HEMATOLOGY ONCOLOGY | Facility: CLINIC | Age: 74
End: 2019-01-29
Payer: COMMERCIAL

## 2019-01-29 VITALS
HEART RATE: 78 BPM | RESPIRATION RATE: 18 BRPM | WEIGHT: 219.5 LBS | DIASTOLIC BLOOD PRESSURE: 78 MMHG | OXYGEN SATURATION: 97 % | BODY MASS INDEX: 30.73 KG/M2 | SYSTOLIC BLOOD PRESSURE: 158 MMHG | HEIGHT: 71 IN | TEMPERATURE: 98.6 F

## 2019-01-29 DIAGNOSIS — C91.10 CLL (CHRONIC LYMPHOCYTIC LEUKEMIA) (HCC): Primary | ICD-10-CM

## 2019-01-29 PROCEDURE — 99214 OFFICE O/P EST MOD 30 MIN: CPT | Performed by: INTERNAL MEDICINE

## 2019-01-29 NOTE — LETTER
January 29, 2019     Adali Wyman MD  [facility]  [address] [address]    Patient: Holden Bhatia   YOB: 1945   Date of Visit: 1/29/2019       Dear Dr Brian Lozada: Thank you for referring Holden Bhatia to me for evaluation  Below are my notes for this consultation  If you have questions, please do not hesitate to call me  I look forward to following your patient along with you  Sincerely,        Lynne Goltz, MD        CC: No Recipients  Lynne Goltz, MD  1/29/2019  9:37 AM  Sign at close encounter    HPI:  Patient is here with his wife  He has CLL and he was almost ready to be treated because last time platelet count was less than 100,000  Platelet count has improved to 111,000  WBC and lymphocyte counts on high but stable this time  He is not symptomatic from CLL except for some tiredness and minor arthritic symptoms  He has been under surveillance  He has both on favorable and favorable features like 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, IgVH mutation, lack of CD38 expression, deletion of 13 q14 in 6%, no 11 q deletion and no trisomy 12  CT scan in August 2018 showed  splenomegaly, 15 7 cm  He will be going for ultrasound of abdomen to check spleen size                Current Outpatient Prescriptions:     aspirin 81 mg chewable tablet, Chew 81 mg daily, Disp: , Rfl:     co-enzyme Q-10 50 MG capsule, Take 200 mg by mouth daily, Disp: , Rfl:     diazepam (VALIUM) 2 mg tablet, 1 mg  , Disp: , Rfl:     fosinopril (MONOPRIL) 20 mg tablet, Take 20 mg by mouth daily, Disp: , Rfl:     magnesium gluconate (MAGONATE) 500 mg tablet, Take 500 mg by mouth 2 (two) times a day, Disp: , Rfl:     mometasone (NASONEX) 50 mcg/act nasal spray, 2 sprays into each nostril daily, Disp: , Rfl:     Multiple Vitamin (MULTIVITAMIN) tablet, Take 1 tablet by mouth daily, Disp: , Rfl:     sertraline (ZOLOFT) 50 mg tablet, Take 75 mg by mouth daily  , Disp: , Rfl:     simvastatin (ZOCOR) 20 mg tablet, Take 20 mg by mouth daily at bedtime, Disp: , Rfl:     Allergies   Allergen Reactions    Sulfa Antibiotics        Oncology History    chronic lymphocytic leukemia, diagnosed in August 2017  CLL has mixed good and bad prognostic features, 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, IgVH mutation, lack of CD38 expression, deletion of 13 q14 in 6% and no 11 q deletion        There was no trisomy 12  Lymphocytes were CD5 and CD23 positive, dim kappa expression and moderate CD20 expression had splenomegaly on CT scan but not on palpation        CLL (chronic lymphocytic leukemia) ([facility])    3/27/2018 Initial Diagnosis     CLL (chronic lymphocytic leukemia) ([facility])          ROS:  01/29/19 Reviewed 13 systems:  Presently no  headaches, seizures, dizziness, diplopia, dysphagia, hoarseness, chest pain, palpitations, shortness of breath, cough, hemoptysis, abdominal pain, nausea, vomiting, change in bowel habits, melena, hematuria, fever, chills, bleeding, bone pains, skin rash, weight loss,  weakness, numbness,  claudication and gait problem  No frequent infections  Not unusually sensitive to heat or cold  No swelling of the ankles  No swollen glands  Patient is anxious  Other symptoms are in HPI        /78 (BP Location: Left arm, Patient Position: Sitting, Cuff Size: Adult)   Pulse 78   Temp 98 6 °F (37 °C)   Resp 18   Ht 5' 10 75" (1 797 m)   Wt 99 6 kg (219 lb 8 oz)   SpO2 97%   BMI 30 83 kg/m²      Physical Exam:  Alert, oriented, not in distress, no icterus, no oral thrush, no palpable neck mass, clear lung fields, regular heart rate, abdomen  soft and non tender, no palpable abdominal mass, no ascites, no edema of ankles, no calf tenderness, no focal neurological deficit, no skin rash, no palpable lymphadenopathy, good arterial pulses, no clubbing  Patient is anxious  Performance status 0      IMAGING:      LABS:  Results for orders placed or performed in visit on 01/04/19   CBC and differential   Result Value Ref Range     66 (HH) 4 31 - 10 16 Thousand/uL    RBC 5 30 3 88 - 5 62 Million/uL    Hemoglobin 13 7 12 0 - 17 0 g/dL    Hematocrit 47 1 36 5 - 49 3 %    MCV 89 82 - 98 fL    MCH 25 8 (L) 26 8 - 34 3 pg    MCHC 29 1 (L) 31 4 - 37 4 g/dL    RDW 15 5 (H) 11 6 - 15 1 %    MPV 9 7 8 9 - 12 7 fL    Platelets 216 (L) 263 - 390 Thousands/uL    nRBC 0 /100 WBCs   Comprehensive metabolic panel   Result Value Ref Range    Sodium 141 136 - 145 mmol/L    Potassium 4 9 3 5 - 5 3 mmol/L    Chloride 104 100 - 108 mmol/L    CO2 30 21 - 32 mmol/L    ANION GAP 7 4 - 13 mmol/L    BUN 26 (H) 5 - 25 mg/dL    Creatinine 1 18 0 60 - 1 30 mg/dL    Glucose, Fasting 107 (H) 65 - 99 mg/dL    Calcium 8 8 8 3 - 10 1 mg/dL    AST 18 5 - 45 U/L    ALT 24 12 - 78 U/L    Alkaline Phosphatase 76 46 - 116 U/L    Total Protein 6 8 6 4 - 8 2 g/dL    Albumin 3 7 3 5 - 5 0 g/dL    Total Bilirubin 0 60 0 20 - 1 00 mg/dL    eGFR 61 ml/min/1 73sq m   IgG, IgA, IgM   Result Value Ref Range     0 70 0 - 400 0 mg/dL     0 (L) 700 0-1,600 0 mg/dL    IGM 32 0 (L) 40 0 - 230 0 mg/dL   LD,Blood   Result Value Ref Range     81 - 234 U/L   Uric acid   Result Value Ref Range    Uric Acid 5 7 4 2 - 8 0 mg/dL   Manual Differential(PHLEBS Do Not Order)   Result Value Ref Range    Segmented % 5 (L) 43 - 75 %    Lymphocytes % 90 (H) 14 - 44 %    Monocytes % 2 (L) 4 - 12 %    Eosinophils, % 0 0 - 6 %    Basophils % 0 0 - 1 %    Atypical Lymphocytes % 3 (H) <=0 %    Absolute Neutrophils 5 43 1 85 - 7 62 Thousand/uL    Lymphocytes Absolute 97 79 (H) 0 60 - 4 47 Thousand/uL    Monocytes Absolute 2 17 (H) 0 00 - 1 22 Thousand/uL    Eosinophils Absolute 0 00 0 00 - 0 40 Thousand/uL    Basophils Absolute 0 00 0 00 - 0 10 Thousand/uL    Total Counted 100     Smudge Cells Present     Anisocytosis Present     Platelet Estimate Decreased (A) Adequate     Labs, Imaging, & Other studies:   All pertinent labs and imaging studies were personally reviewed    Lab Results   Component Value Date    K 4 9 01/25/2019     01/25/2019    CO2 30 01/25/2019    BUN 26 (H) 01/25/2019    CREATININE 1 18 01/25/2019    GLUF 107 (H) 01/25/2019    CALCIUM 8 8 01/25/2019    AST 18 01/25/2019    ALT 24 01/25/2019    ALKPHOS 76 01/25/2019    EGFR 61 01/25/2019     Lab Results   Component Value Date     66 (HH) 01/25/2019    HGB 13 7 01/25/2019    HCT 47 1 01/25/2019    MCV 89 01/25/2019     (L) 01/25/2019     Normal LDH and uric acid  Slightly low IgG level  Reviewed and discussed with patient  Assessment and plan:  Patient is here with his wife  He has CLL and he was almost ready to be treated because last time platelet count was less than 100,000  Platelet count has improved to 111,000  WBC and lymphocyte counts on high but stable this time  He is not symptomatic from CLL except for some tiredness and minor arthritic symptoms  He has been under surveillance  He has both on favorable and favorable features like 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, IgVH mutation, lack of CD38 expression, deletion of 13 q14 in 6%, no 11 q deletion and no trisomy 12  CT scan in August 2018 showed  splenomegaly, 15 7 cm  He will be going for ultrasound of abdomen to check spleen size       Physical examination and test results are as recorded and discussed  Thrombocytopenia  was an indication for treatment but platelet count has improved  We discussed and mutually agreed to monitor counts once a month and decide treatment depending upon patient's condition and CLL parameters     Condition discussed in detail    Questions answered  Discussed treatment indications for CLL  Plan is surveillance for now  Also discussed the importance of eating healthy foods, staying active and health screening tests  Patient is capable of self-care      1  CLL [facility]chronic lymphocytic leukemia) ([facility] )          Patient voiced understanding and agreement in the discussion         Counseling / Coordination of Care   Greater than 50% of total time was spent with the patient and / or family counseling and / or coordination of care

## 2019-01-29 NOTE — PROGRESS NOTES
HPI:  Patient is here with his wife  He has CLL and he was almost ready to be treated because last time platelet count was less than 100,000  Platelet count has improved to 111,000  WBC and lymphocyte counts on high but stable this time  He is not symptomatic from CLL except for some tiredness and minor arthritic symptoms  He has been under surveillance  He has both on favorable and favorable features like 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, IgVH mutation, lack of CD38 expression, deletion of 13 q14 in 6%, no 11 q deletion and no trisomy 12  CT scan in August 2018 showed  splenomegaly, 15 7 cm  He will be going for ultrasound of abdomen to check spleen size            Current Outpatient Prescriptions:     aspirin 81 mg chewable tablet, Chew 81 mg daily, Disp: , Rfl:     co-enzyme Q-10 50 MG capsule, Take 200 mg by mouth daily, Disp: , Rfl:     diazepam (VALIUM) 2 mg tablet, 1 mg  , Disp: , Rfl:     fosinopril (MONOPRIL) 20 mg tablet, Take 20 mg by mouth daily, Disp: , Rfl:     magnesium gluconate (MAGONATE) 500 mg tablet, Take 500 mg by mouth 2 (two) times a day, Disp: , Rfl:     mometasone (NASONEX) 50 mcg/act nasal spray, 2 sprays into each nostril daily, Disp: , Rfl:     Multiple Vitamin (MULTIVITAMIN) tablet, Take 1 tablet by mouth daily, Disp: , Rfl:     sertraline (ZOLOFT) 50 mg tablet, Take 75 mg by mouth daily  , Disp: , Rfl:     simvastatin (ZOCOR) 20 mg tablet, Take 20 mg by mouth daily at bedtime, Disp: , Rfl:     Allergies   Allergen Reactions    Sulfa Antibiotics        Oncology History    chronic lymphocytic leukemia, diagnosed in August 2017  CLL has mixed good and bad prognostic features, 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, IgVH mutation, lack of CD38 expression, deletion of 13 q14 in 6% and no 11 q deletion        There was no trisomy 12   Lymphocytes were CD5 and CD23 positive, dim kappa expression and moderate CD20 expression had splenomegaly on CT scan but not on palpation        CLL (chronic lymphocytic leukemia) ([facility] )    3/27/2018 Initial Diagnosis     CLL (chronic lymphocytic leukemia) ([facility] )          ROS:  01/29/19 Reviewed 13 systems:  Presently no  headaches, seizures, dizziness, diplopia, dysphagia, hoarseness, chest pain, palpitations, shortness of breath, cough, hemoptysis, abdominal pain, nausea, vomiting, change in bowel habits, melena, hematuria, fever, chills, bleeding, bone pains, skin rash, weight loss,  weakness, numbness,  claudication and gait problem  No frequent infections  Not unusually sensitive to heat or cold  No swelling of the ankles  No swollen glands  Patient is anxious  Other symptoms are in HPI        /78 (BP Location: Left arm, Patient Position: Sitting, Cuff Size: Adult)   Pulse 78   Temp 98 6 °F (37 °C)   Resp 18   Ht 5' 10 75" (1 797 m)   Wt 99 6 kg (219 lb 8 oz)   SpO2 97%   BMI 30 83 kg/m²     Physical Exam:  Alert, oriented, not in distress, no icterus, no oral thrush, no palpable neck mass, clear lung fields, regular heart rate, abdomen  soft and non tender, no palpable abdominal mass, no ascites, no edema of ankles, no calf tenderness, no focal neurological deficit, no skin rash, no palpable lymphadenopathy, good arterial pulses, no clubbing  Patient is anxious  Performance status 0      IMAGING:      LABS:  Results for orders placed or performed in visit on 01/04/19   CBC and differential   Result Value Ref Range     66 (HH) 4 31 - 10 16 Thousand/uL    RBC 5 30 3 88 - 5 62 Million/uL    Hemoglobin 13 7 12 0 - 17 0 g/dL    Hematocrit 47 1 36 5 - 49 3 %    MCV 89 82 - 98 fL    MCH 25 8 (L) 26 8 - 34 3 pg    MCHC 29 1 (L) 31 4 - 37 4 g/dL    RDW 15 5 (H) 11 6 - 15 1 %    MPV 9 7 8 9 - 12 7 fL    Platelets 588 (L) 416 - 390 Thousands/uL    nRBC 0 /100 WBCs   Comprehensive metabolic panel   Result Value Ref Range    Sodium 141 136 - 145 mmol/L    Potassium 4 9 3 5 - 5 3 mmol/L    Chloride 104 100 - 108 mmol/L    CO2 30 21 - 32 mmol/L    ANION GAP 7 4 - 13 mmol/L BUN 26 (H) 5 - 25 mg/dL    Creatinine 1 18 0 60 - 1 30 mg/dL    Glucose, Fasting 107 (H) 65 - 99 mg/dL    Calcium 8 8 8 3 - 10 1 mg/dL    AST 18 5 - 45 U/L    ALT 24 12 - 78 U/L    Alkaline Phosphatase 76 46 - 116 U/L    Total Protein 6 8 6 4 - 8 2 g/dL    Albumin 3 7 3 5 - 5 0 g/dL    Total Bilirubin 0 60 0 20 - 1 00 mg/dL    eGFR 61 ml/min/1 73sq m   IgG, IgA, IgM   Result Value Ref Range     0 70 0 - 400 0 mg/dL     0 (L) 700 0-1,600 0 mg/dL    IGM 32 0 (L) 40 0 - 230 0 mg/dL   LD,Blood   Result Value Ref Range     81 - 234 U/L   Uric acid   Result Value Ref Range    Uric Acid 5 7 4 2 - 8 0 mg/dL   Manual Differential(PHLEBS Do Not Order)   Result Value Ref Range    Segmented % 5 (L) 43 - 75 %    Lymphocytes % 90 (H) 14 - 44 %    Monocytes % 2 (L) 4 - 12 %    Eosinophils, % 0 0 - 6 %    Basophils % 0 0 - 1 %    Atypical Lymphocytes % 3 (H) <=0 %    Absolute Neutrophils 5 43 1 85 - 7 62 Thousand/uL    Lymphocytes Absolute 97 79 (H) 0 60 - 4 47 Thousand/uL    Monocytes Absolute 2 17 (H) 0 00 - 1 22 Thousand/uL    Eosinophils Absolute 0 00 0 00 - 0 40 Thousand/uL    Basophils Absolute 0 00 0 00 - 0 10 Thousand/uL    Total Counted 100     Smudge Cells Present     Anisocytosis Present     Platelet Estimate Decreased (A) Adequate     Labs, Imaging, & Other studies:   All pertinent labs and imaging studies were personally reviewed    Lab Results   Component Value Date    K 4 9 01/25/2019     01/25/2019    CO2 30 01/25/2019    BUN 26 (H) 01/25/2019    CREATININE 1 18 01/25/2019    GLUF 107 (H) 01/25/2019    CALCIUM 8 8 01/25/2019    AST 18 01/25/2019    ALT 24 01/25/2019    ALKPHOS 76 01/25/2019    EGFR 61 01/25/2019     Lab Results   Component Value Date     66 (HH) 01/25/2019    HGB 13 7 01/25/2019    HCT 47 1 01/25/2019    MCV 89 01/25/2019     (L) 01/25/2019     Normal LDH and uric acid  Slightly low IgG level  Reviewed and discussed with patient      Assessment and plan:  Patient is here with his wife  He has CLL and he was almost ready to be treated because last time platelet count was less than 100,000  Platelet count has improved to 111,000  WBC and lymphocyte counts on high but stable this time  He is not symptomatic from CLL except for some tiredness and minor arthritic symptoms  He has been under surveillance  He has both on favorable and favorable features like 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, IgVH mutation, lack of CD38 expression, deletion of 13 q14 in 6%, no 11 q deletion and no trisomy 12  CT scan in August 2018 showed  splenomegaly, 15 7 cm  He will be going for ultrasound of abdomen to check spleen size       Physical examination and test results are as recorded and discussed  Thrombocytopenia was an indication for treatment but platelet count has improved  We discussed and mutually agreed to monitor counts once a month and decide treatment depending upon patient's condition and CLL parameters     Condition discussed in detail    Questions answered  Discussed treatment indications for CLL  Plan is surveillance for now  Also discussed the importance of eating healthy foods, staying active and health screening tests  Patient is capable of self-care     1  CLL (chronic lymphocytic leukemia) ([facility] )          Patient voiced understanding and agreement in the discussion  Counseling / Coordination of Care   Greater than 50% of total time was spent with the patient and / or family counseling and / or coordination of care

## 2019-02-11 ENCOUNTER — HOSPITAL ENCOUNTER (OUTPATIENT)
Dept: ULTRASOUND IMAGING | Facility: HOSPITAL | Age: 74
Discharge: HOME | End: 2019-02-11
Attending: INTERNAL MEDICINE
Payer: COMMERCIAL

## 2019-02-11 DIAGNOSIS — C91.10 CLL (CHRONIC LYMPHOCYTIC LEUKEMIA) (HCC): ICD-10-CM

## 2019-02-11 PROCEDURE — 76700 US EXAM ABDOM COMPLETE: CPT

## 2019-02-28 ENCOUNTER — TELEPHONE (OUTPATIENT)
Dept: HEMATOLOGY ONCOLOGY | Facility: CLINIC | Age: 74
End: 2019-02-28

## 2019-02-28 ENCOUNTER — APPOINTMENT (OUTPATIENT)
Dept: LAB | Facility: CLINIC | Age: 74
End: 2019-02-28
Payer: COMMERCIAL

## 2019-02-28 NOTE — TELEPHONE ENCOUNTER
aurelia called from [facility] [place] lab to report a critical  24

## 2019-03-28 ENCOUNTER — TELEPHONE (OUTPATIENT)
Dept: HEMATOLOGY ONCOLOGY | Facility: CLINIC | Age: 74
End: 2019-03-28

## 2019-03-28 ENCOUNTER — TRANSCRIBE ORDERS (OUTPATIENT)
Dept: LAB | Facility: CLINIC | Age: 74
End: 2019-03-28

## 2019-03-28 ENCOUNTER — APPOINTMENT (OUTPATIENT)
Dept: LAB | Facility: CLINIC | Age: 74
End: 2019-03-28
Payer: COMMERCIAL

## 2019-03-28 DIAGNOSIS — C91.10 CLL (CHRONIC LYMPHOCYTIC LEUKEMIA) (HCC): Primary | ICD-10-CM

## 2019-03-28 DIAGNOSIS — Z13.6 SCREENING FOR ISCHEMIC HEART DISEASE: ICD-10-CM

## 2019-03-28 DIAGNOSIS — Z13.6 SCREENING FOR ISCHEMIC HEART DISEASE: Primary | ICD-10-CM

## 2019-03-28 DIAGNOSIS — E87.5 HYPERKALEMIA: Primary | ICD-10-CM

## 2019-03-28 LAB
ALBUMIN SERPL BCP-MCNC: 3.7 G/DL (ref 3.5–5)
ALP SERPL-CCNC: 77 U/L (ref 46–116)
ALT SERPL W P-5'-P-CCNC: 28 U/L (ref 12–78)
ANION GAP SERPL CALCULATED.3IONS-SCNC: 7 MMOL/L (ref 4–13)
AST SERPL W P-5'-P-CCNC: 21 U/L (ref 5–45)
BASOPHILS # BLD MANUAL: 0 THOUSAND/UL (ref 0–0.1)
BASOPHILS NFR MAR MANUAL: 0 % (ref 0–1)
BILIRUB SERPL-MCNC: 0.5 MG/DL (ref 0.2–1)
BUN SERPL-MCNC: 33 MG/DL (ref 5–25)
CALCIUM SERPL-MCNC: 8.9 MG/DL (ref 8.3–10.1)
CHLORIDE SERPL-SCNC: 104 MMOL/L (ref 100–108)
CHOLEST SERPL-MCNC: 141 MG/DL (ref 50–200)
CO2 SERPL-SCNC: 29 MMOL/L (ref 21–32)
CREAT SERPL-MCNC: 1.21 MG/DL (ref 0.6–1.3)
EOSINOPHIL # BLD MANUAL: 0 THOUSAND/UL (ref 0–0.4)
EOSINOPHIL NFR BLD MANUAL: 0 % (ref 0–6)
ERYTHROCYTE [DISTWIDTH] IN BLOOD BY AUTOMATED COUNT: 15.9 % (ref 11.6–15.1)
GFR SERPL CREATININE-BSD FRML MDRD: 59 ML/MIN/1.73SQ M
GLUCOSE SERPL-MCNC: 88 MG/DL (ref 65–140)
HCT VFR BLD AUTO: 45.7 % (ref 36.5–49.3)
HDLC SERPL-MCNC: 31 MG/DL (ref 40–60)
HGB BLD-MCNC: 14 G/DL (ref 12–17)
IGA SERPL-MCNC: 136 MG/DL (ref 70–400)
IGG SERPL-MCNC: 704 MG/DL (ref 700–1600)
IGM SERPL-MCNC: 22 MG/DL (ref 40–230)
LDH SERPL-CCNC: 233 U/L (ref 81–234)
LDLC SERPL CALC-MCNC: 83 MG/DL (ref 0–100)
LYMPHOCYTES # BLD AUTO: 116.1 THOUSAND/UL (ref 0.6–4.47)
LYMPHOCYTES # BLD AUTO: 96 % (ref 14–44)
MCH RBC QN AUTO: 27.2 PG (ref 26.8–34.3)
MCHC RBC AUTO-ENTMCNC: 30.6 G/DL (ref 31.4–37.4)
MCV RBC AUTO: 89 FL (ref 82–98)
MONOCYTES # BLD AUTO: 0 THOUSAND/UL (ref 0–1.22)
MONOCYTES NFR BLD: 0 % (ref 4–12)
NEUTROPHILS # BLD MANUAL: 4.84 THOUSAND/UL (ref 1.85–7.62)
NEUTS SEG NFR BLD AUTO: 4 % (ref 43–75)
NONHDLC SERPL-MCNC: 110 MG/DL
NRBC BLD AUTO-RTO: 0 /100 WBCS
PLATELET # BLD AUTO: 104 THOUSANDS/UL (ref 149–390)
PLATELET BLD QL SMEAR: ABNORMAL
PMV BLD AUTO: 9.3 FL (ref 8.9–12.7)
POTASSIUM SERPL-SCNC: 5.8 MMOL/L (ref 3.5–5.3)
PROT SERPL-MCNC: 6.7 G/DL (ref 6.4–8.2)
RBC # BLD AUTO: 5.14 MILLION/UL (ref 3.88–5.62)
RBC MORPH BLD: PRESENT
SODIUM SERPL-SCNC: 140 MMOL/L (ref 136–145)
TOTAL CELLS COUNTED SPEC: 100
TRIGL SERPL-MCNC: 135 MG/DL
URATE SERPL-MCNC: 6.2 MG/DL (ref 4.2–8)
WBC # BLD AUTO: 120.94 THOUSAND/UL (ref 4.31–10.16)

## 2019-03-28 PROCEDURE — 80061 LIPID PANEL: CPT

## 2019-03-28 PROCEDURE — 83615 LACTATE (LD) (LDH) ENZYME: CPT

## 2019-03-28 PROCEDURE — 84550 ASSAY OF BLOOD/URIC ACID: CPT

## 2019-03-28 PROCEDURE — 80053 COMPREHEN METABOLIC PANEL: CPT

## 2019-03-28 PROCEDURE — 82784 ASSAY IGA/IGD/IGG/IGM EACH: CPT

## 2019-03-28 PROCEDURE — 85027 COMPLETE CBC AUTOMATED: CPT

## 2019-03-28 PROCEDURE — 85007 BL SMEAR W/DIFF WBC COUNT: CPT

## 2019-03-28 NOTE — TELEPHONE ENCOUNTER
Patient called because he went to get his monthly labs ( x4 weeks) this morning and the lab told him they were  and the scripts had to be reordered  The standing labs were ordered OV: 18  He needs to go today  [facility]  Please reorder scripts

## 2019-03-28 NOTE — TELEPHONE ENCOUNTER
Matias Guerrier called from [facility]) lab with a critical result for the patient:     WBC: 120 94

## 2019-03-28 NOTE — TELEPHONE ENCOUNTER
I spoke with patient and gave him lab test results especially potassium being high 5 8  He will cut back on potassium rich foods  He will drink more water  He will go to get potassium rechecked in 2 days  I have put in the BMP order in the epic

## 2019-04-01 ENCOUNTER — APPOINTMENT (OUTPATIENT)
Dept: LAB | Facility: CLINIC | Age: 74
End: 2019-04-01
Payer: COMMERCIAL

## 2019-04-01 DIAGNOSIS — E87.5 HYPERKALEMIA: ICD-10-CM

## 2019-04-01 LAB
ANION GAP SERPL CALCULATED.3IONS-SCNC: 7 MMOL/L (ref 4–13)
BUN SERPL-MCNC: 25 MG/DL (ref 5–25)
CALCIUM SERPL-MCNC: 8.7 MG/DL (ref 8.3–10.1)
CHLORIDE SERPL-SCNC: 108 MMOL/L (ref 100–108)
CO2 SERPL-SCNC: 26 MMOL/L (ref 21–32)
CREAT SERPL-MCNC: 1.13 MG/DL (ref 0.6–1.3)
GFR SERPL CREATININE-BSD FRML MDRD: 65 ML/MIN/1.73SQ M
GLUCOSE P FAST SERPL-MCNC: 111 MG/DL (ref 65–99)
POTASSIUM SERPL-SCNC: 5.1 MMOL/L (ref 3.5–5.3)
SODIUM SERPL-SCNC: 141 MMOL/L (ref 136–145)

## 2019-04-01 PROCEDURE — 36415 COLL VENOUS BLD VENIPUNCTURE: CPT

## 2019-04-01 PROCEDURE — 80048 BASIC METABOLIC PNL TOTAL CA: CPT

## 2019-04-03 ENCOUNTER — TELEPHONE (OUTPATIENT)
Dept: HEMATOLOGY ONCOLOGY | Facility: CLINIC | Age: 74
End: 2019-04-03

## 2019-04-26 ENCOUNTER — TELEPHONE (OUTPATIENT)
Dept: HEMATOLOGY ONCOLOGY | Facility: CLINIC | Age: 74
End: 2019-04-26

## 2019-04-26 ENCOUNTER — APPOINTMENT (OUTPATIENT)
Dept: LAB | Facility: CLINIC | Age: 74
End: 2019-04-26
Payer: COMMERCIAL

## 2019-05-07 ENCOUNTER — OFFICE VISIT (OUTPATIENT)
Dept: HEMATOLOGY ONCOLOGY | Facility: CLINIC | Age: 74
End: 2019-05-07
Payer: COMMERCIAL

## 2019-05-07 VITALS
RESPIRATION RATE: 14 BRPM | BODY MASS INDEX: 30.8 KG/M2 | TEMPERATURE: 97.9 F | WEIGHT: 220 LBS | DIASTOLIC BLOOD PRESSURE: 80 MMHG | HEIGHT: 71 IN | HEART RATE: 76 BPM | SYSTOLIC BLOOD PRESSURE: 140 MMHG

## 2019-05-07 DIAGNOSIS — C91.10 CLL (CHRONIC LYMPHOCYTIC LEUKEMIA) (HCC): Primary | ICD-10-CM

## 2019-05-07 PROCEDURE — 99214 OFFICE O/P EST MOD 30 MIN: CPT | Performed by: PHYSICIAN ASSISTANT

## 2019-05-07 RX ORDER — UREA 10 %
500 LOTION (ML) TOPICAL DAILY
COMMUNITY

## 2019-05-28 ENCOUNTER — TELEPHONE (OUTPATIENT)
Dept: HEMATOLOGY ONCOLOGY | Facility: CLINIC | Age: 74
End: 2019-05-28

## 2019-05-28 ENCOUNTER — APPOINTMENT (OUTPATIENT)
Dept: LAB | Facility: CLINIC | Age: 74
End: 2019-05-28
Payer: COMMERCIAL

## 2019-05-28 LAB
ALBUMIN SERPL BCP-MCNC: 3.6 G/DL (ref 3.5–5)
ALP SERPL-CCNC: 69 U/L (ref 46–116)
ALT SERPL W P-5'-P-CCNC: 22 U/L (ref 12–78)
ANION GAP SERPL CALCULATED.3IONS-SCNC: 8 MMOL/L (ref 4–13)
ANISOCYTOSIS BLD QL SMEAR: PRESENT
AST SERPL W P-5'-P-CCNC: 23 U/L (ref 5–45)
BASOPHILS # BLD MANUAL: 0 THOUSAND/UL (ref 0–0.1)
BASOPHILS NFR MAR MANUAL: 0 % (ref 0–1)
BILIRUB SERPL-MCNC: 0.5 MG/DL (ref 0.2–1)
BUN SERPL-MCNC: 28 MG/DL (ref 5–25)
CALCIUM SERPL-MCNC: 8.9 MG/DL (ref 8.3–10.1)
CHLORIDE SERPL-SCNC: 105 MMOL/L (ref 100–108)
CO2 SERPL-SCNC: 28 MMOL/L (ref 21–32)
CREAT SERPL-MCNC: 1.14 MG/DL (ref 0.6–1.3)
EOSINOPHIL # BLD MANUAL: 0 THOUSAND/UL (ref 0–0.4)
EOSINOPHIL NFR BLD MANUAL: 0 % (ref 0–6)
ERYTHROCYTE [DISTWIDTH] IN BLOOD BY AUTOMATED COUNT: 16.1 % (ref 11.6–15.1)
GFR SERPL CREATININE-BSD FRML MDRD: 64 ML/MIN/1.73SQ M
GLUCOSE SERPL-MCNC: 85 MG/DL (ref 65–140)
HCT VFR BLD AUTO: 43.3 % (ref 36.5–49.3)
HGB BLD-MCNC: 12.9 G/DL (ref 12–17)
IGA SERPL-MCNC: 128 MG/DL (ref 70–400)
IGG SERPL-MCNC: 652 MG/DL (ref 700–1600)
IGM SERPL-MCNC: 33 MG/DL (ref 40–230)
LDH SERPL-CCNC: 224 U/L (ref 81–234)
LYMPHOCYTES # BLD AUTO: 101.25 THOUSAND/UL (ref 0.6–4.47)
LYMPHOCYTES # BLD AUTO: 94 % (ref 14–44)
MCH RBC QN AUTO: 26.6 PG (ref 26.8–34.3)
MCHC RBC AUTO-ENTMCNC: 29.8 G/DL (ref 31.4–37.4)
MCV RBC AUTO: 89 FL (ref 82–98)
MONOCYTES # BLD AUTO: 2.15 THOUSAND/UL (ref 0–1.22)
MONOCYTES NFR BLD: 2 % (ref 4–12)
NEUTROPHILS # BLD MANUAL: 1.08 THOUSAND/UL (ref 1.85–7.62)
NEUTS SEG NFR BLD AUTO: 1 % (ref 43–75)
NRBC BLD AUTO-RTO: 0 /100 WBCS
PLATELET # BLD AUTO: 92 THOUSANDS/UL (ref 149–390)
PLATELET BLD QL SMEAR: ABNORMAL
PMV BLD AUTO: 10.2 FL (ref 8.9–12.7)
POTASSIUM SERPL-SCNC: 5.4 MMOL/L (ref 3.5–5.3)
PROT SERPL-MCNC: 6.4 G/DL (ref 6.4–8.2)
RBC # BLD AUTO: 4.85 MILLION/UL (ref 3.88–5.62)
SMUDGE CELLS BLD QL SMEAR: PRESENT
SODIUM SERPL-SCNC: 141 MMOL/L (ref 136–145)
TOTAL CELLS COUNTED SPEC: 100
VARIANT LYMPHS # BLD AUTO: 3 %
WBC # BLD AUTO: 107.71 THOUSAND/UL (ref 4.31–10.16)

## 2019-05-28 PROCEDURE — 85007 BL SMEAR W/DIFF WBC COUNT: CPT | Performed by: PHYSICIAN ASSISTANT

## 2019-05-28 PROCEDURE — 36415 COLL VENOUS BLD VENIPUNCTURE: CPT | Performed by: PHYSICIAN ASSISTANT

## 2019-05-28 PROCEDURE — 82232 ASSAY OF BETA-2 PROTEIN: CPT | Performed by: PHYSICIAN ASSISTANT

## 2019-05-28 PROCEDURE — 80053 COMPREHEN METABOLIC PANEL: CPT | Performed by: PHYSICIAN ASSISTANT

## 2019-05-28 PROCEDURE — 82784 ASSAY IGA/IGD/IGG/IGM EACH: CPT | Performed by: PHYSICIAN ASSISTANT

## 2019-05-28 PROCEDURE — 85027 COMPLETE CBC AUTOMATED: CPT | Performed by: PHYSICIAN ASSISTANT

## 2019-05-28 PROCEDURE — 83615 LACTATE (LD) (LDH) ENZYME: CPT | Performed by: PHYSICIAN ASSISTANT

## 2019-05-29 LAB — B2 MICROGLOB SERPL-MCNC: 2.6 MG/L (ref 0.6–2.4)

## 2019-05-30 ENCOUNTER — TELEPHONE (OUTPATIENT)
Dept: HEMATOLOGY ONCOLOGY | Facility: CLINIC | Age: 74
End: 2019-05-30

## 2019-06-04 ENCOUNTER — OFFICE VISIT (OUTPATIENT)
Dept: HEMATOLOGY ONCOLOGY | Facility: CLINIC | Age: 74
End: 2019-06-04
Payer: COMMERCIAL

## 2019-06-04 VITALS
OXYGEN SATURATION: 97 % | DIASTOLIC BLOOD PRESSURE: 78 MMHG | HEIGHT: 69 IN | SYSTOLIC BLOOD PRESSURE: 144 MMHG | BODY MASS INDEX: 32.58 KG/M2 | RESPIRATION RATE: 18 BRPM | HEART RATE: 78 BPM | WEIGHT: 220 LBS | TEMPERATURE: 98 F

## 2019-06-04 DIAGNOSIS — D69.6 THROMBOCYTOPENIA (HCC): ICD-10-CM

## 2019-06-04 DIAGNOSIS — C91.10 CLL (CHRONIC LYMPHOCYTIC LEUKEMIA) (HCC): Primary | ICD-10-CM

## 2019-06-04 PROCEDURE — 99215 OFFICE O/P EST HI 40 MIN: CPT | Performed by: INTERNAL MEDICINE

## 2019-06-28 ENCOUNTER — APPOINTMENT (OUTPATIENT)
Dept: LAB | Facility: CLINIC | Age: 74
End: 2019-06-28
Payer: COMMERCIAL

## 2019-06-28 ENCOUNTER — TELEPHONE (OUTPATIENT)
Dept: HEMATOLOGY ONCOLOGY | Facility: CLINIC | Age: 74
End: 2019-06-28

## 2019-06-28 DIAGNOSIS — C91.10 CLL (CHRONIC LYMPHOCYTIC LEUKEMIA) (HCC): ICD-10-CM

## 2019-06-28 DIAGNOSIS — D69.6 THROMBOCYTOPENIA (HCC): ICD-10-CM

## 2019-06-28 LAB
HBV CORE AB SER QL: NORMAL
HBV CORE IGM SER QL: NORMAL
HBV SURFACE AG SER QL: NORMAL
HCV AB SER QL: NORMAL

## 2019-06-28 PROCEDURE — 86705 HEP B CORE ANTIBODY IGM: CPT

## 2019-06-28 PROCEDURE — 87340 HEPATITIS B SURFACE AG IA: CPT

## 2019-06-28 PROCEDURE — 86704 HEP B CORE ANTIBODY TOTAL: CPT

## 2019-06-28 PROCEDURE — 86803 HEPATITIS C AB TEST: CPT

## 2019-07-01 ENCOUNTER — TELEPHONE (OUTPATIENT)
Dept: HEMATOLOGY ONCOLOGY | Facility: HOSPITAL | Age: 74
End: 2019-07-01

## 2019-07-01 NOTE — TELEPHONE ENCOUNTER
GRISELDA informing patient that his appt on 9/3/19 needed to be R/S  His new appt is 9/6/19 at 11:40 am with Dr Nino Bower at the [facility] location  Instructed patient to call the office to R/S if the appt does not work for him

## 2019-07-16 ENCOUNTER — OFFICE VISIT (OUTPATIENT)
Dept: HEMATOLOGY ONCOLOGY | Facility: CLINIC | Age: 74
End: 2019-07-16
Payer: COMMERCIAL

## 2019-07-16 VITALS
TEMPERATURE: 98.2 F | WEIGHT: 213.5 LBS | HEIGHT: 70 IN | RESPIRATION RATE: 16 BRPM | DIASTOLIC BLOOD PRESSURE: 80 MMHG | BODY MASS INDEX: 30.56 KG/M2 | SYSTOLIC BLOOD PRESSURE: 143 MMHG | OXYGEN SATURATION: 96 % | HEART RATE: 72 BPM

## 2019-07-16 DIAGNOSIS — C91.10 CLL (CHRONIC LYMPHOCYTIC LEUKEMIA) (HCC): Primary | ICD-10-CM

## 2019-07-16 PROCEDURE — 99213 OFFICE O/P EST LOW 20 MIN: CPT | Performed by: PHYSICIAN ASSISTANT

## 2019-07-16 NOTE — PROGRESS NOTES
Hematology/Oncology Outpatient Follow-up  Pat Cabrera 69 y o  male 1945 [id number]    Date:  7/16/2019      Assessment and Plan:  1  CLL (chronic lymphocytic leukemia) [facility])  60-year-old male with history of CLL  He is asymptomatic from this condition  He was last seen by Dr Denice Rivas in June 2019 and at this time it was discussed to initiate treatment  Patient to Dr Denice Rivas agreed to proceed with Riki Boykin and Parag Sahni  However patient would like to start this after his vacation to [place] at the end of August 2019  He has follow-up with Dr Denice Rivas in beginning of September 2019  He will continue to have monthly blood work  Call with any symptoms in the interim  HPI:    Oncology History    chronic lymphocytic leukemia, diagnosed in August 2017  CLL has mixed good and bad prognostic features, 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, IgVH mutation, lack of CD38 expression, deletion of 13 q14 in 6% and no 11 q deletion        There was no trisomy 12  Lymphocytes were CD5 and CD23 positive, dim kappa expression and moderate CD20 expression had splenomegaly on CT scan but not on palpation        CLL (chronic lymphocytic leukemia) ([facility])    3/27/2018 Initial Diagnosis     CLL (chronic lymphocytic leukemia) ([facility])       Interval history:    ROS: Review of Systems   Constitutional: Negative for appetite change, chills, fever and unexpected weight change  HENT: Negative for mouth sores and nosebleeds  Respiratory: Negative for cough and shortness of breath  Cardiovascular: Negative for chest pain, palpitations and leg swelling  Gastrointestinal: Negative for abdominal pain, blood in stool, constipation, diarrhea, nausea and vomiting  Genitourinary: Negative for difficulty urinating, dysuria and hematuria  Musculoskeletal: Negative for arthralgias  Skin: Negative  Neurological: Negative for dizziness, weakness, light-headedness, numbness and headaches  Hematological: Negative  Psychiatric/Behavioral: Negative  Past Medical History:   Diagnosis Date    Anxiety     Hypertension     Leukemia ([facility] )        Past Surgical History:   Procedure Laterality Date    APPENDECTOMY      COLONOSCOPY      TONSILLECTOMY         Social History     Socioeconomic History    Marital status: /Civil Union     Spouse name: Not on file    Number of children: Not on file    Years of education: Not on file    Highest education level: Not on file   Occupational History    Not on file   Social Needs    Financial resource strain: Not on file    Food insecurity:     Worry: Not on file     Inability: Not on file    Transportation needs:     Medical: Not on file     Non-medical: Not on file   Tobacco Use    Smoking status: Never Smoker    Smokeless tobacco: Never Used   Substance and Sexual Activity    Alcohol use: Yes     Comment: minimal    Drug use: No    Sexual activity: Not on file   Lifestyle    Physical activity:     Days per week: Not on file     Minutes per session: Not on file    Stress: Not on file   Relationships    Social connections:     Talks on phone: Not on file     Gets together: Not on file     Attends [employer] service: Not on file     Active member of club or organization: Not on file     Attends meetings of clubs or organizations: Not on file     Relationship status: Not on file    Intimate partner violence:     Fear of current or ex partner: Not on file     Emotionally abused: Not on file     Physically abused: Not on file     Forced sexual activity: Not on file   Other Topics Concern    Not on file   Social History Narrative    Not on file       Family History   Problem Relation Age of Onset    No Known Problems Mother     No Known Problems Father        Allergies   Allergen Reactions    Sulfa Antibiotics          Current Outpatient Medications:     aspirin 81 mg chewable tablet, Chew 81 mg daily, Disp: , Rfl:     co-enzyme Q-10 50 MG capsule, Take 200 mg by mouth daily, Disp: , Rfl:    co-enzyme Q-10 50 MG capsule, Take 100 mg by mouth daily, Disp: , Rfl:     fosinopril (MONOPRIL) 20 mg tablet, Take 20 mg by mouth daily, Disp: , Rfl:     Green Tea, Ni sinensis, (GREEN TEA PO), Take by mouth, Disp: , Rfl:     magnesium gluconate (MAGONATE) 500 mg tablet, Take 500 mg by mouth 2 (two) times a day, Disp: , Rfl:     magnesium gluconate (MAGONATE) 500 mg tablet, Take 500 mg by mouth daily, Disp: , Rfl:     mometasone (NASONEX) 50 mcg/act nasal spray, 2 sprays into each nostril daily, Disp: , Rfl:     Multiple Vitamin (MULTIVITAMIN) tablet, Take 1 tablet by mouth daily, Disp: , Rfl:     sertraline (ZOLOFT) 50 mg tablet, Take 75 mg by mouth daily  , Disp: , Rfl:     simvastatin (ZOCOR) 20 mg tablet, Take 20 mg by mouth daily at bedtime, Disp: , Rfl:     TURMERIC CURCUMIN PO, Take by mouth, Disp: , Rfl:       Physical Exam:  /80 (BP Location: Left arm, Cuff Size: Adult)   Pulse 72   Temp 98 2 °F (36 8 °C) (Oral)   Resp 16   Ht 5' 10" (1 778 m)   Wt 96 8 kg (213 lb 8 oz)   SpO2 96%   BMI 30 63 kg/m²     Physical Exam   Constitutional: He is oriented to person, place, and time  He appears well-developed and well-nourished  No distress  HENT:   Head: Normocephalic and atraumatic  Eyes: Conjunctivae are normal  No scleral icterus  Neck: Normal range of motion  Neck supple  Cardiovascular: Normal rate, regular rhythm and normal heart sounds  No murmur heard  Pulmonary/Chest: Effort normal and breath sounds normal  No respiratory distress  Abdominal: Soft  There is no tenderness  Musculoskeletal: Normal range of motion  He exhibits no edema or tenderness  Lymphadenopathy:     He has no cervical adenopathy  Neurological: He is alert and oriented to person, place, and time  No cranial nerve deficit  Skin: Skin is warm and dry  Psychiatric: He has a normal mood and affect  Vitals reviewed      Labs:  Lab Results   Component Value Date     18 ([facility]) 06/28/2019 HGB 13 5 06/28/2019    HCT 45 1 06/28/2019    MCV 90 06/28/2019    PLT 95 (L) 06/28/2019     Lab Results   Component Value Date    K 4 9 06/28/2019     06/28/2019    CO2 27 06/28/2019    BUN 27 (H) 06/28/2019    CREATININE 1 13 06/28/2019    GLUF 113 (H) 06/28/2019    CALCIUM 8 9 06/28/2019    AST 22 06/28/2019    ALT 21 06/28/2019    ALKPHOS 77 06/28/2019    EGFR 64 06/28/2019     Patient voiced understanding and agreement in the above discussion  Aware to contact our office with questions/symptoms in the interim

## 2019-07-27 ENCOUNTER — APPOINTMENT (OUTPATIENT)
Dept: LAB | Facility: CLINIC | Age: 74
End: 2019-07-27
Payer: COMMERCIAL

## 2019-08-31 ENCOUNTER — APPOINTMENT (OUTPATIENT)
Dept: LAB | Facility: CLINIC | Age: 74
End: 2019-08-31
Payer: COMMERCIAL

## 2019-09-06 ENCOUNTER — OFFICE VISIT (OUTPATIENT)
Dept: HEMATOLOGY ONCOLOGY | Facility: CLINIC | Age: 74
End: 2019-09-06
Payer: COMMERCIAL

## 2019-09-06 VITALS
RESPIRATION RATE: 14 BRPM | HEIGHT: 71 IN | TEMPERATURE: 98.7 F | DIASTOLIC BLOOD PRESSURE: 60 MMHG | HEART RATE: 72 BPM | SYSTOLIC BLOOD PRESSURE: 140 MMHG | WEIGHT: 217 LBS | BODY MASS INDEX: 30.38 KG/M2

## 2019-09-06 DIAGNOSIS — D69.6 THROMBOCYTOPENIA (HCC): ICD-10-CM

## 2019-09-06 DIAGNOSIS — C91.10 CLL (CHRONIC LYMPHOCYTIC LEUKEMIA) (HCC): Primary | ICD-10-CM

## 2019-09-06 PROCEDURE — 99214 OFFICE O/P EST MOD 30 MIN: CPT | Performed by: INTERNAL MEDICINE

## 2019-09-06 NOTE — PROGRESS NOTES
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  He has IgVH mutation, lack of CD38 expression, deletion of 13 q14 in 6%, no 11 q deletion and no trisomy 12     Occasionally he has  some tiredness and minor arthritic symptoms   He has been under surveillance  [facility] Spleen size is 16 cm  Treatment indication is thrombocytopenia and he knows that  Current Outpatient Medications:     aspirin 81 mg chewable tablet, Chew 81 mg daily, Disp: , Rfl:     co-enzyme Q-10 50 MG capsule, Take 200 mg by mouth daily, Disp: , Rfl:     co-enzyme Q-10 50 MG capsule, Take 100 mg by mouth daily, Disp: , Rfl:     fosinopril (MONOPRIL) 20 mg tablet, Take 20 mg by mouth daily, Disp: , Rfl:     Green Tea, Ni sinensis, (GREEN TEA PO), Take by mouth, Disp: , Rfl:     magnesium gluconate (MAGONATE) 500 mg tablet, Take 500 mg by mouth 2 (two) times a day, Disp: , Rfl:     magnesium gluconate (MAGONATE) 500 mg tablet, Take 500 mg by mouth daily, Disp: , Rfl:     mometasone (NASONEX) 50 mcg/act nasal spray, 2 sprays into each nostril daily, Disp: , Rfl:     Multiple Vitamin (MULTIVITAMIN) tablet, Take 1 tablet by mouth daily, Disp: , Rfl:     sertraline (ZOLOFT) 50 mg tablet, Take 75 mg by mouth daily  , Disp: , Rfl:     simvastatin (ZOCOR) 20 mg tablet, Take 20 mg by mouth daily at bedtime, Disp: , Rfl:     TURMERIC CURCUMIN PO, Take by mouth, Disp: , Rfl:     Allergies   Allergen Reactions    Sulfa Antibiotics        Oncology History    chronic lymphocytic leukemia, diagnosed in August 2017  CLL has mixed good and bad prognostic features, 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, IgVH mutation, lack of CD38 expression, deletion of 13 q14 in 6% and no 11 q deletion        There was no trisomy 12   Lymphocytes were CD5 and CD23 positive, dim kappa expression and moderate CD20 expression had splenomegaly on CT scan but not on palpation        CLL (chronic lymphocytic leukemia) ([address] )    3/27/2018 Initial Diagnosis     CLL (chronic lymphocytic leukemia) (HCC)         ROS:  09/06/19 Reviewed 13 systems:  Presently no headaches, seizures, dizziness, diplopia, dysphagia, hoarseness, chest pain, palpitations, shortness of breath, cough, hemoptysis, abdominal pain, nausea, vomiting, change in bowel habits, melena, hematuria, fever, chills, bleeding, bone pains, skin rash, weight loss,   weakness, numbness,  claudication and gait problem  No frequent infections  Not unusually sensitive to heat or cold  No swelling of the ankles  No swollen glands  Patient is anxious  Other symptoms are in HPI        /60   Pulse 72   Temp 98 7 °F (37 1 °C)   Resp 14   Ht 5' 10 5" (1 791 m)   Wt 98 4 kg (217 lb)   BMI 30 70 kg/m²     Physical Exam:  Alert, oriented, not in distress, no icterus, no oral thrush, no palpable neck mass, clear lung fields, regular heart rate, abdomen  soft and non tender, no palpable abdominal mass, no ascites, no edema of ankles, no calf tenderness, no focal neurological deficit, no skin rash, no palpable lymphadenopathy, good arterial pulses, no clubbing  Patient is anxious  Performance status 0      IMAGING:      LABS:  Results for orders placed or performed in visit on 07/26/19   Beta 2 microglobulin, serum   Result Value Ref Range    Beta-2 Microglobulin 3 1 (H) 0 6 - 2 4 mg/L   IgG, IgA, IgM   Result Value Ref Range     0 70 0 - 400 0 mg/dL     0 700 0-1,600 0 mg/dL    IGM 25 0 (L) 40 0 - 230 0 mg/dL   LD,Blood   Result Value Ref Range     81 - 234 U/L   Comprehensive metabolic panel   Result Value Ref Range    Sodium 144 136 - 145 mmol/L    Potassium 4 9 3 5 - 5 3 mmol/L    Chloride 107 100 - 108 mmol/L    CO2 29 21 - 32 mmol/L    ANION GAP 8 4 - 13 mmol/L    BUN 36 (H) 5 - 25 mg/dL    Creatinine 1 31 (H) 0 60 - 1 30 mg/dL Glucose, Fasting 110 (H) 65 - 99 mg/dL    Calcium 9 1 8 3 - 10 1 mg/dL    AST 22 5 - 45 U/L    ALT 17 12 - 78 U/L    Alkaline Phosphatase 78 46 - 116 U/L    Total Protein 6 9 6 4 - 8 2 g/dL    Albumin 3 9 3 5 - 5 0 g/dL    Total Bilirubin 0 70 0 20 - 1 00 mg/dL    eGFR 54 ml/min/1 73sq m   CBC and differential   Result Value Ref Range     91 (HH) 4 31 - 10 16 Thousand/uL    RBC 5 07 3 88 - 5 62 Million/uL    Hemoglobin 13 5 12 0 - 17 0 g/dL    Hematocrit 46 3 36 5 - 49 3 %    MCV 91 82 - 98 fL    MCH 26 6 (L) 26 8 - 34 3 pg    MCHC 29 2 (L) 31 4 - 37 4 g/dL    RDW 15 9 (H) 11 6 - 15 1 %    MPV 9 7 8 9 - 12 7 fL    Platelets 90 (L) 624 - 390 Thousands/uL    nRBC 0 /100 WBCs   Manual Differential(PHLEBS Do Not Order)   Result Value Ref Range    Segmented % 5 (L) 43 - 75 %    Lymphocytes % 93 (H) 14 - 44 %    Monocytes % 1 (L) 4 - 12 %    Eosinophils, % 0 0 - 6 %    Basophils % 0 0 - 1 %    Atypical Lymphocytes % 1 (H) <=0 %    Absolute Neutrophils 6 75 1 85 - 7 62 Thousand/uL    Lymphocytes Absolute 125 47 (H) 0 60 - 4 47 Thousand/uL    Monocytes Absolute 1 35 (H) 0 00 - 1 22 Thousand/uL    Eosinophils Absolute 0 00 0 00 - 0 40 Thousand/uL    Basophils Absolute 0 00 0 00 - 0 10 Thousand/uL    Total Counted 100     RBC Morphology Present     Platelet Estimate Decreased (A) Adequate     Labs, Imaging, & Other studies:   All pertinent labs and imaging studies were personally reviewed    Lab Results   Component Value Date    K 4 7 08/31/2019     08/31/2019    CO2 30 08/31/2019    BUN 28 (H) 08/31/2019    CREATININE 1 12 08/31/2019    GLUF 98 08/31/2019    CALCIUM 8 9 08/31/2019    AST 27 08/31/2019    ALT 26 08/31/2019    ALKPHOS 80 08/31/2019    EGFR 65 08/31/2019     Lab Results   Component Value Date     42 (HH) 08/31/2019    HGB 13 5 08/31/2019    HCT 46 1 08/31/2019    MCV 91 08/31/2019    PLT 93 (L) 08/31/2019       Reviewed and discussed with patient  Assessment and plan:   Follow-up visit for stage IV CLL  Stage IV because of thrombocytopenia  Platelet count has been running between 90,000 and 100,000  Patient is not symptomatic from CLL even though he has unfavorable features like 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  He has IgVH mutation, lack of CD38 expression, deletion of 13 q14 in 6%, no 11 q deletion and no trisomy 12     Occasionally he has  some tiredness and minor arthritic symptoms   He has been under surveillance  Felisa Andino Spleen size is 16 cm  Treatment indication is thrombocytopenia and he knows that      Physical examination and test results are as recorded and discussed   Thrombocytopenia is an indication for treatment  Previously has information on ibrutinib  Felisa Andino He also has information on  venetoclax plus Gazyva  He prefers surveillance for now  He will continue to get blood tests every month as before  He will come back in 2 months  We discussed symptoms secondary to CLL and he will call if he has any of the CLL related symptoms and have earlier appointment       Condition and plan discussed in detail    Questions answered  Also discussed the importance of eating healthy foods, staying active and health screening tests   Patient is capable of self-care      1  [facility] )      2  Thrombocytopenia [facility])          Patient voiced understanding and agreement in the discussion  Counseling / Coordination of Care   Greater than 50% of total time was spent with the patient and / or family counseling and / or coordination of care

## 2019-09-28 ENCOUNTER — APPOINTMENT (OUTPATIENT)
Dept: LAB | Facility: CLINIC | Age: 74
End: 2019-09-28
Payer: COMMERCIAL

## 2019-09-28 ENCOUNTER — TRANSCRIBE ORDERS (OUTPATIENT)
Dept: LAB | Facility: CLINIC | Age: 74
End: 2019-09-28

## 2019-10-28 ENCOUNTER — APPOINTMENT (OUTPATIENT)
Dept: LAB | Facility: CLINIC | Age: 74
End: 2019-10-28
Payer: COMMERCIAL

## 2019-10-28 ENCOUNTER — TELEPHONE (OUTPATIENT)
Dept: HEMATOLOGY ONCOLOGY | Facility: CLINIC | Age: 74
End: 2019-10-28

## 2019-11-05 ENCOUNTER — TELEPHONE (OUTPATIENT)
Dept: HEMATOLOGY ONCOLOGY | Facility: CLINIC | Age: 74
End: 2019-11-05

## 2019-11-05 NOTE — TELEPHONE ENCOUNTER
LVM informing patient that his appt on 11/12/19 needed to be R/S  His new appt is 12/10/19 at 2:30 pm with [facility] Cutler at the [place] location  Patient was instructed to call our office to R/S if the appt does not work for him

## 2019-11-27 ENCOUNTER — TELEPHONE (OUTPATIENT)
Dept: HEMATOLOGY ONCOLOGY | Facility: CLINIC | Age: 74
End: 2019-11-27

## 2019-11-27 ENCOUNTER — APPOINTMENT (OUTPATIENT)
Dept: LAB | Facility: CLINIC | Age: 74
End: 2019-11-27
Payer: COMMERCIAL

## 2019-12-10 ENCOUNTER — OFFICE VISIT (OUTPATIENT)
Dept: HEMATOLOGY ONCOLOGY | Facility: CLINIC | Age: 74
End: 2019-12-10
Payer: COMMERCIAL

## 2019-12-10 VITALS
HEART RATE: 69 BPM | DIASTOLIC BLOOD PRESSURE: 52 MMHG | WEIGHT: 223 LBS | SYSTOLIC BLOOD PRESSURE: 150 MMHG | HEIGHT: 70 IN | BODY MASS INDEX: 31.92 KG/M2 | OXYGEN SATURATION: 96 % | RESPIRATION RATE: 16 BRPM | TEMPERATURE: 98.3 F

## 2019-12-10 DIAGNOSIS — C91.10 CLL (CHRONIC LYMPHOCYTIC LEUKEMIA) (HCC): Primary | ICD-10-CM

## 2019-12-10 PROCEDURE — 99214 OFFICE O/P EST MOD 30 MIN: CPT | Performed by: PHYSICIAN ASSISTANT

## 2019-12-27 ENCOUNTER — APPOINTMENT (OUTPATIENT)
Dept: LAB | Facility: CLINIC | Age: 74
End: 2019-12-27
Payer: COMMERCIAL

## 2019-12-27 ENCOUNTER — TELEPHONE (OUTPATIENT)
Dept: HEMATOLOGY ONCOLOGY | Facility: CLINIC | Age: 74
End: 2019-12-27

## 2019-12-27 NOTE — TELEPHONE ENCOUNTER
Ceci West from [facility] beds at the [facility] lab called with a critical results for  60

## 2019-12-27 NOTE — TELEPHONE ENCOUNTER
I am not covering Dr Romana Herter  Please look at the schedule and route to the correct covering nurse

## 2020-01-28 ENCOUNTER — TELEPHONE (OUTPATIENT)
Dept: HEMATOLOGY ONCOLOGY | Facility: CLINIC | Age: 75
End: 2020-01-28

## 2020-01-28 ENCOUNTER — TRANSCRIBE ORDERS (OUTPATIENT)
Dept: LAB | Facility: CLINIC | Age: 75
End: 2020-01-28

## 2020-01-28 ENCOUNTER — APPOINTMENT (OUTPATIENT)
Dept: LAB | Facility: CLINIC | Age: 75
End: 2020-01-28
Payer: COMMERCIAL

## 2020-01-28 NOTE — TELEPHONE ENCOUNTER
Call received from [facility] lab called with a critical  27 on today's labs  Read back occurred  Kristyn Garcia RN tasked and IM

## 2020-02-28 ENCOUNTER — TRANSCRIBE ORDERS (OUTPATIENT)
Dept: LAB | Facility: CLINIC | Age: 75
End: 2020-02-28

## 2020-02-28 ENCOUNTER — TELEPHONE (OUTPATIENT)
Dept: HEMATOLOGY ONCOLOGY | Facility: CLINIC | Age: 75
End: 2020-02-28

## 2020-02-28 ENCOUNTER — HOSPITAL ENCOUNTER (INPATIENT)
Facility: HOSPITAL | Age: 75
LOS: 1 days | Discharge: HOME | DRG: 842 | End: 2020-02-29
Attending: EMERGENCY MEDICINE | Admitting: FAMILY MEDICINE
Payer: COMMERCIAL

## 2020-02-28 ENCOUNTER — DOCUMENTATION (OUTPATIENT)
Dept: HEMATOLOGY ONCOLOGY | Facility: CLINIC | Age: 75
End: 2020-02-28

## 2020-02-28 ENCOUNTER — LAB (OUTPATIENT)
Dept: LAB | Facility: CLINIC | Age: 75
DRG: 842 | End: 2020-02-28
Payer: COMMERCIAL

## 2020-02-28 DIAGNOSIS — C91.10 CLL (CHRONIC LYMPHOCYTIC LEUKEMIA) (HCC): ICD-10-CM

## 2020-02-28 DIAGNOSIS — D72.829 LEUKOCYTOSIS: ICD-10-CM

## 2020-02-28 DIAGNOSIS — E87.5 HYPERKALEMIA: Primary | ICD-10-CM

## 2020-02-28 DIAGNOSIS — E87.5 ACUTE HYPERKALEMIA: Primary | ICD-10-CM

## 2020-02-28 DIAGNOSIS — E87.5 HYPERKALEMIA: ICD-10-CM

## 2020-02-28 PROBLEM — F41.9 ANXIETY: Status: ACTIVE | Noted: 2020-02-28

## 2020-02-28 LAB
ANION GAP SERPL CALCULATED.3IONS-SCNC: 8 MMOL/L (ref 4–13)
BASOPHILS # BLD MANUAL: 0 THOUSAND/UL (ref 0–0.1)
BASOPHILS NFR MAR MANUAL: 0 % (ref 0–1)
BUN SERPL-MCNC: 40 MG/DL (ref 5–25)
CALCIUM SERPL-MCNC: 8.5 MG/DL (ref 8.3–10.1)
CHLORIDE SERPL-SCNC: 106 MMOL/L (ref 100–108)
CO2 SERPL-SCNC: 25 MMOL/L (ref 21–32)
CREAT SERPL-MCNC: 1.08 MG/DL (ref 0.6–1.3)
EOSINOPHIL # BLD MANUAL: 0 THOUSAND/UL (ref 0–0.4)
EOSINOPHIL NFR BLD MANUAL: 0 % (ref 0–6)
ERYTHROCYTE [DISTWIDTH] IN BLOOD BY AUTOMATED COUNT: 15.7 % (ref 11.6–15.1)
GFR SERPL CREATININE-BSD FRML MDRD: 68 ML/MIN/1.73SQ M
GLUCOSE SERPL-MCNC: 129 MG/DL (ref 65–140)
HCT VFR BLD AUTO: 43.5 % (ref 36.5–49.3)
HGB BLD-MCNC: 12.9 G/DL (ref 12–17)
LYMPHOCYTES # BLD AUTO: 128.8 THOUSAND/UL (ref 0.6–4.47)
LYMPHOCYTES # BLD AUTO: 75 % (ref 14–44)
MAGNESIUM SERPL-MCNC: 1.9 MG/DL (ref 1.6–2.6)
MCH RBC QN AUTO: 26.8 PG (ref 26.8–34.3)
MCHC RBC AUTO-ENTMCNC: 29.7 G/DL (ref 31.4–37.4)
MCV RBC AUTO: 90 FL (ref 82–98)
MONOCYTES # BLD AUTO: 0 THOUSAND/UL (ref 0–1.22)
MONOCYTES NFR BLD: 0 % (ref 4–12)
NEUTROPHILS # BLD MANUAL: 24.04 THOUSAND/UL (ref 1.85–7.62)
NEUTS BAND NFR BLD MANUAL: 1 % (ref 0–8)
NEUTS SEG NFR BLD AUTO: 13 % (ref 43–75)
NRBC BLD AUTO-RTO: 0 /100 WBCS
PLATELET # BLD AUTO: 104 THOUSANDS/UL (ref 149–390)
PLATELET BLD QL SMEAR: ABNORMAL
PMV BLD AUTO: 9.4 FL (ref 8.9–12.7)
POTASSIUM SERPL-SCNC: 5.6 MMOL/L (ref 3.5–5.3)
POTASSIUM SERPL-SCNC: 6 MMOL/L (ref 3.5–5.3)
POTASSIUM SERPL-SCNC: 6.2 MMOL/L (ref 3.5–5.3)
RBC # BLD AUTO: 4.82 MILLION/UL (ref 3.88–5.62)
RBC MORPH BLD: NORMAL
SODIUM SERPL-SCNC: 139 MMOL/L (ref 136–145)
TOTAL CELLS COUNTED SPEC: 100
VARIANT LYMPHS # BLD AUTO: 11 %
WBC # BLD AUTO: 171.73 THOUSAND/UL (ref 4.31–10.16)

## 2020-02-28 PROCEDURE — 99285 EMERGENCY DEPT VISIT HI MDM: CPT | Performed by: EMERGENCY MEDICINE

## 2020-02-28 PROCEDURE — 80048 BASIC METABOLIC PNL TOTAL CA: CPT | Performed by: EMERGENCY MEDICINE

## 2020-02-28 PROCEDURE — 85007 BL SMEAR W/DIFF WBC COUNT: CPT | Performed by: EMERGENCY MEDICINE

## 2020-02-28 PROCEDURE — 99284 EMERGENCY DEPT VISIT MOD MDM: CPT

## 2020-02-28 PROCEDURE — 99223 1ST HOSP IP/OBS HIGH 75: CPT | Performed by: FAMILY MEDICINE

## 2020-02-28 PROCEDURE — 93005 ELECTROCARDIOGRAM TRACING: CPT

## 2020-02-28 PROCEDURE — 84132 ASSAY OF SERUM POTASSIUM: CPT

## 2020-02-28 PROCEDURE — 84132 ASSAY OF SERUM POTASSIUM: CPT | Performed by: EMERGENCY MEDICINE

## 2020-02-28 PROCEDURE — 83735 ASSAY OF MAGNESIUM: CPT | Performed by: EMERGENCY MEDICINE

## 2020-02-28 PROCEDURE — 85027 COMPLETE CBC AUTOMATED: CPT | Performed by: EMERGENCY MEDICINE

## 2020-02-28 RX ORDER — SODIUM CHLORIDE 9 MG/ML
75 INJECTION, SOLUTION INTRAVENOUS CONTINUOUS
Status: DISCONTINUED | OUTPATIENT
Start: 2020-02-28 | End: 2020-02-28

## 2020-02-28 RX ORDER — PRAVASTATIN SODIUM 40 MG
40 TABLET ORAL
Status: DISCONTINUED | OUTPATIENT
Start: 2020-02-29 | End: 2020-02-28

## 2020-02-28 RX ORDER — PRAVASTATIN SODIUM 40 MG
40 TABLET ORAL
Status: DISCONTINUED | OUTPATIENT
Start: 2020-02-28 | End: 2020-02-29 | Stop reason: HOSPADM

## 2020-02-28 RX ORDER — SODIUM POLYSTYRENE SULFONATE 4.1 MEQ/G
15 POWDER, FOR SUSPENSION ORAL; RECTAL ONCE
Status: COMPLETED | OUTPATIENT
Start: 2020-02-28 | End: 2020-02-28

## 2020-02-28 RX ORDER — AMLODIPINE BESYLATE 5 MG/1
5 TABLET ORAL
Status: DISCONTINUED | OUTPATIENT
Start: 2020-02-28 | End: 2020-02-29 | Stop reason: HOSPADM

## 2020-02-28 RX ORDER — FLUTICASONE PROPIONATE 50 MCG
1 SPRAY, SUSPENSION (ML) NASAL 2 TIMES DAILY
Status: DISCONTINUED | OUTPATIENT
Start: 2020-02-28 | End: 2020-02-29 | Stop reason: HOSPADM

## 2020-02-28 RX ADMIN — SERTRALINE HYDROCHLORIDE 75 MG: 50 TABLET ORAL at 21:29

## 2020-02-28 RX ADMIN — AMLODIPINE BESYLATE 5 MG: 5 TABLET ORAL at 21:29

## 2020-02-28 RX ADMIN — SODIUM CHLORIDE 75 ML/HR: 0.9 INJECTION, SOLUTION INTRAVENOUS at 21:29

## 2020-02-28 RX ADMIN — SODIUM POLYSTYRENE SULFONATE 15 G: 1 POWDER ORAL; RECTAL at 19:06

## 2020-02-28 RX ADMIN — SODIUM CHLORIDE 1000 ML: 0.9 INJECTION, SOLUTION INTRAVENOUS at 18:39

## 2020-02-28 RX ADMIN — PRAVASTATIN SODIUM 40 MG: 40 TABLET ORAL at 21:29

## 2020-02-28 NOTE — ED PROVIDER NOTES
History  Chief Complaint   Patient presents with    Abnormal Lab     patient reports potassium of 6 2 today  hx CLL  denies CP or palpitations     61-year-old male presents with outpatient potassium level above 6  , sent in by his oncologist , patient with a history of CLL  Otherwise states he feels well  No modifying or alleviating factors not try any medications  No recent change of medications  , no trauma no falls no muscle pain no dysuria no change in urination  History provided by:  Patient      Prior to Admission Medications   Prescriptions Last Dose Informant Patient Reported? Taking?    Green Tea, Ni sinensis, (GREEN TEA PO)  Self Yes No   Sig: Take by mouth   Multiple Vitamin (MULTIVITAMIN) tablet  Self Yes No   Sig: Take 1 tablet by mouth daily   TURMERIC CURCUMIN PO  Self Yes No   Sig: Take by mouth   co-enzyme Q-10 50 MG capsule  Self Yes No   Sig: Take 200 mg by mouth daily   co-enzyme Q-10 50 MG capsule  Self Yes No   Sig: Take 100 mg by mouth daily   fosinopril (MONOPRIL) 20 mg tablet  Self Yes No   Sig: Take 20 mg by mouth daily   magnesium gluconate (MAGONATE) 500 mg tablet  Self Yes No   Sig: Take 500 mg by mouth once    magnesium gluconate (MAGONATE) 500 mg tablet  Self Yes No   Sig: Take 500 mg by mouth daily   mometasone (NASONEX) 50 mcg/act nasal spray  Self Yes No   Si sprays into each nostril daily   sertraline (ZOLOFT) 50 mg tablet  Self Yes No   Sig: Take 75 mg by mouth daily     simvastatin (ZOCOR) 20 mg tablet  Self Yes No   Sig: Take 20 mg by mouth daily at bedtime      Facility-Administered Medications: None       Past Medical History:   Diagnosis Date    Anxiety     Hypertension     Leukemia ([facility] [address] )        Past Surgical History:   Procedure Laterality Date    APPENDECTOMY      COLONOSCOPY      TONSILLECTOMY         Family History   Problem Relation Age of Onset    No Known Problems Mother     No Known Problems Father      I have reviewed and agree with the history as documented  E-Cigarette/Vaping     E-Cigarette/Vaping Substances     Social History     Tobacco Use    Smoking status: Never Smoker    Smokeless tobacco: Never Used   Substance Use Topics    Alcohol use: Yes     Comment: minimal    Drug use: No       Review of Systems   Constitutional: Negative for activity change, chills, diaphoresis and fever  HENT: Negative for congestion, sinus pressure and sore throat  Eyes: Negative for pain and visual disturbance  Respiratory: Negative for cough, chest tightness, shortness of breath, wheezing and stridor  Cardiovascular: Negative for chest pain and palpitations  Gastrointestinal: Negative for abdominal distention, abdominal pain, constipation, diarrhea, nausea and vomiting  Genitourinary: Negative for dysuria and frequency  Musculoskeletal: Negative for neck pain and neck stiffness  Skin: Negative for rash  Neurological: Negative for dizziness, speech difficulty, light-headedness, numbness and headaches  Physical Exam  Physical Exam   Constitutional: He is oriented to person, place, and time  He appears well-developed  No distress  HENT:   Head: Normocephalic and atraumatic  Eyes: Pupils are equal, round, and reactive to light  Neck: Normal range of motion  Neck supple  No tracheal deviation present  Cardiovascular: Normal rate, regular rhythm, normal heart sounds and intact distal pulses  No murmur heard  Pulmonary/Chest: Effort normal and breath sounds normal  No stridor  No respiratory distress  Abdominal: Soft  He exhibits no distension  There is no tenderness  There is no rebound and no guarding  Musculoskeletal: Normal range of motion  Neurological: He is alert and oriented to person, place, and time  Skin: Skin is warm and dry  He is not diaphoretic  No erythema  No pallor  Psychiatric: He has a normal mood and affect  Vitals reviewed        Vital Signs  ED Triage Vitals [02/28/20 1532]   Temperature Pulse Respirations Blood Pressure SpO2   98 5 °F (36 9 °C) 74 18 165/76 96 %      Temp Source Heart Rate Source Patient Position - Orthostatic VS BP Location FiO2 (%)   Oral Monitor Lying Right arm --      Pain Score       --           Vitals:    02/28/20 1532   BP: 165/76   Pulse: 74   Patient Position - Orthostatic VS: Lying         Visual Acuity      ED Medications  Medications   sodium polystyrene (KAYEXALATE) powder 15 g (has no administration in time range)   sodium chloride 0 9 % bolus 1,000 mL (has no administration in time range)       Diagnostic Studies  Results Reviewed     Procedure Component Value Units Date/Time    Potassium [[id number]]  (Abnormal) Collected:  02/28/20 1747    Lab Status:  Final result Specimen:  Blood from Arm, Right Updated:  02/28/20 1805     Potassium 6 0 mmol/L     CBC and differential [[id number]]  (Abnormal) Collected:  02/28/20 1628    Lab Status:  Final result Specimen:  Blood from Arm, Right Updated:  02/28/20 1709      73 Thousand/uL      RBC 4 82 Million/uL      Hemoglobin 12 9 g/dL      Hematocrit 43 5 %      MCV 90 fL      MCH 26 8 pg      MCHC 29 7 g/dL      RDW 15 7 %      MPV 9 4 fL      Platelets 009 Thousands/uL      nRBC 0 /100 WBCs     Basic metabolic panel [[id number]]  (Abnormal) Collected:  02/28/20 1628    Lab Status:  Final result Specimen:  Blood from Arm, Right Updated:  02/28/20 1704     Sodium 139 mmol/L      Potassium 5 6 mmol/L      Chloride 106 mmol/L      CO2 25 mmol/L      ANION GAP 8 mmol/L      BUN 40 mg/dL      Creatinine 1 08 mg/dL      Glucose 129 mg/dL      Calcium 8 5 mg/dL      eGFR 68 ml/min/1 73sq m     Narrative:       [place] guidelines for Chronic Kidney Disease (CKD):     Stage 1 with normal or high GFR (GFR > 90 mL/min/1 73 square meters)    Stage 2 Mild CKD (GFR = 60-89 mL/min/1 73 square meters)    Stage 3A Moderate CKD (GFR = 45-59 mL/min/1 73 square meters)    Stage 3B Moderate CKD (GFR = 30-44 mL/min/1 73 square meters)    Stage 4 Severe CKD (GFR = 15-29 mL/min/1 73 square meters)    Stage 5 End Stage CKD (GFR <15 mL/min/1 73 square meters)  Note: GFR calculation is accurate only with a steady state creatinine    Magnesium [[id number]]  (Normal) Collected:  02/28/20 1628    Lab Status:  Final result Specimen:  Blood from Arm, Right Updated:  02/28/20 1701     Magnesium 1 9 mg/dL                  No orders to display              Procedures  ECG 12 Lead Documentation Only  Date/Time: 2/28/2020 4:34 PM  Performed by: Bro Mcghee DO  Authorized by: Bro Mcghee DO     ECG reviewed by me, the ED Provider: yes    Patient location:  ED  Previous ECG:     Previous ECG:  Compared to current    Comparison ECG info:  6 8 2018    Similarity:  No change  Interpretation:     Interpretation: normal    Rate:     ECG rate:  66    ECG rate assessment: normal    Rhythm:     Rhythm: sinus rhythm    Ectopy:     Ectopy: none    QRS:     QRS axis:  Normal    QRS intervals:  Normal  Conduction:     Conduction: normal    ST segments:     ST segments:  Normal  T waves:     T waves: normal    Comments:      Specifically no peak T-waves, no other signs of acute hyperkalemia             ED Course                               MDM  Number of Diagnoses or Management Options  Acute hyperkalemia: new and requires workup  CLL (chronic lymphocytic leukemia) ([facility] [address] ): minor  Leukocytosis: new and requires workup  Diagnosis management comments:       Initial ED assessment:  50-year-old male presents with outpatient blood work of a potassium level greater than 6  This is in the setting of having CLL  Patient is asymptomatic  No signs of acute hyperkalemia on EKG      Initial ED plan:    Will repeat potassium level, will likely treat possible admission        Final ED summary/disposition:   After evaluation and workup in the emergency department, hyperkalemia, treated with Kayexalate and IV fluids, admitted to hospital for observation due to concern for continuing rising potassium in the setting of CLL, if this is related to cell lysis very well could get worse before better, would prefer patient admitted on cardiac monitor        Amount and/or Complexity of Data Reviewed  Clinical lab tests: ordered and reviewed  Decide to obtain previous medical records or to obtain history from someone other than the patient: yes  Obtain history from someone other than the patient: yes  Review and summarize past medical records: yes  Independent visualization of images, tracings, or specimens: yes          Disposition  Final diagnoses:   Acute hyperkalemia   CLL (chronic lymphocytic leukemia) ([place] [address] )   Leukocytosis - Secondary to CLL     Time reflects when diagnosis was documented in both MDM as applicable and the Disposition within this note     Time User Action Codes Description Comment    2/28/2020  6:20 PM Jaycee Collins Add [E87 5] Acute hyperkalemia     2/28/2020  6:20 PM Gregory Aguilar Add [C91 10] CLL (chronic lymphocytic leukemia) ([place] [address] )     2/28/2020  6:21 PM [employer] [D72 829] Leukocytosis     2/28/2020  6:21 PM Jaycee Collins Modify [Z36 783] Leukocytosis Secondary to CLL      ED Disposition     ED Disposition Condition Date/Time Comment    Admit Stable Fri Feb 28, 2020  6:20 PM Case was discussed with DR Hank Flowers and the patient's admission status was agreed to be Admission Status: observation status to the service of Dr Hank Flowers   Follow-up Information    None         Patient's Medications   Discharge Prescriptions    No medications on file     No discharge procedures on file      PDMP Review     None          ED Provider  Electronically Signed by           Tawana Rowe,   02/28/20 [address] [place] [address],   02/28/20 6939

## 2020-02-28 NOTE — TELEPHONE ENCOUNTER
Call transferred from [facility], [facility] from Lab reporting Critical  08  Lab repeated with read back     Mindi Dance, RN contacted via IM and tasked for Dr Luis E Lema

## 2020-02-29 VITALS
BODY MASS INDEX: 30.81 KG/M2 | TEMPERATURE: 98.4 F | HEART RATE: 76 BPM | OXYGEN SATURATION: 96 % | WEIGHT: 214.73 LBS | RESPIRATION RATE: 18 BRPM | SYSTOLIC BLOOD PRESSURE: 142 MMHG | DIASTOLIC BLOOD PRESSURE: 70 MMHG

## 2020-02-29 LAB
ARTERIAL PATENCY WRIST A: YES
BASE EXCESS BLDA CALC-SCNC: -1 MMOL/L
HCO3 BLDA-SCNC: 23.7 MMOL/L (ref 22–28)
NON VENT ROOM AIR: 21 %
O2 CT BLDA-SCNC: 16.3 ML/DL (ref 16–23)
OXYHGB MFR BLDA: 94.1 % (ref 94–97)
PCO2 BLDA: 39.7 MM HG (ref 36–44)
PH BLDA: 7.39 [PH] (ref 7.35–7.45)
PO2 BLDA: 75.3 MM HG (ref 75–129)
POTASSIUM SERPL-SCNC: 4.7 MMOL/L (ref 3.5–5.3)
POTASSIUM SERPL-SCNC: 4.9 MMOL/L (ref 3.5–5.3)
POTASSIUM SERPL-SCNC: 5.1 MMOL/L (ref 3.5–5.3)
POTASSIUM SERPL-SCNC: 5.7 MMOL/L (ref 3.5–5.3)
SPECIMEN SOURCE: NORMAL

## 2020-02-29 PROCEDURE — 82805 BLOOD GASES W/O2 SATURATION: CPT | Performed by: PHYSICIAN ASSISTANT

## 2020-02-29 PROCEDURE — 99238 HOSP IP/OBS DSCHRG MGMT 30/<: CPT | Performed by: FAMILY MEDICINE

## 2020-02-29 PROCEDURE — 84132 ASSAY OF SERUM POTASSIUM: CPT | Performed by: FAMILY MEDICINE

## 2020-02-29 PROCEDURE — 36600 WITHDRAWAL OF ARTERIAL BLOOD: CPT

## 2020-02-29 RX ADMIN — FLUTICASONE PROPIONATE 1 SPRAY: 50 SPRAY, METERED NASAL at 10:11

## 2020-02-29 NOTE — ASSESSMENT & PLAN NOTE
Patient was admitted yesterday because his serum potassium was read as 6 0  Prior to admission he had lab work ordered by his oncologist that showed his potassium to be 6 2  Initially thought to be due to fosinopril and may be supplements  That being said, patient has CLL with markedly elevated white blood cell count  I strongly suspect that patient has pseudohyperkalemia  We checked an arterial sample and it is potassium levels 4 9  At the same time a venous sample was drawn and it was 5 7  To avoid falsely elevated potassium levels processing should be minimized  Accurate measurement could be achieved by allowing blood specimen to clot before centrifugation and ideally avoiding heparinized tubes  Also consider using arterial samples to measure potassium levels

## 2020-02-29 NOTE — H&P
H&P- Daisy Orozco 1945, 69 y o  male MRN: [id number]    Unit/Bed#: S -01 Encounter: [id number]    Primary Care Provider: Carley Jung MD   Date and time admitted to hospital: 2/28/2020  4:17 PM    Anxiety  Assessment & Plan  Continue Zoloft  CLL (chronic lymphocytic leukemia) ([facility] [address] )  Assessment & Plan  Patient has had CLL for 3 years  He follows up as an outpatient with Dr Dillon How  He has not received any treatment  He is managed with monitoring  Hypertension, essential  Assessment & Plan  Patient was on fosinopril  However, given his hyperkalemia I am going to stop fosinopril  Ace inhibitors and ARB use should be avoided  Will try amlodipine instead  Hyperlipidemia, unspecified  Assessment & Plan  Continue statin  * Hyperkalemia  Assessment & Plan  Uncertain etiology of hyperkalemia  Looking through labs patient has had hyperkalemia in the past   He takes fosinopril so I will stop this and replace it with amlodipine  He received IV fluids and Kayexalate in the emergency room  Will monitor on telemetry and will recheck potassium tomorrow morning  Patient does take a lot of supplements also including green tea extract, tumeric, a multivitamin, Osteo Bi-Flex and Co Q10  I am uncertain if the supplements are contributing to his hyperkalemia  If potassium is normal tomorrow I would like to monitor patient through tomorrow and into Sunday morning to make sure his potassium does not rise without any interventions  Addendum: given CLL with significant leukocytosis pseudohyperkalemia has to be considered as fragile leukemic cells may be rupturing during handling and processing of blood samples causing falsely elevated potassium readings  Consider discussing this with oncology before providing further treatment of any other elevated potassium labs      VTE Prophylaxis: Enoxaparin (Lovenox)  / sequential compression device   Code Status:  Full code  POLST: POLST form is not discussed and not completed at this time  Anticipated Length of Stay:  Patient will be admitted on an Inpatient basis with an anticipated length of stay of  greater than 2 midnights  Justification for Hospital Stay:  Hyperkalemia    Total Time for Visit, including Counseling / Coordination of Care: 1 hour  Greater than 50% of this total time spent on direct patient counseling and coordination of care  Chief Complaint:   Hyperkalemia    History of Present Illness:    Patient is a 23-year-old  gentleman with CLL, hypertension, hyperlipidemia and anxiety  He had presented to the emergency room on the instruction of his oncologist Dr Juventino Madden for hyperkalemia  He usually has monthly labs  CMP today showed a potassium of 5 9  Repeat potassium this afternoon was also elevated at 6 2  In the emergency room it was 6 0  Patient is asymptomatic  He has fatigue, but this is chronic for him  No other issues reported  Review of Systems:    Review systems as in HPI  All other review systems negative  Past Medical and Surgical History:     Past Medical History:   Diagnosis Date    Anxiety     Hypertension     Leukemia ([facility] [address] )        Past Surgical History:   Procedure Laterality Date    APPENDECTOMY      COLONOSCOPY      TONSILLECTOMY         Meds/Allergies:    Prior to Admission medications    Medication Sig Start Date End Date Taking?  Authorizing Provider   co-enzyme Q-10 50 MG capsule Take 200 mg by mouth daily   Yes Historical Provider, MD   co-enzyme Q-10 50 MG capsule Take 100 mg by mouth daily   Yes Historical Provider, MD   fosinopril (MONOPRIL) 20 mg tablet Take 20 mg by mouth daily   Yes Historical Provider, MD Dyana Peraza Tea, Ni sinensis, (GREEN TEA PO) Take by mouth   Yes Historical Provider, MD   magnesium gluconate (MAGONATE) 500 mg tablet Take 500 mg by mouth once    Yes Historical Provider, MD   magnesium gluconate (MAGONATE) 500 mg tablet Take 500 mg by mouth daily   Yes Historical Provider, MD   mometasone (NASONEX) 50 mcg/act nasal spray 2 sprays into each nostril daily   Yes Historical Provider, MD   Multiple Vitamin (MULTIVITAMIN) tablet Take 1 tablet by mouth daily   Yes Historical Provider, MD   sertraline (ZOLOFT) 50 mg tablet Take 75 mg by mouth daily     Yes Historical Provider, MD   simvastatin (ZOCOR) 20 mg tablet Take 20 mg by mouth daily at bedtime   Yes Historical Provider, MD   TURMERIC CURCUMIN PO Take by mouth   Yes Historical Provider, MD     I have reviewed home medications with patient personally  Allergies: Allergies   Allergen Reactions    Sulfa Antibiotics        Social History:     Marital Status: /Civil Union   Occupation:  Retired  Used to work in marketing and  at [employer]  Substance Use History:   Social History     Substance and Sexual Activity   Alcohol Use Yes    Frequency: 2-4 times a month    Comment: minimal     Social History     Tobacco Use   Smoking Status Never Smoker   Smokeless Tobacco Never Used     Social History     Substance and Sexual Activity   Drug Use No       Family History:    Family History   Problem Relation Age of Onset    No Known Problems Mother     No Known Problems Father        Physical Exam:     Vitals:   Blood Pressure: (!) 176/78 (02/28/20 1853)  Pulse: 65 (02/28/20 1853)  Temperature: 98 5 °F (36 9 °C) (02/28/20 1532)  Temp Source: Oral (02/28/20 1532)  Respirations: 18 (02/28/20 1853)  Weight - Scale: 97 5 kg (215 lb) (02/28/20 1532)  SpO2: 96 % (02/28/20 1853)    Physical Exam   Constitutional: He is oriented to person, place, and time  He appears well-developed and well-nourished  No distress  HENT:   Head: Normocephalic and atraumatic  Eyes: No scleral icterus  Cardiovascular: Normal rate, regular rhythm and normal heart sounds  Pulmonary/Chest: Effort normal and breath sounds normal  No respiratory distress  Abdominal: Soft   Bowel sounds are normal  He exhibits no distension  Musculoskeletal: He exhibits no edema  Neurological: He is alert and oriented to person, place, and time  Psychiatric: He has a normal mood and affect  His behavior is normal        Additional Data:     Lab Results: I have personally reviewed pertinent reports  Results from last 7 days   Lab Units 20  1628   WBC Thousand/uL 171 73*   HEMOGLOBIN g/dL 12 9   HEMATOCRIT % 43 5   PLATELETS Thousands/uL 104*   LYMPHO PCT % 75*   MONO PCT % 0*   EOS PCT % 0     Results from last 7 days   Lab Units 20  1747 20  1628  20  0714   POTASSIUM mmol/L 6 0* 5 6*   < > 5 9*   CHLORIDE mmol/L  --  106  --  106   CO2 mmol/L  --  25  --  26   BUN mg/dL  --  40*  --  34*   CREATININE mg/dL  --  1 08  --  1 23   CALCIUM mg/dL  --  8 5  --  8 8   ALK PHOS U/L  --   --   --  66   ALT U/L  --   --   --  28   AST U/L  --   --   --  28    < > = values in this interval not displayed  Imaging: I have personally reviewed pertinent reports  No results found  EKG, Pathology, and Other Studies Reviewed on Admission:   · EK beats per minute, normal sinus rhythm, normal axis and intervals, nonspecific ST changes  Epic / Care Everywhere Records Reviewed: Yes     ** Please Note: This note has been constructed using a voice recognition system   **

## 2020-02-29 NOTE — ASSESSMENT & PLAN NOTE
Patient has had CLL for 3 years  He follows up as an outpatient with Dr Shlomo Quezada  He has not received any treatment  He is managed with monitoring  LDH from yesterday was modestly elevated at 273  Uric acid was normal at 5 9  White blood cell count was markedly elevated at 171 73

## 2020-02-29 NOTE — PLAN OF CARE
Problem: SAFETY ADULT  Goal: Patient will remain free of falls  Description  INTERVENTIONS:  - Assess patient frequently for physical needs  -  Identify cognitive and physical deficits and behaviors that affect risk of falls    -  Lagrange fall precautions as indicated by assessment   - Educate patient/family on patient safety including physical limitations  - Instruct patient to call for assistance with activity based on assessment  - Modify environment to reduce risk of injury  - Consider OT/PT consult to assist with strengthening/mobility  Outcome: Progressing  Goal: Maintain or return to baseline ADL function  Description  INTERVENTIONS:  -  Assess patient's ability to carry out ADLs; assess patient's baseline for ADL function and identify physical deficits which impact ability to perform ADLs (bathing, care of mouth/teeth, toileting, grooming, dressing, etc )  - Assess/evaluate cause of self-care deficits   - Assess range of motion  - Assess patient's mobility; develop plan if impaired  - Assess patient's need for assistive devices and provide as appropriate  - Encourage maximum independence but intervene and supervise when necessary  - Involve family in performance of ADLs  - Assess for home care needs following discharge   - Consider OT consult to assist with ADL evaluation and planning for discharge  - Provide patient education as appropriate  Outcome: Progressing  Goal: Maintain or return mobility status to optimal level  Description  INTERVENTIONS:  - Assess patient's baseline mobility status (ambulation, transfers, stairs, etc )    - Identify cognitive and physical deficits and behaviors that affect mobility  - Identify mobility aids required to assist with transfers and/or ambulation (gait belt, sit-to-stand, lift, walker, cane, etc )  - Lagrange fall precautions as indicated by assessment  - Record patient progress and toleration of activity level on Mobility SBAR; progress patient to next Phase/Stage  - Instruct patient to call for assistance with activity based on assessment  - Consider rehabilitation consult to assist with strengthening/weightbearing, etc   Outcome: Progressing     Problem: CARDIOVASCULAR - ADULT  Goal: Maintains optimal cardiac output and hemodynamic stability  Description  INTERVENTIONS:  - Monitor I/O, vital signs and rhythm  - Monitor for S/S and trends of decreased cardiac output  - Administer and titrate ordered vasoactive medications to optimize hemodynamic stability  - Assess quality of pulses, skin color and temperature  - Assess for signs of decreased coronary artery perfusion  - Instruct patient to report change in severity of symptoms  Outcome: Progressing  Goal: Absence of cardiac dysrhythmias or at baseline rhythm  Description  INTERVENTIONS:  - Continuous cardiac monitoring, vital signs, obtain 12 lead EKG if ordered  - Administer antiarrhythmic and heart rate control medications as ordered  - Monitor electrolytes and administer replacement therapy as ordered  Outcome: Progressing     Problem: METABOLIC, FLUID AND ELECTROLYTES - ADULT  Goal: Electrolytes maintained within normal limits  Description  INTERVENTIONS:  - Monitor labs and assess patient for signs and symptoms of electrolyte imbalances  - Administer electrolyte replacement as ordered  - Monitor response to electrolyte replacements, including repeat lab results as appropriate  - Instruct patient on fluid and nutrition as appropriate  Outcome: Progressing  Goal: Fluid balance maintained  Description  INTERVENTIONS:  - Monitor labs   - Monitor I/O and WT  - Instruct patient on fluid and nutrition as appropriate  - Assess for signs & symptoms of volume excess or deficit  Outcome: Progressing  Goal: Glucose maintained within target range  Description  INTERVENTIONS:  - Monitor Blood Glucose as ordered  - Assess for signs and symptoms of hyperglycemia and hypoglycemia  - Administer ordered medications to maintain glucose within target range  - Assess nutritional intake and initiate nutrition service referral as needed  Outcome: Progressing

## 2020-02-29 NOTE — ASSESSMENT & PLAN NOTE
For now will continue amlodipine over fosinopril in case patient truly has hyperkalemia    Blood pressure has been normal

## 2020-02-29 NOTE — ASSESSMENT & PLAN NOTE
Uncertain etiology of hyperkalemia  Patient does take fosinopril and a lot of supplements  Patient was discussed with Nephrology who recommended checking LDH and uric acid to make sure tumor lysis syndrome is not concern and to check a plasma potassium  LDH and uric acid were drawn yesterday  LDH was modestly elevated and uric acid was normal   Doubt tumor lysis syndrome but will discuss with oncology  Pseudohyperkalemia is a consideration  Serum potassium this morning was read as 5 1 after patient received Kayexalate and IV fluids yesterday  That being said, it will be important to differentiate whether patient truly as hyperkalemia or whether his serum potassium levels are being falsely elevated by processing of blood samples causing rupture fragile leukemic cells  Will check another serum potassium with usual processing  Will check a plasma potassium, though this will take 3-5 days to come back  Will also check an arterial potassium which should be resulted today

## 2020-02-29 NOTE — ASSESSMENT & PLAN NOTE
Patient has had CLL for 3 years  He follows up as an outpatient with Dr Fadumo Hernández  He has not received any treatment  He is managed with monitoring

## 2020-02-29 NOTE — PROGRESS NOTES
Progress Note Ninfa Elizalde 1945, 69 y o  male MRN: [id number]    Unit/Bed#: S -01 Encounter: [id number]    Primary Care Provider: Carley Jung MD   Date and time admitted to hospital: 2/28/2020  4:17 PM        Anxiety  Assessment & Plan  Continue Zoloft  CLL (chronic lymphocytic leukemia) ([facility] [address] )  Assessment & Plan  Patient has had CLL for 3 years  He follows up as an outpatient with Dr Dillon How  He has not received any treatment  He is managed with monitoring  LDH from yesterday was modestly elevated at 273  Uric acid was normal at 5 9  White blood cell count was markedly elevated at 171 73  Hypertension, essential  Assessment & Plan  For now will continue amlodipine over fosinopril in case patient truly has hyperkalemia  Blood pressure has been normal     Hyperlipidemia, unspecified  Assessment & Plan  Continue statin  * Hyperkalemia  Assessment & Plan  Uncertain etiology of hyperkalemia  Patient does take fosinopril and a lot of supplements  Patient was discussed with Nephrology who recommended checking LDH and uric acid to make sure tumor lysis syndrome is not concern and to check a plasma potassium  LDH and uric acid were drawn yesterday  LDH was modestly elevated and uric acid was normal   Doubt tumor lysis syndrome but will discuss with oncology  Pseudohyperkalemia is a consideration  Serum potassium this morning was read as 5 1 after patient received Kayexalate and IV fluids yesterday  That being said, it will be important to differentiate whether patient truly as hyperkalemia or whether his serum potassium levels are being falsely elevated by processing of blood samples causing rupture fragile leukemic cells  Will check another serum potassium with usual processing  Will check a plasma potassium, though this will take 3-5 days to come back  Will also check an arterial potassium which should be resulted today              Subjective/Objective     Subjective: Patient seen and examined this morning  He had a good night and feels well this morning  He has no complaints  Objective:  Vitals: Blood pressure 142/70, pulse 76, temperature 98 4 °F (36 9 °C), temperature source Oral, resp  rate 18, weight 97 4 kg (214 lb 11 7 oz), SpO2 96 %  ,Body mass index is 30 81 kg/m²  No intake or output data in the 24 hours ending 02/29/20 0917    Invasive Devices     Peripheral Intravenous Line            Peripheral IV 02/28/20 Right Hand less than 1 day                Physical Exam: /70 (BP Location: Right arm)   Pulse 76   Temp 98 4 °F (36 9 °C) (Oral)   Resp 18   Wt 97 4 kg (214 lb 11 7 oz)   SpO2 96%   BMI 30 81 kg/m²   General appearance: alert and oriented, in no acute distress  Head: Normocephalic, without obvious abnormality, atraumatic  Eyes: No scleral icterus  Lungs: No respiratory distress  Neurologic: Grossly normal    Lab, Imaging and other studies: I have personally reviewed pertinent reports      VTE Pharmacologic Prophylaxis: Sequential compression device (Venodyne)   VTE Mechanical Prophylaxis: sequential compression device

## 2020-02-29 NOTE — ASSESSMENT & PLAN NOTE
Uncertain etiology of hyperkalemia  Looking through labs patient has had hyperkalemia in the past   He takes fosinopril so I will stop this and replace it with amlodipine  He received IV fluids and Kayexalate in the emergency room  Will monitor on telemetry and will recheck potassium tomorrow morning  Patient does take a lot of supplements also including green tea extract, tumeric, a multivitamin, Osteo Bi-Flex and Co Q10  I am uncertain if the supplements are contributing to his hyperkalemia  If potassium is normal tomorrow I would like to monitor patient through tomorrow and into Sunday morning to make sure his potassium does not rise without any interventions

## 2020-02-29 NOTE — ASSESSMENT & PLAN NOTE
Patient has had CLL for 3 years  He follows up as an outpatient with Dr Reza Distance  He has not received any treatment  He is managed with monitoring  LDH from yesterday was modestly elevated at 273  Uric acid was normal at 5 9  White blood cell count was markedly elevated at 171 73  Discussed with on-call oncologist Dr Tiffany Jane  No evidence of tumor lysis syndrome

## 2020-02-29 NOTE — ASSESSMENT & PLAN NOTE
Patient was on fosinopril  However, given his hyperkalemia I am going to stop fosinopril  Ace inhibitors and ARB use should be avoided  Will try amlodipine instead

## 2020-02-29 NOTE — DISCHARGE SUMMARY
Discharge- Alexander Whitman 1945, 69 y o  male MRN: [id number]    Unit/Bed#: S -01 Encounter: [id number]    Primary Care Provider: Walter Antoine MD   Date and time admitted to hospital: 2/28/2020  4:17 PM        Anxiety  Assessment & Plan  Continue Zoloft  CLL (chronic lymphocytic leukemia) ([facility] [address] )  Assessment & Plan  Patient has had CLL for 3 years  He follows up as an outpatient with Dr Bel Stewart  He has not received any treatment  He is managed with monitoring  LDH from yesterday was modestly elevated at 273  Uric acid was normal at 5 9  White blood cell count was markedly elevated at 171 73  Discussed with on-call oncologist Dr Lilia Magallon  No evidence of tumor lysis syndrome  Hypertension, essential  Assessment & Plan  Given that elevated potassium readings are more than likely due to pseudohyperkalemia I think it is okay for patient to continue fosinopril  Hyperlipidemia, unspecified  Assessment & Plan  Continue statin  * Serum potassium elevated  Assessment & Plan  Patient was admitted yesterday because his serum potassium was read as 6 0  Prior to admission he had lab work ordered by his oncologist that showed his potassium to be 6 2  Initially thought to be due to fosinopril and may be supplements  That being said, patient has CLL with markedly elevated white blood cell count  I strongly suspect that patient has pseudohyperkalemia  We checked an arterial sample and it is potassium levels 4 9  At the same time a venous sample was drawn and it was 5 7  To avoid falsely elevated potassium levels processing should be minimized  Accurate measurement could be achieved by allowing blood specimen to clot before centrifugation and ideally avoiding heparinized tubes  Also consider using arterial samples to measure potassium levels        Disposition:     Home     Reason for Admission:  Elevated serum potassium    Discharge Diagnoses:     Principal Problem:    Serum potassium elevated  Active Problems:    Hyperlipidemia, unspecified    Hypertension, essential    CLL (chronic lymphocytic leukemia) ([facility] [address] )    Anxiety  Resolved Problems:    * No resolved hospital problems  *      Consultations During Hospital Stay:  · None    Procedures Performed:     · None    Significant Findings / Test Results:     · Elevated serum potassium level as well as leukocytosis    Incidental Findings:   · None     Test Results Pending at Discharge (will require follow up): · None     Outpatient Tests Requested:  · When checking potassium levels use arterial sample or if using a venous sample allow clotting to separate serum from cells before centrifugaton and avoid heparinized tubes  Complications:  None    Hospital Course:     Patient was admitted for elevated potassium labs  He was initially given IV fluids and Kayexalate  Also, his fosinopril was replaced with amlodipine  He was not having any symptoms  EKG was unremarkable  That being said, given his CLL and his marked leukocytosis patient more than likely has pseudohyperkalemia  We checked an arterial potassium level and it was 4 9  I do not think we need to make any changes to patient's medications  However, we need to make sure to obtain accurate potassium labs so that patient is not unnecessarily treated for hyperkalemia that he does not actually have  Nonetheless, patient is doing well  He was then discharged home  Condition at Discharge: stable     Discharge Day Visit / Exam:     * Please refer to separate progress note for these details *    Discussion with Family:  Discussed with wife at bedside  Discharge instructions/Information to patient and family:   See after visit summary for information provided to patient and family  Provisions for Follow-Up Care:  See after visit summary for information related to follow-up care and any pertinent home health orders        Planned Readmission:  None    Discharge Statement:  I spent 30 minutes discharging the patient  This time was spent on the day of discharge  I had direct contact with the patient on the day of discharge  Greater than 50% of the total time was spent examining patient, answering all patient questions, arranging and discussing plan of care with patient as well as directly providing post-discharge instructions  Additional time then spent on discharge activities  Discharge Medications:  See after visit summary for reconciled discharge medications provided to patient and family  ** Please Note: This note has been constructed using a voice recognition system   **

## 2020-02-29 NOTE — ASSESSMENT & PLAN NOTE
Given that elevated potassium readings are more than likely due to pseudohyperkalemia I think it is okay for patient to continue fosinopril

## 2020-02-29 NOTE — DISCHARGE INSTR - AVS FIRST PAGE
Dear Morris Mcdonald,     It was our pleasure to care for you here at [facility]  It is our hope that we were always able to exceed the expected standards for your care during your stay  You were hospitalized due to elevated potassium labs  You were cared for on the 3rd floor under the service of Cassia Otto DO with the [facility] who covers for your primary care physician (PCP), Destin Meek MD, while you were hospitalized  If you have any questions or concerns related to this hospitalization, you may contact us at [phone]  For follow up as well as medication refills, we recommend that you follow up with your primary care physician  A registered nurse will reach out to you by phone within a few days after your discharge to answer any additional questions that you may have after going home  However, at this time we provide for you here, the most important instructions / recommendations at discharge:     · Notable Medication Adjustments -   · None  · Testing Required after Discharge -   · Routine lab work as per Dr Carole Garcia  · Important follow up information -   · Follow-up with your primary care physician within 1-2 weeks  Also, follow-up with Dr Carole Garcia  · Other Instructions -   · For your potassium levels plasma potassium or arterial potassium samples have to be used  · Please review this entire after visit summary as additional general instructions including medication list, appointments, activity, diet, any pertinent wound care, and other additional recommendations from your care team that may be provided for you        Sincerely,     Cassia Otto DO

## 2020-02-29 NOTE — UTILIZATION REVIEW
Initial Clinical Review    2/28/2020 1813 Observation and changed 2/28/2020 2008 to inpatient due to need for > 2 midnight stay for treatment and monitoring of hyperkalemia  Admission: Date/Time/Statement: Admission Orders (From admission, onward)     Ordered        02/28/20 2008  Inpatient Admission  Once         02/28/20 1813                Orders Placed This Encounter   Procedures    Inpatient Admission     Standing Status:   Standing     Number of Occurrences:   1     Order Specific Question:   Admitting Physician     Answer:   Murphy Montenegro [[id number]]     Order Specific Question:   Level of Care     Answer:   Med Surg [[id number]]     Order Specific Question:   Estimated length of stay     Answer:   More than 2 Midnights     Order Specific Question:   Certification     Answer:   I certify that inpatient services are medically necessary for this patient for a duration of greater than two midnights  See H&P and MD Progress Notes for additional information about the patient's course of treatment  ED Arrival Information     Expected Arrival Acuity Means of Arrival Escorted By Service Admission Type    2/28/2020 2/28/2020 14:58 Urgent Walk-In Spouse General Medicine Urgent    Arrival Complaint    hyperkalemia        Chief Complaint   Patient presents with    Abnormal Lab     patient reports potassium of 6 2 today  hx CLL  denies CP or palpitations     Assessment/Plan:  this is a 69year old male from home from hematology  to ED admitted as inpatient due to hyperkalemia  History includes CLL  Presented to outpatient labs showing potassium of 6 2  Exam is non focal   K is 5 6 at 1628 and rechecked at 1747 and is 6  Wbc 171 73   KEG sinus with normal ST and T  In ED IVF and Kayexalate given  Telemetry, serial K in progress  On 2/29/2020 Patient with hyperkalemia of uncertain etiology, patient on fosinopril and on multiple supplements including green tea, tumeric, MVI, osteo bi flex and CO Q10     Discussed with oncology and concern was tumor lysis syndrome but with uric acid normal and LDH  Slightly elevated at 273, doubtful  Other suspicion is Pseudohyperkalemia with serum potassium levels are being falsely elevated by processing of blood samples causing rupture fragile leukemic cells  Plasma potassium to be checked but will take 3 to 5 days to process     ED Triage Vitals   Temperature Pulse Respirations Blood Pressure SpO2   02/28/20 1532 02/28/20 1532 02/28/20 1532 02/28/20 1532 02/28/20 1532   98 5 °F (36 9 °C) 74 18 165/76 96 %      Temp Source Heart Rate Source Patient Position - Orthostatic VS BP Location FiO2 (%)   02/28/20 1532 02/28/20 1532 02/28/20 1532 02/28/20 1532 --   Oral Monitor Lying Right arm       Pain Score       02/28/20 1853       No Pain        Wt Readings from Last 1 Encounters:   02/29/20 97 4 kg (214 lb 11 7 oz)     Additional Vital Signs:   02/29/20 0700  98 4 °F (36 9 °C)  76  18  142/70  100  96 %  None (Room air)  Sitting   02/28/20 1853    65  18  176/78Abnormal     96 %  None (Room air)         Pertinent Labs/Diagnostic Test Results:   2/28/2020 EKG - sinus  Normal ST and T  Results from last 7 days   Lab Units 02/28/20  1628 02/28/20  0714   WBC Thousand/uL 171 73* 149 08*   HEMOGLOBIN g/dL 12 9 13 1   HEMATOCRIT % 43 5 44 3   PLATELETS Thousands/uL 104* 96*   BANDS PCT % 1  --            Ref  Range 1/28/2020 07:14 2/28/2020 07:14   LD (LDH) Latest Ref Range: 81 - 234 U/L 223 273 (H)      Ref   Range 1/28/2020 07:14 2/28/2020 07:14   URIC ACID Latest Ref Range: 4 2 - 8 0 mg/dL 6 0 5 9     Results from last 7 days   Lab Units 02/29/20  1008 02/29/20  0647 02/28/20  1747 02/28/20  1628 02/28/20  1400 02/28/20  0714   SODIUM mmol/L  --   --   --  139  --  140   POTASSIUM mmol/L 5 7* 5 1 6 0* 5 6* 6 2* 5 9*   CHLORIDE mmol/L  --   --   --  106  --  106   CO2 mmol/L  --   --   --  25  --  26   ANION GAP mmol/L  --   --   --  8  --  8   BUN mg/dL  --   --   --  40*  --  34*   CREATININE mg/dL  --   --   --  1 08  --  1 23   EGFR ml/min/1 73sq m  --   --   --  68  --  58   CALCIUM mg/dL  --   --   --  8 5  --  8 8   MAGNESIUM mg/dL  --   --   --  1 9  --   --      Results from last 7 days   Lab Units 02/28/20  0714   AST U/L 28   ALT U/L 28   ALK PHOS U/L 66   TOTAL PROTEIN g/dL 6 8   ALBUMIN g/dL 3 8   TOTAL BILIRUBIN mg/dL 0 60         Results from last 7 days   Lab Units 02/28/20  1628   GLUCOSE RANDOM mg/dL 129      Results from last 7 days   Lab Units 02/29/20  0516   PH ART  7 394   PCO2 ART mm Hg 39 7   PO2 ART mm Hg 75 3   HCO3 ART mmol/L 23 7   BASE EXC ART mmol/L -1 0   O2 CONTENT ART mL/dL 16 3   O2 HGB, ARTERIAL % 94 1   ABG SOURCE  Radial, Right     Results from last 7 days   Lab Units 02/28/20  1628 02/28/20  0714   TOTAL COUNTED  100 100       ED Treatment:   Medication Administration from 02/28/2020 1456 to 02/28/2020 1924       Date/Time Order Dose Route Action Comments     02/28/2020 1906 sodium polystyrene (KAYEXALATE) powder 15 g 15 g Oral Given      02/28/2020 1839 sodium chloride 0 9 % bolus 1,000 mL 1,000 mL Intravenous New Bag         Past Medical History:   Diagnosis Date    Anxiety     Hypertension     Leukemia ([facility] )      Present on Admission:   [facility] lymphocytic [facility] )   Hypertension, essential   Hyperlipidemia, unspecified   Anxiety      Admitting Diagnosis: Hyperkalemia [E87 5]  Acute hyperkalemia [E87 5]  Leukocytosis [D72 829]  CLL (chronic lymphocytic leukemia) (HCC) [C91 10]  Age/Sex: 69 y o  male  Admission Orders:  2/28/2020 1813 Observation and changed to inpatient   Scheduled Medications:  Medications:  amLODIPine 5 mg Oral HS   fluticasone 1 spray Each Nare BID   pravastatin 40 mg Oral HS   sertraline 75 mg Oral HS     Continuous IV Infusions:    sodium chloride 0 9 % infusion   Rate: 75 mL/hr Dose: 75 mL/hr  Freq: Continuous Route: IV  Last Dose: Stopped (02/28/20 2241)  Start: 02/28/20 2030 End: 02/28/20 2219    PRN Meds:       Network Utilization Review Department  [email or web address]  ATTENTION: Please call with any questions or concerns to [phone] and carefully listen to the prompts so that you are directed to the right person  All voicemails are confidential   [employer] all requests for admission clinical reviews, approved or denied determinations and any other requests to dedicated fax number below belonging to the campus where the patient is receiving treatment   List of dedicated fax numbers for the Facilities:  [address] DENIALS (Administrative/Medical Necessity) [phone]   [address] (Maternity/NICU/Pediatrics) [phone]   [facility] [phone]   [facility] [phone]   Brian Standing [phone]   [facility] [phone]   [address] [place] [address] [phone]   [facility]  [phone]   [address] [facility]  [address] [facility] [address] [facility] [phone]

## 2020-03-02 LAB
ATRIAL RATE: 66 BPM
P AXIS: 53 DEGREES
PR INTERVAL: 154 MS
QRS AXIS: 31 DEGREES
QRSD INTERVAL: 86 MS
QT INTERVAL: 384 MS
QTC INTERVAL: 402 MS
T WAVE AXIS: 50 DEGREES
VENTRICULAR RATE: 66 BPM

## 2020-03-02 PROCEDURE — 93010 ELECTROCARDIOGRAM REPORT: CPT | Performed by: INTERNAL MEDICINE

## 2020-03-09 ENCOUNTER — TELEPHONE (OUTPATIENT)
Dept: HEMATOLOGY ONCOLOGY | Facility: CLINIC | Age: 75
End: 2020-03-09

## 2020-03-09 NOTE — TELEPHONE ENCOUNTER
Patient called stating that he is scheduled to fly to [place] this coming Sunday and would like to know if that is a good idea per his diagnosis and the current coronavirus activity    Best call back  [phone]

## 2020-03-09 NOTE — TELEPHONE ENCOUNTER
Spoke to Cedric Álvarez and advised him that per [facility] he should not be flying at this time  Cedric Álvarez voiced understanding and stated he will cancel his flight

## 2020-03-11 NOTE — TELEPHONE ENCOUNTER
Patient is calling in requesting a letter for airline indicating why his flight was canceled, without this note he can not be refunded   Once letter is completed please reach out to patient at [phone]

## 2020-03-12 NOTE — TELEPHONE ENCOUNTER
Mailed letter to Gregorio Jara stopped into the [facility] location and picked up letter

## 2020-03-12 NOTE — TELEPHONE ENCOUNTER
Spoke to Cyndie stating as soon as Dr Nohemi Emanuel signs the letter I will have it ready for him to   He requested we send it to [facility] since that campus is closer to his [place]

## 2020-03-23 ENCOUNTER — TELEPHONE (OUTPATIENT)
Dept: HEMATOLOGY ONCOLOGY | Facility: CLINIC | Age: 75
End: 2020-03-23

## 2020-03-23 NOTE — TELEPHONE ENCOUNTER
Patient is calling in to change his appt to a virtual visit, screening questions have been asked and answered

## 2020-03-26 ENCOUNTER — TELEPHONE (OUTPATIENT)
Dept: HEMATOLOGY ONCOLOGY | Facility: CLINIC | Age: 75
End: 2020-03-26

## 2020-03-26 NOTE — TELEPHONE ENCOUNTER
Called [phone] confirmed facetime appointment for 3/27/20, was unsure if he should go out and get lab work done because of Josue  No changes with insurance

## 2020-03-27 ENCOUNTER — TELEMEDICINE (OUTPATIENT)
Dept: HEMATOLOGY ONCOLOGY | Facility: CLINIC | Age: 75
End: 2020-03-27
Payer: COMMERCIAL

## 2020-03-27 DIAGNOSIS — C91.10 CLL (CHRONIC LYMPHOCYTIC LEUKEMIA) (HCC): Primary | ICD-10-CM

## 2020-03-27 PROCEDURE — 99214 OFFICE O/P EST MOD 30 MIN: CPT | Performed by: PHYSICIAN ASSISTANT

## 2020-03-27 NOTE — PROGRESS NOTES
Virtual Regular Visit    Problem List Items Addressed This Visit     None               Reason for visit is     Encounter provider Trey Wilson PA-C    Provider located at [address] [facility]  [address] [facility] [address]      Recent Visits  No visits were found meeting these conditions  Showing recent visits within past 7 days and meeting all other requirements     Future Appointments  No visits were found meeting these conditions  Showing future appointments within next 150 days and meeting all other requirements        After connecting through [employer], the patient was identified by name and date of birth  Johny Tomlinson was informed that this is a telemedicine visit and that the visit is being conducted through [employer] and patient was informed that this is not a secure, HIPAA-complaint platform  he agrees to proceed  which may not be secure and therefore, might not be HIPAA-compliant  My office door was closed  No one else was in the room  He acknowledged consent and understanding of privacy and security of the video platform  The patient has agreed to participate and understands they can discontinue the visit at any time  Subjective  Johny Tomlinson is a 69 y o  male follow up CLL  Oncology History    chronic lymphocytic leukemia, diagnosed in August 2017  CLL has mixed good and bad prognostic features, 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, IgVH mutation, lack of CD38 expression, deletion of 13 q14 in 6% and no 11 q deletion        There was no trisomy 12   Lymphocytes were CD5 and CD23 positive, dim kappa expression and moderate CD20 expression had splenomegaly on CT scan but not on palpation        CLL [facility]chronic lymphocytic leukemia) ([facility] )    3/27/2018 Initial Diagnosis     CLL (chronic lymphocytic leukemia) (HCC)         Past Medical History:   Diagnosis Date    Anxiety     Hypertension     Leukemia ([facility] )        Past Surgical History:   Procedure Laterality Date    APPENDECTOMY      COLONOSCOPY      TONSILLECTOMY         Current Outpatient Medications   Medication Sig Dispense Refill    co-enzyme Q-10 50 MG capsule Take 200 mg by mouth daily      co-enzyme Q-10 50 MG capsule Take 100 mg by mouth daily      fosinopril (MONOPRIL) 20 mg tablet Take 20 mg by mouth daily      Green Tea, Ni sinensis, (GREEN TEA PO) Take by mouth      magnesium gluconate (MAGONATE) 500 mg tablet Take 500 mg by mouth once       magnesium gluconate (MAGONATE) 500 mg tablet Take 500 mg by mouth daily      mometasone (NASONEX) 50 mcg/act nasal spray 2 sprays into each nostril daily      Multiple Vitamin (MULTIVITAMIN) tablet Take 1 tablet by mouth daily      sertraline (ZOLOFT) 50 mg tablet Take 75 mg by mouth daily        simvastatin (ZOCOR) 20 mg tablet Take 20 mg by mouth daily at bedtime      TURMERIC CURCUMIN PO Take by mouth       No current facility-administered medications for this visit  Allergies   Allergen Reactions    Sulfa Antibiotics        Review of Systems   Constitutional: Positive for appetite change (diminished but still good; weight 211) and fatigue (chronic, unchanged)  Negative for chills and fever (checking temp daily)  HENT: Negative for mouth sores, nosebleeds and trouble swallowing  Respiratory: Negative for cough and shortness of breath  Cardiovascular: Negative for leg swelling  Gastrointestinal: Negative for abdominal pain, constipation, diarrhea, nausea and vomiting  Genitourinary: Negative for difficulty urinating, dysuria and hematuria  Musculoskeletal: Positive for arthralgias (chronic ) and myalgias  Neurological: Positive for headaches (slight prior to eating )  Negative for dizziness and light-headedness  Physical Exam   Constitutional: He is oriented to person, place, and time  He appears well-developed and well-nourished  No distress  HENT:   Head: Normocephalic and atraumatic     Eyes: No scleral icterus  Neck: Normal range of motion  Pulmonary/Chest: No respiratory distress  Neurological: He is alert and oriented to person, place, and time  Skin: Skin is warm and dry  No pallor  Psychiatric: He has a normal mood and affect  His behavior is normal         Assessment and Plan:  18-year-old male presents for face time tele health visit for follow-up regarding his CLL  On outpatient labs that he had in February this showed an elevated potassium x2 readings  He was then sent to the hospital due to this  It was found by doing arterial sample that this was a false elevation  Patient had pseudo hyperkalemia  Was recommended by the hospital that the subsequent blood samples should be placed in a blood specimen to that does not contain heparin  Additionally the specimen should then be allowed to clot prior to centrifuging  Additionally arterial samples could also be completed  I reviewed this with the patient  I also spoke with the  and lab at [facility] who both new the patient in stated that they will place the specimen in gold top tube which has no heparin for all future  Samples  Patient states he continues to feel very well  Reviewed that his WBC count did increase last month  He still has thrombocytopenia  He still remains a candidate for treatment  However due to the present COVID-19 pandemic in patient being asymptomatic and labs being relatively stable Dr Jill Stroud and I both recommend that patient delayed treatment at this time as it is not urgent  If we were to begin treatment this could make patient more main compromised or result in hospitalization  Since patient is presently doing well we will continue to defer to defer treatment at this time  Patient is very much in agreement with this plan  He is due for repeat labs at this time will have it done tomorrow outpatient at the [facility] lab  Reviewed hours that this is open      Follow-up in 2 months  I spent  30 minutes with the patient during this visit

## 2020-03-28 ENCOUNTER — APPOINTMENT (OUTPATIENT)
Dept: LAB | Facility: CLINIC | Age: 75
End: 2020-03-28
Payer: COMMERCIAL

## 2020-04-22 ENCOUNTER — TELEPHONE (OUTPATIENT)
Dept: HEMATOLOGY ONCOLOGY | Facility: MEDICAL CENTER | Age: 75
End: 2020-04-22

## 2020-04-22 ENCOUNTER — TELEPHONE (OUTPATIENT)
Dept: LAB | Facility: HOSPITAL | Age: 75
End: 2020-04-22

## 2020-04-22 ENCOUNTER — TRANSCRIBE ORDERS (OUTPATIENT)
Dept: LAB | Facility: HOSPITAL | Age: 75
End: 2020-04-22

## 2020-04-22 ENCOUNTER — TELEPHONE (OUTPATIENT)
Dept: HEMATOLOGY ONCOLOGY | Facility: CLINIC | Age: 75
End: 2020-04-22

## 2020-04-22 DIAGNOSIS — C91.10 LEUKEMIA, LYMPHOCYTIC, CHRONIC (HCC): Primary | ICD-10-CM

## 2020-04-22 DIAGNOSIS — C91.10 CLL (CHRONIC LYMPHOCYTIC LEUKEMIA) (HCC): Primary | ICD-10-CM

## 2020-04-28 ENCOUNTER — TELEPHONE (OUTPATIENT)
Dept: HEMATOLOGY ONCOLOGY | Facility: CLINIC | Age: 75
End: 2020-04-28

## 2020-04-28 ENCOUNTER — APPOINTMENT (OUTPATIENT)
Dept: LAB | Facility: HOSPITAL | Age: 75
End: 2020-04-28
Attending: INTERNAL MEDICINE
Payer: COMMERCIAL

## 2020-04-28 DIAGNOSIS — C91.10 CLL (CHRONIC LYMPHOCYTIC LEUKEMIA) (HCC): ICD-10-CM

## 2020-04-28 DIAGNOSIS — C91.10 LEUKEMIA, LYMPHOCYTIC, CHRONIC (HCC): ICD-10-CM

## 2020-04-28 DIAGNOSIS — C91.10 CLL (CHRONIC LYMPHOCYTIC LEUKEMIA) (HCC): Primary | ICD-10-CM

## 2020-04-28 LAB
BASOPHILS # BLD MANUAL: 0 THOUSAND/UL (ref 0–0.1)
BASOPHILS NFR MAR MANUAL: 0 % (ref 0–1)
EOSINOPHIL # BLD MANUAL: 0 THOUSAND/UL (ref 0–0.4)
EOSINOPHIL NFR BLD MANUAL: 0 % (ref 0–6)
ERYTHROCYTE [DISTWIDTH] IN BLOOD BY AUTOMATED COUNT: 15.6 % (ref 11.6–15.1)
HCT VFR BLD AUTO: 42.5 % (ref 36.5–49.3)
HGB BLD-MCNC: 12.6 G/DL (ref 12–17)
LYMPHOCYTES # BLD AUTO: 156.83 THOUSAND/UL (ref 0.6–4.47)
LYMPHOCYTES # BLD AUTO: 94 % (ref 14–44)
MCH RBC QN AUTO: 27 PG (ref 26.8–34.3)
MCHC RBC AUTO-ENTMCNC: 29.6 G/DL (ref 31.4–37.4)
MCV RBC AUTO: 91 FL (ref 82–98)
MONOCYTES # BLD AUTO: 6.67 THOUSAND/UL (ref 0–1.22)
MONOCYTES NFR BLD: 4 % (ref 4–12)
NEUTROPHILS # BLD MANUAL: 3.34 THOUSAND/UL (ref 1.85–7.62)
NEUTS SEG NFR BLD AUTO: 2 % (ref 43–75)
NRBC BLD AUTO-RTO: 0 /100 WBCS
PLATELET # BLD AUTO: 94 THOUSANDS/UL (ref 149–390)
PLATELET BLD QL SMEAR: ABNORMAL
PMV BLD AUTO: 9.7 FL (ref 8.9–12.7)
RBC # BLD AUTO: 4.66 MILLION/UL (ref 3.88–5.62)
VENIPUNCTURE: NORMAL
WBC # BLD AUTO: 166.84 THOUSAND/UL (ref 4.31–10.16)

## 2020-04-28 PROCEDURE — 36415 COLL VENOUS BLD VENIPUNCTURE: CPT

## 2020-04-28 PROCEDURE — 85027 COMPLETE CBC AUTOMATED: CPT

## 2020-04-28 PROCEDURE — 85007 BL SMEAR W/DIFF WBC COUNT: CPT

## 2020-05-27 ENCOUNTER — TELEPHONE (OUTPATIENT)
Dept: HEMATOLOGY ONCOLOGY | Facility: CLINIC | Age: 75
End: 2020-05-27

## 2020-05-27 ENCOUNTER — TRANSCRIBE ORDERS (OUTPATIENT)
Dept: LAB | Facility: CLINIC | Age: 75
End: 2020-05-27

## 2020-05-27 ENCOUNTER — APPOINTMENT (OUTPATIENT)
Dept: LAB | Facility: CLINIC | Age: 75
End: 2020-05-27
Payer: COMMERCIAL

## 2020-05-27 DIAGNOSIS — C91.10 CLL (CHRONIC LYMPHOCYTIC LEUKEMIA) (HCC): ICD-10-CM

## 2020-05-27 LAB
ALBUMIN SERPL BCP-MCNC: 3.8 G/DL (ref 3.5–5)
ALP SERPL-CCNC: 68 U/L (ref 46–116)
ALT SERPL W P-5'-P-CCNC: 26 U/L (ref 12–78)
ANION GAP SERPL CALCULATED.3IONS-SCNC: 8 MMOL/L (ref 4–13)
AST SERPL W P-5'-P-CCNC: 21 U/L (ref 5–45)
BASOPHILS # BLD MANUAL: 0 THOUSAND/UL (ref 0–0.1)
BASOPHILS NFR MAR MANUAL: 0 % (ref 0–1)
BILIRUB SERPL-MCNC: 0.53 MG/DL (ref 0.2–1)
BUN SERPL-MCNC: 32 MG/DL (ref 5–25)
CALCIUM SERPL-MCNC: 8.4 MG/DL (ref 8.3–10.1)
CHLORIDE SERPL-SCNC: 107 MMOL/L (ref 100–108)
CO2 SERPL-SCNC: 28 MMOL/L (ref 21–32)
CREAT SERPL-MCNC: 1.31 MG/DL (ref 0.6–1.3)
EOSINOPHIL # BLD MANUAL: 0 THOUSAND/UL (ref 0–0.4)
EOSINOPHIL NFR BLD MANUAL: 0 % (ref 0–6)
ERYTHROCYTE [DISTWIDTH] IN BLOOD BY AUTOMATED COUNT: 15.6 % (ref 11.6–15.1)
GFR SERPL CREATININE-BSD FRML MDRD: 54 ML/MIN/1.73SQ M
GLUCOSE P FAST SERPL-MCNC: 102 MG/DL (ref 65–99)
HCT VFR BLD AUTO: 43 % (ref 36.5–49.3)
HGB BLD-MCNC: 12.7 G/DL (ref 12–17)
IGA SERPL-MCNC: 120 MG/DL (ref 70–400)
IGG SERPL-MCNC: 631 MG/DL (ref 700–1600)
IGM SERPL-MCNC: 20 MG/DL (ref 40–230)
LYMPHOCYTES # BLD AUTO: 131.84 THOUSAND/UL (ref 0.6–4.47)
LYMPHOCYTES # BLD AUTO: 87 % (ref 14–44)
MCH RBC QN AUTO: 27 PG (ref 26.8–34.3)
MCHC RBC AUTO-ENTMCNC: 29.5 G/DL (ref 31.4–37.4)
MCV RBC AUTO: 91 FL (ref 82–98)
MONOCYTES # BLD AUTO: 4.55 THOUSAND/UL (ref 0–1.22)
MONOCYTES NFR BLD: 3 % (ref 4–12)
NEUTROPHILS # BLD MANUAL: 9.09 THOUSAND/UL (ref 1.85–7.62)
NEUTS SEG NFR BLD AUTO: 6 % (ref 43–75)
NRBC BLD AUTO-RTO: 0 /100 WBCS
PLATELET # BLD AUTO: 84 THOUSANDS/UL (ref 149–390)
PLATELET BLD QL SMEAR: ABNORMAL
PMV BLD AUTO: 9.9 FL (ref 8.9–12.7)
POTASSIUM SERPL-SCNC: 4.9 MMOL/L (ref 3.5–5.3)
PROT SERPL-MCNC: 6.3 G/DL (ref 6.4–8.2)
RBC # BLD AUTO: 4.71 MILLION/UL (ref 3.88–5.62)
RBC MORPH BLD: NORMAL
SODIUM SERPL-SCNC: 143 MMOL/L (ref 136–145)
TOTAL CELLS COUNTED SPEC: 100
URATE SERPL-MCNC: 5.8 MG/DL (ref 4.2–8)
VARIANT LYMPHS # BLD AUTO: 4 %
WBC # BLD AUTO: 151.54 THOUSAND/UL (ref 4.31–10.16)

## 2020-05-27 PROCEDURE — 84550 ASSAY OF BLOOD/URIC ACID: CPT

## 2020-05-27 PROCEDURE — 36415 COLL VENOUS BLD VENIPUNCTURE: CPT

## 2020-05-27 PROCEDURE — 85007 BL SMEAR W/DIFF WBC COUNT: CPT

## 2020-05-27 PROCEDURE — 80053 COMPREHEN METABOLIC PANEL: CPT

## 2020-05-27 PROCEDURE — 82784 ASSAY IGA/IGD/IGG/IGM EACH: CPT

## 2020-05-27 PROCEDURE — 85027 COMPLETE CBC AUTOMATED: CPT

## 2020-05-29 ENCOUNTER — OFFICE VISIT (OUTPATIENT)
Dept: HEMATOLOGY ONCOLOGY | Facility: CLINIC | Age: 75
End: 2020-05-29
Payer: COMMERCIAL

## 2020-05-29 ENCOUNTER — OFFICE VISIT (OUTPATIENT)
Dept: LAB | Facility: CLINIC | Age: 75
End: 2020-05-29
Payer: COMMERCIAL

## 2020-05-29 ENCOUNTER — DOCUMENTATION (OUTPATIENT)
Dept: HEMATOLOGY ONCOLOGY | Facility: CLINIC | Age: 75
End: 2020-05-29

## 2020-05-29 VITALS
SYSTOLIC BLOOD PRESSURE: 136 MMHG | BODY MASS INDEX: 30.21 KG/M2 | OXYGEN SATURATION: 97 % | DIASTOLIC BLOOD PRESSURE: 76 MMHG | HEIGHT: 70 IN | TEMPERATURE: 97.5 F | HEART RATE: 70 BPM | RESPIRATION RATE: 16 BRPM | WEIGHT: 211 LBS

## 2020-05-29 DIAGNOSIS — D69.6 THROMBOCYTOPENIA (HCC): Primary | ICD-10-CM

## 2020-05-29 DIAGNOSIS — E78.5 HYPERLIPIDEMIA, UNSPECIFIED HYPERLIPIDEMIA TYPE: ICD-10-CM

## 2020-05-29 DIAGNOSIS — C91.10 CHRONIC LYMPHOCYTIC LEUKEMIA OF B-CELL TYPE NOT HAVING ACHIEVED REMISSION (HCC): ICD-10-CM

## 2020-05-29 DIAGNOSIS — C91.10 CHRONIC LYMPHOCYTIC LEUKEMIA OF B-CELL TYPE NOT HAVING ACHIEVED REMISSION (HCC): Primary | ICD-10-CM

## 2020-05-29 DIAGNOSIS — D69.6 THROMBOCYTOPENIA (HCC): ICD-10-CM

## 2020-05-29 DIAGNOSIS — I10 HYPERTENSION, ESSENTIAL: ICD-10-CM

## 2020-05-29 DIAGNOSIS — C91.10 CLL (CHRONIC LYMPHOCYTIC LEUKEMIA) (HCC): ICD-10-CM

## 2020-05-29 LAB
ATRIAL RATE: 60 BPM
PR INTERVAL: 136 MS
QRS AXIS: 24 DEGREES
QRSD INTERVAL: 80 MS
QT INTERVAL: 398 MS
QTC INTERVAL: 398 MS
T WAVE AXIS: 29 DEGREES
VENTRICULAR RATE: 60 BPM

## 2020-05-29 PROCEDURE — 93010 ELECTROCARDIOGRAM REPORT: CPT | Performed by: INTERNAL MEDICINE

## 2020-05-29 PROCEDURE — 93005 ELECTROCARDIOGRAM TRACING: CPT

## 2020-05-29 PROCEDURE — 99215 OFFICE O/P EST HI 40 MIN: CPT | Performed by: INTERNAL MEDICINE

## 2020-05-29 RX ORDER — ALLOPURINOL 100 MG/1
200 TABLET ORAL DAILY
Qty: 60 TABLET | Refills: 2 | Status: SHIPPED | OUTPATIENT
Start: 2020-05-29 | End: 2020-06-29

## 2020-05-29 RX ORDER — FAMOTIDINE 20 MG/1
20 TABLET, FILM COATED ORAL 2 TIMES DAILY
COMMUNITY
End: 2020-11-20 | Stop reason: HOSPADM

## 2020-06-01 ENCOUNTER — DOCUMENTATION (OUTPATIENT)
Dept: HEMATOLOGY ONCOLOGY | Facility: CLINIC | Age: 75
End: 2020-06-01

## 2020-06-02 DIAGNOSIS — C91.10 CHRONIC LYMPHOCYTIC LEUKEMIA OF B-CELL TYPE NOT HAVING ACHIEVED REMISSION (HCC): Primary | ICD-10-CM

## 2020-06-10 ENCOUNTER — TELEPHONE (OUTPATIENT)
Dept: HEMATOLOGY ONCOLOGY | Facility: CLINIC | Age: 75
End: 2020-06-10

## 2020-06-10 ENCOUNTER — APPOINTMENT (OUTPATIENT)
Dept: LAB | Facility: CLINIC | Age: 75
End: 2020-06-10
Payer: COMMERCIAL

## 2020-06-17 ENCOUNTER — TELEPHONE (OUTPATIENT)
Dept: HEMATOLOGY ONCOLOGY | Facility: CLINIC | Age: 75
End: 2020-06-17

## 2020-06-17 ENCOUNTER — APPOINTMENT (OUTPATIENT)
Dept: LAB | Facility: CLINIC | Age: 75
End: 2020-06-17
Payer: COMMERCIAL

## 2020-06-23 ENCOUNTER — TELEPHONE (OUTPATIENT)
Dept: HEMATOLOGY ONCOLOGY | Facility: CLINIC | Age: 75
End: 2020-06-23

## 2020-06-24 ENCOUNTER — APPOINTMENT (OUTPATIENT)
Dept: LAB | Facility: CLINIC | Age: 75
End: 2020-06-24
Payer: COMMERCIAL

## 2020-06-24 ENCOUNTER — TELEPHONE (OUTPATIENT)
Dept: HEMATOLOGY ONCOLOGY | Facility: CLINIC | Age: 75
End: 2020-06-24

## 2020-06-29 DIAGNOSIS — C91.10 CLL (CHRONIC LYMPHOCYTIC LEUKEMIA) (HCC): ICD-10-CM

## 2020-06-29 RX ORDER — ALLOPURINOL 100 MG/1
200 TABLET ORAL DAILY
Qty: 60 TABLET | Refills: 1 | Status: SHIPPED | OUTPATIENT
Start: 2020-06-29 | End: 2020-07-19

## 2020-06-30 ENCOUNTER — TELEPHONE (OUTPATIENT)
Dept: HEMATOLOGY ONCOLOGY | Facility: CLINIC | Age: 75
End: 2020-06-30

## 2020-07-01 ENCOUNTER — TRANSCRIBE ORDERS (OUTPATIENT)
Dept: LAB | Facility: CLINIC | Age: 75
End: 2020-07-01

## 2020-07-01 ENCOUNTER — TELEPHONE (OUTPATIENT)
Dept: HEMATOLOGY ONCOLOGY | Facility: CLINIC | Age: 75
End: 2020-07-01

## 2020-07-01 ENCOUNTER — TELEPHONE (OUTPATIENT)
Dept: HEMATOLOGY ONCOLOGY | Facility: MEDICAL CENTER | Age: 75
End: 2020-07-01

## 2020-07-01 ENCOUNTER — APPOINTMENT (OUTPATIENT)
Dept: LAB | Facility: CLINIC | Age: 75
End: 2020-07-01
Payer: COMMERCIAL

## 2020-07-01 NOTE — TELEPHONE ENCOUNTER
I have received medical records request from the [address]     Email has been sent to [facility]

## 2020-07-01 NOTE — TELEPHONE ENCOUNTER
Oscar from [facility] lab called in with a critical of  17, a good call back number [phone]

## 2020-07-02 ENCOUNTER — OFFICE VISIT (OUTPATIENT)
Dept: HEMATOLOGY ONCOLOGY | Facility: CLINIC | Age: 75
End: 2020-07-02
Payer: COMMERCIAL

## 2020-07-02 VITALS
WEIGHT: 210 LBS | DIASTOLIC BLOOD PRESSURE: 70 MMHG | HEART RATE: 67 BPM | TEMPERATURE: 97.5 F | BODY MASS INDEX: 30.06 KG/M2 | RESPIRATION RATE: 18 BRPM | HEIGHT: 70 IN | SYSTOLIC BLOOD PRESSURE: 132 MMHG | OXYGEN SATURATION: 98 %

## 2020-07-02 DIAGNOSIS — D69.6 THROMBOCYTOPENIA (HCC): ICD-10-CM

## 2020-07-02 DIAGNOSIS — C91.10 CLL (CHRONIC LYMPHOCYTIC LEUKEMIA) (HCC): Primary | ICD-10-CM

## 2020-07-02 PROCEDURE — 99214 OFFICE O/P EST MOD 30 MIN: CPT | Performed by: PHYSICIAN ASSISTANT

## 2020-07-02 NOTE — PROGRESS NOTES
Hematology/Oncology Outpatient Follow-up  Salma Jha 77 y o  male 1945 [id number]    Date:  7/2/2020      Assessment and Plan:    1  CLL (chronic lymphocytic leukemia) ([facility] ), 2  Thrombocytopenia [facility])  71-year-old male presents for follow-up regarding history of CLL  He has recently started on treatment, acalabrutinib  He is tolerating this very well  No bleeding symptoms  He actually feels better since starting treatment  He states his energy level is better  He did have occasional headaches when taking allopurinol  This has improved and resolved  He has vertigo  Offer only the 1st 2 months  He will continue every 1 week labs with a total of 6 weeks  Then he can decrease to once a month  He had expected increase in WBC but now this is nicely decreasing  He is very satisfied with treatment  He has questions regarding immunocompromised state  His disease is itself makes him immunocompromised  He is to take precautions in regards to COVID-19  He is aware  Follow-up 2 months     - CBC and differential; Standing  - Comprehensive metabolic panel; Standing  - LD,Blood; Standing  - Uric acid; Standing        HPI:  Oncology History    chronic lymphocytic leukemia, diagnosed in August 2017  CLL has mixed good and bad prognostic features, 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, IgVH mutation, lack of CD38 expression, deletion of 13 q14 in 6% and no 11 q deletion        There was no trisomy 12  Lymphocytes were CD5 and CD23 positive, dim kappa expression and moderate CD20 expression had splenomegaly on CT scan but not on palpation        CLL (chronic lymphocytic leukemia) (HCC)    3/27/2018 Initial Diagnosis     CLL (chronic lymphocytic leukemia) (HCC)       Patient WBC was increasing and platelet count remained decreased  Patient was seen by Dr Smita Gupta on 5/29/20 and advised to begin treatment  Interval history: He is doing very well since starting acalibrutinib    He states when he 1st started allopurinol he had headaches but not these are resolved  ROS: Review of Systems   Constitutional: Negative for appetite change, chills, fatigue (feels that his energy has improve since starting tx), fever and unexpected weight change  HENT: Negative for mouth sores and nosebleeds  Respiratory: Negative for cough and shortness of breath  Cardiovascular: Negative for chest pain, palpitations and leg swelling  Gastrointestinal: Negative for abdominal pain, blood in stool, constipation, diarrhea, nausea and vomiting  Genitourinary: Negative for difficulty urinating, dysuria and hematuria  Musculoskeletal: Negative for arthralgias  Skin: Negative  Neurological: Positive for headaches (noticeable with starting allopurinol)  Negative for dizziness, weakness, light-headedness and numbness  Hematological: Negative  Psychiatric/Behavioral: Negative           Past Medical History:   Diagnosis Date    Anxiety     Hypertension     Leukemia ([facility] )        Past Surgical History:   Procedure Laterality Date    APPENDECTOMY      COLONOSCOPY      TONSILLECTOMY         Social History     Socioeconomic History    Marital status: /Civil Union     Spouse name: None    Number of children: None    Years of education: None    Highest education level: None   Occupational History    None   Social Needs    Financial resource strain: None    Food insecurity:     Worry: None     Inability: None    Transportation needs:     Medical: None     Non-medical: None   Tobacco Use    Smoking status: Never Smoker    Smokeless tobacco: Never Used   Substance and Sexual Activity    Alcohol use: Yes     Frequency: 2-4 times a month     Comment: minimal    Drug use: No    Sexual activity: None   Lifestyle    Physical activity:     Days per week: None     Minutes per session: None    Stress: None   Relationships    Social connections:     Talks on phone: None     Gets together: None     Attends Zoroastrian service: None Active member of club or organization: None     Attends meetings of clubs or organizations: None     Relationship status: None    Intimate partner violence:     Fear of current or ex partner: None     Emotionally abused: None     Physically abused: None     Forced sexual activity: None   Other Topics Concern    None   Social History Narrative    None       Family History   Problem Relation Age of Onset    No Known Problems Mother     No Known Problems Father        Allergies   Allergen Reactions    Sulfa Antibiotics          Current Outpatient Medications:     Acalabrutinib 100 MG CAPS, Take 100 mg by mouth 2 (two) times a day, Disp: 60 capsule, Rfl: 11    allopurinol (ZYLOPRIM) 100 mg tablet, Take 2 tablets (200 mg total) by mouth daily, Disp: 60 tablet, Rfl: 1    co-enzyme Q-10 50 MG capsule, Take 200 mg by mouth daily, Disp: , Rfl:     co-enzyme Q-10 50 MG capsule, Take 100 mg by mouth daily, Disp: , Rfl:     fosinopril (MONOPRIL) 20 mg tablet, Take 20 mg by mouth daily, Disp: , Rfl:     magnesium gluconate (MAGONATE) 500 mg tablet, Take 500 mg by mouth once , Disp: , Rfl:     magnesium gluconate (MAGONATE) 500 mg tablet, Take 500 mg by mouth daily, Disp: , Rfl:     mometasone (NASONEX) 50 mcg/act nasal spray, 2 sprays into each nostril daily, Disp: , Rfl:     Multiple Vitamin (MULTIVITAMIN) tablet, Take 1 tablet by mouth daily, Disp: , Rfl:     sertraline (ZOLOFT) 50 mg tablet, Take 75 mg by mouth daily  , Disp: , Rfl:     simvastatin (ZOCOR) 20 mg tablet, Take 20 mg by mouth daily at bedtime, Disp: , Rfl:     famotidine (PEPCID) 20 mg tablet, Take 20 mg by mouth 2 (two) times a day, Disp: , Rfl:     Green Tea, Ni sinensis, (GREEN TEA PO), Take by mouth, Disp: , Rfl:     TURMERIC CURCUMIN PO, Take by mouth, Disp: , Rfl:       Physical Exam:  /70 (BP Location: Left arm, Patient Position: Sitting, Cuff Size: Adult)   Pulse 67   Temp 97 5 °F (36 4 °C)   Resp 18   Ht 5' 10" (1 778 m)   Wt 95 3 kg (210 lb)   SpO2 98%   BMI 30 13 kg/m²     Physical Exam   Constitutional: He is oriented to person, place, and time  He appears well-developed and well-nourished  No distress  HENT:   Head: Normocephalic and atraumatic  Eyes: Conjunctivae are normal  No scleral icterus  Neck: Normal range of motion  Neck supple  Cardiovascular: Normal rate, regular rhythm and normal heart sounds  No murmur heard  Pulmonary/Chest: Effort normal and breath sounds normal  No respiratory distress  Abdominal: Soft  There is no tenderness  Musculoskeletal: Normal range of motion  He exhibits no edema or tenderness  Lymphadenopathy:     He has no cervical adenopathy  Neurological: He is alert and oriented to person, place, and time  No cranial nerve deficit  Skin: Skin is warm and dry  Psychiatric: He has a normal mood and affect  Vitals reviewed  Labs:  Lab Results   Component Value Date     17 (HH) 07/01/2020    HGB 12 7 07/01/2020    HCT 44 9 07/01/2020    MCV 94 07/01/2020    PLT 93 (L) 07/01/2020     Lab Results   Component Value Date    K 4 7 07/01/2020     07/01/2020    CO2 28 07/01/2020    BUN 31 (H) 07/01/2020    CREATININE 1 20 07/01/2020    GLUF 100 (H) 07/01/2020    CALCIUM 8 6 07/01/2020    AST 13 07/01/2020    ALT 20 07/01/2020    ALKPHOS 61 07/01/2020    EGFR 59 07/01/2020       Patient voiced understanding and agreement in the above discussion  Aware to contact our office with questions/symptoms in the interim  This note has been generated by voice recognition software system  Therefore, there may be spelling, grammar, and or syntax errors  Please contact if questions arise

## 2020-07-08 ENCOUNTER — TELEPHONE (OUTPATIENT)
Dept: HEMATOLOGY ONCOLOGY | Facility: CLINIC | Age: 75
End: 2020-07-08

## 2020-07-08 ENCOUNTER — APPOINTMENT (OUTPATIENT)
Dept: LAB | Facility: CLINIC | Age: 75
End: 2020-07-08
Payer: COMMERCIAL

## 2020-07-08 LAB
ALBUMIN SERPL BCP-MCNC: 3.9 G/DL (ref 3.5–5)
ALP SERPL-CCNC: 59 U/L (ref 46–116)
ALT SERPL W P-5'-P-CCNC: 17 U/L (ref 12–78)
ANION GAP SERPL CALCULATED.3IONS-SCNC: 7 MMOL/L (ref 4–13)
AST SERPL W P-5'-P-CCNC: 17 U/L (ref 5–45)
BASOPHILS # BLD MANUAL: 0 THOUSAND/UL (ref 0–0.1)
BASOPHILS NFR MAR MANUAL: 0 % (ref 0–1)
BILIRUB SERPL-MCNC: 0.49 MG/DL (ref 0.2–1)
BUN SERPL-MCNC: 27 MG/DL (ref 5–25)
CALCIUM SERPL-MCNC: 8.7 MG/DL (ref 8.3–10.1)
CHLORIDE SERPL-SCNC: 105 MMOL/L (ref 100–108)
CO2 SERPL-SCNC: 30 MMOL/L (ref 21–32)
CREAT SERPL-MCNC: 1.09 MG/DL (ref 0.6–1.3)
EOSINOPHIL # BLD MANUAL: 0 THOUSAND/UL (ref 0–0.4)
EOSINOPHIL NFR BLD MANUAL: 0 % (ref 0–6)
ERYTHROCYTE [DISTWIDTH] IN BLOOD BY AUTOMATED COUNT: 17.5 % (ref 11.6–15.1)
GFR SERPL CREATININE-BSD FRML MDRD: 67 ML/MIN/1.73SQ M
GLUCOSE P FAST SERPL-MCNC: 99 MG/DL (ref 65–99)
HCT VFR BLD AUTO: 46.2 % (ref 36.5–49.3)
HGB BLD-MCNC: 13 G/DL (ref 12–17)
LDH SERPL-CCNC: 155 U/L (ref 81–234)
LYMPHOCYTES # BLD AUTO: 187.25 THOUSAND/UL (ref 0.6–4.47)
LYMPHOCYTES # BLD AUTO: 95 % (ref 14–44)
MCH RBC QN AUTO: 26.4 PG (ref 26.8–34.3)
MCHC RBC AUTO-ENTMCNC: 28.1 G/DL (ref 31.4–37.4)
MCV RBC AUTO: 94 FL (ref 82–98)
MONOCYTES # BLD AUTO: 3.94 THOUSAND/UL (ref 0–1.22)
MONOCYTES NFR BLD: 2 % (ref 4–12)
NEUTROPHILS # BLD MANUAL: 5.91 THOUSAND/UL (ref 1.85–7.62)
NEUTS SEG NFR BLD AUTO: 3 % (ref 43–75)
NRBC BLD AUTO-RTO: 0 /100 WBCS
PLATELET # BLD AUTO: 95 THOUSANDS/UL (ref 149–390)
PLATELET BLD QL SMEAR: ABNORMAL
PMV BLD AUTO: 10.7 FL (ref 8.9–12.7)
POTASSIUM SERPL-SCNC: 4.7 MMOL/L (ref 3.5–5.3)
PROT SERPL-MCNC: 6.8 G/DL (ref 6.4–8.2)
RBC # BLD AUTO: 4.92 MILLION/UL (ref 3.88–5.62)
RBC MORPH BLD: NORMAL
SODIUM SERPL-SCNC: 142 MMOL/L (ref 136–145)
TOTAL CELLS COUNTED SPEC: 100
URATE SERPL-MCNC: 4.1 MG/DL (ref 4.2–8)
WBC # BLD AUTO: 197.11 THOUSAND/UL (ref 4.31–10.16)

## 2020-07-08 PROCEDURE — 80053 COMPREHEN METABOLIC PANEL: CPT | Performed by: INTERNAL MEDICINE

## 2020-07-08 PROCEDURE — 84550 ASSAY OF BLOOD/URIC ACID: CPT | Performed by: INTERNAL MEDICINE

## 2020-07-08 PROCEDURE — 85007 BL SMEAR W/DIFF WBC COUNT: CPT | Performed by: INTERNAL MEDICINE

## 2020-07-08 PROCEDURE — 85027 COMPLETE CBC AUTOMATED: CPT | Performed by: INTERNAL MEDICINE

## 2020-07-08 PROCEDURE — 36415 COLL VENOUS BLD VENIPUNCTURE: CPT | Performed by: INTERNAL MEDICINE

## 2020-07-08 PROCEDURE — 83615 LACTATE (LD) (LDH) ENZYME: CPT | Performed by: INTERNAL MEDICINE

## 2020-07-15 ENCOUNTER — TELEPHONE (OUTPATIENT)
Dept: HEMATOLOGY ONCOLOGY | Facility: CLINIC | Age: 75
End: 2020-07-15

## 2020-07-15 ENCOUNTER — APPOINTMENT (OUTPATIENT)
Dept: LAB | Facility: CLINIC | Age: 75
End: 2020-07-15
Payer: COMMERCIAL

## 2020-07-15 DIAGNOSIS — C91.10 CLL (CHRONIC LYMPHOCYTIC LEUKEMIA) (HCC): ICD-10-CM

## 2020-07-15 LAB
ALBUMIN SERPL BCP-MCNC: 3.7 G/DL (ref 3.5–5)
ALP SERPL-CCNC: 64 U/L (ref 46–116)
ALT SERPL W P-5'-P-CCNC: 18 U/L (ref 12–78)
ANION GAP SERPL CALCULATED.3IONS-SCNC: 7 MMOL/L (ref 4–13)
AST SERPL W P-5'-P-CCNC: 16 U/L (ref 5–45)
BASOPHILS # BLD MANUAL: 0 THOUSAND/UL (ref 0–0.1)
BASOPHILS NFR MAR MANUAL: 0 % (ref 0–1)
BILIRUB SERPL-MCNC: 0.42 MG/DL (ref 0.2–1)
BUN SERPL-MCNC: 27 MG/DL (ref 5–25)
CALCIUM SERPL-MCNC: 8.5 MG/DL (ref 8.3–10.1)
CHLORIDE SERPL-SCNC: 105 MMOL/L (ref 100–108)
CO2 SERPL-SCNC: 28 MMOL/L (ref 21–32)
CREAT SERPL-MCNC: 1.11 MG/DL (ref 0.6–1.3)
EOSINOPHIL # BLD MANUAL: 0 THOUSAND/UL (ref 0–0.4)
EOSINOPHIL NFR BLD MANUAL: 0 % (ref 0–6)
ERYTHROCYTE [DISTWIDTH] IN BLOOD BY AUTOMATED COUNT: 17.4 % (ref 11.6–15.1)
GFR SERPL CREATININE-BSD FRML MDRD: 65 ML/MIN/1.73SQ M
GLUCOSE P FAST SERPL-MCNC: 96 MG/DL (ref 65–99)
HCT VFR BLD AUTO: 46.2 % (ref 36.5–49.3)
HGB BLD-MCNC: 12.9 G/DL (ref 12–17)
LDH SERPL-CCNC: 139 U/L (ref 81–234)
LYMPHOCYTES # BLD AUTO: 170.15 THOUSAND/UL (ref 0.6–4.47)
LYMPHOCYTES # BLD AUTO: 96 % (ref 14–44)
MCH RBC QN AUTO: 26.2 PG (ref 26.8–34.3)
MCHC RBC AUTO-ENTMCNC: 27.9 G/DL (ref 31.4–37.4)
MCV RBC AUTO: 94 FL (ref 82–98)
MONOCYTES # BLD AUTO: 0 THOUSAND/UL (ref 0–1.22)
MONOCYTES NFR BLD: 0 % (ref 4–12)
NEUTROPHILS # BLD MANUAL: 7.09 THOUSAND/UL (ref 1.85–7.62)
NEUTS SEG NFR BLD AUTO: 4 % (ref 43–75)
NRBC BLD AUTO-RTO: 0 /100 WBCS
PLATELET # BLD AUTO: 100 THOUSANDS/UL (ref 149–390)
PLATELET BLD QL SMEAR: ABNORMAL
PMV BLD AUTO: 10 FL (ref 8.9–12.7)
POTASSIUM SERPL-SCNC: 4.4 MMOL/L (ref 3.5–5.3)
PROT SERPL-MCNC: 6.3 G/DL (ref 6.4–8.2)
RBC # BLD AUTO: 4.92 MILLION/UL (ref 3.88–5.62)
RBC MORPH BLD: NORMAL
SODIUM SERPL-SCNC: 140 MMOL/L (ref 136–145)
TOTAL CELLS COUNTED SPEC: 100
URATE SERPL-MCNC: 4.5 MG/DL (ref 4.2–8)
WBC # BLD AUTO: 177.24 THOUSAND/UL (ref 4.31–10.16)

## 2020-07-15 PROCEDURE — 84550 ASSAY OF BLOOD/URIC ACID: CPT

## 2020-07-15 PROCEDURE — 83615 LACTATE (LD) (LDH) ENZYME: CPT

## 2020-07-15 PROCEDURE — 36415 COLL VENOUS BLD VENIPUNCTURE: CPT

## 2020-07-15 PROCEDURE — 80053 COMPREHEN METABOLIC PANEL: CPT

## 2020-07-15 PROCEDURE — 85027 COMPLETE CBC AUTOMATED: CPT

## 2020-07-15 PROCEDURE — 85007 BL SMEAR W/DIFF WBC COUNT: CPT

## 2020-07-15 NOTE — TELEPHONE ENCOUNTER
Received fax requesting medical records     I have faxed request to [facility] at [phone]

## 2020-07-15 NOTE — TELEPHONE ENCOUNTER
Call received from:      Mary Ellen Nguyen  Date of lab draw:7/15/20  Critical value: 24  Read back occurred:Yes  Tasked and Monica Farias RN
Noted  Dr Kannan Carver will review 
Detail Level: Generalized
Detail Level: Simple

## 2020-07-19 DIAGNOSIS — C91.10 CLL (CHRONIC LYMPHOCYTIC LEUKEMIA) (HCC): ICD-10-CM

## 2020-07-19 RX ORDER — ALLOPURINOL 100 MG/1
TABLET ORAL
Qty: 60 TABLET | Refills: 0 | Status: SHIPPED | OUTPATIENT
Start: 2020-07-19 | End: 2020-09-20

## 2020-08-14 ENCOUNTER — APPOINTMENT (OUTPATIENT)
Dept: LAB | Facility: CLINIC | Age: 75
End: 2020-08-14
Payer: COMMERCIAL

## 2020-08-14 ENCOUNTER — TELEPHONE (OUTPATIENT)
Dept: HEMATOLOGY ONCOLOGY | Facility: CLINIC | Age: 75
End: 2020-08-14

## 2020-08-14 ENCOUNTER — TRANSCRIBE ORDERS (OUTPATIENT)
Dept: LAB | Facility: CLINIC | Age: 75
End: 2020-08-14

## 2020-08-14 DIAGNOSIS — C91.10 CLL (CHRONIC LYMPHOCYTIC LEUKEMIA) (HCC): ICD-10-CM

## 2020-08-14 LAB
ALBUMIN SERPL BCP-MCNC: 3.7 G/DL (ref 3.5–5)
ALP SERPL-CCNC: 64 U/L (ref 46–116)
ALT SERPL W P-5'-P-CCNC: 16 U/L (ref 12–78)
ANION GAP SERPL CALCULATED.3IONS-SCNC: 5 MMOL/L (ref 4–13)
AST SERPL W P-5'-P-CCNC: 18 U/L (ref 5–45)
BASOPHILS # BLD MANUAL: 0 THOUSAND/UL (ref 0–0.1)
BASOPHILS NFR MAR MANUAL: 0 % (ref 0–1)
BILIRUB SERPL-MCNC: 0.57 MG/DL (ref 0.2–1)
BUN SERPL-MCNC: 28 MG/DL (ref 5–25)
CALCIUM SERPL-MCNC: 8.9 MG/DL (ref 8.3–10.1)
CHLORIDE SERPL-SCNC: 105 MMOL/L (ref 100–108)
CO2 SERPL-SCNC: 28 MMOL/L (ref 21–32)
CREAT SERPL-MCNC: 1.25 MG/DL (ref 0.6–1.3)
EOSINOPHIL # BLD MANUAL: 0 THOUSAND/UL (ref 0–0.4)
EOSINOPHIL NFR BLD MANUAL: 0 % (ref 0–6)
ERYTHROCYTE [DISTWIDTH] IN BLOOD BY AUTOMATED COUNT: 16.5 % (ref 11.6–15.1)
GFR SERPL CREATININE-BSD FRML MDRD: 56 ML/MIN/1.73SQ M
GLUCOSE P FAST SERPL-MCNC: 92 MG/DL (ref 65–99)
HCT VFR BLD AUTO: 46.1 % (ref 36.5–49.3)
HGB BLD-MCNC: 13.4 G/DL (ref 12–17)
LDH SERPL-CCNC: 174 U/L (ref 81–234)
LYMPHOCYTES # BLD AUTO: 132.08 THOUSAND/UL (ref 0.6–4.47)
LYMPHOCYTES # BLD AUTO: 98 % (ref 14–44)
MCH RBC QN AUTO: 26.7 PG (ref 26.8–34.3)
MCHC RBC AUTO-ENTMCNC: 29.1 G/DL (ref 31.4–37.4)
MCV RBC AUTO: 92 FL (ref 82–98)
MONOCYTES # BLD AUTO: 0 THOUSAND/UL (ref 0–1.22)
MONOCYTES NFR BLD: 0 % (ref 4–12)
NEUTROPHILS # BLD MANUAL: 2.7 THOUSAND/UL (ref 1.85–7.62)
NEUTS SEG NFR BLD AUTO: 2 % (ref 43–75)
NRBC BLD AUTO-RTO: 0 /100 WBCS
PLATELET # BLD AUTO: 103 THOUSANDS/UL (ref 149–390)
PLATELET BLD QL SMEAR: ABNORMAL
PMV BLD AUTO: 10.3 FL (ref 8.9–12.7)
POTASSIUM SERPL-SCNC: 5 MMOL/L (ref 3.5–5.3)
PROT SERPL-MCNC: 6.8 G/DL (ref 6.4–8.2)
RBC # BLD AUTO: 5.01 MILLION/UL (ref 3.88–5.62)
RBC MORPH BLD: NORMAL
SODIUM SERPL-SCNC: 138 MMOL/L (ref 136–145)
TOTAL CELLS COUNTED SPEC: 100
URATE SERPL-MCNC: 3.9 MG/DL (ref 4.2–8)
WBC # BLD AUTO: 134.78 THOUSAND/UL (ref 4.31–10.16)

## 2020-08-14 PROCEDURE — 85007 BL SMEAR W/DIFF WBC COUNT: CPT

## 2020-08-14 PROCEDURE — 84550 ASSAY OF BLOOD/URIC ACID: CPT

## 2020-08-14 PROCEDURE — 36415 COLL VENOUS BLD VENIPUNCTURE: CPT

## 2020-08-14 PROCEDURE — 85027 COMPLETE CBC AUTOMATED: CPT

## 2020-08-14 PROCEDURE — 80053 COMPREHEN METABOLIC PANEL: CPT

## 2020-08-14 PROCEDURE — 83615 LACTATE (LD) (LDH) ENZYME: CPT

## 2020-08-14 NOTE — TELEPHONE ENCOUNTER
Critical Results   Call Received From [facility]   Lab Department Location  [place]   Lab Study WBC   Date Blood Work was Done  8/14/2020   Relevant Information  WBC of 134 78

## 2020-08-31 ENCOUNTER — TELEPHONE (OUTPATIENT)
Dept: HEMATOLOGY ONCOLOGY | Facility: CLINIC | Age: 75
End: 2020-08-31

## 2020-08-31 NOTE — TELEPHONE ENCOUNTER
Will forward to RN with Dr Andres Pinzon to review      Please let me know if/when of for patient to get flu shot and I will return his call

## 2020-09-14 ENCOUNTER — TELEPHONE (OUTPATIENT)
Dept: HEMATOLOGY ONCOLOGY | Facility: CLINIC | Age: 75
End: 2020-09-14

## 2020-09-14 ENCOUNTER — TRANSCRIBE ORDERS (OUTPATIENT)
Dept: LAB | Facility: CLINIC | Age: 75
End: 2020-09-14

## 2020-09-14 ENCOUNTER — APPOINTMENT (OUTPATIENT)
Dept: LAB | Facility: CLINIC | Age: 75
End: 2020-09-14
Payer: COMMERCIAL

## 2020-09-14 DIAGNOSIS — C91.10 CLL (CHRONIC LYMPHOCYTIC LEUKEMIA) (HCC): ICD-10-CM

## 2020-09-14 LAB
ALBUMIN SERPL BCP-MCNC: 3.4 G/DL (ref 3.5–5)
ALP SERPL-CCNC: 68 U/L (ref 46–116)
ALT SERPL W P-5'-P-CCNC: 17 U/L (ref 12–78)
ANION GAP SERPL CALCULATED.3IONS-SCNC: 9 MMOL/L (ref 4–13)
AST SERPL W P-5'-P-CCNC: 12 U/L (ref 5–45)
BASOPHILS # BLD MANUAL: 0 THOUSAND/UL (ref 0–0.1)
BASOPHILS NFR MAR MANUAL: 0 % (ref 0–1)
BILIRUB SERPL-MCNC: 0.36 MG/DL (ref 0.2–1)
BUN SERPL-MCNC: 28 MG/DL (ref 5–25)
CALCIUM SERPL-MCNC: 8.4 MG/DL (ref 8.3–10.1)
CHLORIDE SERPL-SCNC: 107 MMOL/L (ref 100–108)
CO2 SERPL-SCNC: 28 MMOL/L (ref 21–32)
CREAT SERPL-MCNC: 1.1 MG/DL (ref 0.6–1.3)
EOSINOPHIL # BLD MANUAL: 0 THOUSAND/UL (ref 0–0.4)
EOSINOPHIL NFR BLD MANUAL: 0 % (ref 0–6)
ERYTHROCYTE [DISTWIDTH] IN BLOOD BY AUTOMATED COUNT: 15.5 % (ref 11.6–15.1)
GFR SERPL CREATININE-BSD FRML MDRD: 66 ML/MIN/1.73SQ M
GLUCOSE P FAST SERPL-MCNC: 101 MG/DL (ref 65–99)
HCT VFR BLD AUTO: 46.5 % (ref 36.5–49.3)
HGB BLD-MCNC: 13.6 G/DL (ref 12–17)
LDH SERPL-CCNC: 131 U/L (ref 81–234)
LYMPHOCYTES # BLD AUTO: 79 % (ref 14–44)
LYMPHOCYTES # BLD AUTO: 86.54 THOUSAND/UL (ref 0.6–4.47)
MCH RBC QN AUTO: 26.9 PG (ref 26.8–34.3)
MCHC RBC AUTO-ENTMCNC: 29.2 G/DL (ref 31.4–37.4)
MCV RBC AUTO: 92 FL (ref 82–98)
MONOCYTES # BLD AUTO: 0 THOUSAND/UL (ref 0–1.22)
MONOCYTES NFR BLD: 0 % (ref 4–12)
NEUTROPHILS # BLD MANUAL: 2.19 THOUSAND/UL (ref 1.85–7.62)
NEUTS SEG NFR BLD AUTO: 2 % (ref 43–75)
NRBC BLD AUTO-RTO: 0 /100 WBCS
PLATELET # BLD AUTO: 121 THOUSANDS/UL (ref 149–390)
PLATELET BLD QL SMEAR: ABNORMAL
PMV BLD AUTO: 10.7 FL (ref 8.9–12.7)
POTASSIUM SERPL-SCNC: 4.7 MMOL/L (ref 3.5–5.3)
PROT SERPL-MCNC: 6.2 G/DL (ref 6.4–8.2)
RBC # BLD AUTO: 5.06 MILLION/UL (ref 3.88–5.62)
RBC MORPH BLD: NORMAL
SODIUM SERPL-SCNC: 144 MMOL/L (ref 136–145)
TOTAL CELLS COUNTED SPEC: 100
URATE SERPL-MCNC: 3.8 MG/DL (ref 4.2–8)
VARIANT LYMPHS # BLD AUTO: 19 %
WBC # BLD AUTO: 109.54 THOUSAND/UL (ref 4.31–10.16)

## 2020-09-14 PROCEDURE — 80053 COMPREHEN METABOLIC PANEL: CPT

## 2020-09-14 PROCEDURE — 85007 BL SMEAR W/DIFF WBC COUNT: CPT

## 2020-09-14 PROCEDURE — 83615 LACTATE (LD) (LDH) ENZYME: CPT

## 2020-09-14 PROCEDURE — 84550 ASSAY OF BLOOD/URIC ACID: CPT

## 2020-09-14 PROCEDURE — 36415 COLL VENOUS BLD VENIPUNCTURE: CPT

## 2020-09-14 PROCEDURE — 85027 COMPLETE CBC AUTOMATED: CPT

## 2020-09-14 NOTE — TELEPHONE ENCOUNTER
Maritza Alfred is calling in from [facility] lab with a critical result     WBC- 109 54

## 2020-09-20 DIAGNOSIS — C91.10 CLL (CHRONIC LYMPHOCYTIC LEUKEMIA) (HCC): ICD-10-CM

## 2020-09-20 RX ORDER — ALLOPURINOL 100 MG/1
TABLET ORAL
Qty: 60 TABLET | Refills: 0 | Status: SHIPPED | OUTPATIENT
Start: 2020-09-20 | End: 2020-10-12

## 2020-09-24 ENCOUNTER — TELEPHONE (OUTPATIENT)
Dept: HEMATOLOGY ONCOLOGY | Facility: MEDICAL CENTER | Age: 75
End: 2020-09-24

## 2020-09-25 ENCOUNTER — OFFICE VISIT (OUTPATIENT)
Dept: HEMATOLOGY ONCOLOGY | Facility: CLINIC | Age: 75
End: 2020-09-25
Payer: COMMERCIAL

## 2020-09-25 VITALS
SYSTOLIC BLOOD PRESSURE: 156 MMHG | HEIGHT: 70 IN | HEART RATE: 74 BPM | BODY MASS INDEX: 30.06 KG/M2 | TEMPERATURE: 97.3 F | RESPIRATION RATE: 18 BRPM | OXYGEN SATURATION: 98 % | DIASTOLIC BLOOD PRESSURE: 80 MMHG | WEIGHT: 210 LBS

## 2020-09-25 DIAGNOSIS — C91.10 CLL (CHRONIC LYMPHOCYTIC LEUKEMIA) (HCC): Primary | ICD-10-CM

## 2020-09-25 PROCEDURE — 99214 OFFICE O/P EST MOD 30 MIN: CPT | Performed by: INTERNAL MEDICINE

## 2020-09-25 NOTE — PROGRESS NOTES
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, a mixture of unfavorable and favorable features  Patient has been tolerating acalabrutinib without symptoms secondary to acalabrutinib patient is not on anti CD 20 antibody in addition to acalabrutinib  Occasionally he has  some tiredness and minor arthritic symptoms   He has been under surveillance    Spleen size was 16 cm the last time                  Current Outpatient Medications:     Acalabrutinib 100 MG CAPS, Take 100 mg by mouth 2 (two) times a day, Disp: 60 capsule, Rfl: 11    allopurinol (ZYLOPRIM) 100 mg tablet, Take 2 tablets by mouth daily  , Disp: 60 tablet, Rfl: 0    co-enzyme Q-10 50 MG capsule, Take 100 mg by mouth daily, Disp: , Rfl:     fosinopril (MONOPRIL) 20 mg tablet, Take 20 mg by mouth daily, Disp: , Rfl:     magnesium gluconate (MAGONATE) 500 mg tablet, Take 500 mg by mouth daily, Disp: , Rfl:     mometasone (NASONEX) 50 mcg/act nasal spray, 2 sprays into each nostril daily, Disp: , Rfl:     Multiple Vitamin (MULTIVITAMIN) tablet, Take 1 tablet by mouth daily, Disp: , Rfl:     sertraline (ZOLOFT) 50 mg tablet, Take 75 mg by mouth daily  , Disp: , Rfl:     simvastatin (ZOCOR) 20 mg tablet, Take 20 mg by mouth daily at bedtime, Disp: , Rfl:     co-enzyme Q-10 50 MG capsule, Take 200 mg by mouth daily, Disp: , Rfl:     famotidine (PEPCID) 20 mg tablet, Take 20 mg by mouth 2 (two) times a day, Disp: , Rfl:     Green Tea, Ni sinensis, (GREEN TEA PO), Take by mouth, Disp: , Rfl:     magnesium gluconate (MAGONATE) 500 mg tablet, Take 500 mg by mouth once , Disp: , Rfl:     TURMERIC CURCUMIN PO, Take by mouth, Disp: , Rfl:     Allergies   Allergen Reactions    Sulfa Antibiotics        Oncology History   chronic lymphocytic leukemia, diagnosed in August 2017   CLL has mixed good and bad prognostic features, 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, IgVH mutation, lack of CD38 expression, deletion of 13 q14 in 6% and no 11 q deletion        There was no trisomy 12  Lymphocytes were CD5 and CD23 positive, dim kappa expression and moderate CD20 expression had splenomegaly on CT scan but not on palpation     CLL (chronic lymphocytic leukemia) ([facility])   3/27/2018 Initial Diagnosis    CLL (chronic lymphocytic leukemia) ([facility])         ROS:  09/25/20 Reviewed 13 systems: See symptoms in HPI[de-identified]  Tiredness and arthritic symptoms  Presently no headaches, seizures, dizziness, diplopia, dysphagia, hoarseness, chest pain, palpitations, shortness of breath, cough, hemoptysis, abdominal pain, nausea, vomiting, change in bowel habits, melena, hematuria, fever, chills, bleeding, bone pains, skin rash, weight loss,   weakness, numbness,  claudication and gait problem  No frequent infections  Not unusually sensitive to heat or cold  No swelling of the ankles  No swollen glands  Patient is anxious  /80 (BP Location: Left arm, Patient Position: Sitting, Cuff Size: Adult)   Pulse 74   Temp (!) 97 3 °F (36 3 °C)   Resp 18   Ht 5' 10" (1 778 m)   Wt 95 3 kg (210 lb)   SpO2 98%   BMI 30 13 kg/m²     Physical Exam:  Alert, oriented, not in distress, no icterus, no oral thrush, no palpable neck mass, clear lung fields, regular heart rate, abdomen  soft and non tender, no palpable abdominal mass, no ascites, no edema of ankles, no calf tenderness, no focal neurological deficit, no skin rash, no palpable lymphadenopathy, good arterial pulses, no clubbing  Patient is anxious  Performance status 0      IMAGING:      LABS:  Results for orders placed or performed in visit on 09/14/20   CBC and differential   Result Value Ref Range     54 (HH) 4 31 - 10 16 Thousand/uL    RBC 5 06 3 88 - 5 62 Million/uL    Hemoglobin 13 6 12 0 - 17 0 g/dL    Hematocrit 46 5 36 5 - 49 3 %    MCV 92 82 - 98 fL    MCH 26 9 26 8 - 34 3 pg    MCHC 29 2 (L) 31 4 - 37 4 g/dL    RDW 15 5 (H) 11 6 - 15 1 %    MPV 10 7 8 9 - 12 7 fL    Platelets 621 (L) 248 - 390 Thousands/uL    nRBC 0 /100 WBCs   Comprehensive metabolic panel   Result Value Ref Range    Sodium 144 136 - 145 mmol/L    Potassium 4 7 3 5 - 5 3 mmol/L    Chloride 107 100 - 108 mmol/L    CO2 28 21 - 32 mmol/L    ANION GAP 9 4 - 13 mmol/L    BUN 28 (H) 5 - 25 mg/dL    Creatinine 1 10 0 60 - 1 30 mg/dL    Glucose, Fasting 101 (H) 65 - 99 mg/dL    Calcium 8 4 8 3 - 10 1 mg/dL    AST 12 5 - 45 U/L    ALT 17 12 - 78 U/L    Alkaline Phosphatase 68 46 - 116 U/L    Total Protein 6 2 (L) 6 4 - 8 2 g/dL    Albumin 3 4 (L) 3 5 - 5 0 g/dL    Total Bilirubin 0 36 0 20 - 1 00 mg/dL    eGFR 66 ml/min/1 73sq m   LD,Blood   Result Value Ref Range     81 - 234 U/L   Uric acid   Result Value Ref Range    Uric Acid 3 8 (L) 4 2 - 8 0 mg/dL   Manual Differential(PHLEBS Do Not Order)   Result Value Ref Range    Segmented % 2 (L) 43 - 75 %    Lymphocytes % 79 (H) 14 - 44 %    Monocytes % 0 (L) 4 - 12 %    Eosinophils, % 0 0 - 6 %    Basophils % 0 0 - 1 %    Atypical Lymphocytes % 19 (H) <=0 %    Absolute Neutrophils 2 19 1 85 - 7 62 Thousand/uL    Lymphocytes Absolute 86 54 (H) 0 60 - 4 47 Thousand/uL    Monocytes Absolute 0 00 0 00 - 1 22 Thousand/uL    Eosinophils Absolute 0 00 0 00 - 0 40 Thousand/uL    Basophils Absolute 0 00 0 00 - 0 10 Thousand/uL    Total Counted 100     RBC Morphology Normal     Platelet Estimate Borderline (A) Adequate     Labs, Imaging, & Other studies:   All pertinent labs and imaging studies were personally reviewed    Lab Results   Component Value Date    K 4 7 09/14/2020     09/14/2020    CO2 28 09/14/2020    BUN 28 (H) 09/14/2020    CREATININE 1 10 09/14/2020    GLUF 101 (H) 09/14/2020    CALCIUM 8 4 09/14/2020    AST 12 09/14/2020    ALT 17 09/14/2020    ALKPHOS 68 09/14/2020    EGFR 66 09/14/2020     Lab Results   Component Value Date     54 (HH) 09/14/2020    HGB 13 6 09/14/2020    HCT 46 5 09/14/2020    MCV 92 09/14/2020     (L) 09/14/2020     Component      Latest Ref Rng & Units 6/17/2020 6/24/2020 7/1/2020 7/8/2020                WBC      4 31 - 10 16 Thousand/uL 240 92 (HH) 212 22 (HH) 209 17 (HH) 197 11 (HH)   Red Blood Cell Count      3 88 - 5 62 Million/uL 4 76 4 73 4 78 4 92   Hemoglobin      12 0 - 17 0 g/dL 11 9 (L) 11 7 (L) 12 7 13 0   HCT      36 5 - 49 3 % 44 7 44 8 44 9 46 2   MCV      82 - 98 fL 94 95 94 94   MCH      26 8 - 34 3 pg 25 0 (L) 24 7 (L) 26 6 (L) 26 4 (L)   MCHC      31 4 - 37 4 g/dL 26 6 (L) 26 1 (L) 28 3 (L) 28 1 (L)   RDW      11 6 - 15 1 % 16 7 (H) 17 6 (H) 16 5 (H) 17 5 (H)   MPV      8 9 - 12 7 fL 10 0 10 2 10 6 10 7   Platelet Count      711 - 390 Thousands/uL 87 (L) 101 (L) 93 (L) 95 (L)   nRBC      /100 WBCs 0 0 0 0     Component      Latest Ref Rng & Units 7/15/2020 8/14/2020 9/14/2020               WBC      4 31 - 10 16 Thousand/uL 177 24 (HH) 134 78 (HH) 109 54 (HH)   Red Blood Cell Count      3 88 - 5 62 Million/uL 4 92 5 01 5 06   Hemoglobin      12 0 - 17 0 g/dL 12 9 13 4 13 6   HCT      36 5 - 49 3 % 46 2 46 1 46 5   MCV      82 - 98 fL 94 92 92   MCH      26 8 - 34 3 pg 26 2 (L) 26 7 (L) 26 9   MCHC      31 4 - 37 4 g/dL 27 9 (L) 29 1 (L) 29 2 (L)   RDW      11 6 - 15 1 % 17 4 (H) 16 5 (H) 15 5 (H)   MPV      8 9 - 12 7 fL 10 0 10 3 10 7   Platelet Count      628 - 390 Thousands/uL 100 (L) 103 (L) 121 (L)   nRBC      /100 WBCs 0 0 0     Reviewed and discussed with patient  Assessment and plan:  Patient is here with his wife  Patient is on acalabrutinib since June 2020 for 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,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, a mixture of unfavorable and favorable features  Patient has been tolerating acalabrutinib without symptoms secondary to acalabrutinib patient is not on anti CD 20 antibody in addition to acalabrutinib    Occasionally he has  some tiredness and minor arthritic symptoms   He has been under surveillance    Spleen size was 16 cm the last time   Physical examination and test results are as recorded and discussed     Blood counts are improving  Patient is responding to acalabrutinib  No change in therapy  Condition discussed and explained  He does not have cardiac history  He is not taking aspirin, NSAIDs and any other blood thinner  He can take Tylenol if needed  He understands the risks of atrial fibrillation and bleeding in addition to other side effects  All discussed in very much detail  Questions answered  Discussed the importance of  eating healthy foods, staying active and health screening tests   Patient is capable of self-care  Discussed precautions against Coronavirus especially his immune system is low     1  CLL (chronic lymphocytic leukemia) (HCC)    - CBC and differential; Standing  - Comprehensive metabolic panel; Standing  - LD,Blood; Standing  - CBC and differential  - Comprehensive metabolic panel  - LD,Blood            2  Hyperlipidemia, unspecified hyperlipidemia type        3  Hypertension, essential                  Patient voiced understanding and agreement in the discussion  Counseling / Coordination of Care   Greater than 50% of total time was spent with the patient and / or family counseling and / or coordination of care

## 2020-10-12 DIAGNOSIS — C91.10 CLL (CHRONIC LYMPHOCYTIC LEUKEMIA) (HCC): ICD-10-CM

## 2020-10-12 RX ORDER — ALLOPURINOL 100 MG/1
TABLET ORAL
Qty: 60 TABLET | Refills: 0 | Status: SHIPPED | OUTPATIENT
Start: 2020-10-12 | End: 2021-05-27

## 2020-10-14 ENCOUNTER — TELEPHONE (OUTPATIENT)
Dept: HEMATOLOGY ONCOLOGY | Facility: CLINIC | Age: 75
End: 2020-10-14

## 2020-10-14 ENCOUNTER — TRANSCRIBE ORDERS (OUTPATIENT)
Dept: LAB | Facility: CLINIC | Age: 75
End: 2020-10-14

## 2020-10-14 ENCOUNTER — LAB (OUTPATIENT)
Dept: LAB | Facility: CLINIC | Age: 75
End: 2020-10-14
Payer: COMMERCIAL

## 2020-10-14 DIAGNOSIS — C91.10 CLL (CHRONIC LYMPHOCYTIC LEUKEMIA) (HCC): ICD-10-CM

## 2020-10-14 LAB
ALBUMIN SERPL BCP-MCNC: 3.7 G/DL (ref 3.5–5)
ALP SERPL-CCNC: 63 U/L (ref 46–116)
ALT SERPL W P-5'-P-CCNC: 17 U/L (ref 12–78)
ANION GAP SERPL CALCULATED.3IONS-SCNC: 6 MMOL/L (ref 4–13)
AST SERPL W P-5'-P-CCNC: 15 U/L (ref 5–45)
BASOPHILS # BLD MANUAL: 0 THOUSAND/UL (ref 0–0.1)
BASOPHILS NFR MAR MANUAL: 0 % (ref 0–1)
BILIRUB SERPL-MCNC: 0.52 MG/DL (ref 0.2–1)
BUN SERPL-MCNC: 27 MG/DL (ref 5–25)
CALCIUM SERPL-MCNC: 8.8 MG/DL (ref 8.3–10.1)
CHLORIDE SERPL-SCNC: 107 MMOL/L (ref 100–108)
CO2 SERPL-SCNC: 29 MMOL/L (ref 21–32)
CREAT SERPL-MCNC: 1.07 MG/DL (ref 0.6–1.3)
EOSINOPHIL # BLD MANUAL: 0.98 THOUSAND/UL (ref 0–0.4)
EOSINOPHIL NFR BLD MANUAL: 1 % (ref 0–6)
ERYTHROCYTE [DISTWIDTH] IN BLOOD BY AUTOMATED COUNT: 16 % (ref 11.6–15.1)
GFR SERPL CREATININE-BSD FRML MDRD: 68 ML/MIN/1.73SQ M
GLUCOSE P FAST SERPL-MCNC: 97 MG/DL (ref 65–99)
HCT VFR BLD AUTO: 47.4 % (ref 36.5–49.3)
HGB BLD-MCNC: 13.8 G/DL (ref 12–17)
LDH SERPL-CCNC: 165 U/L (ref 81–234)
LYMPHOCYTES # BLD AUTO: 92.27 THOUSAND/UL (ref 0.6–4.47)
LYMPHOCYTES # BLD AUTO: 94 % (ref 14–44)
MCH RBC QN AUTO: 26.8 PG (ref 26.8–34.3)
MCHC RBC AUTO-ENTMCNC: 29.1 G/DL (ref 31.4–37.4)
MCV RBC AUTO: 92 FL (ref 82–98)
MONOCYTES # BLD AUTO: 0.98 THOUSAND/UL (ref 0–1.22)
MONOCYTES NFR BLD: 1 % (ref 4–12)
NEUTROPHILS # BLD MANUAL: 3.93 THOUSAND/UL (ref 1.85–7.62)
NEUTS SEG NFR BLD AUTO: 4 % (ref 43–75)
NRBC BLD AUTO-RTO: 0 /100 WBCS
PLATELET # BLD AUTO: 109 THOUSANDS/UL (ref 149–390)
PLATELET BLD QL SMEAR: ABNORMAL
PMV BLD AUTO: 10.6 FL (ref 8.9–12.7)
POTASSIUM SERPL-SCNC: 4.7 MMOL/L (ref 3.5–5.3)
PROT SERPL-MCNC: 6.8 G/DL (ref 6.4–8.2)
RBC # BLD AUTO: 5.15 MILLION/UL (ref 3.88–5.62)
SODIUM SERPL-SCNC: 142 MMOL/L (ref 136–145)
TOTAL CELLS COUNTED SPEC: 100
URATE SERPL-MCNC: 3.8 MG/DL (ref 4.2–8)
WBC # BLD AUTO: 98.16 THOUSAND/UL (ref 4.31–10.16)

## 2020-10-14 PROCEDURE — 84550 ASSAY OF BLOOD/URIC ACID: CPT

## 2020-10-14 PROCEDURE — 80053 COMPREHEN METABOLIC PANEL: CPT | Performed by: INTERNAL MEDICINE

## 2020-10-14 PROCEDURE — 36415 COLL VENOUS BLD VENIPUNCTURE: CPT | Performed by: INTERNAL MEDICINE

## 2020-10-14 PROCEDURE — 83615 LACTATE (LD) (LDH) ENZYME: CPT | Performed by: INTERNAL MEDICINE

## 2020-10-14 PROCEDURE — 85007 BL SMEAR W/DIFF WBC COUNT: CPT | Performed by: INTERNAL MEDICINE

## 2020-10-14 PROCEDURE — 85027 COMPLETE CBC AUTOMATED: CPT | Performed by: INTERNAL MEDICINE

## 2020-10-16 ENCOUNTER — TELEPHONE (OUTPATIENT)
Dept: HEMATOLOGY ONCOLOGY | Facility: CLINIC | Age: 75
End: 2020-10-16

## 2020-11-11 DIAGNOSIS — C91.10 CLL (CHRONIC LYMPHOCYTIC LEUKEMIA) (HCC): ICD-10-CM

## 2020-11-11 RX ORDER — ALLOPURINOL 100 MG/1
TABLET ORAL
Qty: 60 TABLET | Refills: 0 | OUTPATIENT
Start: 2020-11-11

## 2020-11-13 ENCOUNTER — TELEPHONE (OUTPATIENT)
Dept: HEMATOLOGY ONCOLOGY | Facility: CLINIC | Age: 75
End: 2020-11-13

## 2020-11-13 ENCOUNTER — LAB (OUTPATIENT)
Dept: LAB | Facility: CLINIC | Age: 75
End: 2020-11-13
Payer: COMMERCIAL

## 2020-11-20 ENCOUNTER — APPOINTMENT (OUTPATIENT)
Dept: LAB | Facility: CLINIC | Age: 75
End: 2020-11-20
Payer: COMMERCIAL

## 2020-11-20 ENCOUNTER — OFFICE VISIT (OUTPATIENT)
Dept: HEMATOLOGY ONCOLOGY | Facility: CLINIC | Age: 75
End: 2020-11-20
Payer: COMMERCIAL

## 2020-11-20 ENCOUNTER — TELEPHONE (OUTPATIENT)
Dept: HEMATOLOGY ONCOLOGY | Facility: CLINIC | Age: 75
End: 2020-11-20

## 2020-11-20 VITALS
HEART RATE: 74 BPM | DIASTOLIC BLOOD PRESSURE: 80 MMHG | HEIGHT: 70 IN | BODY MASS INDEX: 30.35 KG/M2 | RESPIRATION RATE: 18 BRPM | SYSTOLIC BLOOD PRESSURE: 140 MMHG | TEMPERATURE: 98.2 F | WEIGHT: 212 LBS

## 2020-11-20 DIAGNOSIS — C91.10 CLL (CHRONIC LYMPHOCYTIC LEUKEMIA) (HCC): ICD-10-CM

## 2020-11-20 DIAGNOSIS — C91.10 CLL (CHRONIC LYMPHOCYTIC LEUKEMIA) (HCC): Primary | ICD-10-CM

## 2020-11-20 LAB
BASOPHILS # BLD MANUAL: 0 THOUSAND/UL (ref 0–0.1)
BASOPHILS NFR MAR MANUAL: 0 % (ref 0–1)
EOSINOPHIL # BLD MANUAL: 0 THOUSAND/UL (ref 0–0.4)
EOSINOPHIL NFR BLD MANUAL: 0 % (ref 0–6)
ERYTHROCYTE [DISTWIDTH] IN BLOOD BY AUTOMATED COUNT: 16.2 % (ref 11.6–15.1)
HCT VFR BLD AUTO: 48.6 % (ref 36.5–49.3)
HGB BLD-MCNC: 14.3 G/DL (ref 12–17)
LYMPHOCYTES # BLD AUTO: 85.05 THOUSAND/UL (ref 0.6–4.47)
LYMPHOCYTES # BLD AUTO: 93 % (ref 14–44)
MCH RBC QN AUTO: 26.9 PG (ref 26.8–34.3)
MCHC RBC AUTO-ENTMCNC: 29.4 G/DL (ref 31.4–37.4)
MCV RBC AUTO: 91 FL (ref 82–98)
MONOCYTES # BLD AUTO: 0.91 THOUSAND/UL (ref 0–1.22)
MONOCYTES NFR BLD: 1 % (ref 4–12)
NEUTROPHILS # BLD MANUAL: 5.49 THOUSAND/UL (ref 1.85–7.62)
NEUTS SEG NFR BLD AUTO: 6 % (ref 43–75)
NRBC BLD AUTO-RTO: 0 /100 WBCS
PLATELET # BLD AUTO: 122 THOUSANDS/UL (ref 149–390)
PLATELET BLD QL SMEAR: ABNORMAL
PMV BLD AUTO: 10.5 FL (ref 8.9–12.7)
RBC # BLD AUTO: 5.32 MILLION/UL (ref 3.88–5.62)
TOTAL CELLS COUNTED SPEC: 100
WBC # BLD AUTO: 91.45 THOUSAND/UL (ref 4.31–10.16)

## 2020-11-20 PROCEDURE — 85027 COMPLETE CBC AUTOMATED: CPT

## 2020-11-20 PROCEDURE — 85007 BL SMEAR W/DIFF WBC COUNT: CPT

## 2020-11-20 PROCEDURE — 99214 OFFICE O/P EST MOD 30 MIN: CPT | Performed by: PHYSICIAN ASSISTANT

## 2020-11-20 PROCEDURE — 36415 COLL VENOUS BLD VENIPUNCTURE: CPT

## 2020-11-24 ENCOUNTER — TELEPHONE (OUTPATIENT)
Dept: HEMATOLOGY ONCOLOGY | Facility: CLINIC | Age: 75
End: 2020-11-24

## 2020-12-14 ENCOUNTER — APPOINTMENT (OUTPATIENT)
Dept: LAB | Facility: CLINIC | Age: 75
End: 2020-12-14
Payer: COMMERCIAL

## 2020-12-14 ENCOUNTER — TRANSCRIBE ORDERS (OUTPATIENT)
Dept: LAB | Facility: CLINIC | Age: 75
End: 2020-12-14

## 2021-01-14 ENCOUNTER — APPOINTMENT (OUTPATIENT)
Dept: LAB | Facility: CLINIC | Age: 76
End: 2021-01-14
Payer: COMMERCIAL

## 2021-01-14 ENCOUNTER — TRANSCRIBE ORDERS (OUTPATIENT)
Dept: LAB | Facility: CLINIC | Age: 76
End: 2021-01-14

## 2021-01-14 ENCOUNTER — TELEPHONE (OUTPATIENT)
Dept: HEMATOLOGY ONCOLOGY | Facility: CLINIC | Age: 76
End: 2021-01-14

## 2021-01-14 NOTE — TELEPHONE ENCOUNTER
Call received from:   Whitney Fritz     Lab location:[facility]  Date of lab draw:1/14/21  Critical value:WBC 62 74  Read back occurred:Yes  Tasked and César Sim

## 2021-01-20 DIAGNOSIS — Z23 ENCOUNTER FOR IMMUNIZATION: ICD-10-CM

## 2021-01-23 ENCOUNTER — IMMUNIZATIONS (OUTPATIENT)
Dept: FAMILY MEDICINE CLINIC | Facility: HOSPITAL | Age: 76
End: 2021-01-23

## 2021-01-23 DIAGNOSIS — Z23 ENCOUNTER FOR IMMUNIZATION: Primary | ICD-10-CM

## 2021-01-23 PROCEDURE — 91301 SARS-COV-2 / COVID-19 MRNA VACCINE (MODERNA) 100 MCG: CPT

## 2021-01-23 PROCEDURE — 0011A SARS-COV-2 / COVID-19 MRNA VACCINE (MODERNA) 100 MCG: CPT

## 2021-02-15 ENCOUNTER — APPOINTMENT (OUTPATIENT)
Dept: LAB | Facility: CLINIC | Age: 76
End: 2021-02-15
Payer: COMMERCIAL

## 2021-02-15 ENCOUNTER — TRANSCRIBE ORDERS (OUTPATIENT)
Dept: LAB | Facility: CLINIC | Age: 76
End: 2021-02-15

## 2021-02-15 ENCOUNTER — TELEPHONE (OUTPATIENT)
Dept: HEMATOLOGY ONCOLOGY | Facility: CLINIC | Age: 76
End: 2021-02-15

## 2021-02-18 ENCOUNTER — IMMUNIZATIONS (OUTPATIENT)
Dept: FAMILY MEDICINE CLINIC | Facility: HOSPITAL | Age: 76
End: 2021-02-18

## 2021-02-18 DIAGNOSIS — Z23 ENCOUNTER FOR IMMUNIZATION: Primary | ICD-10-CM

## 2021-02-18 PROCEDURE — 91301 SARS-COV-2 / COVID-19 MRNA VACCINE (MODERNA) 100 MCG: CPT

## 2021-02-18 PROCEDURE — 0012A SARS-COV-2 / COVID-19 MRNA VACCINE (MODERNA) 100 MCG: CPT

## 2021-02-25 ENCOUNTER — TELEPHONE (OUTPATIENT)
Dept: HEMATOLOGY ONCOLOGY | Facility: MEDICAL CENTER | Age: 76
End: 2021-02-25

## 2021-02-26 ENCOUNTER — OFFICE VISIT (OUTPATIENT)
Dept: HEMATOLOGY ONCOLOGY | Facility: CLINIC | Age: 76
End: 2021-02-26
Payer: COMMERCIAL

## 2021-02-26 VITALS
DIASTOLIC BLOOD PRESSURE: 72 MMHG | HEART RATE: 70 BPM | BODY MASS INDEX: 30.64 KG/M2 | HEIGHT: 70 IN | OXYGEN SATURATION: 98 % | TEMPERATURE: 97.8 F | SYSTOLIC BLOOD PRESSURE: 128 MMHG | RESPIRATION RATE: 18 BRPM | WEIGHT: 214 LBS

## 2021-02-26 DIAGNOSIS — I10 HYPERTENSION, ESSENTIAL: ICD-10-CM

## 2021-02-26 DIAGNOSIS — C91.10 CLL (CHRONIC LYMPHOCYTIC LEUKEMIA) (HCC): Primary | ICD-10-CM

## 2021-02-26 DIAGNOSIS — E78.5 HYPERLIPIDEMIA, UNSPECIFIED HYPERLIPIDEMIA TYPE: ICD-10-CM

## 2021-02-26 DIAGNOSIS — D69.6 THROMBOCYTOPENIA (HCC): ICD-10-CM

## 2021-02-26 PROCEDURE — 3078F DIAST BP <80 MM HG: CPT | Performed by: INTERNAL MEDICINE

## 2021-02-26 PROCEDURE — 3008F BODY MASS INDEX DOCD: CPT | Performed by: INTERNAL MEDICINE

## 2021-02-26 PROCEDURE — 1036F TOBACCO NON-USER: CPT | Performed by: INTERNAL MEDICINE

## 2021-02-26 PROCEDURE — 99214 OFFICE O/P EST MOD 30 MIN: CPT | Performed by: INTERNAL MEDICINE

## 2021-02-26 PROCEDURE — 1160F RVW MEDS BY RX/DR IN RCRD: CPT | Performed by: INTERNAL MEDICINE

## 2021-02-26 PROCEDURE — 3074F SYST BP LT 130 MM HG: CPT | Performed by: INTERNAL MEDICINE

## 2021-02-26 NOTE — PROGRESS NOTES
HPI:    Patient has 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 but mutated Ig VH CLL, stage IV and had low platelet count, less than 100,000 and since June 2020 he has been on acalabrutinib and has been tolerating that without much problem  No AFib  No bleeding  Has some tiredness and minor arthritic symptoms  Spleen size was 16 cm the last time                  Current Outpatient Medications:     Acalabrutinib 100 MG CAPS, Take 100 mg by mouth 2 (two) times a day, Disp: 60 capsule, Rfl: 11    co-enzyme Q-10 50 MG capsule, Take 100 mg by mouth daily, Disp: , Rfl:     fosinopril (MONOPRIL) 20 mg tablet, Take 20 mg by mouth daily, Disp: , Rfl:     magnesium gluconate (MAGONATE) 500 mg tablet, Take 500 mg by mouth daily, Disp: , Rfl:     mometasone (NASONEX) 50 mcg/act nasal spray, 2 sprays into each nostril daily, Disp: , Rfl:     Multiple Vitamin (MULTIVITAMIN) tablet, Take 1 tablet by mouth daily, Disp: , Rfl:     sertraline (ZOLOFT) 50 mg tablet, Take 75 mg by mouth daily  , Disp: , Rfl:     simvastatin (ZOCOR) 20 mg tablet, Take 20 mg by mouth daily at bedtime, Disp: , Rfl:     allopurinol (ZYLOPRIM) 100 mg tablet, Take 2 tablets by mouth daily  , Disp: 60 tablet, Rfl: 0    Allergies   Allergen Reactions    Sulfa Antibiotics        Oncology History Overview Note   chronic lymphocytic leukemia, diagnosed in August 2017  CLL has mixed good and bad prognostic features, 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, IgVH mutation, lack of CD38 expression, deletion of 13 q14 in 6% and no 11 q deletion        There was no trisomy 12  Lymphocytes were CD5 and CD23 positive, dim kappa expression and moderate CD20 expression had splenomegaly on CT scan but not on palpation     CLL [facility]chronic lymphocytic leukemia) ([facility] )   3/27/2018 Initial Diagnosis    [facility]         ROS:  02/26/21 Reviewed 13 systems: See symptoms in HPI[de-identified]   Presently no neurological, cardiac, pulmonary, GI  symptoms  Symptoms are in HPI  Bocaneg[place] Joaquim   No fever, chills, bleeding, bone pains, skin rash, weight loss,   weakness, numbness,  claudication and gait problem  No frequent infections  Not unusually sensitive to heat or cold  No swelling of the ankles  No swollen glands  Patient is not anxious     No CLL related symptoms      /72 (BP Location: Left arm, Patient Position: Sitting, Cuff Size: Adult)   Pulse 70   Temp 97 8 °F (36 6 °C)   Resp 18   Ht 5' 10" (1 778 m)   Wt 97 1 kg (214 lb)   SpO2 98%   BMI 30 71 kg/m²     Physical Exam:  Alert, oriented, not in distress, stable vital signs, no icterus, no oral thrush, no palpable neck mass, clear lung fields percussion and auscultation and heart rate is regular, abdomen  soft and non tender, no palpable abdominal mass, no ascites, no edema of ankles, no calf tenderness, no focal neurological deficit, no skin rash, no palpable lymphadenopathy, good arterial pulses, no clubbing  Patient is anxious  Performance status 0      IMAGING:      LABS:    Results for orders placed or performed in visit on 02/12/21   CBC and differential   Result Value Ref Range    WBC 54 91 (HH) 4 31 - 10 16 Thousand/uL    RBC 5 45 3 88 - 5 62 Million/uL    Hemoglobin 14 9 12 0 - 17 0 g/dL    Hematocrit 49 4 (H) 36 5 - 49 3 %    MCV 91 82 - 98 fL    MCH 27 3 26 8 - 34 3 pg    MCHC 30 2 (L) 31 4 - 37 4 g/dL    RDW 14 7 11 6 - 15 1 %    MPV 11 3 8 9 - 12 7 fL    Platelets 517 (L) 787 - 390 Thousands/uL    nRBC 0 /100 WBCs    Neutrophils Relative 11 (L) 43 - 75 %    Immat GRANS % 0 0 - 2 %    Lymphocytes Relative 88 (H) 14 - 44 %    Monocytes Relative 1 (L) 4 - 12 %    Eosinophils Relative 0 0 - 6 %    Basophils Relative 0 0 - 1 %    Neutrophils Absolute 5 82 1 85 - 7 62 Thousands/µL    Immature Grans Absolute 0 12 0 00 - 0 20 Thousand/uL    Lymphocytes Absolute 48 14 (H) 0 60 - 4 47 Thousands/µL    Monocytes Absolute 0 72 0 17 - 1 22 Thousand/µL    Eosinophils Absolute 0 08 0 00 - 0 61 Thousand/µL    Basophils Absolute 0 03 0 00 - 0 10 Thousands/µL   Comprehensive metabolic panel   Result Value Ref Range    Sodium 144 136 - 145 mmol/L    Potassium 4 3 3 5 - 5 3 mmol/L    Chloride 106 100 - 108 mmol/L    CO2 28 21 - 32 mmol/L    ANION GAP 10 4 - 13 mmol/L    BUN 22 5 - 25 mg/dL    Creatinine 1 07 0 60 - 1 30 mg/dL    Glucose, Fasting 95 65 - 99 mg/dL    Calcium 8 8 8 3 - 10 1 mg/dL    Corrected Calcium 9 3 8 3 - 10 1 mg/dL    AST 18 5 - 45 U/L    ALT 18 12 - 78 U/L    Alkaline Phosphatase 70 46 - 116 U/L    Total Protein 6 7 6 4 - 8 2 g/dL    Albumin 3 4 (L) 3 5 - 5 0 g/dL    Total Bilirubin 0 61 0 20 - 1 00 mg/dL    eGFR 68 ml/min/1 73sq m   LD,Blood   Result Value Ref Range     81 - 234 U/L     Labs, Imaging, & Other studies:   All pertinent labs and imaging studies were personally reviewed    Lab Results   Component Value Date    K 4 3 02/15/2021     02/15/2021    CO2 28 02/15/2021    BUN 22 02/15/2021    CREATININE 1 07 02/15/2021    GLUF 95 02/15/2021    CALCIUM 8 8 02/15/2021    CORRECTEDCA 9 3 02/15/2021    AST 18 02/15/2021    ALT 18 02/15/2021    ALKPHOS 70 02/15/2021    EGFR 68 02/15/2021     Lab Results   Component Value Date    WBC 54 91 (HH) 02/15/2021    HGB 14 9 02/15/2021    HCT 49 4 (H) 02/15/2021    MCV 91 02/15/2021     (L) 02/15/2021     Component      Latest Ref Rng & Units 6/17/2020 6/24/2020 7/1/2020 7/8/2020                WBC      4 31 - 10 16 Thousand/uL 240 92 () 212 22 (HH) 209 17 (HH) 197 11 (HH)   Red Blood Cell Count      3 88 - 5 62 Million/uL 4 76 4 73 4 78 4 92   Hemoglobin      12 0 - 17 0 g/dL 11 9 (L) 11 7 (L) 12 7 13 0   HCT      36 5 - 49 3 % 44 7 44 8 44 9 46 2   MCV      82 - 98 fL 94 95 94 94   MCH      26 8 - 34 3 pg 25 0 (L) 24 7 (L) 26 6 (L) 26 4 (L)   MCHC      31 4 - 37 4 g/dL 26 6 (L) 26 1 (L) 28 3 (L) 28 1 (L)   RDW      11 6 - 15 1 % 16 7 (H) 17 6 (H) 16 5 (H) 17 5 (H)   MPV      8 9 - 12 7 fL 10 0 10 2 10 6 10 7   Platelet Count      918 - 390 Thousands/uL 87 (L) 101 (L) 93 (L) 95 (L)   nRBC      /100 WBCs 0 0 0 0     Component      Latest Ref Rng & Units 7/15/2020 8/14/2020 9/14/2020               WBC      4 31 - 10 16 Thousand/uL 177 24 (HH) 134 78 (HH) 109 54 (HH)   Red Blood Cell Count      3 88 - 5 62 Million/uL 4 92 5 01 5 06   Hemoglobin      12 0 - 17 0 g/dL 12 9 13 4 13 6   HCT      36 5 - 49 3 % 46 2 46 1 46 5   MCV      82 - 98 fL 94 92 92   MCH      26 8 - 34 3 pg 26 2 (L) 26 7 (L) 26 9   MCHC      31 4 - 37 4 g/dL 27 9 (L) 29 1 (L) 29 2 (L)   RDW      11 6 - 15 1 % 17 4 (H) 16 5 (H) 15 5 (H)   MPV      8 9 - 12 7 fL 10 0 10 3 10 7   Platelet Count      591 - 390 Thousands/uL 100 (L) 103 (L) 121 (L)   nRBC      /100 WBCs 0 0 0     Reviewed CBC, CMP and LDH and discussed with patient  Assessment and plan:  Patient has 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 but mutated Ig VH CLL, stage IV and had low platelet count, less than 100,000 and since June 2020 he has been on acalabrutinib and has been tolerating that without much problem  No AFib  No bleeding  Has some tiredness and minor arthritic symptoms  Spleen size was 16 cm the last time   Physical examination and test results are as recorded and discussed  Reviewed CBC, CMP and LDH and discussed with patient    Blood counts are improving  Lymphocyte count is coming down and platelet count is going up  Patient is pleased with the results  He is  Responding to acalabrutinib  No change in therapy  Condition discussed and explained  He does not have cardiac history  He is not taking aspirin, NSAIDs and any other blood thinner  He can take Tylenol if needed  He understands the risks of atrial fibrillation and bleeding in addition to other side effects  All discussed in very much detail  Questions answered  Discussed the importance of  eating healthy foods, staying active and health screening tests   Patient is capable of self-care  Discussed precautions against Coronavirus especially his immune system is low     1   CLL (chronic lymphocytic leukemia) (HCC)    - CBC and differential; Standing  - Comprehensive metabolic panel; Standing  - LD,Blood; Standing  - IgG; Standing  - CBC and differential  - Comprehensive metabolic panel  - LD,Blood  - IgG    2  Hyperlipidemia, unspecified hyperlipidemia type      3  Hypertension, essential      4  Thrombocytopenia (HCC)                   No change in therapy, acalabrutinib  Blood tests every month  Visit in 3 months  Patient voiced understanding and agreement in the discussion  Counseling / Coordination of Care       Provided counseling and support

## 2021-03-12 ENCOUNTER — APPOINTMENT (OUTPATIENT)
Dept: LAB | Facility: CLINIC | Age: 76
End: 2021-03-12
Payer: COMMERCIAL

## 2021-03-12 ENCOUNTER — TELEPHONE (OUTPATIENT)
Dept: HEMATOLOGY ONCOLOGY | Facility: CLINIC | Age: 76
End: 2021-03-12

## 2021-03-12 LAB — IGG SERPL-MCNC: 684 MG/DL (ref 700–1600)

## 2021-03-12 PROCEDURE — 82784 ASSAY IGA/IGD/IGG/IGM EACH: CPT | Performed by: INTERNAL MEDICINE

## 2021-03-12 NOTE — TELEPHONE ENCOUNTER
Call received from:   Florian Rose     Lab location:[facility]  Date of lab draw:3/12/21  Critical value:WBC 53 32  Read back occurred:YES  Tasked and Corby Guajardo RN

## 2021-04-14 ENCOUNTER — TRANSCRIBE ORDERS (OUTPATIENT)
Dept: LAB | Facility: CLINIC | Age: 76
End: 2021-04-14

## 2021-04-14 ENCOUNTER — APPOINTMENT (OUTPATIENT)
Dept: LAB | Facility: CLINIC | Age: 76
End: 2021-04-14
Payer: COMMERCIAL

## 2021-04-14 ENCOUNTER — TELEPHONE (OUTPATIENT)
Dept: HEMATOLOGY ONCOLOGY | Facility: CLINIC | Age: 76
End: 2021-04-14

## 2021-05-07 DIAGNOSIS — C91.10 CHRONIC LYMPHOCYTIC LEUKEMIA OF B-CELL TYPE NOT HAVING ACHIEVED REMISSION (HCC): ICD-10-CM

## 2021-05-10 ENCOUNTER — DOCUMENTATION (OUTPATIENT)
Dept: HEMATOLOGY ONCOLOGY | Facility: CLINIC | Age: 76
End: 2021-05-10

## 2021-05-10 NOTE — PROGRESS NOTES
Received request for patient to have a refill on their calquence   auth is submitted and pending via covermymeds  Key: [id number]

## 2021-05-11 ENCOUNTER — TELEPHONE (OUTPATIENT)
Dept: HEMATOLOGY ONCOLOGY | Facility: CLINIC | Age: 76
End: 2021-05-11

## 2021-05-11 ENCOUNTER — TRANSCRIBE ORDERS (OUTPATIENT)
Dept: LAB | Facility: CLINIC | Age: 76
End: 2021-05-11

## 2021-05-11 ENCOUNTER — APPOINTMENT (OUTPATIENT)
Dept: LAB | Facility: CLINIC | Age: 76
End: 2021-05-11
Payer: COMMERCIAL

## 2021-05-11 DIAGNOSIS — I10 ESSENTIAL HYPERTENSION, MALIGNANT: ICD-10-CM

## 2021-05-11 DIAGNOSIS — Z13.220 SCREENING FOR LIPOID DISORDERS: ICD-10-CM

## 2021-05-11 DIAGNOSIS — R73.01 IMPAIRED FASTING GLUCOSE: ICD-10-CM

## 2021-05-11 DIAGNOSIS — R73.01 IMPAIRED FASTING GLUCOSE: Primary | ICD-10-CM

## 2021-05-11 LAB
CHOLEST SERPL-MCNC: 159 MG/DL (ref 50–200)
EST. AVERAGE GLUCOSE BLD GHB EST-MCNC: 111 MG/DL
HBA1C MFR BLD: 5.5 %
HDLC SERPL-MCNC: 41 MG/DL
LDLC SERPL CALC-MCNC: 94 MG/DL (ref 0–100)
NONHDLC SERPL-MCNC: 118 MG/DL
TRIGL SERPL-MCNC: 121 MG/DL

## 2021-05-11 PROCEDURE — 83036 HEMOGLOBIN GLYCOSYLATED A1C: CPT | Performed by: FAMILY MEDICINE

## 2021-05-11 PROCEDURE — 80061 LIPID PANEL: CPT

## 2021-05-14 ENCOUNTER — TELEPHONE (OUTPATIENT)
Dept: HEMATOLOGY ONCOLOGY | Facility: CLINIC | Age: 76
End: 2021-05-14

## 2021-05-25 ENCOUNTER — DOCUMENTATION (OUTPATIENT)
Dept: HEMATOLOGY ONCOLOGY | Facility: CLINIC | Age: 76
End: 2021-05-25

## 2021-05-25 NOTE — PROGRESS NOTES
5-25-21  Received email from Pharmacy requesting renewal of funds for acalabrutinib    Enrolled with [employer]  Member ID: [id number]  Group ID: [id number]  RxBin ID: [id number]  PCN: [id number]  Eligibility Start Date: 02/24/2021  Eligibility End Date: 05/24/2022  Assistance Amount: $[id number] 00    Epic Noted

## 2021-05-27 ENCOUNTER — OFFICE VISIT (OUTPATIENT)
Dept: HEMATOLOGY ONCOLOGY | Facility: CLINIC | Age: 76
End: 2021-05-27
Payer: COMMERCIAL

## 2021-05-27 VITALS
HEIGHT: 70 IN | TEMPERATURE: 97.6 F | BODY MASS INDEX: 30.21 KG/M2 | OXYGEN SATURATION: 95 % | WEIGHT: 211 LBS | SYSTOLIC BLOOD PRESSURE: 130 MMHG | HEART RATE: 72 BPM | DIASTOLIC BLOOD PRESSURE: 66 MMHG | RESPIRATION RATE: 16 BRPM

## 2021-05-27 DIAGNOSIS — C91.10 CLL (CHRONIC LYMPHOCYTIC LEUKEMIA) (HCC): Primary | ICD-10-CM

## 2021-05-27 PROCEDURE — 3008F BODY MASS INDEX DOCD: CPT | Performed by: PHYSICIAN ASSISTANT

## 2021-05-27 PROCEDURE — 99214 OFFICE O/P EST MOD 30 MIN: CPT | Performed by: PHYSICIAN ASSISTANT

## 2021-05-27 PROCEDURE — 1160F RVW MEDS BY RX/DR IN RCRD: CPT | Performed by: PHYSICIAN ASSISTANT

## 2021-05-27 NOTE — PROGRESS NOTES
Hematology/Oncology Outpatient Follow-up  Christiano Dhaliwal 77 y o  male 1945 [id number]    Date:  5/27/2021      Assessment and Plan:    1  CLL (chronic lymphocytic leukemia) [facility])  73-year-old male presents for follow-up regarding history of CLL  He is on acalabrutinib 100 mg p o  daily  He tolerates this very well  He denies any bruising or bleeding symptoms  He again notes that he has had improvement in his symptoms of fatigue since starting this medication  He is very happy with this medication  His white blood cells continue to decrease  These are almost normal levels  He will continue laboratory assessment  No change in therapy  Follow-up in 3 months  He has had his COVID-19 vaccine  We did discuss that due to his underlying CLL is unknown of about to the amount of antibodies that he will be able to produce however there should be some production of antibodies still  He is still practicing safe measures for prevention of COVID-19 virus    HPI:  Oncology History Overview Note   chronic lymphocytic leukemia, diagnosed in August 2017  CLL has mixed good and bad prognostic features, 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, IgVH mutation, lack of CD38 expression, deletion of 13 q14 in 6% and no 11 q deletion        There was no trisomy 12  Lymphocytes were CD5 and CD23 positive, dim kappa expression and moderate CD20 expression had splenomegaly on CT scan but not on palpation     CLL (chronic lymphocytic leukemia) ([facility] )   3/27/2018 Initial Diagnosis    CLL (chronic lymphocytic leukemia) ([facility] )       77year old male presents for follow up for history of CLL  Patient WBC was increasing and platelet count remained decreased  Patient was seen by Dr Madalyn Munoz on 5/29/20 and advised to begin treatment  Patient was started on acalabrutinib 100 mg PO daily  Since beginning treatment in June 2020 patient has had significant improvement in his energy  He feels better since being on this medication    She denies any bruising or bleeding symptoms  Interval history:   No complaints  He continues to feel well  He has no difficulty with obtaining medication at this time  ROS: Review of Systems   Constitutional: Negative for appetite change, chills, fatigue, fever and unexpected weight change  Respiratory: Negative for cough and shortness of breath  Cardiovascular: Negative for chest pain, palpitations and leg swelling  Gastrointestinal: Negative for abdominal pain, constipation, diarrhea, nausea and vomiting  Genitourinary: Negative for difficulty urinating, dysuria and hematuria  Musculoskeletal: Negative for arthralgias  Skin: Negative  Neurological: Negative for dizziness, weakness, light-headedness, numbness and headaches  Hematological: Negative  Psychiatric/Behavioral: Negative          Past Medical History:   Diagnosis Date    Anxiety     Hypertension     [facility]uk[facility] ([facility] )        Past Surgical History:   Procedure Laterality Date    APPENDECTOMY      COLONOSCOPY      TONSILLECTOMY         Social History     Socioeconomic History    Marital status: /Civil Union     Spouse name: Not on file    Number of children: Not on file    Years of education: Not on file    Highest education level: Not on file   Occupational History    Not on file   Social Needs    Financial resource strain: Not on file    Food insecurity     Worry: Not on file     Inability: Not on file   Page Mage needs     Medical: Not on file     Non-medical: Not on file   Tobacco Use    Smoking status: Never Smoker    Smokeless tobacco: Never Used   Substance and Sexual Activity    Alcohol use: Yes     Frequency: 2-4 times a month     Comment: minimal    Drug use: No    Sexual activity: Not on file   Lifestyle    Physical activity     Days per week: Not on file     Minutes per session: Not on file    Stress: Not on file   Relationships    Social connections     Talks on phone: Not on file     Gets together: Not on file Attends [employer] service: Not on file     Active member of club or organization: Not on file     Attends meetings of clubs or organizations: Not on file     Relationship status: Not on file    Intimate partner violence     Fear of current or ex partner: Not on file     Emotionally abused: Not on file     Physically abused: Not on file     Forced sexual activity: Not on file   Other Topics Concern    Not on file   Social History Narrative    Not on file       Family History   Problem Relation Age of Onset    No Known Problems Mother     No Known Problems Father        Allergies   Allergen Reactions    Sulfa Antibiotics          Current Outpatient Medications:     Acalabrutinib (Calquence) 100 MG CAPS, Take 1 capsule by mouth 2 times a day , Disp: 60 capsule, Rfl: 10    co-enzyme Q-10 50 MG capsule, Take 100 mg by mouth daily, Disp: , Rfl:     fosinopril (MONOPRIL) 20 mg tablet, Take 20 mg by mouth daily, Disp: , Rfl:     magnesium gluconate (MAGONATE) 500 mg tablet, Take 500 mg by mouth daily, Disp: , Rfl:     mometasone (NASONEX) 50 mcg/act nasal spray, 2 sprays into each nostril daily, Disp: , Rfl:     Multiple Vitamin (MULTIVITAMIN) tablet, Take 1 tablet by mouth daily, Disp: , Rfl:     sertraline (ZOLOFT) 50 mg tablet, Take 75 mg by mouth daily  , Disp: , Rfl:     simvastatin (ZOCOR) 20 mg tablet, Take 20 mg by mouth daily at bedtime, Disp: , Rfl:       Physical Exam:  /66 (BP Location: Left arm, Patient Position: Sitting, Cuff Size: Large)   Pulse 72   Temp 97 6 °F (36 4 °C) (Temporal)   Resp 16   Ht 5' 10" (1 778 m)   Wt 95 7 kg (211 lb)   SpO2 95%   BMI 30 28 kg/m²     Physical Exam  Vitals signs reviewed  Constitutional:       General: He is not in acute distress  Appearance: He is well-developed  He is not ill-appearing  HENT:      Head: Normocephalic and atraumatic  Eyes:      General: No scleral icterus       Conjunctiva/sclera: Conjunctivae normal    Neck: Musculoskeletal: Normal range of motion and neck supple  Cardiovascular:      Rate and Rhythm: Normal rate and regular rhythm  Heart sounds: Normal heart sounds  No murmur  Pulmonary:      Effort: Pulmonary effort is normal  No respiratory distress  Breath sounds: Normal breath sounds  Abdominal:      Palpations: Abdomen is soft  Tenderness: There is no abdominal tenderness  Musculoskeletal: Normal range of motion  General: No tenderness  Right lower leg: No edema  Left lower leg: No edema  Lymphadenopathy:      Cervical: No cervical adenopathy  Skin:     General: Skin is warm and dry  Neurological:      Mental Status: He is alert and oriented to person, place, and time  Cranial Nerves: No cranial nerve deficit  Psychiatric:         Mood and Affect: Mood normal          Behavior: Behavior normal          Labs:  Lab Results   Component Value Date    WBC 34 20 (HH) 05/11/2021    HGB 14 9 05/11/2021    HCT 48 8 05/11/2021    MCV 90 05/11/2021     (L) 05/11/2021     Lab Results   Component Value Date    K 4 8 05/11/2021     05/11/2021    CO2 28 05/11/2021    BUN 25 05/11/2021    CREATININE 1 13 05/11/2021    GLUF 97 05/11/2021    CALCIUM 8 5 05/11/2021    CORRECTEDCA 9 3 02/15/2021    AST 15 05/11/2021    ALT 26 05/11/2021    ALKPHOS 78 05/11/2021    EGFR 64 05/11/2021       Patient voiced understanding and agreement in the above discussion  Aware to contact our office with questions/symptoms in the interim  This note has been generated by voice recognition software system  Therefore, there may be spelling, grammar, and or syntax errors  Please contact if questions arise

## 2021-06-14 ENCOUNTER — APPOINTMENT (OUTPATIENT)
Dept: LAB | Facility: CLINIC | Age: 76
End: 2021-06-14
Payer: COMMERCIAL

## 2021-06-14 ENCOUNTER — TELEPHONE (OUTPATIENT)
Dept: HEMATOLOGY ONCOLOGY | Facility: CLINIC | Age: 76
End: 2021-06-14

## 2021-06-14 ENCOUNTER — TRANSCRIBE ORDERS (OUTPATIENT)
Dept: LAB | Facility: CLINIC | Age: 76
End: 2021-06-14

## 2021-06-14 NOTE — TELEPHONE ENCOUNTER
Critical Results   Call Received From  [facility] Department Location  [place]   Lab Study WBC 32 15   Date Blood Work was Done 06/14/2021   Read Back of Information Done yes   Relevant Information

## 2021-06-24 ENCOUNTER — TELEPHONE (OUTPATIENT)
Dept: HEMATOLOGY ONCOLOGY | Facility: CLINIC | Age: 76
End: 2021-06-24

## 2021-06-24 NOTE — TELEPHONE ENCOUNTER
Left  for pt informing him that his his appt on 8 27 21 has changed to [place]  Asked him to call back if that appt doesn't work for him

## 2021-07-02 ENCOUNTER — HOSPITAL ENCOUNTER (INPATIENT)
Facility: HOSPITAL | Age: 76
LOS: 2 days | Discharge: HOME | DRG: 247 | End: 2021-07-04
Attending: EMERGENCY MEDICINE | Admitting: INTERNAL MEDICINE
Payer: COMMERCIAL

## 2021-07-02 ENCOUNTER — APPOINTMENT (EMERGENCY)
Dept: RADIOLOGY | Facility: HOSPITAL | Age: 76
DRG: 247 | End: 2021-07-02
Payer: COMMERCIAL

## 2021-07-02 ENCOUNTER — APPOINTMENT (EMERGENCY)
Dept: NON INVASIVE DIAGNOSTICS | Facility: HOSPITAL | Age: 76
DRG: 247 | End: 2021-07-02
Payer: COMMERCIAL

## 2021-07-02 DIAGNOSIS — I21.3 STEMI (ST ELEVATION MYOCARDIAL INFARCTION) (HCC): Primary | ICD-10-CM

## 2021-07-02 DIAGNOSIS — E78.5 HYPERLIPIDEMIA, UNSPECIFIED HYPERLIPIDEMIA TYPE: ICD-10-CM

## 2021-07-02 DIAGNOSIS — I25.10 CAD (CORONARY ARTERY DISEASE): ICD-10-CM

## 2021-07-02 PROBLEM — I21.02 ACUTE ST ELEVATION MYOCARDIAL INFARCTION (STEMI) DUE TO OCCLUSION OF DISTAL PORTION OF LEFT ANTERIOR DESCENDING (LAD) CORONARY ARTERY (HCC): Status: ACTIVE | Noted: 2021-07-02

## 2021-07-02 PROBLEM — C91.10 CLL (CHRONIC LYMPHOCYTIC LEUKEMIA) (HCC): Chronic | Status: ACTIVE | Noted: 2018-03-27

## 2021-07-02 PROBLEM — I10 HYPERTENSION, ESSENTIAL: Chronic | Status: ACTIVE | Noted: 2017-08-05

## 2021-07-02 LAB
ANION GAP SERPL CALCULATED.3IONS-SCNC: 10 MMOL/L (ref 4–13)
APTT PPP: 24 SECONDS (ref 23–37)
ATRIAL RATE: 60 BPM
ATRIAL RATE: 61 BPM
ATRIAL RATE: 62 BPM
ATRIAL RATE: 66 BPM
ATRIAL RATE: 71 BPM
BASOPHILS # BLD MANUAL: 0 THOUSAND/UL (ref 0–0.1)
BASOPHILS NFR MAR MANUAL: 0 % (ref 0–1)
BUN SERPL-MCNC: 27 MG/DL (ref 5–25)
CALCIUM SERPL-MCNC: 9.1 MG/DL (ref 8.3–10.1)
CHLORIDE SERPL-SCNC: 106 MMOL/L (ref 100–108)
CHOLEST SERPL-MCNC: 156 MG/DL (ref 50–200)
CO2 SERPL-SCNC: 24 MMOL/L (ref 21–32)
CREAT SERPL-MCNC: 1.34 MG/DL (ref 0.6–1.3)
EOSINOPHIL # BLD MANUAL: 0 THOUSAND/UL (ref 0–0.4)
EOSINOPHIL NFR BLD MANUAL: 0 % (ref 0–6)
ERYTHROCYTE [DISTWIDTH] IN BLOOD BY AUTOMATED COUNT: 14.6 % (ref 11.6–15.1)
GFR SERPL CREATININE-BSD FRML MDRD: 51 ML/MIN/1.73SQ M
GLUCOSE SERPL-MCNC: 137 MG/DL (ref 65–140)
HCT VFR BLD AUTO: 48.4 % (ref 36.5–49.3)
HDLC SERPL-MCNC: 41 MG/DL
HGB BLD-MCNC: 14.9 G/DL (ref 12–17)
HYPERCHROMIA BLD QL SMEAR: PRESENT
INR PPP: 1 (ref 0.84–1.19)
KCT BLD-ACNC: 318 SEC (ref 89–137)
LDLC SERPL CALC-MCNC: 80 MG/DL (ref 0–100)
LYMPHOCYTES # BLD AUTO: 32.94 THOUSAND/UL (ref 0.6–4.47)
LYMPHOCYTES # BLD AUTO: 80 % (ref 14–44)
MAGNESIUM SERPL-MCNC: 1.8 MG/DL (ref 1.6–2.6)
MCH RBC QN AUTO: 27.7 PG (ref 26.8–34.3)
MCHC RBC AUTO-ENTMCNC: 30.8 G/DL (ref 31.4–37.4)
MCV RBC AUTO: 90 FL (ref 82–98)
MONOCYTES # BLD AUTO: 0.82 THOUSAND/UL (ref 0–1.22)
MONOCYTES NFR BLD: 2 % (ref 4–12)
NEUTROPHILS # BLD MANUAL: 7.41 THOUSAND/UL (ref 1.85–7.62)
NEUTS SEG NFR BLD AUTO: 18 % (ref 43–75)
NONHDLC SERPL-MCNC: 115 MG/DL
NRBC BLD AUTO-RTO: 0 /100 WBCS
P AXIS: 58 DEGREES
P AXIS: 64 DEGREES
P AXIS: 65 DEGREES
P AXIS: 69 DEGREES
PLATELET # BLD AUTO: 154 THOUSANDS/UL (ref 149–390)
PLATELET BLD QL SMEAR: ADEQUATE
PMV BLD AUTO: 10.8 FL (ref 8.9–12.7)
POTASSIUM SERPL-SCNC: 4.8 MMOL/L (ref 3.5–5.3)
PR INTERVAL: 158 MS
PR INTERVAL: 164 MS
PR INTERVAL: 164 MS
PR INTERVAL: 172 MS
PROTHROMBIN TIME: 13.3 SECONDS (ref 11.6–14.5)
QRS AXIS: 19 DEGREES
QRS AXIS: 33 DEGREES
QRS AXIS: 49 DEGREES
QRS AXIS: 59 DEGREES
QRS AXIS: 66 DEGREES
QRSD INTERVAL: 78 MS
QRSD INTERVAL: 80 MS
QRSD INTERVAL: 82 MS
QRSD INTERVAL: 82 MS
QRSD INTERVAL: 86 MS
QT INTERVAL: 392 MS
QT INTERVAL: 406 MS
QT INTERVAL: 420 MS
QT INTERVAL: 422 MS
QT INTERVAL: 422 MS
QTC INTERVAL: 413 MS
QTC INTERVAL: 417 MS
QTC INTERVAL: 419 MS
QTC INTERVAL: 424 MS
QTC INTERVAL: 428 MS
RBC # BLD AUTO: 5.37 MILLION/UL (ref 3.88–5.62)
SODIUM SERPL-SCNC: 140 MMOL/L (ref 136–145)
SPECIMEN SOURCE: ABNORMAL
T WAVE AXIS: 4 DEGREES
T WAVE AXIS: 71 DEGREES
T WAVE AXIS: 75 DEGREES
T WAVE AXIS: 77 DEGREES
T WAVE AXIS: 9 DEGREES
TOTAL CELLS COUNTED SPEC: 100
TRIGL SERPL-MCNC: 173 MG/DL
TROPONIN I SERPL-MCNC: 0.03 NG/ML
TROPONIN I SERPL-MCNC: 1.08 NG/ML
TROPONIN I SERPL-MCNC: 7.16 NG/ML
VENTRICULAR RATE: 58 BPM
VENTRICULAR RATE: 61 BPM
VENTRICULAR RATE: 62 BPM
VENTRICULAR RATE: 64 BPM
VENTRICULAR RATE: 68 BPM
WBC # BLD AUTO: 41.18 THOUSAND/UL (ref 4.31–10.16)

## 2021-07-02 PROCEDURE — C1725 CATH, TRANSLUMIN NON-LASER: HCPCS | Performed by: EMERGENCY MEDICINE

## 2021-07-02 PROCEDURE — 93010 ELECTROCARDIOGRAM REPORT: CPT | Performed by: INTERNAL MEDICINE

## 2021-07-02 PROCEDURE — B2111ZZ FLUOROSCOPY OF MULTIPLE CORONARY ARTERIES USING LOW OSMOLAR CONTRAST: ICD-10-PCS | Performed by: INTERNAL MEDICINE

## 2021-07-02 PROCEDURE — 96374 THER/PROPH/DIAG INJ IV PUSH: CPT

## 2021-07-02 PROCEDURE — C1887 CATHETER, GUIDING: HCPCS | Performed by: EMERGENCY MEDICINE

## 2021-07-02 PROCEDURE — 99153 MOD SED SAME PHYS/QHP EA: CPT | Performed by: EMERGENCY MEDICINE

## 2021-07-02 PROCEDURE — 85007 BL SMEAR W/DIFF WBC COUNT: CPT | Performed by: EMERGENCY MEDICINE

## 2021-07-02 PROCEDURE — 93454 CORONARY ARTERY ANGIO S&I: CPT | Performed by: INTERNAL MEDICINE

## 2021-07-02 PROCEDURE — 99152 MOD SED SAME PHYS/QHP 5/>YRS: CPT | Performed by: INTERNAL MEDICINE

## 2021-07-02 PROCEDURE — 99152 MOD SED SAME PHYS/QHP 5/>YRS: CPT | Performed by: EMERGENCY MEDICINE

## 2021-07-02 PROCEDURE — 93005 ELECTROCARDIOGRAM TRACING: CPT

## 2021-07-02 PROCEDURE — C1769 GUIDE WIRE: HCPCS | Performed by: EMERGENCY MEDICINE

## 2021-07-02 PROCEDURE — 92978 ENDOLUMINL IVUS OCT C 1ST: CPT | Performed by: INTERNAL MEDICINE

## 2021-07-02 PROCEDURE — 84484 ASSAY OF TROPONIN QUANT: CPT | Performed by: EMERGENCY MEDICINE

## 2021-07-02 PROCEDURE — 93454 CORONARY ARTERY ANGIO S&I: CPT | Performed by: EMERGENCY MEDICINE

## 2021-07-02 PROCEDURE — 85347 COAGULATION TIME ACTIVATED: CPT

## 2021-07-02 PROCEDURE — 027034Z DILATION OF CORONARY ARTERY, ONE ARTERY WITH DRUG-ELUTING INTRALUMINAL DEVICE, PERCUTANEOUS APPROACH: ICD-10-PCS | Performed by: INTERNAL MEDICINE

## 2021-07-02 PROCEDURE — C1874 STENT, COATED/COV W/DEL SYS: HCPCS

## 2021-07-02 PROCEDURE — C1894 INTRO/SHEATH, NON-LASER: HCPCS | Performed by: EMERGENCY MEDICINE

## 2021-07-02 PROCEDURE — 80061 LIPID PANEL: CPT | Performed by: EMERGENCY MEDICINE

## 2021-07-02 PROCEDURE — 99285 EMERGENCY DEPT VISIT HI MDM: CPT

## 2021-07-02 PROCEDURE — 85027 COMPLETE CBC AUTOMATED: CPT | Performed by: EMERGENCY MEDICINE

## 2021-07-02 PROCEDURE — 84484 ASSAY OF TROPONIN QUANT: CPT | Performed by: NURSE PRACTITIONER

## 2021-07-02 PROCEDURE — NC001 PR NO CHARGE: Performed by: NURSE PRACTITIONER

## 2021-07-02 PROCEDURE — 85610 PROTHROMBIN TIME: CPT | Performed by: EMERGENCY MEDICINE

## 2021-07-02 PROCEDURE — C1753 CATH, INTRAVAS ULTRASOUND: HCPCS | Performed by: EMERGENCY MEDICINE

## 2021-07-02 PROCEDURE — 92978 ENDOLUMINL IVUS OCT C 1ST: CPT | Performed by: EMERGENCY MEDICINE

## 2021-07-02 PROCEDURE — 71045 X-RAY EXAM CHEST 1 VIEW: CPT

## 2021-07-02 PROCEDURE — 99285 EMERGENCY DEPT VISIT HI MDM: CPT | Performed by: EMERGENCY MEDICINE

## 2021-07-02 PROCEDURE — 92941 PRQ TRLML REVSC TOT OCCL AMI: CPT | Performed by: INTERNAL MEDICINE

## 2021-07-02 PROCEDURE — 83735 ASSAY OF MAGNESIUM: CPT | Performed by: EMERGENCY MEDICINE

## 2021-07-02 PROCEDURE — 85730 THROMBOPLASTIN TIME PARTIAL: CPT | Performed by: EMERGENCY MEDICINE

## 2021-07-02 PROCEDURE — 36415 COLL VENOUS BLD VENIPUNCTURE: CPT | Performed by: EMERGENCY MEDICINE

## 2021-07-02 PROCEDURE — C9606 PERC D-E COR REVASC W AMI S: HCPCS | Performed by: EMERGENCY MEDICINE

## 2021-07-02 PROCEDURE — 80048 BASIC METABOLIC PNL TOTAL CA: CPT | Performed by: EMERGENCY MEDICINE

## 2021-07-02 RX ORDER — VERAPAMIL HCL 2.5 MG/ML
AMPUL (ML) INTRAVENOUS CODE/TRAUMA/SEDATION MEDICATION
Status: COMPLETED | OUTPATIENT
Start: 2021-07-02 | End: 2021-07-02

## 2021-07-02 RX ORDER — FENTANYL CITRATE 50 UG/ML
50 INJECTION, SOLUTION INTRAMUSCULAR; INTRAVENOUS ONCE
Status: DISCONTINUED | OUTPATIENT
Start: 2021-07-02 | End: 2021-07-02

## 2021-07-02 RX ORDER — MIDAZOLAM HYDROCHLORIDE 2 MG/2ML
INJECTION, SOLUTION INTRAMUSCULAR; INTRAVENOUS CODE/TRAUMA/SEDATION MEDICATION
Status: COMPLETED | OUTPATIENT
Start: 2021-07-02 | End: 2021-07-02

## 2021-07-02 RX ORDER — ACETAMINOPHEN 325 MG/1
650 TABLET ORAL EVERY 4 HOURS PRN
Status: DISCONTINUED | OUTPATIENT
Start: 2021-07-02 | End: 2021-07-04 | Stop reason: HOSPADM

## 2021-07-02 RX ORDER — LIDOCAINE HYDROCHLORIDE 10 MG/ML
INJECTION, SOLUTION EPIDURAL; INFILTRATION; INTRACAUDAL; PERINEURAL CODE/TRAUMA/SEDATION MEDICATION
Status: COMPLETED | OUTPATIENT
Start: 2021-07-02 | End: 2021-07-02

## 2021-07-02 RX ORDER — CHOLECALCIFEROL (VITAMIN D3) 125 MCG
100 CAPSULE ORAL DAILY
Status: DISCONTINUED | OUTPATIENT
Start: 2021-07-03 | End: 2021-07-04 | Stop reason: HOSPADM

## 2021-07-02 RX ORDER — ASPIRIN 81 MG/1
81 TABLET, CHEWABLE ORAL DAILY
Status: DISCONTINUED | OUTPATIENT
Start: 2021-07-03 | End: 2021-07-04 | Stop reason: HOSPADM

## 2021-07-02 RX ORDER — NITROGLYCERIN 20 MG/100ML
INJECTION INTRAVENOUS CODE/TRAUMA/SEDATION MEDICATION
Status: COMPLETED | OUTPATIENT
Start: 2021-07-02 | End: 2021-07-02

## 2021-07-02 RX ORDER — FENTANYL CITRATE 50 UG/ML
INJECTION, SOLUTION INTRAMUSCULAR; INTRAVENOUS CODE/TRAUMA/SEDATION MEDICATION
Status: COMPLETED | OUTPATIENT
Start: 2021-07-02 | End: 2021-07-02

## 2021-07-02 RX ORDER — SODIUM CHLORIDE 9 MG/ML
3 INJECTION INTRAVENOUS
Status: DISCONTINUED | OUTPATIENT
Start: 2021-07-02 | End: 2021-07-04 | Stop reason: HOSPADM

## 2021-07-02 RX ORDER — HEPARIN SODIUM 1000 [USP'U]/ML
4000 INJECTION, SOLUTION INTRAVENOUS; SUBCUTANEOUS ONCE
Status: COMPLETED | OUTPATIENT
Start: 2021-07-02 | End: 2021-07-02

## 2021-07-02 RX ORDER — ASPIRIN 81 MG/1
324 TABLET, CHEWABLE ORAL ONCE
Status: COMPLETED | OUTPATIENT
Start: 2021-07-02 | End: 2021-07-02

## 2021-07-02 RX ORDER — HEPARIN SODIUM 1000 [USP'U]/ML
INJECTION, SOLUTION INTRAVENOUS; SUBCUTANEOUS CODE/TRAUMA/SEDATION MEDICATION
Status: COMPLETED | OUTPATIENT
Start: 2021-07-02 | End: 2021-07-02

## 2021-07-02 RX ORDER — ATORVASTATIN CALCIUM 40 MG/1
40 TABLET, FILM COATED ORAL
Status: DISCONTINUED | OUTPATIENT
Start: 2021-07-02 | End: 2021-07-04 | Stop reason: HOSPADM

## 2021-07-02 RX ORDER — SODIUM CHLORIDE 9 MG/ML
100 INJECTION, SOLUTION INTRAVENOUS CONTINUOUS
Status: DISPENSED | OUTPATIENT
Start: 2021-07-02 | End: 2021-07-03

## 2021-07-02 RX ADMIN — TICAGRELOR 180 MG: 90 TABLET ORAL at 12:13

## 2021-07-02 RX ADMIN — LIDOCAINE HYDROCHLORIDE 0.1 ML: 10 INJECTION, SOLUTION EPIDURAL; INFILTRATION; INTRACAUDAL; PERINEURAL at 12:49

## 2021-07-02 RX ADMIN — TICAGRELOR 90 MG: 90 TABLET ORAL at 21:23

## 2021-07-02 RX ADMIN — NITROGLYCERIN 200 MCG: 20 INJECTION INTRAVENOUS at 12:49

## 2021-07-02 RX ADMIN — FENTANYL CITRATE 50 MCG: 50 INJECTION, SOLUTION INTRAMUSCULAR; INTRAVENOUS at 12:47

## 2021-07-02 RX ADMIN — HEPARIN SODIUM 5000 UNITS: 1000 INJECTION INTRAVENOUS; SUBCUTANEOUS at 12:57

## 2021-07-02 RX ADMIN — SODIUM CHLORIDE 100 ML/HR: 0.9 INJECTION, SOLUTION INTRAVENOUS at 15:53

## 2021-07-02 RX ADMIN — VERAPAMIL HYDROCHLORIDE 1.25 MG: 2.5 INJECTION, SOLUTION INTRAVENOUS at 12:50

## 2021-07-02 RX ADMIN — HEPARIN SODIUM 4000 UNITS: 1000 INJECTION INTRAVENOUS; SUBCUTANEOUS at 12:12

## 2021-07-02 RX ADMIN — IOHEXOL 120 ML: 350 INJECTION, SOLUTION INTRAVENOUS at 13:33

## 2021-07-02 RX ADMIN — ASPIRIN 81 MG CHEWABLE TABLET 324 MG: 81 TABLET CHEWABLE at 12:12

## 2021-07-02 RX ADMIN — NITROGLYCERIN 400 MCG: 20 INJECTION INTRAVENOUS at 13:13

## 2021-07-02 RX ADMIN — ATORVASTATIN CALCIUM 40 MG: 40 TABLET, FILM COATED ORAL at 16:18

## 2021-07-02 RX ADMIN — MIDAZOLAM HYDROCHLORIDE 2 MG: 1 INJECTION, SOLUTION INTRAMUSCULAR; INTRAVENOUS at 12:48

## 2021-07-02 NOTE — ED PROVIDER NOTES
History  No chief complaint on file  History provided by:  Patient   used: No      Patient is a 56-year-old male presenting to emergency department with chest pain that started today while working outside  Feels like burning  Has had similar pain in the past   Short of breath  Diaphoretic  History of hypertension hyperlipidemia  History of CLL  No history of CAD stents or MI  Patient refused aspirin from EMS due to being with CLL  MDM EKG done in ER concerning for inferior wall MI with elevations 2 3 and AVF and depression aVL  EKG is junctional   Concerning for AV gareth blockade  Case was discussed with interventional cardiologist and cath was activated  Patient received heparin bolus aspirin and Brilinta  Patient pain had completely resolved and did not receive fentanyl  Prior to Admission Medications   Prescriptions Last Dose Informant Patient Reported? Taking? Acalabrutinib (Calquence) 100 MG CAPS  Self No No   Sig: Take 1 capsule by mouth 2 times a day     Multiple Vitamin (MULTIVITAMIN) tablet  Self Yes No   Sig: Take 1 tablet by mouth daily   co-enzyme Q-10 50 MG capsule  Self Yes No   Sig: Take 100 mg by mouth daily   fosinopril (MONOPRIL) 20 mg tablet  Self Yes No   Sig: Take 20 mg by mouth daily   magnesium gluconate (MAGONATE) 500 mg tablet  Self Yes No   Sig: Take 500 mg by mouth daily   mometasone (NASONEX) 50 mcg/act nasal spray  Self Yes No   Si sprays into each nostril daily   sertraline (ZOLOFT) 50 mg tablet  Self Yes No   Sig: Take 75 mg by mouth daily     simvastatin (ZOCOR) 20 mg tablet  Self Yes No   Sig: Take 20 mg by mouth daily at bedtime      Facility-Administered Medications: None       Past Medical History:   Diagnosis Date    Anxiety     Hypertension     Leuk[facility] ([facility] )        Past Surgical History:   Procedure Laterality Date    APPENDECTOMY      COLONOSCOPY      TONSILLECTOMY         Family History   Problem Relation Age of Onset    No Known Problems Mother     No Known Problems Father      I have reviewed and agree with the history as documented  E-Cigarette/Vaping     E-Cigarette/Vaping Substances    Nicotine No     THC No     CBD No     Flavoring No     Other No     Unknown No      Social History     Tobacco Use    Smoking status: Never Smoker    Smokeless tobacco: Never Used   Vaping Use    Vaping Use: Never assessed   Substance Use Topics    Alcohol use: Yes     Comment: minimal    Drug use: No       Review of Systems   Constitutional: Negative for chills, diaphoresis and fever  HENT: Negative for congestion and sore throat  Respiratory: Positive for shortness of breath  Negative for cough, wheezing and stridor  Cardiovascular: Positive for chest pain  Negative for palpitations and leg swelling  Gastrointestinal: Negative for abdominal pain, blood in stool, diarrhea, nausea and vomiting  Genitourinary: Negative for dysuria, frequency and urgency  Musculoskeletal: Negative for neck stiffness  Skin: Negative for pallor and rash  Neurological: Negative for dizziness, syncope, weakness, light-headedness and headaches  All other systems reviewed and are negative  Physical Exam  Physical Exam  Vitals reviewed  Constitutional:       Appearance: He is well-developed  He is ill-appearing and diaphoretic  HENT:      Head: Normocephalic and atraumatic  Eyes:      Pupils: Pupils are equal, round, and reactive to light  Cardiovascular:      Rate and Rhythm: Normal rate and regular rhythm  Heart sounds: Normal heart sounds  Pulmonary:      Effort: Pulmonary effort is normal  No respiratory distress  Breath sounds: Normal breath sounds  Abdominal:      General: Bowel sounds are normal       Palpations: Abdomen is soft  Tenderness: There is no abdominal tenderness  Musculoskeletal:         General: Normal range of motion        Cervical back: Normal range of motion and neck supple  Skin:     General: Skin is warm  Capillary Refill: Capillary refill takes less than 2 seconds  Neurological:      General: No focal deficit present  Mental Status: He is alert and oriented to person, place, and time           Vital Signs  ED Triage Vitals   Temperature Pulse Respirations Blood Pressure SpO2   07/02/21 1217 07/02/21 1214 07/02/21 1214 07/02/21 1214 07/02/21 1214   97 9 °F (36 6 °C) 56 22 149/67 100 %      Temp Source Heart Rate Source Patient Position - Orthostatic VS BP Location FiO2 (%)   07/02/21 1217 07/02/21 1214 07/02/21 1217 -- --   Oral Monitor Lying        Pain Score       --                  Vitals:    07/02/21 1214 07/02/21 1217   BP: 149/67 134/70   Pulse: 56 58   Patient Position - Orthostatic VS:  Lying         Visual Acuity      ED Medications  Medications   sodium chloride (PF) 0 9 % injection 3 mL (has no administration in time range)   ticagrelor (BRILINTA) tablet 180 mg (180 mg Oral Given 7/2/21 1213)     And   ticagrelor (BRILINTA) tablet 90 mg (has no administration in time range)   enoxaparin (LOVENOX) subcutaneous injection 40 mg (has no administration in time range)   acetaminophen (TYLENOL) tablet 650 mg (has no administration in time range)   fentanyl citrate (PF) 100 MCG/2ML (50 mcg Intravenous Given 7/2/21 1247)   midazolam (VERSED) injection (2 mg Intravenous Given 7/2/21 1248)   lidocaine (PF) (XYLOCAINE-MPF) 1 % injection (0 1 mL Infiltration Given 7/2/21 1249)   nitroGLYcerin (TRIDIL) 50 mg in 250 mL infusion (premix) (200 mcg Intra-arterial Given 7/2/21 1249)   verapamil (ISOPTIN) injection (1 25 mg Intra-arterial Given 7/2/21 1250)   Acalabrutinib CAPS 1 capsule (has no administration in time range)   co-enzyme Q-10 capsule 100 mg (has no administration in time range)   sertraline (ZOLOFT) tablet 75 mg (has no administration in time range)   atorvastatin (LIPITOR) tablet 40 mg (has no administration in time range)   aspirin chewable tablet 81 mg (has no administration in time range)   heparin (porcine) injection 4,000 Units (4,000 Units Intravenous Given 7/2/21 1212)   aspirin chewable tablet 324 mg (324 mg Oral Given 7/2/21 1212)       Diagnostic Studies  Results Reviewed     Procedure Component Value Units Date/Time    Troponin I [[id number]]     Lab Status: No result Specimen: Blood     Troponin I [[id number]]     Lab Status: No result Specimen: Blood     Platelet count [[id number]]     Lab Status: No result Specimen: Blood     CBC and differential [[id number]]  (Abnormal) Collected: 07/02/21 1212    Lab Status: Preliminary result Specimen: Blood from Arm, Right Updated: 07/02/21 1245     WBC 41 18 Thousand/uL      RBC 5 37 Million/uL      Hemoglobin 14 9 g/dL      Hematocrit 48 4 %      MCV 90 fL      MCH 27 7 pg      MCHC 30 8 g/dL      RDW 14 6 %      MPV 10 8 fL      Platelets 344 Thousands/uL      nRBC 0 /100 WBCs     Troponin I [[id number]]  (Normal) Collected: 07/02/21 1212    Lab Status: Final result Specimen: Blood from Arm, Right Updated: 07/02/21 1243     Troponin I 0 03 ng/mL     Lipid panel [[id number]]  (Abnormal) Collected: 07/02/21 1212    Lab Status: Final result Specimen: Blood from Arm, Right Updated: 07/02/21 1239     Cholesterol 156 mg/dL      Triglycerides 173 mg/dL      HDL, Direct 41 mg/dL      LDL Calculated 80 mg/dL      Non-HDL-Chol (CHOL-HDL) 115 mg/dl     Basic metabolic panel [[id number]]  (Abnormal) Collected: 07/02/21 1212    Lab Status: Final result Specimen: Blood from Arm, Right Updated: 07/02/21 1238     Sodium 140 mmol/L      Potassium 4 8 mmol/L      Chloride 106 mmol/L      CO2 24 mmol/L      ANION GAP 10 mmol/L      BUN 27 mg/dL      Creatinine 1 34 mg/dL      Glucose 137 mg/dL      Calcium 9 1 mg/dL      eGFR 51 ml/min/1 73sq m     Narrative:      Danita guidelines for Chronic Kidney Disease (CKD):     Stage 1 with normal or high GFR (GFR > 90 mL/min/1 73 square meters)    Stage 2 Mild CKD (GFR = 60-89 mL/min/1 73 square meters)    Stage 3A Moderate CKD (GFR = 45-59 mL/min/1 73 square meters)    Stage 3B Moderate CKD (GFR = 30-44 mL/min/1 73 square meters)    Stage 4 Severe CKD (GFR = 15-29 mL/min/1 73 square meters)    Stage 5 End Stage CKD (GFR <15 mL/min/1 73 square meters)  Note: GFR calculation is accurate only with a steady state creatinine    Magnesium [[id number]]  (Normal) Collected: 07/02/21 1212    Lab Status: Final result Specimen: Blood from Arm, Right Updated: 07/02/21 1238     Magnesium 1 8 mg/dL     Protime-INR [[id number]]  (Normal) Collected: 07/02/21 1212    Lab Status: Final result Specimen: Blood from Arm, Right Updated: 07/02/21 1232     Protime 13 3 seconds      INR 1 00    APTT [[id number]]  (Normal) Collected: 07/02/21 1212    Lab Status: Final result Specimen: Blood from Arm, Right Updated: 07/02/21 1232     PTT 24 seconds                  XR chest 1 view portable    (Results Pending)        chest x-ray done in the ER without acute abnormality      Procedures  Procedures         ED Course        ECG done at 12:01 p m  Showed rate of 68, junctional, inferior elevations, aVL depressed, normal QRS, STEMI    Right-sided EKG was done again showing inferior elevations  Lateral depression  No elevations right-sided noted  MDM    Disposition  Final diagnoses:   STEMI (ST elevation myocardial infarction) ([address] )     Time reflects when diagnosis was documented in both MDM as applicable and the Disposition within this note     Time User Action Codes Description Comment    7/2/2021 12:08 PM Maria Elena English Add [I21 3] STEMI (ST elevation myocardial infarction) [facility])       ED Disposition     ED Disposition Condition Date/Time Comment    Send to Cath Lab  Fri Jul 2, 2021 12:08 PM       Follow-up Information    None         Patient's Medications   Discharge Prescriptions    No medications on file     No discharge procedures on file      PDMP Review None          ED Provider  Electronically Signed by           Nancy Mariano MD  07/02/21 0781

## 2021-07-02 NOTE — PROGRESS NOTES
Heart Failure/ Pulmonary Hypertension Progress Note - Manoj Salazar 77 y o  male MRN: [id number]    Unit/Bed#: ICU 06 Encounter: [id number]      Assessment:    Active Problems:    * No active hospital problems  *      Objective: Intake/ Output: 1230/1100: +130  Weight: bed scales  Tele:     # Inferior STEMI,   No thrombus, PRETTY to D1  pk trop 7 2  Rx: asa 81 mg, Brilinta 90 mg BID, atorvastatin 40 mg   Resting bradycardia precluding beta blocker    Studies- personally reviewed by [facility] 7/2/21:  Alba Gray no acute thrombus  PRETTY to 80% D1  80% prox diffuse LAD dz    Echocardiogram- pending  LVEF:   LVIDd:  RV:  MR:  PASP:  RVOT:   Other:    # HTN- fosinopril as outpt  # Dyslipidemia- atorvastatin 40 mg   7/2/21: LDL 80, HDL 41, Tri 173  # CLL- follows with heme-onc  acalabrutinib      Plan:  Echo pending  Resting bradycardia precluding beta blocker  CM for price on Brilinta  Continue atorvastatin    Review of Systems   Constitutional: Negative for activity change, appetite change, fatigue and unexpected weight change  HENT: Negative for congestion and nosebleeds  Eyes: Negative  Respiratory: Negative for cough, chest tightness and shortness of breath  Cardiovascular: Negative for chest pain, palpitations and leg swelling  Gastrointestinal: Negative for abdominal distention  Endocrine: Negative  Genitourinary: Negative  Musculoskeletal: Negative  Skin: Negative  Neurological: Negative for dizziness, syncope and weakness  Hematological: Negative  Psychiatric/Behavioral: Negative  [employer] (day, reason): Negron catheter (day, reason):    Vitals: Blood pressure 124/65, pulse 58, temperature 98 1 °F (36 7 °C), temperature source Oral, resp  rate 17, height 5' 11" (1 803 m), weight 95 7 kg (210 lb 15 7 oz), SpO2 99 %  , Body mass index is 29 43 kg/m² , No intake/output data recorded  No intake/output data recorded    Wt Readings from Last 3 Encounters:   07/02/21 95 7 kg (210 lb 15 7 oz)   05/27/21 95 7 kg (211 lb)   02/26/21 97 1 kg (214 lb)     No intake or output data in the 24 hours ending 07/02/21 1439  No intake/output data recorded  No significant arrhythmias seen on telemetry review         Physical Exam:  Vitals:    07/02/21 1359 07/02/21 1400 07/02/21 1415 07/02/21 1430   BP: 122/64 122/64 122/61 124/65   BP Location: Left arm      Pulse: 65 71 65 58   Resp: 20 20 22 17   Temp: 98 1 °F (36 7 °C)      TempSrc: Oral      SpO2: 97% 97% 98% 99%   Weight: 95 7 kg (210 lb 15 7 oz)      Height: 5' 11" (1 803 m)          GEN: Macrina Bahena appears well, alert and oriented x 3, pleasant and cooperative   HEENT: pupils equal, round, and reactive to light; extraocular muscles intact  NECK: supple, no carotid bruits   HEART: regular rhythm, normal S1 and S2, no murmurs, clicks, gallops or rubs, JVP is    LUNGS: clear to auscultation bilaterally; no wheezes, rales, or rhonchi   ABDOMEN: normal bowel sounds, soft, no tenderness, no distention  EXTREMITIES: peripheral pulses normal; no clubbing, cyanosis, or edema  NEURO: no focal findings   SKIN: normal without suspicious lesions on exposed skin      Current Facility-Administered Medications:     [START ON 7/3/2021] Acalabrutinib CAPS 1 capsule, 1 capsule, Oral, BID, ISRRAEL Summers    acetaminophen (TYLENOL) tablet 650 mg, 650 mg, Oral, Q4H PRN, ISRRAEL Summers    [START ON 7/3/2021] aspirin chewable tablet 81 mg, 81 mg, Oral, Daily, ISRRAEL Summers    atorvastatin (LIPITOR) tablet 40 mg, 40 mg, Oral, Daily With Dinner, ISRRAEL Yates    [START ON 7/3/2021] co-enzyme Q-10 capsule 100 mg, 100 mg, Oral, Daily, ISRRAEL Summers    [START ON 7/3/2021] enoxaparin (LOVENOX) subcutaneous injection 40 mg, 40 mg, Subcutaneous, Q24H [facility], Archie ISRRAEL Escobar    [START ON 7/3/2021] sertraline (ZOLOFT) tablet 75 mg, 75 mg, Oral, Daily, ISRRAEL Yates    Insert peripheral IV, , , Once **AND** sodium chloride (PF) 0 9 % injection 3 mL, 3 mL, Intravenous, Q1H PRN, ISRRAEL Jimenez    [COMPLETED] ticagrelor (BRILINTA) tablet 180 mg, 180 mg, Oral, Once, 180 mg at 07/02/21 1213 **AND** ticagrelor (BRILINTA) tablet 90 mg, 90 mg, Oral, Q12H [address], Anastacio Koyanagi, CRNP      Labs & Results:    Results from last 7 days   Lab Units 07/02/21  1212   TROPONIN I ng/mL 0 03     Results from last 7 days   Lab Units 07/02/21  1212   WBC Thousand/uL 41 18*   HEMOGLOBIN g/dL 14 9   HEMATOCRIT % 48 4   PLATELETS Thousands/uL 154         Results from last 7 days   Lab Units 07/02/21  1212   POTASSIUM mmol/L 4 8   CHLORIDE mmol/L 106   CO2 mmol/L 24   BUN mg/dL 27*   CREATININE mg/dL 1 34*   CALCIUM mg/dL 9 1     Results from last 7 days   Lab Units 07/02/21  1212   INR  1 00         Counseling / Coordination of Care  Total floor / unit time spent today 25 minutes  Greater than 50% of total time was spent with the patient and / or family counseling and / or coordination of care  A description of the counseling / coordination of care: 15      Thank you for the opportunity to participate in the care of this patient    [address] [facility] PULMONARY HYPERTENSION  MEDICAL DIRECTOR OF [place]

## 2021-07-02 NOTE — DISCHARGE SUMMARY
Discharge Summary - Jessie Rahman 77 y o  male MRN: [id number]    Unit/Bed#: S -01 Encounter: [id number]    Admission Date: 7/2/2021   Discharge Date: 7/4/2021    Disposition: Home     Discharge Diagnosis:  1  Acute inferior STEMI   2  CAD s/p PCI / PRETTY x1 to pLAD; (residual 60% dRCA lesion)  3  Preserved biventricular systolic function  4  Sinus bradycardia      Secondary Diagnoses:  1  Essential hypertension  2  Dyslipidemia  3  CLL    Condition at Discharge: good   Consultants: None    HPI and Hospital Course:   Jessie Rahman is a 77 y o  male with a medical history of essential hypertension, dyslipidemia, CLL  He had no prior history of CAD, HF, or any known cardiac arrhythmias  Longstanding nonsmoker, denied excessive alcohol or recreational drug use      Pt reported a 2-3 month history of intermittent c/o of  exertional "burning chest pain" over the in which he had associated with indigestion/reflux  He described no other associated symptoms with these episodes and would typically have relief of these symptoms with periods of rest       He presented to the [facility] ED on 7/2/2021 after experiencing an acute onset of severe "burning chest pain" early this morning after lifting heavy bags of mulch out of his truck at his home residence  His symptoms persisted despite attempts to rest  EMS was summoned  Pre-hospital ECG demonstrated inferior ST elevations  A pre-hospital MI alert was called  Initial 12 lead ECG in the ED demonstrated ST elevations in the inferior leads with reciprocal changes in the lateral leads  He received full-dose aspirin, 180 mg of Brilinta, and 4000 units of heparin  Telemetry in the ED did demonstrate intermittent junctional bradycardia with rates in the low 50s  He was transferred to the cardiac cath lab for urgent coronary angiography  Which demonstrated a 80% lesion in the  Proximal LAD and a 60% distal RCA lesion after the RPDA    He underwent PCI / PRETTY x1 to the proximal RCA lesion with 0% residual stenosis  He was transferred to the ICU  postprocedure for further hemodynamic monitoring  He was initiated on dual anti-platelet therapy with aspirin/ Brilinta, was placed on high-intensity statin therapy, and BB was initially held given persistent asymptomatic sinus bradycardia  Cost of Brilinta was assessed and was deemed to be unaffordable  Patient was switched to clopidogrel  TTE demonstrated LVEF 55%, grade 1 DD, RV normal size and systolic function, and no significant valvular disease    On the day of discharge, pertinent laboratory data, hemodynamics, and telemetry were reviewed and deemed to be stable for discharge  Right radial catheterization site was assessed, surrounding area soft without visible hematoma and neurovascular exam to the RUE within normal limits  Discharge creatinine 1 05  Follow-up has been arranged with [address] [place] [address] Cardiology as an outpatient  A referral for cardiac rehab has been placed  A BMP/CBC will be requested 3 days following discharge  Diagnostics  1  Cardiac catheterization 7/1/2021  CORONARY CIRCULATION:  Left main: The vessel was normal sized  There was moderate plaque  There were no obstructive lesions  LAD: The vessel was normal sized  There was a long, irregular lesion of the proximal vessel  There were no other significant lesions  Circumflex: The vessel was normal sized and gave rise to one major OM branch  There was moderate plaque  There were no flow-limiting lesions  RCA: The vessel was normal sized and dominant, giving rise to a dual PDA and two posterolateral branches  There was moderate diffuse plaque  There was a 60% lesion of the distal vessel after the PDA   However, there were no flow-critical  lesions that appeared to be culprits for the patient's STEMI      1ST LESION INTERVENTIONS:  Following IVUS interrogation and pre-dilation, a [id number] Rx 3 25 x 33mm drug-eluting stent was placed across the 80% lesion in the proximal diagonal and deployed at a maximum inflation pressure of 18 ivelisse  After post-dilation to  3 5mm, there was full stent expansion and apposition, with 0% residual stenosis  DAMIAN flow remained grade 3      REPORT ELEMENT SELECTION:  Right radial access  There were no complications      Summary:  Diffuse atherosclerotic plaque  An 80% proximal LAD stenosis was the likely culprit for the patient's STEMI  Plan: DAPT, statin, beta-blocker, cardiac rehabilitation    2   TTE 7/2/2021  TTE demonstrated LVEF 55%, grade 1 DD, RV normal size and systolic function, and no significant valvular disease    Pertinent Labs  Admission on 07/02/2021   Component Date Value    WBC 07/02/2021 41 18*    RBC 07/02/2021 5 37     Hemoglobin 07/02/2021 14 9     Hematocrit 07/02/2021 48 4     MCV 07/02/2021 90     MCH 07/02/2021 27 7     MCHC 07/02/2021 30 8*    RDW 07/02/2021 14 6     MPV 07/02/2021 10 8     Platelets 02/05/8628 154     nRBC 07/02/2021 0     Sodium 07/02/2021 140     Potassium 07/02/2021 4 8     Chloride 07/02/2021 106     CO2 07/02/2021 24     ANION GAP 07/02/2021 10     BUN 07/02/2021 27*    Creatinine 07/02/2021 1 34*    Glucose 07/02/2021 137     Calcium 07/02/2021 9 1     eGFR 07/02/2021 51     Cholesterol 07/02/2021 156     Triglycerides 07/02/2021 173*    HDL, Direct 07/02/2021 41     LDL Calculated 07/02/2021 80     Non-HDL-Chol (CHOL-HDL) 07/02/2021 115     Magnesium 07/02/2021 1 8     Protime 07/02/2021 13 3     INR 07/02/2021 1 00     PTT 07/02/2021 24     Troponin I 07/02/2021 0 03     Troponin I 07/02/2021 1 08*    Segmented % 07/02/2021 18*    Lymphocytes % 07/02/2021 80*    Monocytes % 07/02/2021 2*    Eosinophils, % 07/02/2021 0     Basophils % 07/02/2021 0     Absolute Neutrophils 07/02/2021 7 41     Lymphocytes Absolute 07/02/2021 32 94*    Monocytes Absolute 07/02/2021 0 82     Eosinophils Absolute 07/02/2021 0 00     Basophils Absolute 07/02/2021 0 00     Total Counted 07/02/2021 100     Hypochromia 07/02/2021 Present     Platelet Estimate 61/76/9610 Adequate     Activated Clotting Time,* 07/02/2021 318*    Specimen Type 07/02/2021 ARTERIAL     Troponin I 07/02/2021 7 16*    Ventricular Rate 07/02/2021 62     Atrial Rate 07/02/2021 62     KS Interval 07/02/2021 172     QRSD Interval 07/02/2021 82     QT Interval 07/02/2021 422     QTC Interval 07/02/2021 428     P Axis 07/02/2021 64     QRS Axis 07/02/2021 19     T Wave Axis 07/02/2021 4     Ventricular Rate 07/02/2021 61     Atrial Rate 07/02/2021 61     KS Interval 07/02/2021 164     QRSD Interval 07/02/2021 82     QT Interval 07/02/2021 422     QTC Interval 07/02/2021 424     P Axis 07/02/2021 69     QRS Axis 07/02/2021 33     T Wave Axis 07/02/2021 9     Ventricular Rate 07/02/2021 64     Atrial Rate 07/02/2021 66     KS Interval 07/02/2021 158     QRSD Interval 07/02/2021 80     QT Interval 07/02/2021 406     QTC Interval 07/02/2021 419     P Axis 07/02/2021 65     QRS Axis 07/02/2021 59     T Wave Axis 07/02/2021 75     Ventricular Rate 07/02/2021 68     Atrial Rate 07/02/2021 71     QRSD Interval 07/02/2021 86     QT Interval 07/02/2021 392     QTC Interval 07/02/2021 417     QRS Axis 07/02/2021 66     T Wave Axis 07/02/2021 77     Ventricular Rate 07/02/2021 58     Atrial Rate 07/02/2021 60     KS Interval 07/02/2021 164     QRSD Interval 07/02/2021 78     QT Interval 07/02/2021 420     QTC Interval 07/02/2021 413     P Axis 07/02/2021 58     QRS Axis 07/02/2021 49     T Wave Axis 07/02/2021 71     Sodium 07/03/2021 137     Potassium 07/03/2021 4 1     Chloride 07/03/2021 106     CO2 07/03/2021 23     ANION GAP 07/03/2021 8     BUN 07/03/2021 22     Creatinine 07/03/2021 1 05     Glucose 07/03/2021 130     Calcium 07/03/2021 8 4     Corrected Calcium 07/03/2021 9 3     AST 07/03/2021 198*    ALT 07/03/2021 36     Alkaline Phosphatase 07/03/2021 73     Total Protein 07/03/2021 5 9*    Albumin 07/03/2021 2 9*    Total Bilirubin 07/03/2021 0 83     eGFR 07/03/2021 69     WBC 07/03/2021 29 38*    RBC 07/03/2021 4 98     Hemoglobin 07/03/2021 13 7     Hematocrit 07/03/2021 43 3     MCV 07/03/2021 87     MCH 07/03/2021 27 5     MCHC 07/03/2021 31 6     RDW 07/03/2021 14 6     Platelets 41/22/8238 100*    MPV 07/03/2021 11 1     Magnesium 07/03/2021 1 8        Vitals:    07/03/21 1200 07/03/21 1500 07/03/21 2100 07/04/21 0700   BP: 128/61 123/59 116/58 125/60   BP Location:  Left arm Left arm Right arm   Pulse: 63 56 61 58   Resp: 13 16 18 18   Temp:  98 2 °F (36 8 °C) 98 3 °F (36 8 °C) 98 7 °F (37 1 °C)   TempSrc:  Oral Oral Oral   SpO2: 98% 98% 95% 94%   Weight:       Height:           Vitals:    07/02/21 1217 07/02/21 1359   Weight: 102 kg (224 lb 13 9 oz) 95 7 kg (210 lb 15 7 oz)     Condition at Discharge: good     Discharge Medications:  See after visit summary for reconciled discharge medications provided to patient and family  Discharge instructions/Information to patient and family:   See after visit summary for information provided to patient and family  Provisions for Follow-Up Care:  See after visit summary for information related to follow-up care and any pertinent home health orders  Planned Readmission: No    Discharge Statement   I spent 45 minutes minutes discharging the patient  This time was spent on the day of discharge  I had direct contact with the patient on the day of discharge  Additional documentation is required if more than 30 minutes were spent on discharge

## 2021-07-02 NOTE — H&P
Cardiology   Arash Reina 77 y o  male MRN: [id number]  Unit/Bed#: LALO Encounter: [id number]      Cardiologist:  None    Assessment  1  Inferior STEMI  -2-3 month history of intermittent "burning chest pain"  Acute onset severe "burning chest" early this morning after working outside of his home residence  EMS was summoned - pre-hospital ECG demonstrated inferior ST elevations  Pre-hospital MI alert was called  Currently with complaints of very mild "burning chest pain"  -12 lead ECG 7/2; inferior ST elevations w/ reciprocal changes in the lateral leads   -Intermittent junctional rhythm on cardiac monitoring  Hemodynamics otherwise stable  -In the ED received full-dose aspirin, 180 mg of Brilinta, and 4000 units of IV heparin  2  Essential hypertension  -BP stable last recorded 134/70, HR 58  -Previously on fosinopril 20 mg daily (currently on hold)    3  Dyslipidemia  -Lipid profile 7/2/2021; cholesterol 156, triglycerides 173, HDL 41, LDL 80  -Previously on coenzyme Q10/simvastatin 20 mg daily  4  CLL  -Follows with Heme-Onc as an outpatient   -On acalabrutinib 100 mg daily    Plan  -Urgent coronary angiography  Received full-dose aspirin, loaded with Brilinta 180 mg x 1, and 4000 units of heparin  NPO status  IV fluids for hydration  Further recommendations pending results of coronary angiography   -Obtain TTE  -DC simvastatin - start atorvastatin 40 mg daily   -Caution the use of BB for now in the setting of accelerated junctional rhythm  -Trend troponin until peak - DC with down trend   -Continues cardiopulmonary monitoring    History of Present Illness     HPI:  Arash Reina is a 77 y o  male with a medical history of essential hypertension, dyslipidemia, CLL  He has no prior history of CAD, HF, or any known cardiac arrhythmias  He denies tobacco use, excessive alcohol recreational drug use      Patient states he has been experiencing intermittent complaints of "burning chest pain" over the past 2-3 months in which he had associated with indigestion/reflux  He described no other associated symptoms with these episodes  He presented to the [address] [place] [address] ED on 7/02/2021 after experiencing an acute onset of severe "burning chest pain" early this morning while working outside at his home residence  He had no relief of his symptoms with rest and summoned EMS  Pre-hospital EMS ECG demonstrated inferior ST elevations  A pre-hospital MI alert was called  Initial 12 lead ECG in the ED demonstrated ST elevations in inferior leads with reciprocal changes in the lateral leads  He received full-dose aspirin, 180 mg of Brilinta, and 4000 units of heparin  He was transferred to the cardiac cath lab for urgent coronary angiography  Discussed code status with the patient he wishes to be a level 1 full code  Historical Information   Past Medical History:   Diagnosis Date    Anxiety     Hypertension     Leukemia ([address] )      Past Surgical History:   Procedure Laterality Date    APPENDECTOMY      COLONOSCOPY      TONSILLECTOMY       Social History   Social History     Substance and Sexual Activity   Alcohol Use Yes    Comment: minimal     Social History     Substance and Sexual Activity   Drug Use No     Social History     Tobacco Use   Smoking Status Never Smoker   Smokeless Tobacco Never Used     Family History:   Family History   Problem Relation Age of Onset    No Known Problems Mother     No Known Problems Father        Meds/Allergies   all medications and allergies reviewed  Allergies   Allergen Reactions    Sulfa Antibiotics        Objective   Vitals: Blood pressure 134/70, pulse 58, temperature 97 9 °F (36 6 °C), temperature source Oral, resp  rate 22, weight 102 kg (224 lb 13 9 oz)        No intake or output data in the 24 hours ending 07/02/21 1224    Invasive Devices     Peripheral Intravenous Line            Peripheral IV 07/02/21 Left Wrist <1 day    Peripheral IV 07/02/21 Right Antecubital <1 day              Review of Systems:  Review of Systems   Constitutional: Positive for fatigue  Respiratory: Negative for shortness of breath  Cardiovascular: Positive for chest pain  Negative for palpitations and leg swelling  Gastrointestinal: Negative for abdominal pain  Neurological: Negative for dizziness, light-headedness and headaches  All other systems reviewed and are negative  Physical Exam  Vitals and nursing note reviewed  Constitutional:       General: He is in acute distress  Appearance: He is obese  He is not ill-appearing or diaphoretic  HENT:      Head: Normocephalic and atraumatic  Mouth/Throat:      Mouth: Mucous membranes are moist    Eyes:      General: No scleral icterus  Neck:      Comments: No JVD  Cardiovascular:      Rate and Rhythm: Regular rhythm  Bradycardia present  Pulses: Normal pulses  Heart sounds: Normal heart sounds  No murmur heard  Pulmonary:      Effort: Pulmonary effort is normal       Breath sounds: Normal breath sounds  No wheezing or rales  Abdominal:      Palpations: Abdomen is soft  Musculoskeletal:      Cervical back: Neck supple  Right lower leg: No edema  Left lower leg: No edema  Skin:     General: Skin is warm  Capillary Refill: Capillary refill takes less than 2 seconds  Neurological:      General: No focal deficit present  Mental Status: He is alert and oriented to person, place, and time  Lab Results: I have personally reviewed pertinent lab results  Imaging: I have personally reviewed pertinent reports  Code Status:  Level 1 full code    Epic/ [facility] [place] Everywhere records reviewed: Yes    ** Please Note: Fluency DirectDictation voice to text software may have been used in the creation of this document   **

## 2021-07-03 LAB
ALBUMIN SERPL BCP-MCNC: 2.9 G/DL (ref 3.5–5)
ALP SERPL-CCNC: 73 U/L (ref 46–116)
ALT SERPL W P-5'-P-CCNC: 36 U/L (ref 12–78)
ANION GAP SERPL CALCULATED.3IONS-SCNC: 8 MMOL/L (ref 4–13)
AST SERPL W P-5'-P-CCNC: 198 U/L (ref 5–45)
BILIRUB SERPL-MCNC: 0.83 MG/DL (ref 0.2–1)
BUN SERPL-MCNC: 22 MG/DL (ref 5–25)
CALCIUM ALBUM COR SERPL-MCNC: 9.3 MG/DL (ref 8.3–10.1)
CALCIUM SERPL-MCNC: 8.4 MG/DL (ref 8.3–10.1)
CHLORIDE SERPL-SCNC: 106 MMOL/L (ref 100–108)
CO2 SERPL-SCNC: 23 MMOL/L (ref 21–32)
CREAT SERPL-MCNC: 1.05 MG/DL (ref 0.6–1.3)
ERYTHROCYTE [DISTWIDTH] IN BLOOD BY AUTOMATED COUNT: 14.6 % (ref 11.6–15.1)
GFR SERPL CREATININE-BSD FRML MDRD: 69 ML/MIN/1.73SQ M
GLUCOSE SERPL-MCNC: 130 MG/DL (ref 65–140)
HCT VFR BLD AUTO: 43.3 % (ref 36.5–49.3)
HGB BLD-MCNC: 13.7 G/DL (ref 12–17)
MAGNESIUM SERPL-MCNC: 1.8 MG/DL (ref 1.6–2.6)
MCH RBC QN AUTO: 27.5 PG (ref 26.8–34.3)
MCHC RBC AUTO-ENTMCNC: 31.6 G/DL (ref 31.4–37.4)
MCV RBC AUTO: 87 FL (ref 82–98)
PLATELET # BLD AUTO: 100 THOUSANDS/UL (ref 149–390)
PMV BLD AUTO: 11.1 FL (ref 8.9–12.7)
POTASSIUM SERPL-SCNC: 4.1 MMOL/L (ref 3.5–5.3)
PROT SERPL-MCNC: 5.9 G/DL (ref 6.4–8.2)
RBC # BLD AUTO: 4.98 MILLION/UL (ref 3.88–5.62)
SODIUM SERPL-SCNC: 137 MMOL/L (ref 136–145)
WBC # BLD AUTO: 29.38 THOUSAND/UL (ref 4.31–10.16)

## 2021-07-03 PROCEDURE — 83735 ASSAY OF MAGNESIUM: CPT | Performed by: NURSE PRACTITIONER

## 2021-07-03 PROCEDURE — 80053 COMPREHEN METABOLIC PANEL: CPT | Performed by: NURSE PRACTITIONER

## 2021-07-03 PROCEDURE — 99232 SBSQ HOSP IP/OBS MODERATE 35: CPT | Performed by: INTERNAL MEDICINE

## 2021-07-03 PROCEDURE — 85027 COMPLETE CBC AUTOMATED: CPT | Performed by: NURSE PRACTITIONER

## 2021-07-03 RX ORDER — CLOPIDOGREL BISULFATE 75 MG/1
75 TABLET ORAL DAILY
Status: DISCONTINUED | OUTPATIENT
Start: 2021-07-04 | End: 2021-07-04 | Stop reason: HOSPADM

## 2021-07-03 RX ORDER — CLOPIDOGREL BISULFATE 75 MG/1
600 TABLET ORAL ONCE
Status: COMPLETED | OUTPATIENT
Start: 2021-07-03 | End: 2021-07-03

## 2021-07-03 RX ORDER — CLOPIDOGREL BISULFATE 75 MG/1
600 TABLET ORAL DAILY
Status: DISCONTINUED | OUTPATIENT
Start: 2021-07-04 | End: 2021-07-03

## 2021-07-03 RX ORDER — DIAZEPAM 2 MG/1
2 TABLET ORAL ONCE
Status: COMPLETED | OUTPATIENT
Start: 2021-07-03 | End: 2021-07-03

## 2021-07-03 RX ORDER — DIAZEPAM 2 MG/1
1 TABLET ORAL ONCE
Status: DISCONTINUED | OUTPATIENT
Start: 2021-07-03 | End: 2021-07-04 | Stop reason: HOSPADM

## 2021-07-03 RX ORDER — CLOPIDOGREL BISULFATE 75 MG/1
600 TABLET ORAL ONCE
Status: DISCONTINUED | OUTPATIENT
Start: 2021-07-04 | End: 2021-07-03

## 2021-07-03 RX ADMIN — DIAZEPAM 2 MG: 2 TABLET ORAL at 08:03

## 2021-07-03 RX ADMIN — Medication 100 MG: at 09:56

## 2021-07-03 RX ADMIN — TICAGRELOR 90 MG: 90 TABLET ORAL at 08:03

## 2021-07-03 RX ADMIN — CLOPIDOGREL BISULFATE 600 MG: 75 TABLET ORAL at 18:32

## 2021-07-03 RX ADMIN — SERTRALINE HYDROCHLORIDE 75 MG: 50 TABLET ORAL at 08:03

## 2021-07-03 RX ADMIN — ENOXAPARIN SODIUM 40 MG: 40 INJECTION SUBCUTANEOUS at 08:03

## 2021-07-03 RX ADMIN — ATORVASTATIN CALCIUM 40 MG: 40 TABLET, FILM COATED ORAL at 18:31

## 2021-07-03 RX ADMIN — ASPIRIN 81 MG: 81 TABLET, CHEWABLE ORAL at 08:03

## 2021-07-03 NOTE — UTILIZATION REVIEW
Initial Clinical Review  [facility] (5 day DRG)    Admission: Date/Time/Statement: 7/2/2021 1247 inpatient  Admission Orders (From admission, onward)     Ordered        07/02/21 1247  Inpatient Admission  Once                   Orders Placed This Encounter   Procedures    Inpatient Admission     Standing Status:   Standing     Number of Occurrences:   1     Order Specific Question:   Level of Care     Answer:   Critical Care [15]     Order Specific Question:   Estimated length of stay     Answer:   More than 2 Midnights     Order Specific Question:   Certification     Answer:   I certify that inpatient services are medically necessary for this patient for a duration of greater than two midnights  See H&P and MD Progress Notes for additional information about the patient's course of treatment  ED Arrival Information     Expected Arrival Acuity    - 7/2/2021 11:59 -         Means of arrival Escorted by Service Admission type    Ambulance [facility] Cardiology Emergency         Arrival complaint    MI Alert         chief complaint:  Burning chest pain       Initial Presentation: This is a 77year old male from home to ED via ems admitted inpatient due to inferior STEMI  Presented due to chest pain staring while working outside day of arrival   Has history of CLL and initially refused asa from ems  EGD showed inferior wall MI with elevations 2, 3 avf, depression in aVL and MI alert called  On exam diaphoretic  Wbc 41 18  Troponin 0 03  Bun 27  Creatinine 1 34  Repeat ECG showed inferior elevations, lateral depression   monitor shows accelerated junctional rhythm  In the ED received full-dose aspirin, 180 mg of Brilinta, and 4000 units of IV heparin  Plan is    IVF, start atorvastatin, hold beta blocker  Trend troponin  Cardio pulmonary monitoring in progress  Taken to cath lab      Procedure - cardiac cath 7/2/2021:  CORONARY CIRCULATION:  Left main: The vessel was normal sized  There was moderate plaque  There were no obstructive lesions  LAD: The vessel was normal sized  There was a long, irregular lesion of the proximal vessel  There were no other significant lesions  Circumflex: The vessel was normal sized and gave rise to one major OM branch  There was moderate plaque  There were no flow-limiting lesions  RCA: The vessel was normal sized and dominant, giving rise to a dual PDA and two posterolateral branches  There was moderate diffuse plaque  There was a 60% lesion of the distal vessel after the PDA  However, there were no flow-critical  lesions that appeared to be culprits for the patient's STEMI  1ST LESION INTERVENTIONS:  Following IVUS interrogation and pre-dilation, a [employer] [place] Rx 3 25 x 33mm drug-eluting stent was placed across the 80% lesion in the proximal diagonal and deployed at a maximum inflation pressure of 18 ivelisse  After post-dilation to  3 5mm, there was full stent expansion and apposition, with 0% residual stenosis  DAMIAN flow remained grade 3  REPORT ELEMENT SELECTION:  Right radial access  There were no complications  Summary:  Diffuse atherosclerotic plaque  An 80% proximal LAD stenosis was the likely culprit for the patient's STEMI  Plan: DAPT, statin, beta-blocker, cardiac rehabilitation       Date: 7/3/2021   Day 2:  Patient has resting bradycardia  No significant arrhythmias seen on telemetry review  Exam non focal    Plan is echo  Check cost of Brillinta  Continue atorvastatin        ED Triage Vitals   Temperature Pulse Respirations Blood Pressure SpO2   07/02/21 1217 07/02/21 1214 07/02/21 1214 07/02/21 1214 07/02/21 1214   97 9 °F (36 6 °C) 56 22 149/67 100 %      Temp Source Heart Rate Source Patient Position - Orthostatic VS BP Location FiO2 (%)   07/02/21 1217 07/02/21 1214 07/02/21 1217 07/02/21 1359 --   Oral Monitor Lying Left arm       Pain Score       07/02/21 1359       5          Wt Readings from Last 1 Encounters:   07/02/21 95 7 kg (210 lb 15 7 oz)     Additional Vital Signs:   07/03/21 1500  98 2 °F (36 8 °C)  56  16  123/59  85  98 %  None (Room air)  Sitting   07/03/21 1200  --  63  13  128/61  88  98 %  --  --   07/03/21 1100  --  52Abnormal   12  121/64  87  97 %  --  --   07/03/21 1051  98 4 °F (36 9 °C)  58  15  121/62  --  97 %  --  --   07/03/21 0800  --  59  16  124/66  91  97 %  None (Room air)  Lying   07/03/21 0700  98 4 °F (36 9 °C)  57  19  140/69  --  96 %  --  --   07/03/21 0600  --  60  14  134/63  90  95 %  --  --   07/03/21 0500  --  61  13  131/66  92  97 %  --  --   07/03/21 0400  --  56  15  139/65  94  96 %  --  --   07/03/21 0300  98 2 °F (36 8 °C)  59  16  133/65  94  96 %  None (Room air)  Lying   07/03/21 0200  --  60  16  127/59  85  97 %  --  --   07/03/21 0100  --  57  17  121/60  86  95 %  --  --   07/03/21 0000  98 4 °F (36 9 °C)  58  13  129/60  87  94 %  None (Room air)  --   07/02/21 2300  --  58  18  127/60  87  94 %  None (Room air)  --   07/02/21 2200  --  60  15  115/56  80  94 %  --  --   07/02/21 2100  --  59  22  122/60  86  97 %  --  --   07/02/21 2000  --  54Abnormal   20  120/69  86  96 %  --  --   07/02/21 1902  97 9 °F (36 6 °C)  54Abnormal   20  133/62  --  97 %  None (Room air)  Lying   07/02/21 1900  --  52Abnormal   15  133/62  89  97 %  --  --   07/02/21 1800  --  51Abnormal   16  149/67  97  98 %  None (Room air)  --   07/02/21 1700  --  55  13  135/62  89  97 %  --  --   07/02/21 1600  --  56  23Abnormal   138/66  95  98 %  --  --   07/02/21 1500  --  57  16  138/64  90  99 %  --  --   07/02/21 1400  --  71  20  122/64  87  97 %  None (Room air)         Pertinent Labs/Diagnostic Test Results:   7/2/2021 CxR - No acute cardiopulmonary disease      7/2/2021 ECG Sinus tachycardia  ST elevation consider inferior injury or acute infarct   ** ** ** * ACUTE MI  ** ** ** **  Abnormal ECG  When compared with ECG of 29-MAY-2020 10:52,    7/2/2021 ECG Sinus rhythm with Premature supraventricular complexes  ST elevation consider inferior injury or acute infarct   ** ** ** * ACUTE MI  ** ** ** **  Abnormal ECG  When compared with ECG of 02-JUL-2021 12:01, (unconfirmed)    7/2/2021 ECG Sinus bradycardia  Possible Left atrial enlargement  Anterolateral infarct , new  Inferior injury pattern   ** ** ** * ACUTE MI  ** ** ** **  Abnormal ECG  When compared with ECG of 02-JUL-2021 12:05, (unconfirmed)  Premature supraventricular complexes are no longer Present  Acute Anterior infarct is now Present  Acute Anterolateral infarct is now Present    7/2/2021 ECG Normal sinus rhythm  Nonspecific ST abnormality  Abnormal ECG  When compared with ECG of 02-JUL-2021 12:09, (unconfirmed)  Significant changes have occurred    7/2/2021 Cardiac cath Summary:  Diffuse atherosclerotic plaque  An 80% proximal LAD stenosis was the likely culprit for the patient's STEMI    Plan: DAPT, statin, beta-blocker, cardiac rehabilitation    7/2/2021 ECG Normal sinus rhythm  Possible Left atrial enlargement  ST & T wave abnormality, consider inferior ischemia  Abnormal ECG  When compared with ECG of 02-JUL-2021 13:34, (unconfirmed)  No significant change was found  Results from last 7 days   Lab Units 07/03/21  0514 07/02/21  1212   WBC Thousand/uL 29 38* 41 18*   HEMOGLOBIN g/dL 13 7 14 9   HEMATOCRIT % 43 3 48 4   PLATELETS Thousands/uL 100* 154     Results from last 7 days   Lab Units 07/03/21  0514 07/02/21  1212   SODIUM mmol/L 137 140   POTASSIUM mmol/L 4 1 4 8   CHLORIDE mmol/L 106 106   CO2 mmol/L 23 24   ANION GAP mmol/L 8 10   BUN mg/dL 22 27*   CREATININE mg/dL 1 05 1 34*   EGFR ml/min/1 73sq m 69 51   CALCIUM mg/dL 8 4 9 1   MAGNESIUM mg/dL 1 8 1 8     Results from last 7 days   Lab Units 07/03/21  0514   AST U/L 198*   ALT U/L 36   ALK PHOS U/L 73   TOTAL PROTEIN g/dL 5 9*   ALBUMIN g/dL 2 9*   TOTAL BILIRUBIN mg/dL 0 83     Results from last 7 days   Lab Units 07/03/21  0514 07/02/21  1212   GLUCOSE RANDOM mg/dL 130 137     Results from last 7 days   Lab Units 07/02/21  1735 07/02/21  1445 07/02/21  1212   TROPONIN I ng/mL 7 16* 1 08* 0 03     Results from last 7 days   Lab Units 07/02/21  1212   PROTIME seconds 13 3   INR  1 00   PTT seconds 24     Results from last 7 days   Lab Units 07/02/21  1212   TOTAL COUNTED  100     ED Treatment:   Medication Administration from 07/02/2021 1159 to 07/02/2021 1358       Date/Time Order Dose Route Action Comments     07/02/2021 1212 heparin (porcine) injection 4,000 Units 4,000 Units Intravenous Given      07/02/2021 1213 ticagrelor (BRILINTA) tablet 180 mg 180 mg Oral Given      07/02/2021 1212 aspirin chewable tablet 324 mg 324 mg Oral Given      07/02/2021 1247 fentanyl citrate (PF) 100 MCG/2ML 50 mcg Intravenous Given      07/02/2021 1248 midazolam (VERSED) injection 2 mg Intravenous Given      07/02/2021 1249 lidocaine (PF) (XYLOCAINE-MPF) 1 % injection 0 1 mL Infiltration Given      07/02/2021 1249 nitroGLYcerin (TRIDIL) 50 mg in 250 mL infusion (premix) 200 mcg Intra-arterial Given      07/02/2021 1250 verapamil (ISOPTIN) injection 1 25 mg Intra-arterial Given      07/02/2021 1257 heparin (porcine) injection 5,000 Units Intravenous Given      07/02/2021 1313 nitroGLYcerin (TRIDIL) 50 mg in 250 mL infusion (premix) 400 mcg Other Given         Past Medical History:   Diagnosis Date    Anxiety     Hypertension     Leukemia ([facility] )      Present on Admission:   CLL (chronic lymphocytic leukemia) ([facility] )   Hypertension, essential   Hyperlipidemia, unspecified   Acute ST elevation myocardial infarction (STEMI) due to occlusion of distal portion of left anterior descending (LAD) coronary artery (HCC)      Admitting Diagnosis: Chest pain [R07 9]  STEMI (ST elevation myocardial infarction) ([facility] ) [I21 3]  Age/Sex: 77 y o  male  Admission Orders:  7/2/2021 1247 inpatient   Scheduled Medications:  Acalabrutinib, 1 capsule, Oral, BID  aspirin, 81 mg, Oral, Daily  atorvastatin, 40 mg, Oral, Daily With Dinner   clopidogrel, 600 mg, Oral, Once  co-enzyme Q-10, 100 mg, Oral, Daily  enoxaparin, 40 mg, Subcutaneous, Q24H SABAS  sertraline, 75 mg, Oral, Daily   ticagrelor, 90 mg, Oral, Q12H SABAS      Continuous IV Infusions:sodium chloride 0 9 % infusion   Rate: 100 mL/hr Dose: 100 mL/hr  Freq: Continuous Route: IV  Indications of Use: IV Hydration  Last Dose: Stopped (07/03/21 0147)  Start: 07/02/21 1545 End: 07/03/21 0152     PRN Meds: not used   acetaminophen, 650 mg, Oral, Q4H PRN  sodium chloride (PF), 3 mL, Intravenous, Q1H PRN    puncture site care - Dress puncture site with clear dressing or bandage  2)  Do not use sandbag or apply pressure dressing   3)  Remove dressing 24 hours after procedure and replace with water seal band aid  Cardio pulmonary monitoring     Network Utilization Review Department  ATTENTION: Please call with any questions or concerns to [phone] and carefully listen to the prompts so that you are directed to the right person  All voicemails are confidential   Laura Ordaz all requests for admission clinical reviews, approved or denied determinations and any other requests to dedicated fax number below belonging to the campus where the patient is receiving treatment   List of dedicated fax numbers for the Facilities:  [address] DENIALS (Administrative/Medical Necessity) [phone]   [address] (Maternity/NICU/Pediatrics) [phone]   [address] [address] [facility] Kathy Marcelo [phone] [facility] [address] [facility] [place] [address] [phone]

## 2021-07-04 ENCOUNTER — APPOINTMENT (INPATIENT)
Dept: NON INVASIVE DIAGNOSTICS | Facility: HOSPITAL | Age: 76
DRG: 247 | End: 2021-07-04
Payer: COMMERCIAL

## 2021-07-04 VITALS
TEMPERATURE: 98.7 F | BODY MASS INDEX: 29.54 KG/M2 | OXYGEN SATURATION: 94 % | RESPIRATION RATE: 18 BRPM | SYSTOLIC BLOOD PRESSURE: 125 MMHG | HEIGHT: 71 IN | HEART RATE: 58 BPM | WEIGHT: 210.98 LBS | DIASTOLIC BLOOD PRESSURE: 60 MMHG

## 2021-07-04 PROCEDURE — 99238 HOSP IP/OBS DSCHRG MGMT 30/<: CPT | Performed by: INTERNAL MEDICINE

## 2021-07-04 PROCEDURE — 93306 TTE W/DOPPLER COMPLETE: CPT

## 2021-07-04 PROCEDURE — 93306 TTE W/DOPPLER COMPLETE: CPT | Performed by: INTERNAL MEDICINE

## 2021-07-04 PROCEDURE — NC001 PR NO CHARGE: Performed by: INTERNAL MEDICINE

## 2021-07-04 RX ORDER — CLOPIDOGREL BISULFATE 75 MG/1
75 TABLET ORAL DAILY
Qty: 30 TABLET | Refills: 0 | Status: SHIPPED | OUTPATIENT
Start: 2021-07-05 | End: 2021-07-29 | Stop reason: SDUPTHER

## 2021-07-04 RX ORDER — ATORVASTATIN CALCIUM 40 MG/1
40 TABLET, FILM COATED ORAL
Qty: 30 TABLET | Refills: 0 | Status: SHIPPED | OUTPATIENT
Start: 2021-07-04 | End: 2021-07-29 | Stop reason: SDUPTHER

## 2021-07-04 RX ORDER — ASPIRIN 81 MG/1
81 TABLET, CHEWABLE ORAL DAILY
Qty: 30 TABLET | Refills: 0 | Status: SHIPPED | OUTPATIENT
Start: 2021-07-05

## 2021-07-04 RX ADMIN — SERTRALINE HYDROCHLORIDE 75 MG: 50 TABLET ORAL at 09:48

## 2021-07-04 RX ADMIN — ASPIRIN 81 MG: 81 TABLET, CHEWABLE ORAL at 09:48

## 2021-07-04 RX ADMIN — CLOPIDOGREL BISULFATE 75 MG: 75 TABLET ORAL at 09:48

## 2021-07-04 RX ADMIN — Medication 100 MG: at 09:48

## 2021-07-04 RX ADMIN — ENOXAPARIN SODIUM 40 MG: 40 INJECTION SUBCUTANEOUS at 09:48

## 2021-07-04 NOTE — PROGRESS NOTES
Heart Failure/ Pulmonary Hypertension Progress Note - Albertina Bettencourt 77 y o  male MRN: [id number]    Unit/Bed#: S -01 Encounter: [id number]      Assessment:    Principal Problem:    Acute ST elevation myocardial infarction (STEMI) due to occlusion of distal portion of left anterior descending (LAD) coronary artery (HCC)  Active Problems:    Hyperlipidemia, unspecified    Hypertension, essential    CLL (chronic lymphocytic leukemia) (HCC)      Objective: Intake/ Output: not kept  Weight: bed scales  Tele:     # Inferior STEMI,   No thrombus, PRETTY to D1  pk trop 7 2  Rx: asa 81 mg, Brilinta 90 mg BID, atorvastatin 40 mg   Resting bradycardia precluding beta blocker    Studies- personally reviewed by [facility] 7/2/21:  Oddly no acute thrombus  PRETTY to 80% D1  80% prox diffuse LAD dz    Echocardiogram- pending  LVEF:   LVIDd:  RV:  MR:  PASP:  RVOT:   Other:    # HTN- fosinopril as outpt  # Dyslipidemia- atorvastatin 40 mg   7/2/21: LDL 80, HDL 41, Tri 173  # CLL- follows with heme-onc  acalabrutinib      Plan:  Echo pending-DC today  Resting bradycardia precluding beta blocker  CM for price on Brilinta  Continue atorvastatin    Review of Systems   Constitutional: Negative for activity change, appetite change, fatigue and unexpected weight change  HENT: Negative for congestion and nosebleeds  Eyes: Negative  Respiratory: Negative for cough, chest tightness and shortness of breath  Cardiovascular: Negative for chest pain, palpitations and leg swelling  Gastrointestinal: Negative for abdominal distention  Endocrine: Negative  Genitourinary: Negative  Musculoskeletal: Negative  Skin: Negative  Neurological: Negative for dizziness, syncope and weakness  Hematological: Negative  Psychiatric/Behavioral: Negative  [employer] (day, reason): Negron catheter (day, reason):    Vitals: Blood pressure 125/60, pulse 58, temperature 98 7 °F (37 1 °C), temperature source Oral, resp   rate 18, height 5' 11" (1 803 m), weight 95 7 kg (210 lb 15 7 oz), SpO2 94 %  , Body mass index is 29 43 kg/m² , I/O last 3 completed shifts: In: 0275 [P O :720; I V :990]  Out: 1025 [Urine:1025]  No intake/output data recorded  Wt Readings from Last 3 Encounters:   07/02/21 95 7 kg (210 lb 15 7 oz)   05/27/21 95 7 kg (211 lb)   02/26/21 97 1 kg (214 lb)       Intake/Output Summary (Last 24 hours) at 7/4/2021 0842  Last data filed at 7/4/2021 0500  Gross per 24 hour   Intake 0 ml   Output --   Net 0 ml     I/O last 3 completed shifts: In: 4992 [P O :720; I V :990]  Out: 1025 [Urine:1025]    No significant arrhythmias seen on telemetry review         Physical Exam:  Vitals:    07/03/21 1200 07/03/21 1500 07/03/21 2100 07/04/21 0700   BP: 128/61 123/59 116/58 125/60   BP Location:  Left arm Left arm Right arm   Pulse: 63 56 61 58   Resp: 13 16 18 18   Temp:  98 2 °F (36 8 °C) 98 3 °F (36 8 °C) 98 7 °F (37 1 °C)   TempSrc:  Oral Oral Oral   SpO2: 98% 98% 95% 94%   Weight:       Height:           GEN: Radha Gustafson appears well, alert and oriented x 3, pleasant and cooperative   HEENT: pupils equal, round, and reactive to light; extraocular muscles intact  NECK: supple, no carotid bruits   HEART: regular rhythm, normal S1 and S2, no murmurs, clicks, gallops or rubs, JVP is    LUNGS: clear to auscultation bilaterally; no wheezes, rales, or rhonchi   ABDOMEN: normal bowel sounds, soft, no tenderness, no distention  EXTREMITIES: peripheral pulses normal; no clubbing, cyanosis, or edema  NEURO: no focal findings   SKIN: normal without suspicious lesions on exposed skin      Current Facility-Administered Medications:     Acalabrutinib CAPS 1 capsule, 1 capsule, Oral, BID, ISRRAEL Enriquez    acetaminophen (TYLENOL) tablet 650 mg, 650 mg, Oral, Q4H PRN, ISRRAEL Enriquez    aspirin chewable tablet 81 mg, 81 mg, Oral, Daily, ISRRAEL Enriquez, 81 mg at 07/03/21 0803    atorvastatin (LIPITOR) tablet 40 mg, 40 mg, Oral, Daily With ISRRAEL [place], 40 mg at 07/03/21 1831    clopidogrel (PLAVIX) tablet 75 mg, 75 mg, Oral, Daily, Merritt Brennan DO    co-enzyme Q-10 capsule 100 mg, 100 mg, Oral, Daily, ISRRAEL Farmer, 100 mg at 07/03/21 2757    diazepam (VALIUM) tablet 1 mg, 1 mg, Oral, Once, Jose Guadalupe Pena DO    enoxaparin (LOVENOX) subcutaneous injection 40 mg, 40 mg, Subcutaneous, Q24H [address], ISRRAEL Farmer, 40 mg at 07/03/21 0803    sertraline (ZOLOFT) tablet 75 mg, 75 mg, Oral, Daily, ISRRAEL Farmer, 75 mg at 07/03/21 0803    Insert peripheral IV, , , Once **AND** sodium chloride (PF) 0 9 % injection 3 mL, 3 mL, Intravenous, Q1H PRN, ISRRAEL Beverly      Labs & Results:    Results from last 7 days   Lab Units 07/02/21  1735 07/02/21  1445 07/02/21  1212   TROPONIN I ng/mL 7 16* 1 08* 0 03     Results from last 7 days   Lab Units 07/03/21  0514 07/02/21  1212   WBC Thousand/uL 29 38* 41 18*   HEMOGLOBIN g/dL 13 7 14 9   HEMATOCRIT % 43 3 48 4   PLATELETS Thousands/uL 100* 154         Results from last 7 days   Lab Units 07/03/21  0514 07/02/21  1212   POTASSIUM mmol/L 4 1 4 8   CHLORIDE mmol/L 106 106   CO2 mmol/L 23 24   BUN mg/dL 22 27*   CREATININE mg/dL 1 05 1 34*   CALCIUM mg/dL 8 4 9 1   ALK PHOS U/L 73  --    ALT U/L 36  --    AST U/L 198*  --      Results from last 7 days   Lab Units 07/02/21  1212   INR  1 00         Counseling / Coordination of Care  Total floor / unit time spent today 25 minutes  Greater than 50% of total time was spent with the patient and / or family counseling and / or coordination of care  A description of the counseling / coordination of care: 15      Thank you for the opportunity to participate in the care of this patient    [address] [facility] PULMONARY HYPERTENSION  MEDICAL DIRECTOR OF [facility] Jen Ibanez

## 2021-07-04 NOTE — PLAN OF CARE
Problem: MOBILITY - ADULT  Goal: Maintain or return to baseline ADL function  Description: INTERVENTIONS:  -  Assess patient's ability to carry out ADLs; assess patient's baseline for ADL function and identify physical deficits which impact ability to perform ADLs (bathing, care of mouth/teeth, toileting, grooming, dressing, etc )  - Assess/evaluate cause of self-care deficits   - Assess range of motion  - Assess patient's mobility; develop plan if impaired  - Assess patient's need for assistive devices and provide as appropriate  - Encourage maximum independence but intervene and supervise when necessary  - Involve family in performance of ADLs  - Assess for home care needs following discharge   - Consider OT consult to assist with ADL evaluation and planning for discharge  - Provide patient education as appropriate  Outcome: Progressing  Goal: Maintains/Returns to pre admission functional level  Description: INTERVENTIONS:  - Perform BMAT or MOVE assessment daily    - Set and communicate daily mobility goal to care team and patient/family/caregiver  - Collaborate with rehabilitation services on mobility goals if consulted  - Perform Range of Motion  times a day  - Reposition patient every hours    - Dangle patient  times a day  - Stand patient times a day  - Ambulate patient  times a day  - Out of bed to chair times a day   - Out of bed for meals  times a day  - Out of bed for toileting  - Record patient progress and toleration of activity level   Outcome: Progressing     Problem: CARDIOVASCULAR - ADULT  Goal: Maintains optimal cardiac output and hemodynamic stability  Description: INTERVENTIONS:  - Monitor I/O, vital signs and rhythm  - Monitor for S/S and trends of decreased cardiac output  - Administer and titrate ordered vasoactive medications to optimize hemodynamic stability  - Assess quality of pulses, skin color and temperature  - Assess for signs of decreased coronary artery perfusion  - Instruct patient to report change in severity of symptoms  Outcome: Progressing  Goal: Absence of cardiac dysrhythmias or at baseline rhythm  Description: INTERVENTIONS:  - Continuous cardiac monitoring, vital signs, obtain 12 lead EKG if ordered  - Administer antiarrhythmic and heart rate control medications as ordered  - Monitor electrolytes and administer replacement therapy as ordered  Outcome: Progressing     Problem: Prexisting or High Potential for Compromised Skin Integrity  Goal: Skin integrity is maintained or improved  Description: INTERVENTIONS:  - Identify patients at risk for skin breakdown  - Assess and monitor skin integrity  - Assess and monitor nutrition and hydration status  - Monitor labs   - Assess for incontinence   - Turn and reposition patient  - Assist with mobility/ambulation  - Relieve pressure over bony prominences  - Avoid friction and shearing  - Provide appropriate hygiene as needed including keeping skin clean and dry  - Evaluate need for skin moisturizer/barrier cream  - Collaborate with interdisciplinary team   - Patient/family teaching  - Consider wound care consult   Outcome: Progressing     Problem: Potential for Falls  Goal: Patient will remain free of falls  Description: INTERVENTIONS:  - Educate patient/family on patient safety including physical limitations  - Instruct patient to call for assistance with activity   - Consult OT/PT to assist with strengthening/mobility   - Keep Call bell within reach  - Keep bed low and locked with side rails adjusted as appropriate  - Keep care items and personal belongings within reach  - Initiate and maintain comfort rounds  - Make Fall Risk Sign visible to staff  - Offer Toileting every  Hours, in advance of need  - Initiate/Maintain alarm  - Obtain necessary fall risk management equipment:   - Apply yellow socks and bracelet for high fall risk patients  - Consider moving patient to room near nurses station  Outcome: Progressing

## 2021-07-04 NOTE — DISCHARGE INSTRUCTIONS
1  Please see the post angioplasty discharge instructions  No heavy lifting, greater than 10 lbs  or strenuous activity for 1 week  Follow angioplasty discharge instructions  2 Remove band aid tomorrow  Shower and wash area- wrist gently with soap and water- beginning tomorrow  Rinse and pat dry  Apply new water seal band aid  Repeat this process for 5 days  No powders, creams lotions or antibiotic ointments  for 5 days  No tub baths, hot tubs or swimming for 5 days  3  Call [facility] ([phone]) if you develop a fever, redness or drainage at your wrist access site  4  No driving for 2 days    5  Do not stop aspirin or Plavix (clopiogrel) any reason without a cardiologists consent, or the stent could block up and cause a heart attack  6  Stent card and book  Coronary Intravascular Stent Placement   WHAT YOU SHOULD KNOW:   Coronary intravascular stent placement is a procedure to place a stent in an artery of your heart that has plaque buildup  Plaque is a mixture of fat and cholesterol  A stent is a small mesh tube made of metal that helps keep your artery open  Your caregiver may place a bare metal stent or a drug-eluting stent (PRETTY) in your artery  A PRETTY is coated with medicine that is slowly released and helps prevent more plaque buildup in the area where the stent is placed  The stent remains in your artery for life  You may need more than one stent  AFTER YOU LEAVE:   Medicines: You will be given any of the following:  · Antiplatelets  prevent blood clots from forming  You will need to take aspirin and another type of platelet medicine  Take this medicine daily as directed  Do not stop taking aspirin or other type of antiplatelet medicine  · Nitrates , such as nitroglycerin, relax the arteries of your heart so it gets more oxygen  This medicine helps to relieve chest pain      · Cholesterol medicine  helps decrease the amount of cholesterol in your blood     · Blood pressure medicine  lowers your blood pressure  · Take your medicine as directed  Call your healthcare provider if you think your medicine is not helping or if you have side effects  Tell him if you are allergic to any medicine  Keep a list of the medicines, vitamins, and herbs you take  Include the amounts, and when and why you take them  Bring the list or the pill bottles to follow-up visits  Carry your medicine list with you in case of an emergency  Follow up with your cardiologist as directed:  Write down your questions so you remember to ask them during your visits  Activity:   · Avoid unnecessary stair climbing for 48 hours, if a catheter was put in your groin  · Do not place pressure on your arm, hand, or wrist, if the catheter was placed in your wrist  Avoid pushing, pulling, or heavy lifting with that arm  · If you need to cough, support the area where the catheter was inserted with your hand  · Ask your cardiologist how long you need to limit movement and avoid certain activities  · You may feel like resting more after your procedure  Slowly start to do more each day  Rest when you feel it is needed  Wound care:  Ask your cardiologist about how to care for your incision wound  Ask when you can get into a tub, shower, or pool  Do not smoke: If you smoke, it is never too late to quit  Smoking increases your risk for heart disease and stroke  Ask your cardiologist for information if you need help quitting  Cardiac rehab:  Your cardiologist may recommend that you attend cardiac rehabilitation (rehab)  This is a program run by specialists who will help you safely strengthen your heart and reduce the risk of more heart disease  The plan includes exercise, relaxation, stress management, and heart-healthy nutrition  Caregivers will also check to make sure any medicines you are taking are working  Contact your cardiologist if:   · You have a fever or chills       · You have questions or concerns about your condition or care  Seek care immediately or call 911 if:   · Your leg or arm, used for the procedure, becomes numb or turns white or blue  · The area where the catheter was placed is swollen, red, or has pus or foul-smelling fluid coming from it  · Your arm or leg feels warm, tender, and painful  It may look swollen and red  · You start to bleed from your catheter site again  · You have any of the following signs of a heart attack:     ¨ Squeezing, pressure, fullness, or pain in your chest that lasts longer than a few minutes or returns     ¨ Discomfort or pain in your back, neck, jaw, stomach, or arm    ¨ Shortness of breath or breathing problems    ¨ A sudden cold sweat, lightheadedness, dizziness, or nausea, especially with chest pain or trouble breathing    · You have any of the following signs of a stroke:     ¨ Part of your face droops or is numb    ¨ Weakness in an arm or leg    ¨ Confusion or difficulty speaking    ¨ Dizziness, a severe headache, or vision loss  © 2014 [address] is for End User's use only and may not be sold, redistributed or otherwise used for commercial purposes  All illustrations and images included in CareNotes® are the copyrighted property of [employer]  or [employer]  The above information is an  only  It is not intended as medical advice for individual conditions or treatments  Talk to your doctor, nurse or pharmacist before following any medical regimen to see if it is safe and effective for you  Heart Attack   WHAT YOU NEED TO KNOW:   A heart attack happens when the blood vessels that supply blood to your heart are blocked  This can damage your heart or lead to an abnormal heart rhythm or heart failure  A heart attack is also called a myocardial infarction       DISCHARGE INSTRUCTIONS:   Call your local emergency number (911 in the [address] [place] [address],[address]) for any of the following:   · You have any of the following signs of a heart attack:      ? Squeezing, pressure, or pain in your chest    ? You may  also have any of the following:     § Discomfort or pain in your back, neck, jaw, stomach, or arm    § Shortness of breath    § Nausea or vomiting    § Lightheadedness or a sudden cold sweat      Seek care immediately if:   · You are tired and cannot think clearly  · Your heart is beating faster than usual     · You are bleeding from your gums or nose  · You see blood in your urine or bowel movements  · You urinate less than usual or not at all  · You have new or increased swelling in your feet or ankles  Call your doctor or cardiologist if:   · You have trouble taking your heart medicine  · You have questions or concerns about your condition or care  Medicines: You may  need any of the following:  · Heart medicines  help decrease blood pressure, control your heart rate, and help your heart function better  · Nitroglycerin  opens the arteries to your heart, increases oxygen levels, and can decrease chest pain  You may get your nitroglycerin as a pill, a patch, or a paste  Ask your healthcare provider or cardiologist how to safely take this medicine  · Aspirin  helps prevent clots from forming and causing blood flow problems  If healthcare providers want you to take aspirin daily, do not take acetaminophen or ibuprofen instead  Do not take more or less aspirin than healthcare providers say to take  If you are on another blood thinner medicine, ask your healthcare provider or cardiologist before you take aspirin for any reason  · Antiplatelets , such as aspirin, help prevent blood clots  Take your antiplatelet medicine exactly as directed  These medicines make it more likely for you to bleed or bruise  If you are told to take aspirin, do not take acetaminophen or ibuprofen instead  · Blood thinners  help prevent blood clots   Clots can cause strokes, heart attacks, and death  The following are general safety guidelines to follow while you are taking a blood thinner:    ? Watch for bleeding and bruising while you take blood thinners  Watch for bleeding from your gums or nose  Watch for blood in your urine and bowel movements  Use a soft washcloth on your skin, and a soft toothbrush to brush your teeth  This can keep your skin and gums from bleeding  If you shave, use an electric shaver  Do not play contact sports  ? Tell your dentist and other healthcare providers that you take a blood thinner  Wear a bracelet or necklace that says you take this medicine  ? Do not start or stop any other medicines unless your healthcare provider tells you to  Many medicines cannot be used with blood thinners  ? Take your blood thinner exactly as prescribed by your healthcare provider  Do not skip does or take less than prescribed  Tell your provider right away if you forget to take your blood thinner, or if you take too much  ? Warfarin  is a blood thinner that you may need to take  The following are things you should be aware of if you take warfarin:     § Foods and medicines can affect the amount of warfarin in your blood  Do not make major changes to your diet while you take warfarin  Warfarin works best when you eat about the same amount of vitamin K every day  Vitamin K is found in green leafy vegetables and certain other foods  Ask for more information about what to eat when you are taking warfarin  § You will need to see your healthcare provider for follow-up visits when you are on warfarin  You will need regular blood tests  These tests are used to decide how much medicine you need  · Cholesterol medicine  decreases cholesterol and the amount of plaque in your blood  · Do not take certain medicines without asking your healthcare provider first   These include NSAIDs, herbal or vitamin supplements, or hormones (estrogen or progestin)      · Take your medicine as directed  Contact your healthcare provider if you think your medicine is not helping or if you have side effects  Tell him or her if you are allergic to any medicine  Keep a list of the medicines, vitamins, and herbs you take  Include the amounts, and when and why you take them  Bring the list or the pill bottles to follow-up visits  Carry your medicine list with you in case of an emergency  Cardiac rehabilitation (rehab)  is a program run by specialists who will help you safely strengthen your heart and prevent more heart disease  The plan includes exercise, relaxation, stress management, and heart-healthy nutrition  Healthcare providers will also check to make sure any medicines you take are working  The plan may also include instructions for when you can drive, return to work, and do other normal daily activities  Manage other health conditions:  Diabetes and high cholesterol increases your risk for another heart attack and stroke  Talk to your healthcare provider about your management plan  He or she will make a plan that helps you manage your conditions  Check your blood pressure at home:  High blood pressure can increase your risk for another heart attack  Sit and rest for 5 minutes before you take your blood pressure  Extend your arm and support it on a flat surface  Your arm should be at the same level as your heart  Follow the directions that came with your monitor  If possible, take at least 2 readings each time  Take your blood pressure at least 2 times each day at the same times, such as mornings and evenings  Keep a record of your readings and bring it to your follow-up visits  Ask your healthcare provider what your blood pressure should be  Get a flu vaccine every year as soon as it is available: The vaccine will help prevent the flu  A heart attack will make it harder for you to fight off the flu virus on your own   The flu may also be worse for you than for a person who has not had a heart attack  Ask about other vaccinations you may need  Lifestyle changes you may need to make after a heart attack:   · Follow a heart-healthy diet  A heart-healthy diet is an eating plan low in total fat, unhealthy fats, and sodium (salt)  A heart-healthy diet helps decrease your risk for heart disease and stroke  Limit the amount of fat you eat to 25% to 35% of your total daily calories  Your healthcare provider may recommend the DASH (Dietary Approaches to Stop Hypertension) Eating Plan to help lower high blood pressure and LDL (bad) cholesterol  The plan is low in sodium, sugar, unhealthy fats, and total fat  It is high in potassium, calcium, magnesium, and fiber  Ask for more information about this plan  · Limit sodium (salt) as directed  Too much sodium can affect your fluid balance  Check labels to find low-sodium or no-salt-added foods  Some low-sodium foods use potassium salts for flavor  Too much potassium can also cause health problems  Your healthcare provider will tell you how much sodium and potassium are safe for you to have in a day  He or she may recommend that you limit sodium to 2,300 mg a day  · Do not smoke  Nicotine and other chemicals in cigarettes and cigars can cause lung and heart damage  Ask your healthcare provider for information if you currently smoke and need help to quit  E-cigarettes or smokeless tobacco still contain nicotine  Talk to your healthcare provider before you use these products  · Exercise as directed  Ask your healthcare provider about the best exercise plan for you  Exercise makes your heart stronger, lowers blood pressure, and helps prevent a heart attack  The goal is 30 to 60 minutes a day, 5 to 7 days a week  You may have to work up to this goal  Healthcare providers can help you reach this goal, starting in cardiac rehab sessions  · Maintain a healthy weight  Ask your healthcare provider how much you should weigh   He or she can help you create a safe weight loss plan if you are overweight  · Manage stress  Stress may increase your risk for another heart attack  Learn ways to control stress, such as relaxation, deep breathing, and music  Talk to someone about things that upset you  Follow up with your healthcare provider or cardiologist within 14 days or as directed:  Write down your questions so you remember to ask them during your visits  © Copyright [employer] 2021 Information is for End User's use only and may not be sold, redistributed or otherwise used for commercial purposes  All illustrations and images included in CareNotes® are the copyrighted property of A D A M , Inc  or [facility] Ronald Mcgovern   The above information is an  only  It is not intended as medical advice for individual conditions or treatments  Talk to your doctor, nurse or pharmacist before following any medical regimen to see if it is safe and effective for you

## 2021-07-06 ENCOUNTER — APPOINTMENT (OUTPATIENT)
Dept: LAB | Facility: CLINIC | Age: 76
End: 2021-07-06
Payer: COMMERCIAL

## 2021-07-06 DIAGNOSIS — I21.3 STEMI (ST ELEVATION MYOCARDIAL INFARCTION) (HCC): ICD-10-CM

## 2021-07-06 LAB
ANION GAP SERPL CALCULATED.3IONS-SCNC: 10 MMOL/L (ref 4–13)
BUN SERPL-MCNC: 26 MG/DL (ref 5–25)
CALCIUM SERPL-MCNC: 8 MG/DL (ref 8.3–10.1)
CHLORIDE SERPL-SCNC: 104 MMOL/L (ref 100–108)
CO2 SERPL-SCNC: 26 MMOL/L (ref 21–32)
CREAT SERPL-MCNC: 1.32 MG/DL (ref 0.6–1.3)
ERYTHROCYTE [DISTWIDTH] IN BLOOD BY AUTOMATED COUNT: 14.4 % (ref 11.6–15.1)
GFR SERPL CREATININE-BSD FRML MDRD: 52 ML/MIN/1.73SQ M
GLUCOSE P FAST SERPL-MCNC: 103 MG/DL (ref 65–99)
HCT VFR BLD AUTO: 47.1 % (ref 36.5–49.3)
HGB BLD-MCNC: 14.8 G/DL (ref 12–17)
MCH RBC QN AUTO: 27.8 PG (ref 26.8–34.3)
MCHC RBC AUTO-ENTMCNC: 31.4 G/DL (ref 31.4–37.4)
MCV RBC AUTO: 89 FL (ref 82–98)
PLATELET # BLD AUTO: 94 THOUSANDS/UL (ref 149–390)
PMV BLD AUTO: 10.8 FL (ref 8.9–12.7)
POTASSIUM SERPL-SCNC: 4.5 MMOL/L (ref 3.5–5.3)
RBC # BLD AUTO: 5.32 MILLION/UL (ref 3.88–5.62)
SODIUM SERPL-SCNC: 140 MMOL/L (ref 136–145)
WBC # BLD AUTO: 13.85 THOUSAND/UL (ref 4.31–10.16)

## 2021-07-06 PROCEDURE — 36415 COLL VENOUS BLD VENIPUNCTURE: CPT

## 2021-07-06 PROCEDURE — 85027 COMPLETE CBC AUTOMATED: CPT

## 2021-07-06 PROCEDURE — 80048 BASIC METABOLIC PNL TOTAL CA: CPT

## 2021-07-07 LAB
ATRIAL RATE: 54 BPM
ATRIAL RATE: 60 BPM
P AXIS: 22 DEGREES
P AXIS: 61 DEGREES
PR INTERVAL: 154 MS
PR INTERVAL: 164 MS
QRS AXIS: 18 DEGREES
QRS AXIS: 9 DEGREES
QRSD INTERVAL: 80 MS
QRSD INTERVAL: 84 MS
QT INTERVAL: 430 MS
QT INTERVAL: 444 MS
QTC INTERVAL: 421 MS
QTC INTERVAL: 430 MS
T WAVE AXIS: 26 DEGREES
T WAVE AXIS: 6 DEGREES
VENTRICULAR RATE: 54 BPM
VENTRICULAR RATE: 60 BPM

## 2021-07-07 PROCEDURE — 93010 ELECTROCARDIOGRAM REPORT: CPT | Performed by: INTERNAL MEDICINE

## 2021-07-09 NOTE — UTILIZATION REVIEW
Notification of Discharge   This is a Notification of Discharge from our facility [address]  Please be advised that this patient has been discharge from our facility  Below you will find the admission and discharge date and time including the patients disposition  UTILIZATION REVIEW CONTACT:  Alvina Da Silva  Utilization   Network Utilization Review Department  Phone: [phone] x carefully listen to the prompts  All voicemails are confidential   Email: [email or web address]     PHYSICIAN ADVISORY SERVICES:  FOR [id number] REVIEW - MEDICAL NECESSITY DENIAL  Phone: [phone]  Fax: [phone]  Email: [email or web address]     PRESENTATION DATE: 7/2/2021 12:00 PM  OBERVATION ADMISSION DATE:   INPATIENT ADMISSION DATE: 7/2/21 12:44 PM   DISCHARGE DATE: 7/4/2021 12:27 PM  DISPOSITION: Home/Self Care Home/Self Care      IMPORTANT INFORMATION:  Send all requests for admission clinical reviews, approved or denied determinations and any other requests to dedicated fax number below belonging to the campus where the patient is receiving treatment   List of dedicated fax numbers:  [address] DENIALS (Administrative/Medical Necessity) [phone]   [address] (Maternity/NICU/Pediatrics) [phone]   Jeison Orn [phone]   Yvonne Salter [phone]   Henry Guevara [phone]   Bibi Us [facility] [address] [facility] [phone]   [facility]  [phone]   [address] [facility] [address]  [address] [facility] [address] [facility] [phone]

## 2021-07-14 ENCOUNTER — TELEPHONE (OUTPATIENT)
Dept: HEMATOLOGY ONCOLOGY | Facility: CLINIC | Age: 76
End: 2021-07-14

## 2021-07-14 ENCOUNTER — APPOINTMENT (OUTPATIENT)
Dept: LAB | Facility: CLINIC | Age: 76
End: 2021-07-14
Payer: COMMERCIAL

## 2021-07-14 NOTE — TELEPHONE ENCOUNTER
I spoke with patient  He is not taking  acalabrutinib at present because of recent MI and he is on aspirin and Plavix  Monitoring blood test results for now especially blood counts and they look good  Patient has appointment next month

## 2021-07-29 ENCOUNTER — OFFICE VISIT (OUTPATIENT)
Dept: CARDIOLOGY CLINIC | Facility: CLINIC | Age: 76
End: 2021-07-29
Payer: COMMERCIAL

## 2021-07-29 VITALS
HEART RATE: 70 BPM | SYSTOLIC BLOOD PRESSURE: 128 MMHG | HEIGHT: 71 IN | BODY MASS INDEX: 29.34 KG/M2 | OXYGEN SATURATION: 98 % | DIASTOLIC BLOOD PRESSURE: 70 MMHG | WEIGHT: 209.6 LBS

## 2021-07-29 DIAGNOSIS — I25.10 CORONARY ARTERY DISEASE INVOLVING NATIVE HEART WITHOUT ANGINA PECTORIS, UNSPECIFIED VESSEL OR LESION TYPE: ICD-10-CM

## 2021-07-29 DIAGNOSIS — I10 HYPERTENSION, ESSENTIAL: Chronic | ICD-10-CM

## 2021-07-29 DIAGNOSIS — I25.10 CAD (CORONARY ARTERY DISEASE): ICD-10-CM

## 2021-07-29 DIAGNOSIS — I21.3 STEMI (ST ELEVATION MYOCARDIAL INFARCTION) (HCC): ICD-10-CM

## 2021-07-29 DIAGNOSIS — Z09 HOSPITAL DISCHARGE FOLLOW-UP: Primary | ICD-10-CM

## 2021-07-29 DIAGNOSIS — Z95.5 STENTED CORONARY ARTERY: ICD-10-CM

## 2021-07-29 DIAGNOSIS — E78.5 HYPERLIPIDEMIA, UNSPECIFIED HYPERLIPIDEMIA TYPE: Chronic | ICD-10-CM

## 2021-07-29 DIAGNOSIS — I21.02 ACUTE ST ELEVATION MYOCARDIAL INFARCTION (STEMI) DUE TO OCCLUSION OF DISTAL PORTION OF LEFT ANTERIOR DESCENDING (LAD) CORONARY ARTERY (HCC): ICD-10-CM

## 2021-07-29 PROCEDURE — 3074F SYST BP LT 130 MM HG: CPT | Performed by: INTERNAL MEDICINE

## 2021-07-29 PROCEDURE — 1036F TOBACCO NON-USER: CPT | Performed by: INTERNAL MEDICINE

## 2021-07-29 PROCEDURE — 3078F DIAST BP <80 MM HG: CPT | Performed by: INTERNAL MEDICINE

## 2021-07-29 PROCEDURE — 1160F RVW MEDS BY RX/DR IN RCRD: CPT | Performed by: INTERNAL MEDICINE

## 2021-07-29 PROCEDURE — 3008F BODY MASS INDEX DOCD: CPT | Performed by: INTERNAL MEDICINE

## 2021-07-29 PROCEDURE — 99215 OFFICE O/P EST HI 40 MIN: CPT | Performed by: INTERNAL MEDICINE

## 2021-07-29 RX ORDER — NITROGLYCERIN 0.4 MG/1
0.4 TABLET SUBLINGUAL
Qty: 24 TABLET | Refills: 1 | Status: SHIPPED | OUTPATIENT
Start: 2021-07-29

## 2021-07-29 RX ORDER — CLOPIDOGREL BISULFATE 75 MG/1
75 TABLET ORAL DAILY
Qty: 30 TABLET | Refills: 5 | Status: SHIPPED | OUTPATIENT
Start: 2021-07-29 | End: 2022-01-20 | Stop reason: SDUPTHER

## 2021-07-29 RX ORDER — ATORVASTATIN CALCIUM 40 MG/1
40 TABLET, FILM COATED ORAL
Qty: 30 TABLET | Refills: 1 | Status: SHIPPED | OUTPATIENT
Start: 2021-07-29

## 2021-07-29 RX ORDER — ATORVASTATIN CALCIUM 40 MG/1
40 TABLET, FILM COATED ORAL
Qty: 30 TABLET | Refills: 5 | Status: SHIPPED | OUTPATIENT
Start: 2021-07-29 | End: 2021-07-29 | Stop reason: SDUPTHER

## 2021-07-29 NOTE — LETTER
July 29, 2021     Macarena Humphries, [address]    Patient: Lance Vargas   YOB: 1945   Date of Visit: 7/29/2021       Dear Dr Catrachita Gabriel: Thank you for referring Lance Vargas to me for evaluation  Below are my notes for this consultation  If you have questions, please do not hesitate to call me  I look forward to following your patient along with you  Sincerely,        ISRRAEL Singh        CC: MD Shaggy Staley, [facility]  7/29/2021  3:05 PM  Sign when Signing Visit  Cardiology  MI Follow Up   Office Visit Note  Lance Vargas   77 y o    male   MRN: [id number]  [address]  [address] [place] [id number] [phone]    PCP: Macarena Humphries MD  Cardiologist: will be Dr Reshma Zuniga            Summary of recommendation  Heart healthy diet  Educational information provided  CBC, BMP- have been ordered  Cardiac rehab has been prescribed recommended  Medical adherence to dual antiplatelet therapy reinforced  Follow up will be scheduled with Dr Reshma Zuniga 6 werks        Assessment/plan   Acute ST elevation myocardial infarction (STEMI) due to occlusion of distal portion of left anterior descending (LAD) coronary artery , adm 7/2-7/4/21  No thrombus, PRETTY to D1  pk trop 7 2  Residual 60% dRCA lesion   On aspirin, Plavix ,statin, beta-blocker  Ideally, minimum 1 year   Cardiac rehab has been prescribed and recommended   Adherence to dual antiplatelet therapy reinforced  Sinus bradycardia-a beta-blocker has been avoided  Hypertension, essential   /70  on fosinopril 20 mg daily  Hyperlipidemia, on atorvastatin 40 mg daily; prior to his MI he was on simvastatin  7/2/21:  LDL 80, non    CLL- followed by hem onc  He is currently OFF Calquence, per Dr Lucille Taylor,  given the need for antiplatelet therapy  Last WBC 13 4  Cardiac testing   TTE 7/4/21  EF 55%  Grade 1 DD   No RWMA   RV normal    Right atrium normal size, left atrium upper limit normal   No significant valve disease   Cardiac catheterization 7/2/21  Left main: The vessel was normal sized  There was moderate plaque  There were no obstructive lesions  LAD: The vessel was normal sized  There was a long, irregular lesion of the proximal vessel  There were no other significant lesions  Circumflex: The vessel was normal sized and gave rise to one major OM branch  There was moderate plaque  There were no flow-limiting lesions  RCA: The vessel was normal sized and dominant, giving rise to a dual PDA and two posterolateral branches  There was moderate diffuse plaque  There was a 60% lesion of the distal vessel after the PDA  However, there were no flow-critical  lesions that appeared to be culprits for the patient's STEMI    Diffuse atherosclerotic plaque  An 80% proximal LAD stenosis was the likely culprit for the patient's STEMI  Plan: DAPT, statin, beta-blocker, cardiac rehabilitation              HPI  Sulemacarin Ramesh a 77 y  o  male with essential hypertension, dyslipidemia, CLL  No hx CAD  On acalabrutinib per his CLL  Lifelong nonsmoker, on simvastatin for his lipids    Adm 7/2-7/4/21 Acute MI  Complained of burning chest pain times 2-3 months which he felt was likely indigestion/reflux  On the day of admission, he experienced an acute onset of burning chest pain while working outside his home  His EKG demonstrated inferior ST elevations  She almost she in the emergency room disclosed intermittent junctional bradycardia with rates in the low 50s  He was transferred to the cath lab for urgent angiography  C  Showed an 80% proximal LAD lesion, 60% distal RCA lesion after our PDA  He underwent PCI shows PRETTY to the proximal RCA lesion with a good result was placed on dual antiplatelet therapy with aspirin Brilinta  Statin therapy intensified, placed on atorvastatin  S beta-blocker was held given baseline bradycardia    Given cost of Brilinta, antiplatelet therapy changed to Plavix       7/29/21  Hospital follow-up  Overall he is feeling quite well  We reviewed his coronary anatomy, cath report and echo  His echo showed no wall motion abnormalities /preserved LV function  Troponin went to 7  EKG showed inferior ST-elevation  His catheterization interestingly showed no thrombus  It did show an 80%, proximal diagonal lesion which was successfully stented- felt to be the culprit for his event  He has residual 60% RCA, medically managed    Tolerate dual antiplatelet therapy  Discussed the importance of adherence without interruption  He wonders how long he would need to be on this, given that he is now off his medication for CLL  His white count is the best it has been in a long time  He remains off his medication for about a month or so now, per Dr Urmila Reese  His white count lastly was 13 4, the best it has been    We discussed importance of a heart healthy diet, and cardiac rehab  Cardiac rehab has been prescribed and recommended    I refilled his medications  He has lab work scheduled for Dr Yunior Alonso  next month  I asked him to return in about 6 weeks to establish outpatient care with cardiologist, at the [facility] site        I have spent 40 minutes with Patient  today in which greater than 50% of this time was spent in counseling/coordination of care regarding Patient and family education, Importance of tx compliance and Risk factor reductions    Assessment  Diagnoses and all orders for this visit:    Hospital discharge follow-up    Acute ST elevation myocardial infarction (STEMI) due to occlusion of distal portion of left anterior descending (LAD) coronary artery (HCC)    Coronary artery disease involving native heart without angina pectoris, unspecified vessel or lesion type    Stented coronary artery    Hyperlipidemia, unspecified hyperlipidemia type    Hypertension, essential          Past Medical History:   Diagnosis Date    Anxiety  Hypertension     Leukemia ([facility] [address] )        Review of Systems   Constitutional: Negative for chills  Cardiovascular: Negative for chest pain, claudication, cyanosis, dyspnea on exertion, irregular heartbeat, leg swelling, near-syncope, orthopnea, palpitations, paroxysmal nocturnal dyspnea and syncope  Respiratory: Negative for cough and shortness of breath  Gastrointestinal: Negative for heartburn and nausea  Neurological: Negative for dizziness, focal weakness, headaches, light-headedness and weakness  All other systems reviewed and are negative  Allergies   Allergen Reactions    Sulfa Antibiotics            Current Outpatient Medications:     Acalabrutinib (Calquence) 100 MG CAPS, Take 1 capsule by mouth 2 times a day , Disp: 60 capsule, Rfl: 10    aspirin 81 mg chewable tablet, Chew 1 tablet (81 mg total) daily, Disp: 30 tablet, Rfl: 0    atorvastatin (LIPITOR) 40 mg tablet, Take 1 tablet (40 mg total) by mouth daily with dinner, Disp: 30 tablet, Rfl: 0    clopidogrel (PLAVIX) 75 mg tablet, Take 1 tablet (75 mg total) by mouth daily, Disp: 30 tablet, Rfl: 0    co-enzyme Q-10 50 MG capsule, Take 100 mg by mouth daily, Disp: , Rfl:     fosinopril (MONOPRIL) 20 mg tablet, Take 20 mg by mouth daily, Disp: , Rfl:     magnesium gluconate (MAGONATE) 500 mg tablet, Take 500 mg by mouth daily, Disp: , Rfl:     mometasone (NASONEX) 50 mcg/act nasal spray, 2 sprays into each nostril daily, Disp: , Rfl:     Multiple Vitamin (MULTIVITAMIN) tablet, Take 1 tablet by mouth daily, Disp: , Rfl:     sertraline (ZOLOFT) 50 mg tablet, Take 75 mg by mouth daily  , Disp: , Rfl:         Social History     Socioeconomic History    Marital status: /Civil Union     Spouse name: Not on file    Number of children: Not on file    Years of education: Not on file    Highest education level: Not on file   Occupational History    Not on file   Tobacco Use    Smoking status: Never Smoker    Smokeless tobacco: Never Used   Vaping Use    Vaping Use: Never assessed   Substance and Sexual Activity    Alcohol use: Yes     Comment: minimal    Drug use: No    Sexual activity: Not on file   Other Topics Concern    Not on file   Social History Narrative    Not on file     Social Determinants of Health     Financial Resource Strain:     Difficulty of Paying Living Expenses:    Food Insecurity:     Worried About Running Out of Food in the Last Year:     [address] in the Last Year:    Transportation Needs:     Lack of Transportation (Medical):  Lack of Transportation (Non-Medical):    Physical Activity:     Days of Exercise per Week:     Minutes of Exercise per Session:    Stress:     Feeling of Stress :    Social Connections:     Frequency of Communication with Friends and Family:     Frequency of Social Gatherings with Friends and Family:     Attends [employer] Services:     Active Member of Clubs or Organizations:     Attends Club or Organization Meetings:     Marital Status:    Intimate Partner Violence:     Fear of Current or Ex-Partner:     Emotionally Abused:     Physically Abused:     Sexually Abused:        Family History   Problem Relation Age of Onset    No Known Problems Mother     No Known Problems Father        Physical Exam  Vitals and nursing note reviewed  Constitutional:       General: He is not in acute distress  HENT:      Head: Normocephalic and atraumatic  Eyes:      Conjunctiva/sclera: Conjunctivae normal    Cardiovascular:      Rate and Rhythm: Normal rate and regular rhythm  Pulses: Intact distal pulses  Heart sounds: Normal heart sounds  Pulmonary:      Effort: Pulmonary effort is normal       Breath sounds: Normal breath sounds  Abdominal:      General: Bowel sounds are normal       Palpations: Abdomen is soft  Musculoskeletal:         General: Normal range of motion  Cervical back: Normal range of motion and neck supple     Skin: clinical history, ECG findings and possibly cardiac imaging to establish correct diagnosis  o cTnI 99% cutoff may be suggestive but clearly not indicative of a coronary event without the clinical setting of myocardial ischemia  Results for orders placed during the hospital encounter of 21    Echo complete with contrast if indicated    Narrative  [address] [facility]  [phone]    Transthoracic Echocardiogram  2D, M-mode, Doppler, and Color Doppler    Study date:  2021    Patient: Karen Mesa  MR number: [id number]  Account number: [de-identified]  : 1945  Age: 77 years  Gender: Male  Status: Inpatient  Location: Bedside  Height: 71 in  Weight: 209 7 lb  BP: 125/ 60 mmHg    Indications: MI    Diagnoses: I21 02 - ST elevation (STEMI) myocardial infarction involving left anterior descending coronary artery    Sonographer:  Otilia Kirk RDCS  Primary Physician:  Yael Philippe MD  Referring Physician:  ISRRAEL Owens  Group:  [facility]  Interpreting Physician:  Suarav Acevedo DO    SUMMARY    LEFT VENTRICLE:  Systolic function was normal by visual assessment  Ejection fraction was estimated to be 55 %  Doppler parameters were consistent with abnormal left ventricular relaxation (grade 1 diastolic dysfunction)  HISTORY: PRIOR HISTORY: STEMI, HTN, HLD    PROCEDURE: The procedure was performed at the bedside  This was a routine study  The transthoracic approach was used  The study included complete 2D imaging, M-mode, complete spectral Doppler, and color Doppler  The heart rate was 59 bpm,  at the start of the study  Images were obtained from the parasternal, apical, subcostal, and suprasternal notch acoustic windows  Image quality was adequate  LEFT VENTRICLE: Size was normal  Systolic function was normal by visual assessment  Ejection fraction was estimated to be 55 %   DOPPLER: Doppler parameters were consistent with abnormal left ventricular relaxation (grade 1 diastolic  dysfunction)  RIGHT VENTRICLE: The size was normal  Systolic function was normal     LEFT ATRIUM: Size was at the upper limits of normal     RIGHT ATRIUM: Size was normal     MITRAL VALVE: Valve structure was normal  DOPPLER: There was no significant regurgitation  AORTIC VALVE: The valve was trileaflet  Leaflets exhibited good mobility  DOPPLER: There was no evidence for stenosis  TRICUSPID VALVE: The valve structure was normal  DOPPLER: There was no regurgitation  PULMONIC VALVE: Not well visualized  PERICARDIUM: There was no pericardial effusion  The pericardium was normal in appearance  AORTA: The root exhibited normal size  MEASUREMENT TABLES    DOPPLER MEASUREMENTS  Tricuspid valve   (Reference normals)  RV-RA peak gradient   19 mmHg   (--)    SYSTEM MEASUREMENT TABLES    2D  %FS: 35 06 %  Ao Diam: 3 37 cm  EDV(Teich): 116 17 ml  EF(Teich): 64 15 %  ESV(Teich): 41 65 ml  IVSd: 1 73 cm  LA Diam: 4 56 cm  LAAs A4C: 19 09 cm2  LAESV A-L A4C: 51 54 ml  LAESV MOD A4C: 47 52 ml  LALs A4C: 6 cm  LVEDV MOD A4C: 105 28 ml  LVEF MOD A4C: 67 17 %  LVESV MOD A4C: 34 57 ml  LVIDd: 4 96 cm  LVIDs: 3 22 cm  LVLd A4C: 7 96 cm  LVLs A4C: 6 34 cm  LVPWd: 1 1 cm  RVIDd: 4 05 cm  SV MOD A4C: 70 72 ml  SV(Teich): 74 52 ml    CW  AV Env  Ti: 367 27 ms  AV MaxP 5 mmHg  AV VTI: 28 79 cm  AV Vmax: 1 17 m/s  AV Vmean: 0 78 m/s  AV meanP 84 mmHg  TR MaxP 79 mmHg  TR Vmax: 2 22 m/s    MM  TAPSE: 2 65 cm    PW  E' Sept: 0 08 m/s  E/E' Sept: 11 17  LVOT Env  Ti: 346 34 ms  LVOT VTI: 22 54 cm  LVOT Vmax: 1 03 m/s  LVOT Vmean: 0 65 m/s  LVOT maxP 3 mmHg  LVOT meanP 01 mmHg  MV A Wayne: 0 74 m/s  MV Dec [place]: 5 05 m/s2  MV DecT: 170 03 ms  MV E Wayne: 0 85 m/s  MV E/A Ratio: 1 16    [employer] Accredited Echocardiography Laboratory    Prepared and electronically signed by    Izabel Perez DO  Signed 2021 17:22:19    No results found for this or any previous visit  This note was completed in part utilizing [employer] direct voice recognition software  Grammatical errors, random word insertion, spelling mistakes, and incomplete sentences may be an occasional consequence of the system secondary to software limitations, ambient noise and hardware issues  At the time of dictation, efforts were made to edit, clarify and /or correct errors  Please read the chart carefully and recognize, using context, where substitutions have occurred    If you have any questions or concerns about the context, text or information contained within the body of this dictation, please contact myself, the provider, for further clarification

## 2021-07-29 NOTE — PATIENT INSTRUCTIONS
Mediterranean Diet   AMBULATORY CARE:   A Mediterranean diet  is a meal plan that includes foods that are commonly eaten in countries that border the [place]  This meal plan may provide several health benefits  These include losing or maintaining weight, and decreasing blood pressure, blood sugar, and cholesterol levels  It may also help protect against certain health conditions such as heart disease, cancer, type 2 diabetes, and Alzheimer disease  Work with a dietitian to develop a meal plan that is right for you  Foods to include in the [address] [facility] diet:   · Include fruits and vegetables in each meal   Eat a variety of fresh fruits and vegetables  · Choose whole grains every day  These foods include whole-grain breads, pastas, and cereals  It also includes brown rice, quinoa, and millet  · Use unsaturated fats instead of saturated fats  Cook with olive or canola oil  Limit saturated fats, such as butter, margarine, and shortening  Saturated fat is an unhealthy fat that can increase your cholesterol levels  · Choose plant foods, poultry, and fish as your main sources of protein  ? Eat plant-based foods that provide protein,  such as lentils, beans, chickpeas, nuts, and seeds  Choose mostly plant-based foods in place of meat on most days of the week  ? Eat protein foods high in omega-3 fats  Fish high in omega-3 fats include salmon, trout, and tuna  Include these types of fish 1 or 2 times each week  Limit fish high in mercury, such as shark, swordfish, tilefish, and randy mackerel  Omega-3 fats are also found in walnuts and flaxseed  ? Choose poultry (chicken or turkey)  without skin instead of red meat  Red meat is high in saturated fat  Limit eggs and high-fat meats, such as patel, sausage, and hot dogs  · Choose low-fat dairy foods  such as nonfat or 1% milk, or low-fat almond, cashew, or soy milk   Other examples include low-fat cheese, yogurt, and cottage cheese  · Limit sweets  Limit your intake of high-sugar foods, such as soda, desserts, and candy  · Talk to your healthcare provider about alcohol  Studies have shown that moderate intake of wine may reduce the risk of heart disease  A moderate amount of wine is 1 serving for women and men 65 years and older each day  Two servings is recommended for men 24to 59years of age each day  A serving of wine is 5 ounces  Other things you need to know if you follow the Mediterranean diet:   · Include foods high in iron and vitamin C   Plant-based foods that are high in iron include spinach, beans, tofu, and artichoke  Eat a serving of vitamin C with any iron-rich food to help your body absorb more iron  Examples include oranges, strawberries, cantaloupe, broccoli, and yellow peppers  · Get regular physical activity  The Mediterranean diet will have the most benefit if you get regular physical activity  Get 30 minutes of physical activity at least 5 days a week  Choose physical activities that increase your heart rate  Examples include walking, hiking, swimming, and riding a bike  Ask your healthcare provider about the best exercise plan for you  © Copyright [employer] 2021 Information is for End User's use only and may not be sold, redistributed or otherwise used for commercial purposes  All illustrations and images included in CareNotes® are the copyrighted property of [employer]  or Julián Dutton  The above information is an  only  It is not intended as medical advice for individual conditions or treatments  Talk to your doctor, nurse or pharmacist before following any medical regimen to see if it is safe and effective for you

## 2021-07-29 NOTE — PROGRESS NOTES
Cardiology  MI Follow Up   Office Visit Note  Tennis David   77 y o    male   MRN: [id number]  [address]  [address] [place] [id number] [phone]    PCP: Aj Gorman MD  Cardiologist: will be Dr Loida Wolf            Summary of recommendation  Heart healthy diet  Educational information provided  CBC, BMP- have been ordered  Cardiac rehab has been prescribed recommended  Medical adherence to dual antiplatelet therapy reinforced  Follow up will be scheduled with Dr Loida Wolf 6 werks        Assessment/plan   Acute ST elevation myocardial infarction (STEMI) due to occlusion of distal portion of left anterior descending (LAD) coronary artery , adm 7/2-7/4/21  No thrombus, PRETTY to D1  pk trop 7 2  Residual 60% dRCA lesion   On aspirin, Plavix ,statin, beta-blocker  Ideally, minimum 1 year   Cardiac rehab has been prescribed and recommended   Adherence to dual antiplatelet therapy reinforced  Sinus bradycardia-a beta-blocker has been avoided  Hypertension, essential   /70  on fosinopril 20 mg daily  Hyperlipidemia, on atorvastatin 40 mg daily; prior to his MI he was on simvastatin  7/2/21:  LDL 80, non    CLL- followed by hem onc  He is currently OFF Calquence, per Dr Nino Bower,  given the need for antiplatelet therapy  Last WBC 13 4  Cardiac testing   TTE 7/4/21  EF 55%  Grade 1 DD   No RWMA  RV normal    Right atrium normal size, left atrium upper limit normal   No significant valve disease   Cardiac catheterization 7/2/21  Left main: The vessel was normal sized  There was moderate plaque  There were no obstructive lesions  LAD: The vessel was normal sized  There was a long, irregular lesion of the proximal vessel  There were no other significant lesions  Circumflex: The vessel was normal sized and gave rise to one major OM branch  There was moderate plaque  There were no flow-limiting lesions    RCA: The vessel was normal sized and dominant, giving rise to a dual PDA and two posterolateral branches  There was moderate diffuse plaque  There was a 60% lesion of the distal vessel after the PDA  However, there were no flow-critical  lesions that appeared to be culprits for the patient's STEMI    Diffuse atherosclerotic plaque  An 80% proximal LAD stenosis was the likely culprit for the patient's STEMI  Plan: DAPT, statin, beta-blocker, cardiac rehabilitation              HPI  Seven Negron a 77 y  o  male with essential hypertension, dyslipidemia, CLL  No hx CAD  On acalabrutinib per his CLL  Lifelong nonsmoker, on simvastatin for his lipids    Adm 7/2-7/4/21 Acute MI  Complained of burning chest pain times 2-3 months which he felt was likely indigestion/reflux  On the day of admission, he experienced an acute onset of burning chest pain while working outside his home  His EKG demonstrated inferior ST elevations  She almost she in the emergency room disclosed intermittent junctional bradycardia with rates in the low 50s  He was transferred to the cath lab for urgent angiography  LHC  Showed an 80% proximal LAD lesion, 60% distal RCA lesion after our PDA  He underwent PCI shows PRETTY to the proximal RCA lesion with a good result was placed on dual antiplatelet therapy with aspirin Brilinta  Statin therapy intensified, placed on atorvastatin  S beta-blocker was held given baseline bradycardia  Given cost of Brilinta, antiplatelet therapy changed to Plavix       7/29/21  Hospital follow-up  Overall he is feeling quite well  We reviewed his coronary anatomy, cath report and echo  His echo showed no wall motion abnormalities /preserved LV function  Troponin went to 7  EKG showed inferior ST-elevation  His catheterization interestingly showed no thrombus  It did show an 80%, proximal diagonal lesion which was successfully stented- felt to be the culprit for his event     He has residual 60% RCA, medically managed    Tolerate dual antiplatelet therapy  Discussed the importance of adherence without interruption  He wonders how long he would need to be on this, given that he is now off his medication for CLL  His white count is the best it has been in a long time  He remains off his medication for about a month or so now, per Dr Patterson Borne  His white count lastly was 13 4, the best it has been    We discussed importance of a heart healthy diet, and cardiac rehab  Cardiac rehab has been prescribed and recommended    I refilled his medications  He has lab work scheduled for Dr Luis Rios  next month  I asked him to return in about 6 weeks to establish outpatient care with cardiologist, at the [facility] site        I have spent 40 minutes with Patient  today in which greater than 50% of this time was spent in counseling/coordination of care regarding Patient and family education, Importance of tx compliance and Risk factor reductions  Assessment  Diagnoses and all orders for this visit:    Hospital discharge follow-up    Acute ST elevation myocardial infarction (STEMI) due to occlusion of distal portion of left anterior descending (LAD) coronary artery (HCC)    Coronary artery disease involving native heart without angina pectoris, unspecified vessel or lesion type    Stented coronary artery    Hyperlipidemia, unspecified hyperlipidemia type    Hypertension, essential          Past Medical History:   Diagnosis Date    Anxiety     Hypertension     Leukemia ([facility] [address] )        Review of Systems   Constitutional: Negative for chills  Cardiovascular: Negative for chest pain, claudication, cyanosis, dyspnea on exertion, irregular heartbeat, leg swelling, near-syncope, orthopnea, palpitations, paroxysmal nocturnal dyspnea and syncope  Respiratory: Negative for cough and shortness of breath  Gastrointestinal: Negative for heartburn and nausea  Neurological: Negative for dizziness, focal weakness, headaches, light-headedness and weakness     All other systems reviewed and are negative  Allergies   Allergen Reactions    Sulfa Antibiotics            Current Outpatient Medications:     Acalabrutinib (Calquence) 100 MG CAPS, Take 1 capsule by mouth 2 times a day , Disp: 60 capsule, Rfl: 10    aspirin 81 mg chewable tablet, Chew 1 tablet (81 mg total) daily, Disp: 30 tablet, Rfl: 0    atorvastatin (LIPITOR) 40 mg tablet, Take 1 tablet (40 mg total) by mouth daily with dinner, Disp: 30 tablet, Rfl: 0    clopidogrel (PLAVIX) 75 mg tablet, Take 1 tablet (75 mg total) by mouth daily, Disp: 30 tablet, Rfl: 0    co-enzyme Q-10 50 MG capsule, Take 100 mg by mouth daily, Disp: , Rfl:     fosinopril (MONOPRIL) 20 mg tablet, Take 20 mg by mouth daily, Disp: , Rfl:     magnesium gluconate (MAGONATE) 500 mg tablet, Take 500 mg by mouth daily, Disp: , Rfl:     mometasone (NASONEX) 50 mcg/act nasal spray, 2 sprays into each nostril daily, Disp: , Rfl:     Multiple Vitamin (MULTIVITAMIN) tablet, Take 1 tablet by mouth daily, Disp: , Rfl:     sertraline (ZOLOFT) 50 mg tablet, Take 75 mg by mouth daily  , Disp: , Rfl:         Social History     Socioeconomic History    Marital status: /Civil Union     Spouse name: Not on file    Number of children: Not on file    Years of education: Not on file    Highest education level: Not on file   Occupational History    Not on file   Tobacco Use    Smoking status: Never Smoker    Smokeless tobacco: Never Used   Vaping Use    Vaping Use: Never assessed   Substance and Sexual Activity    Alcohol use: Yes     Comment: minimal    Drug use: No    Sexual activity: Not on file   Other Topics Concern    Not on file   Social History Narrative    Not on file     Social Determinants of Health     Financial Resource Strain:     Difficulty of Paying Living Expenses:    Food Insecurity:     Worried About Running Out of Food in the Last Year:     Ran Out of Food in the Last Year:    Transportation Needs:     Lack of Transportation (Medical):  Lack of Transportation (Non-Medical):    Physical Activity:     Days of Exercise per Week:     Minutes of Exercise per Session:    Stress:     Feeling of Stress :    Social Connections:     Frequency of Communication with Friends and Family:     Frequency of Social Gatherings with Friends and Family:     Attends Taoist Services:     Active Member of Clubs or Organizations:     Attends Club or Organization Meetings:     Marital Status:    Intimate Partner Violence:     Fear of Current or Ex-Partner:     Emotionally Abused:     Physically Abused:     Sexually Abused:        Family History   Problem Relation Age of Onset    No Known Problems Mother     No Known Problems Father        Physical Exam  Vitals and nursing note reviewed  Constitutional:       General: He is not in acute distress  HENT:      Head: Normocephalic and atraumatic  Eyes:      Conjunctiva/sclera: Conjunctivae normal    Cardiovascular:      Rate and Rhythm: Normal rate and regular rhythm  Pulses: Intact distal pulses  Heart sounds: Normal heart sounds  Pulmonary:      Effort: Pulmonary effort is normal       Breath sounds: Normal breath sounds  Abdominal:      General: Bowel sounds are normal       Palpations: Abdomen is soft  Musculoskeletal:         General: Normal range of motion  Cervical back: Normal range of motion and neck supple  Skin:     General: Skin is warm and dry  Neurological:      Mental Status: He is alert and oriented to person, place, and time  Vitals: There were no vitals taken for this visit     Wt Readings from Last 3 Encounters:   07/02/21 95 7 kg (210 lb 15 7 oz)   05/27/21 95 7 kg (211 lb)   02/26/21 97 1 kg (214 lb)         Labs & Results:  Lab Results   Component Value Date    WBC 13 41 (H) 07/14/2021    HGB 14 8 07/14/2021    HCT 48 6 07/14/2021    MCV 91 07/14/2021     (L) 07/14/2021     No results found for: BNP  No components found for: CHEM  Troponin I   Date Value Ref Range Status   07/02/2021 7 16 (H) <=0 04 ng/mL Final     Comment:     [employer] Chemistry analyzer 99% cutoff is > 0 04 ng/mL in network labs     o cTnI 99% cutoff is useful only when applied to patients in the clinical setting of myocardial ischemia   o cTnI 99% cutoff should be interpreted in the context of clinical history, ECG findings and possibly cardiac imaging to establish correct diagnosis  o cTnI 99% cutoff may be suggestive but clearly not indicative of a coronary event without the clinical setting of myocardial ischemia      07/02/2021 1 08 (H) <=0 04 ng/mL Final     Comment:     S[employer]ens Chemistry analyzer 99% cutoff is > 0 04 ng/mL in network labs     o cTnI 99% cutoff is useful only when applied to patients in the clinical setting of myocardial ischemia   o cTnI 99% cutoff should be interpreted in the context of clinical history, ECG findings and possibly cardiac imaging to establish correct diagnosis  o cTnI 99% cutoff may be suggestive but clearly not indicative of a coronary event without the clinical setting of myocardial ischemia      07/02/2021 0 03 <=0 04 ng/mL Final     Comment:     Siemens Chemistry analyzer 99% cutoff is > 0 04 ng/mL in network labs     o cTnI 99% cutoff is useful only when applied to patients in the clinical setting of myocardial ischemia   o cTnI 99% cutoff should be interpreted in the context of clinical history, ECG findings and possibly cardiac imaging to establish correct diagnosis  o cTnI 99% cutoff may be suggestive but clearly not indicative of a coronary event without the clinical setting of myocardial ischemia         Results for orders placed during the hospital encounter of 07/02/21    Echo complete with contrast if indicated    Narrative  [facility] [facility]  [phone]    Transthoracic Echocardiogram  2D, M-mode, Doppler, and Color Doppler    Study date: 2021    Patient: Juliana Serrano  MR number: [id number]  Account number: [de-identified]  : 1945  Age: 77 years  Gender: Male  Status: Inpatient  Location: Bedside  Height: 71 in  Weight: 209 7 lb  BP: 125/ 60 mmHg    Indications: MI    Diagnoses: I21 02 - ST elevation (STEMI) myocardial infarction involving left anterior descending coronary artery    Sonographer:  Dina Park RDCS  Primary Physician:  Nieves Pérez MD  Referring Physician:  ISRRAEL Sol  Group:  [id number]  Interpreting Physician:  Yann Andrade DO    SUMMARY    LEFT VENTRICLE:  Systolic function was normal by visual assessment  Ejection fraction was estimated to be 55 %  Doppler parameters were consistent with abnormal left ventricular relaxation (grade 1 diastolic dysfunction)  HISTORY: PRIOR HISTORY: STEMI, HTN, HLD    PROCEDURE: The procedure was performed at the bedside  This was a routine study  The transthoracic approach was used  The study included complete 2D imaging, M-mode, complete spectral Doppler, and color Doppler  The heart rate was 59 bpm,  at the start of the study  Images were obtained from the parasternal, apical, subcostal, and suprasternal notch acoustic windows  Image quality was adequate  LEFT VENTRICLE: Size was normal  Systolic function was normal by visual assessment  Ejection fraction was estimated to be 55 %  DOPPLER: Doppler parameters were consistent with abnormal left ventricular relaxation (grade 1 diastolic  dysfunction)  RIGHT VENTRICLE: The size was normal  Systolic function was normal     LEFT ATRIUM: Size was at the upper limits of normal     RIGHT ATRIUM: Size was normal     MITRAL VALVE: Valve structure was normal  DOPPLER: There was no significant regurgitation  AORTIC VALVE: The valve was trileaflet  Leaflets exhibited good mobility  DOPPLER: There was no evidence for stenosis      TRICUSPID VALVE: The valve structure was normal  DOPPLER: There was no regurgitation  PULMONIC VALVE: Not well visualized  PERICARDIUM: There was no pericardial effusion  The pericardium was normal in appearance  AORTA: The root exhibited normal size  MEASUREMENT TABLES    DOPPLER MEASUREMENTS  Tricuspid valve   (Reference normals)  RV-RA peak gradient   19 mmHg   (--)    SYSTEM MEASUREMENT TABLES    2D  %FS: 35 06 %  Ao Diam: 3 37 cm  EDV(Teich): 116 17 ml  EF(Teich): 64 15 %  ESV(Teich): 41 65 ml  IVSd: 1 73 cm  LA Diam: 4 56 cm  LAAs A4C: 19 09 cm2  LAESV A-L A4C: 51 54 ml  LAESV MOD A4C: 47 52 ml  LALs A4C: 6 cm  LVEDV MOD A4C: 105 28 ml  LVEF MOD A4C: 67 17 %  LVESV MOD A4C: 34 57 ml  LVIDd: 4 96 cm  LVIDs: 3 22 cm  LVLd A4C: 7 96 cm  LVLs A4C: 6 34 cm  LVPWd: 1 1 cm  RVIDd: 4 05 cm  SV MOD A4C: 70 72 ml  SV(Teich): 74 52 ml    CW  AV Env  Ti: 367 27 ms  AV MaxP 5 mmHg  AV VTI: 28 79 cm  AV Vmax: 1 17 m/s  AV Vmean: 0 78 m/s  AV meanP 84 mmHg  TR MaxP 79 mmHg  TR Vmax: 2 22 m/s    MM  TAPSE: 2 65 cm    PW  E' Sept: 0 08 m/s  E/E' Sept: 11 17  LVOT Env  Ti: 346 34 ms  LVOT VTI: 22 54 cm  LVOT Vmax: 1 03 m/s  LVOT Vmean: 0 65 m/s  LVOT maxP 3 mmHg  LVOT meanP 01 mmHg  MV A Wayne: 0 74 m/s  MV Dec Ralls: 5 05 m/s2  MV DecT: 170 03 ms  MV E Wayne: 0 85 m/s  MV E/A Ratio: 1 16    [employer] Accredited Echocardiography Laboratory    Prepared and electronically signed by    Aria Tapia DO  Signed 2021 17:22:19    No results found for this or any previous visit  This note was completed in part utilizing [employer] direct voice recognition software  Grammatical errors, random word insertion, spelling mistakes, and incomplete sentences may be an occasional consequence of the system secondary to software limitations, ambient noise and hardware issues  At the time of dictation, efforts were made to edit, clarify and /or correct errors    Please read the chart carefully and recognize, using context, where substitutions have occurred    If you have any questions or concerns about the context, text or information contained within the body of this dictation, please contact myself, the provider, for further clarification

## 2021-08-13 ENCOUNTER — APPOINTMENT (OUTPATIENT)
Dept: LAB | Facility: CLINIC | Age: 76
End: 2021-08-13
Payer: COMMERCIAL

## 2021-08-17 ENCOUNTER — IMMUNIZATIONS (OUTPATIENT)
Dept: FAMILY MEDICINE CLINIC | Facility: HOSPITAL | Age: 76
End: 2021-08-17

## 2021-08-17 DIAGNOSIS — Z23 ENCOUNTER FOR IMMUNIZATION: Primary | ICD-10-CM

## 2021-08-17 PROCEDURE — 91301 SARS-COV-2 / COVID-19 MRNA VACCINE (MODERNA) 100 MCG: CPT

## 2021-08-17 PROCEDURE — 0012A SARS-COV-2 / COVID-19 MRNA VACCINE (MODERNA) 100 MCG: CPT

## 2021-08-25 ENCOUNTER — TELEPHONE (OUTPATIENT)
Dept: HEMATOLOGY ONCOLOGY | Facility: CLINIC | Age: 76
End: 2021-08-25

## 2021-08-25 NOTE — TELEPHONE ENCOUNTER
Patient agreed to convert his follow up appointment with Vish Christianson PA-C on Friday 8/27/2021 @ 9:00 AM to a virtual visit via [address] [place] [address]      Mobile # [phone]

## 2021-08-27 ENCOUNTER — TELEMEDICINE (OUTPATIENT)
Dept: HEMATOLOGY ONCOLOGY | Facility: CLINIC | Age: 76
End: 2021-08-27
Payer: COMMERCIAL

## 2021-08-27 ENCOUNTER — TELEPHONE (OUTPATIENT)
Dept: HEMATOLOGY ONCOLOGY | Facility: CLINIC | Age: 76
End: 2021-08-27

## 2021-08-27 DIAGNOSIS — C91.10 CLL (CHRONIC LYMPHOCYTIC LEUKEMIA) (HCC): Primary | ICD-10-CM

## 2021-08-27 PROCEDURE — 99214 OFFICE O/P EST MOD 30 MIN: CPT | Performed by: PHYSICIAN ASSISTANT

## 2021-08-27 NOTE — PROGRESS NOTES
Virtual Regular Visit    Verification of patient location:    Patient is located in the following state in which I hold an active license [place]      Assessment/Plan:    Problem List Items Addressed This Visit     None               Reason for visit is No chief complaint on file  Encounter provider Bruce Carlson PA-C    Provider located at [address] [address] [phone]      Recent Visits  No visits were found meeting these conditions  Showing recent visits within past 7 days and meeting all other requirements  Future Appointments  No visits were found meeting these conditions  Showing future appointments within next 150 days and meeting all other requirements       The patient was identified by name and date of birth  Inga Dillard was informed that this is a telemedicine visit and that the visit is being conducted through [address] [place] [address] [facility] [employer] and patient was informed that this is a secure, HIPAA-compliant platform  He agrees to proceed     My office door was closed  No one else was in the room  He acknowledged consent and understanding of privacy and security of the video platform  The patient has agreed to participate and understands they can discontinue the visit at any time  Patient is aware this is a billable service  Subjective  Inga Dillard is a 77 y o  male presents for follow up for CLL  Oncology History Overview Note   chronic lymphocytic leukemia, diagnosed in August 2017  CLL has mixed good and bad prognostic features, 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, IgVH mutation, lack of CD38 expression, deletion of 13 q14 in 6% and no 11 q deletion        There was no trisomy 12   Lymphocytes were CD5 and CD23 positive, dim kappa expression and moderate CD20 expression had splenomegaly on CT scan but not on palpation     CLL (chronic lymphocytic leukemia) ([facility] [address] )   3/27/2018 Initial Diagnosis    CLL (chronic lymphocytic leukemia) [facility])       77year old male presents for follow up for history of CLL     Patient WBC was increasing and platelet count remained decreased  Patient was seen by Dr Casey Spicer on 5/29/20 and advised to begin treatment  Patient was started on acalabrutinib 100 mg PO daily       Since beginning treatment in June 2020 patient has had significant improvement in his energy  He feels better since being on this medication  Interval history: On 07/02/2021 patient was admitted to [address] [place] [address] for an inferior STEMI  He was having burning chest pain  He states he was having intermittent burning chest pain occasionally when walking his dog  He states this was even happening prior to being on acalabrutinib  Her underwent cardiac catheterization which demonstrated 80% lesion in the proximal LAD and 60% distal RCA lesion after the RPDA  He underwent PCI/ PRETTY x1 to the proximal RCA lesion with 0% residual stenosis  He was initiated on dual anti-platelet therapy with aspirin and Brilinta, high-intensity statin and beta-blocker was held due to sinus bradycardia  Patient cost was barrier for Brilinta so he is on Plavix  He underwent a transesophageal echo which showed 55% EF, grade 1 DD, RV normal size and systolic function with no valvular disease      Past Medical History:   Diagnosis Date    Anxiety     Hypertension     Leukemia ([address] )        Past Surgical History:   Procedure Laterality Date    APPENDECTOMY      COLONOSCOPY      TONSILLECTOMY         Current Outpatient Medications   Medication Sig Dispense Refill    aspirin 81 mg chewable tablet Chew 1 tablet (81 mg total) daily 30 tablet 0    atorvastatin (LIPITOR) 40 mg tablet Take 1 tablet (40 mg total) by mouth daily with dinner 30 tablet 1    clopidogrel (PLAVIX) 75 mg tablet Take 1 tablet (75 mg total) by mouth daily 30 tablet 5    co-enzyme Q-10 50 MG capsule Take 100 mg by mouth daily      fosinopril (MONOPRIL) 20 mg tablet Take 20 mg by mouth daily  magnesium gluconate (MAGONATE) 500 mg tablet Take 500 mg by mouth daily      mometasone (NASONEX) 50 mcg/act nasal spray 2 sprays into each nostril daily      Multiple Vitamin (MULTIVITAMIN) tablet Take 1 tablet by mouth daily      nitroglycerin (NITROSTAT) 0 4 mg SL tablet Place 1 tablet (0 4 mg total) under the tongue every 5 (five) minutes as needed for chest pain 24 tablet 1    sertraline (ZOLOFT) 50 mg tablet Take 75 mg by mouth daily         No current facility-administered medications for this visit  Allergies   Allergen Reactions    Sulfa Antibiotics        Review of Systems   Constitutional: Negative for appetite change, chills, fatigue, fever and unexpected weight change  Respiratory: Negative for cough and shortness of breath  Cardiovascular: Negative for chest pain, palpitations and leg swelling  Gastrointestinal: Negative for abdominal pain, constipation, diarrhea, nausea and vomiting  Genitourinary: Negative for difficulty urinating, dysuria and hematuria  Musculoskeletal: Negative for arthralgias  Skin: Negative  Neurological: Negative for dizziness, weakness, light-headedness, numbness and headaches  Hematological: Negative  Psychiatric/Behavioral: Negative  Video Exam    There were no vitals filed for this visit  Physical Exam  Constitutional:       General: He is not in acute distress  Appearance: Normal appearance  He is well-developed  He is not ill-appearing  HENT:      Head: Normocephalic and atraumatic  Eyes:      General: No scleral icterus  Pulmonary:      Effort: Pulmonary effort is normal  No respiratory distress  Abdominal:      Comments: Denies abdominal pain   Musculoskeletal:         General: Normal range of motion  Cervical back: Normal range of motion  Skin:     General: Skin is warm and dry  Neurological:      Mental Status: He is alert and oriented to person, place, and time     Psychiatric:         Mood and Affect: Mood normal          Behavior: Behavior normal         1  CLL (chronic lymphocytic leukemia) [facility])  71-year-old male presents for follow-up regarding history of CLL  He was presently being treated with acalabrutinib but this was discontinued after admission to the hospital for a STEMI requiring catheterization with stenting  Patient's CBC remains normal   He states he feels very well  He will be starting cardiac rehab  He will continue to hold medication  He was told that possibly a thrombosis caused his myocardial infarction  I do not see notes regarding this  Would have to confer with Cardiology  He has upcoming appointment in September  At this time he will continue to hold therapy and have close monitoring of labs  Follow up in 1-2 months     - CBC and differential; Standing  - Comprehensive metabolic panel; Standing  - IgG; Standing  - LD,Blood; Standing  - CBC and differential  - Comprehensive metabolic panel  - IgG  - LD,Blood    I spent 27 minutes directly with the patient during this visit    VIRTUAL VISIT DISCLAIMER      Jonelle Reina verbally agrees to participate in [employer]  Pt is aware that [employer] could be limited without vital signs or the ability to perform a full hands-on physical Che Chacko understands he or the provider may request at any time to terminate the video visit and request the patient to seek care or treatment in person

## 2021-08-27 NOTE — TELEPHONE ENCOUNTER
A call was placed to [facility] following his virtual appointment with Thurmond Osler,  A voice message was left with a 3m f/u with Dr Jeannie Ramírez and labs has been scheduled for 12/3/21 9am in the [place] office, If this date/time is inconvenient, please call the office to reschedule

## 2021-08-31 ENCOUNTER — CLINICAL SUPPORT (OUTPATIENT)
Dept: CARDIAC REHAB | Age: 76
End: 2021-08-31
Payer: COMMERCIAL

## 2021-08-31 DIAGNOSIS — I21.3 STEMI (ST ELEVATION MYOCARDIAL INFARCTION) (HCC): ICD-10-CM

## 2021-08-31 DIAGNOSIS — I21.19 ST ELEVATION MYOCARDIAL INFARCTION (STEMI) INVOLVING OTHER CORONARY ARTERY OF INFERIOR WALL (HCC): ICD-10-CM

## 2021-08-31 PROCEDURE — 93797 PHYS/QHP OP CAR RHAB WO ECG: CPT

## 2021-08-31 NOTE — PROGRESS NOTES
Cardiac Rehabilitation Plan of Care   Initial Care Plan          Today's date: 2021   # of Exercise Sessions Completed: Initial Evaluation Today  Patient name: Patrick Goldsmith      : 1945  Age: 77 y o  MRN: [id number]  Referring Physician: Laura Cloud*  Cardiologist: Salomon Calixto MD  Provider: Emily Zheng  Clinician: Alyce Vizcaino, MS, CEP, CCRP      Dx:   Encounter Diagnosis   Name Primary?  STEMI (ST elevation myocardial infarction) [facility])      Date of onset: 21      SUMMARY OF PROGRESS:  Today is Holden's initial evaluation to begin Cardiac Rehab post inferior STEMI, PRETTY x 1 to D1  The patient does currently follow a formal exercise program at home, including walking 45-60 mins 2-3 times/wk  He has resumed all ADLs without limitations and taking breaks when needed  States he feels better then he has in years  He has remained free of angina  Carries NTG, understands is use  Has not used since d/c  Depression screening using the PHQ-9 interprets the patient's score 0 =No Depression  JAYME-7 screening tool for anxiety suggests 0-4  = Not anxious  When addressed, the patient admits to having a long history of anxiety treated with Sertraline  Patient reports excellent social/emotional support  Information to begin using [employer] was provided as well as contact information for counseling through [employer]  PHQ-9 score will be reassessed in 30 days  The patient is a non-smoker  Patient admits to 100% medication compliance  The patient completed an initial submaximal TM ETT  The patient completed 2 minutes of stage 2 (3 1METs) with test termination of RHR +30  Resting  /64 with appropriate hemodynamic response to exercise reaching 162/70  Patient denied symptoms during exercise  Telemetry revealed NSR, no ectopy    Patient was counseled on exercise guidelines to achieve a minimum of 150 mins/wk of moderate intensity (RPE 4-6) exercise and encouraged to add 1-2 days of exercise on opposite days of cardiac rehab as tolerated  We discussed current dietary habits and goals of heart healthy eating for lipid management and weight loss  Patient's goals include: wt loss goal of 200lbs, improved health, return to consistent exercise regimen   The patient's CAD risk factors include:  obesity/overweight, hypertension and hyperlipidemia  His education will focus on lifestyle modification/education specific to His needs  Patient will attend group education classes on heart healthy eating, reading food labels, stress management, risk factor reduction, understanding heart disease and common heart medications  Patient will attend 35 monitored exercise sessions, 3x/wk for 12-18 weeks beginning Sept  3, 2021        Medication compliance: Yes   Comments: Pt reports to be compliant with medications  Fall Risk: Low   Comments: Ambulates with a steady gait with no assist device and Denies a fall in the past 6 months    EKG Interpretation: NSR      EXERCISE ASSESSMENT and PLAN    Current Exercise Program in Rehab:       Frequency: 3 days/week Supplement with home exercise 2+ days/wk as tolerated       Minutes: 30-40         METS: 2 0-3 8            HR: 90 - 125   RPE: 4-5         Modalities: Treadmill, UBE, NuStep and Recumbent bike      Exercise Progression 30 Day Goals :    Frequency: 3 days/week of cardiac rehab     Supplement with home exercise 2+ days/wk as tolerated    Minutes: 40                            >150 mins/wk of moderate intensity exercise   METS: 2 5 - 4 0   HR: 100-125    RPE: 4-5   Modalities: Treadmill, Airdyne bike, UBE, Lifecycle, Rower and Recumbent bike    Strength trainin-3 days / week  12-15 repetitions  1-2 sets per modality    Modalities: Leg Press, Chest Press, Pull Downs and Lateral Raise    Home Exercise: Type: walking, Frequency: 2-3 days/week, Duration: 45-60 mins    Goals: 10% improvement in functional capacity - based on max METs achieved in fitness assessment, improved DASI score by 10%, Increase in exercise capacity by 40% - based on peak METs tolerated in cardiac rehab exercise session, Exercise 5 days/wk, >150mins/wk of moderate intensity exercise, Resume ADLs with increased strength, Attend Rehab regularly and Start a walking program    Progression Toward Goals:  Reviewed Pt goals and determined plan of care    Education: benefit of exercise for CAD risk factors, AHA guidelines to achieve >150 mins/wk of moderate exercise and RPE scale   Plan:education on home exercise guidelines, home exercise 30+ mins 2 days opposite CR and Education class: Risk Factors for Heart Disease  Readiness to change: Preparation:  (Getting ready to change)       NUTRITION ASSESSMENT AND PLAN    Weight control:    Starting weight: 212 4   Current weight:     Waist circumference:    Startin   Current:      Diabetes: N/A  A1c: 5 5    last measured: 21    Lipid management: Discussed diet and lipid management and Last lipid profile 21  Chol 156    HDL 41  LDL 80    Goals:reduced BMI to < 25, HDL >40, TRG <150, reduced waist circumference <40 inches (M), Improved Rate Your Plate score  >48, Wt  loss 1-2 ppw,  goal of 200 lbs  , reduce portion sizes of meat to 3oz or less, increase intake of meatless meals, reduce cheese intake or use reduced-fat, Eat 4-5 cups of fruits and vegetables daily, use fat-free dressings/rm or seldom use, choose healthy snacks: light popcorn, plain pretzels, eliminate or choose low-fat sweets and daily saturated fat intake <7%/13g    Progression Toward Goals: Reviewed Pt goals and determined plan of care    Education: heart healthy eating  low sodium diet  nutrition for  lipid management  wt  loss   Plan: Education class: Reading Food Labels, Education Class: Heart Healthy Eating, replace refined grain bread with whole grain bread, reduce red meat 1x/wk, switch to skim or 1% milk, avoid processed foods, drink more water, learn how to read food labels, replace sugar with stevia or truvia and keep added daily sugar <25g/day  Readiness to change: Action:  (Changing behavior)      PSYCHOSOCIAL ASSESSMENT AND PLAN    Emotional:  Depression assessment:  PHQ-9 = 0 =No Depression            Anxiety measure:  JAYME-7 = 0-4  = Not anxious  Self-reported stress level:  1  Social support: Excellent and Patient reports excellent emotional/social support from family    Goals:  Physical Fitness in [facility] Score < 3, Overall Health in [facility] Score < 3 and Change in Health in [facility] Score < 3     Progression Toward Goals: Reviewed Pt goals and determined plan of care    Education: signs/sxs of depression, benefits of a positive support system, stress management techniques and depression and CAD  Plan: Class: Stress and Your Health, Class: Relaxation, Exercise, Spend time outdoors, Enjoy a hobby, Enjoy family, Return to previous social activity and continue with medical therapy for history of anxiety  Readiness to change: Action:  (Changing behavior)      OTHER CORE COMPONENTS     Tobacco:   Social History     Tobacco Use   Smoking Status Never Smoker   Smokeless Tobacco Never Used       Tobacco Use Intervention:   N/A:  Patient is a non-smoker     Anginal Symptoms:  felt "off" - no pain   NTG use: Compliant with carrying NTG, Understands proper use, Reviewed Proper use and Pt has not used NTG since event    Blood pressure:    Restin/64   Exercise: 162/70    Goals: consistent BP < 130/80, reduced dietary sodium <2300mg, moderate intensity exercise >150 mins/wk and medication compliance    Progression Toward Goals: Reviewed Pt goals and determined plan of care    Education:  low sodium diet and HTN and proper use of sublingual NTG  Plan: Class: Understanding Heart Disease, Class: Common Heart Medications, Avoid Processed foods, engage in regular exercise, eliminate salt shaker at the table, use salt substitutes and check labels for sodium content  Readiness to change: Preparation:  (Getting ready to change)

## 2021-08-31 NOTE — PROGRESS NOTES
CARDIAC REHAB ASSESSMENT    Today's date: 2021  Patient name: Ramez Yoon     : 1945       MRN: [id number]  PCP: Yunior Barrett MD  Referring Physician: Chyna Cruz  Cardiologist: Portia Crabtree MD  Surgeon:   Dx:   Encounter Diagnosis   Name Primary?     STEMI (ST elevation myocardial infarction) ([facility] )        Date of onset: 21  Cultural needs: none    Height:    Wt Readings from Last 1 Encounters:   21 95 1 kg (209 lb 9 6 oz)      Weight:   Ht Readings from Last 1 Encounters:   21 5' 11" (1 803 m)     Medical History:   Past Medical History:   Diagnosis Date    Anxiety     Hypertension     Leukemia ([facility] )          Physical Limitations: some R hip discomfort    Fall Risk: Low   Comments: Ambulates with a steady gait with no assist device and Denies a fall in the past 6 months    Anginal Equivalent: felt "off"   NTG use: Compliant with carrying NTG, Understands proper use and Reviewed Proper use - has not used    Risk Factors   Cholesterol: Yes  Smoking: Never used  HTN: Yes  DM: No  Obesity: Yes   Inactivity: No  Stress:  perceived  stress: 1/10   Stressors:doesn't understand stress   Goals for Stress Management:photography, walking with dog    Family History:  Family History   Problem Relation Age of Onset    No Known Problems Mother     No Known Problems Father        Allergies: Sulfa antibiotics  ETOH:   Social History     Substance and Sexual Activity   Alcohol Use Not Currently    Comment: minimal         Current Medications:   Current Outpatient Medications   Medication Sig Dispense Refill    aspirin 81 mg chewable tablet Chew 1 tablet (81 mg total) daily 30 tablet 0    atorvastatin (LIPITOR) 40 mg tablet Take 1 tablet (40 mg total) by mouth daily with dinner 30 tablet 1    clopidogrel (PLAVIX) 75 mg tablet Take 1 tablet (75 mg total) by mouth daily 30 tablet 5    co-enzyme Q-10 50 MG capsule Take 100 mg by mouth daily      fosinopril (MONOPRIL) 20 mg tablet Take 20 mg by mouth daily      magnesium gluconate (MAGONATE) 500 mg tablet Take 500 mg by mouth daily      mometasone (NASONEX) 50 mcg/act nasal spray 2 sprays into each nostril daily      Multiple Vitamin (MULTIVITAMIN) tablet Take 1 tablet by mouth daily      nitroglycerin (NITROSTAT) 0 4 mg SL tablet Place 1 tablet (0 4 mg total) under the tongue every 5 (five) minutes as needed for chest pain 24 tablet 1    sertraline (ZOLOFT) 50 mg tablet Take 75 mg by mouth daily         No current facility-administered medications for this visit  Functional Status Prior to Diagnosis for Treatment   Occupation: retired 12 years ago  Recreation: house in [place], caring for swimming pool, yard work  ADLs: No limitations  [place]: No limitations  Exercise: [place] (30-45 mins) - in the snow  Other: 7500 steps per day goal - lowest is 4,000 steps -     Current Functional Status  Occupation: retired  [place]: [place] his [place], yard working  ADLs:No limitations - paces himself - takes rests when needed  [place]: No limitations  Exercise: walking 2-3 times/wk  Other: has been very active every day - going up the stairs holds the hand rails - no SOB    Patient Specific Goals:  Be healthy, have a healthy family, wt loss 200lbs    Short Term Program Goals: dietary modifications increased strength improved energy/stamina with ADLs exercise 120-150 mins/wk wt loss 1-2 ppw    Long Term Goals: Improved [facility] Activity Status score  Improved functional capacity  Improved Quality of Life - [facility] score reduced  Reduced body fat%  Reduced waist circumference  weight loss goal of 220lbs    Ability to reach goals/rehabilitation potential:  Excellent    Projected return to function: 12 weeks  Objective tests: sub-max TM ETT      Nutritional   Reviewed details of Rate your Plate  Discussed key elements of heart healthy eating   Reviewed patient goals for dietary modifications and their clinical implications  Reviewed most recent lipid profile     Wife has T2D  Out for breakfast every Saturday/sunday  occ canned      Goals for dietary modification: choose lean cuts of meat  poultry without the skin  low fat ground meat and poultry  eliminate processed meats  reduce portions of meat to 3 oz  increase fish intake  more meatless meals  low fat dairy   reduced fat cheese  increase whole grains  increase fruits and vegetables  eliminate butter  low sodium  improved snack choices  more nuts/seeds  reduce sweets/frozen desserts  heathier choices while dining out      Emotional/Social  Patient has a history of anxiety   compliant with medical therapy for anxiety - Sertraline  Hasn't felt anxious in a long time - since intermediate    Marital status:     Domestic Violence Screening: No    Comments:   Current event:  Working in the yard - carrying 15 bags of mulch and spreading - felt "off" and sat to rest - went to the house called 911  [facility] x 4 years   Indigestion for the past few years with walking  Feels better then he has in years

## 2021-09-03 ENCOUNTER — CLINICAL SUPPORT (OUTPATIENT)
Dept: CARDIAC REHAB | Age: 76
End: 2021-09-03
Payer: COMMERCIAL

## 2021-09-03 DIAGNOSIS — I21.3 ST ELEVATION MYOCARDIAL INFARCTION (STEMI), UNSPECIFIED ARTERY (HCC): ICD-10-CM

## 2021-09-03 PROCEDURE — 93798 PHYS/QHP OP CAR RHAB W/ECG: CPT

## 2021-09-07 ENCOUNTER — CLINICAL SUPPORT (OUTPATIENT)
Dept: CARDIAC REHAB | Age: 76
End: 2021-09-07
Payer: COMMERCIAL

## 2021-09-07 DIAGNOSIS — I21.02 ACUTE ST ELEVATION MYOCARDIAL INFARCTION (STEMI) DUE TO OCCLUSION OF DISTAL PORTION OF LEFT ANTERIOR DESCENDING (LAD) CORONARY ARTERY (HCC): ICD-10-CM

## 2021-09-07 PROCEDURE — 93798 PHYS/QHP OP CAR RHAB W/ECG: CPT

## 2021-09-08 ENCOUNTER — CLINICAL SUPPORT (OUTPATIENT)
Dept: CARDIAC REHAB | Age: 76
End: 2021-09-08
Payer: COMMERCIAL

## 2021-09-08 DIAGNOSIS — I21.3 ST ELEVATION MYOCARDIAL INFARCTION (STEMI), UNSPECIFIED ARTERY (HCC): ICD-10-CM

## 2021-09-08 PROCEDURE — 93798 PHYS/QHP OP CAR RHAB W/ECG: CPT

## 2021-09-10 ENCOUNTER — CLINICAL SUPPORT (OUTPATIENT)
Dept: CARDIAC REHAB | Age: 76
End: 2021-09-10
Payer: COMMERCIAL

## 2021-09-10 DIAGNOSIS — I21.3 ST ELEVATION MYOCARDIAL INFARCTION (STEMI), UNSPECIFIED ARTERY (HCC): ICD-10-CM

## 2021-09-10 PROCEDURE — 93798 PHYS/QHP OP CAR RHAB W/ECG: CPT

## 2021-09-13 ENCOUNTER — CLINICAL SUPPORT (OUTPATIENT)
Dept: CARDIAC REHAB | Age: 76
End: 2021-09-13
Payer: COMMERCIAL

## 2021-09-13 DIAGNOSIS — I21.02 ACUTE ST ELEVATION MYOCARDIAL INFARCTION (STEMI) DUE TO OCCLUSION OF DISTAL PORTION OF LEFT ANTERIOR DESCENDING (LAD) CORONARY ARTERY (HCC): ICD-10-CM

## 2021-09-13 PROCEDURE — 93798 PHYS/QHP OP CAR RHAB W/ECG: CPT

## 2021-09-14 ENCOUNTER — APPOINTMENT (OUTPATIENT)
Dept: LAB | Facility: CLINIC | Age: 76
End: 2021-09-14
Payer: COMMERCIAL

## 2021-09-14 LAB
ALBUMIN SERPL BCP-MCNC: 3.6 G/DL (ref 3.5–5)
ALP SERPL-CCNC: 83 U/L (ref 46–116)
ALT SERPL W P-5'-P-CCNC: 21 U/L (ref 12–78)
ANION GAP SERPL CALCULATED.3IONS-SCNC: 7 MMOL/L (ref 4–13)
AST SERPL W P-5'-P-CCNC: 18 U/L (ref 5–45)
BASOPHILS # BLD MANUAL: 0.11 THOUSAND/UL (ref 0–0.1)
BASOPHILS NFR MAR MANUAL: 1 % (ref 0–1)
BILIRUB SERPL-MCNC: 0.71 MG/DL (ref 0.2–1)
BUN SERPL-MCNC: 26 MG/DL (ref 5–25)
CALCIUM SERPL-MCNC: 8.6 MG/DL (ref 8.3–10.1)
CHLORIDE SERPL-SCNC: 104 MMOL/L (ref 100–108)
CO2 SERPL-SCNC: 30 MMOL/L (ref 21–32)
CREAT SERPL-MCNC: 1.24 MG/DL (ref 0.6–1.3)
EOSINOPHIL # BLD MANUAL: 1.24 THOUSAND/UL (ref 0–0.4)
EOSINOPHIL NFR BLD MANUAL: 11 % (ref 0–6)
ERYTHROCYTE [DISTWIDTH] IN BLOOD BY AUTOMATED COUNT: 14.9 % (ref 11.6–15.1)
GFR SERPL CREATININE-BSD FRML MDRD: 57 ML/MIN/1.73SQ M
GLUCOSE P FAST SERPL-MCNC: 92 MG/DL (ref 65–99)
HCT VFR BLD AUTO: 47.4 % (ref 36.5–49.3)
HGB BLD-MCNC: 14.8 G/DL (ref 12–17)
IGG SERPL-MCNC: 719 MG/DL (ref 700–1600)
LDH SERPL-CCNC: 162 U/L (ref 81–234)
LYMPHOCYTES # BLD AUTO: 63 % (ref 14–44)
LYMPHOCYTES # BLD AUTO: 7.1 THOUSAND/UL (ref 0.6–4.47)
MCH RBC QN AUTO: 27.6 PG (ref 26.8–34.3)
MCHC RBC AUTO-ENTMCNC: 31.2 G/DL (ref 31.4–37.4)
MCV RBC AUTO: 88 FL (ref 82–98)
MONOCYTES # BLD AUTO: 0.23 THOUSAND/UL (ref 0–1.22)
MONOCYTES NFR BLD: 2 % (ref 4–12)
NEUTROPHILS # BLD MANUAL: 2.59 THOUSAND/UL (ref 1.85–7.62)
NEUTS SEG NFR BLD AUTO: 23 % (ref 43–75)
PLATELET # BLD AUTO: 102 THOUSANDS/UL (ref 149–390)
PLATELET BLD QL SMEAR: ABNORMAL
PMV BLD AUTO: 10.2 FL (ref 8.9–12.7)
POTASSIUM SERPL-SCNC: 4.5 MMOL/L (ref 3.5–5.3)
PROT SERPL-MCNC: 6.6 G/DL (ref 6.4–8.2)
RBC # BLD AUTO: 5.36 MILLION/UL (ref 3.88–5.62)
RBC MORPH BLD: NORMAL
SODIUM SERPL-SCNC: 141 MMOL/L (ref 136–145)
WBC # BLD AUTO: 11.27 THOUSAND/UL (ref 4.31–10.16)

## 2021-09-14 PROCEDURE — 85027 COMPLETE CBC AUTOMATED: CPT | Performed by: PHYSICIAN ASSISTANT

## 2021-09-14 PROCEDURE — 82784 ASSAY IGA/IGD/IGG/IGM EACH: CPT | Performed by: PHYSICIAN ASSISTANT

## 2021-09-14 PROCEDURE — 36415 COLL VENOUS BLD VENIPUNCTURE: CPT | Performed by: PHYSICIAN ASSISTANT

## 2021-09-14 PROCEDURE — 83615 LACTATE (LD) (LDH) ENZYME: CPT | Performed by: PHYSICIAN ASSISTANT

## 2021-09-14 PROCEDURE — 85007 BL SMEAR W/DIFF WBC COUNT: CPT | Performed by: PHYSICIAN ASSISTANT

## 2021-09-14 PROCEDURE — 80053 COMPREHEN METABOLIC PANEL: CPT | Performed by: PHYSICIAN ASSISTANT

## 2021-09-15 ENCOUNTER — CLINICAL SUPPORT (OUTPATIENT)
Dept: CARDIAC REHAB | Age: 76
End: 2021-09-15
Payer: COMMERCIAL

## 2021-09-15 ENCOUNTER — OFFICE VISIT (OUTPATIENT)
Dept: CARDIOLOGY CLINIC | Facility: CLINIC | Age: 76
End: 2021-09-15
Payer: COMMERCIAL

## 2021-09-15 VITALS
TEMPERATURE: 97.8 F | WEIGHT: 214 LBS | HEIGHT: 71 IN | DIASTOLIC BLOOD PRESSURE: 82 MMHG | OXYGEN SATURATION: 96 % | BODY MASS INDEX: 29.96 KG/M2 | SYSTOLIC BLOOD PRESSURE: 134 MMHG | HEART RATE: 69 BPM

## 2021-09-15 DIAGNOSIS — I21.3 ST ELEVATION MYOCARDIAL INFARCTION (STEMI), UNSPECIFIED ARTERY (HCC): ICD-10-CM

## 2021-09-15 DIAGNOSIS — I10 HYPERTENSION, ESSENTIAL: Chronic | ICD-10-CM

## 2021-09-15 DIAGNOSIS — I25.10 CORONARY ARTERY DISEASE INVOLVING NATIVE CORONARY ARTERY OF NATIVE HEART WITHOUT ANGINA PECTORIS: Primary | ICD-10-CM

## 2021-09-15 DIAGNOSIS — C91.10 CLL (CHRONIC LYMPHOCYTIC LEUKEMIA) (HCC): Chronic | ICD-10-CM

## 2021-09-15 PROCEDURE — 93798 PHYS/QHP OP CAR RHAB W/ECG: CPT

## 2021-09-15 PROCEDURE — 99214 OFFICE O/P EST MOD 30 MIN: CPT | Performed by: INTERNAL MEDICINE

## 2021-09-15 NOTE — PROGRESS NOTES
Cardiology Follow Up    Arash Reina  1945  [id number]  [facility] [place] [address] [id number] [phone]    1  Coronary artery disease involving native coronary artery of native heart without angina pectoris  Lipid panel    LDL cholesterol, direct   2  Hypertension, essential     3  CLL (chronic lymphocytic leukemia) (HCC)         Discussion/Summary:    History to [facility] ST-elevation MI 7/2/2021  His ECG showed inferior ST elevations, but his cardiac catheterization showed 80% disease in the LAD which was stented and felt to be the culprit  His LV function is preserved  He has a history of CLL, his cancer therapies currently on hold in the setting of dual antiplatelet therapy  He is doing cardiac rehab, feeling value well with this  Continue dual antiplatelet therapy uninterrupted x1 year  We will need to discuss what to do with his antiplatelet therapy as he approaches 1 year  I would be fine holding his Plavix at that point, but aspirin will need to be continued indefinitely  Blood pressure is currently controlled  As he has a history of bradycardia, he is not on any beta-blocker  He is on fosinopril  His lipid-lowering therapy was intensified  I ordered a lipid panel to be done with his next blood work, this is typically done on the 14th of every month at [facility]  He will continue cardiac rehab  I will see him back in 3 months  History of Present Illness:     Pleasant 27-year-old man  He presents to the office today for follow-up after an MI  He had symptoms of burning chest discomfort which had been ongoing for a few months, but on the day of his admission to the hospital, he had worsening of symptoms after working outside  He had inferior ST elevations  He went for an urgent cardiac catheterization  Interestingly, there was no thrombotic lesion present    There was 60% disease in the distal RCA  He did have an 80% disease in the proximal LAD noted which was subsequently stented  His LV function was preserved  He does have a history of CLL, had been on treatment directed by his oncologist, Dr Domo Yee, which is currently held in the setting of antiplatelet therapy  There was some question of whether his MI could have been embolic, because the territory of the inferior ST elevations did not really match the 80% lesion in the proximal LAD  Regardless, he has done very well since his hospitalization  He gets blood work done monthly and this has been stable despite being off of his cancer therapy  His lipid-lowering therapy was intensified during the hospitalization, repeat lipid panel will be done in the near future  He is doing cardiac rehab denies any symptoms or limitations  I see some scattered ecchymoses on his arms, but he denies any major bleeding        Medical Problems     Problem List     Coronary artery disease involving native coronary artery of native heart without angina pectoris    Lymphocytosis    Chills    Acute kidney insufficiency    Hyperlipidemia, unspecified (Chronic)    Hypertension, essential (Chronic)    Nausea    CLL (chronic lymphocytic leukemia) (HCC) (Chronic)    Thrombocytopenia (HCC)    Serum potassium elevated    Anxiety              Past Medical History:   Diagnosis Date    Anxiety     Hypertension     Leukemia ([facility] )      Social History     Tobacco Use    Smoking status: Never Smoker    Smokeless tobacco: Never Used   Vaping Use    Vaping Use: Never assessed   Substance Use Topics    Alcohol use: Not Currently     Comment: minimal    Drug use: No      Family History   Problem Relation Age of Onset    No Known Problems Mother     No Known Problems Father      Past Surgical History:   Procedure Laterality Date    APPENDECTOMY      COLONOSCOPY      TONSILLECTOMY         Current Outpatient Medications:     aspirin 81 mg chewable tablet, Chew 1 tablet (81 mg total) daily, Disp: 30 tablet, Rfl: 0    atorvastatin (LIPITOR) 40 mg tablet, Take 1 tablet (40 mg total) by mouth daily with dinner, Disp: 30 tablet, Rfl: 1    clopidogrel (PLAVIX) 75 mg tablet, Take 1 tablet (75 mg total) by mouth daily, Disp: 30 tablet, Rfl: 5    co-enzyme Q-10 50 MG capsule, Take 100 mg by mouth daily, Disp: , Rfl:     fosinopril (MONOPRIL) 20 mg tablet, Take 20 mg by mouth daily, Disp: , Rfl:     magnesium gluconate (MAGONATE) 500 mg tablet, Take 500 mg by mouth daily, Disp: , Rfl:     mometasone (NASONEX) 50 mcg/act nasal spray, 2 sprays into each nostril daily, Disp: , Rfl:     Multiple Vitamin (MULTIVITAMIN) tablet, Take 1 tablet by mouth daily, Disp: , Rfl:     nitroglycerin (NITROSTAT) 0 4 mg SL tablet, Place 1 tablet (0 4 mg total) under the tongue every 5 (five) minutes as needed for chest pain, Disp: 24 tablet, Rfl: 1    sertraline (ZOLOFT) 50 mg tablet, Take 75 mg by mouth daily  , Disp: , Rfl:   Allergies   Allergen Reactions    Sulfa Antibiotics        Vitals:    09/15/21 1456   BP: 134/82   BP Location: Right arm   Patient Position: Sitting   Cuff Size: Standard   Pulse: 69   Temp: 97 8 °F (36 6 °C)   TempSrc: Tympanic   SpO2: 96%   Weight: 97 1 kg (214 lb)   Height: 5' 11" (1 803 m)     Vitals:    09/15/21 1456   Weight: 97 1 kg (214 lb)      Height: 5' 11" (180 3 cm)   Body mass index is 29 85 kg/m²      Physical Exam:  GENERAL: Alert, well appearing, and in no distress  HEENT:  PERRL, EOMI, no scleral icterus, no conjunctival pallor  NECK:  Supple, No elevated JVP, no thyromegaly, no carotid bruits  HEART:  Regular rate and rhythm, normal S1/S2, no S3/S4, no murmur or rub  LUNGS:  Clear to auscultation bilaterally  ABDOMEN:  Soft, non-tender, positive bowel sounds, no rebound or guarding  EXTREMITIES:  No edema  VASCULAR:  Normal pedal pulses   NEURO: No focal deficits,  SKIN: scattered ecchymoses      ROS:  Positive for bruising, anxiety  Except as noted in HPI, is otherwise reviewed in detail and a 12 point review of systems is negative  Labs:  Lab Results   Component Value Date    SODIUM 141 09/14/2021    K 4 5 09/14/2021     09/14/2021    CREATININE 1 24 09/14/2021    BUN 26 (H) 09/14/2021    CO2 30 09/14/2021    ALT 21 09/14/2021    AST 18 09/14/2021    INR 1 00 07/02/2021    GLUF 92 09/14/2021    HGBA1C 5 5 05/11/2021    WBC 11 27 (H) 09/14/2021    HGB 14 8 09/14/2021    HCT 47 4 09/14/2021     (L) 09/14/2021       No results found for: CHOL  Lab Results   Component Value Date    LDLCALC 80 07/02/2021    LDLCALC 94 05/11/2021    LDLCALC 83 03/28/2019     Lab Results   Component Value Date    HDL 41 07/02/2021    HDL 41 05/11/2021    HDL 31 (L) 03/28/2019     Lab Results   Component Value Date    TRIG 173 (H) 07/02/2021    TRIG 121 05/11/2021    TRIG 135 03/28/2019       Testing:  Cardiac Cath 7/2/21:  CORONARY CIRCULATION:  Left main: The vessel was normal sized  There was moderate plaque  There were no obstructive lesions  LAD: The vessel was normal sized  There was a long, irregular lesion of the proximal vessel  There were no other significant lesions  Circumflex: The vessel was normal sized and gave rise to one major OM branch  There was moderate plaque  There were no flow-limiting lesions  RCA: The vessel was normal sized and dominant, giving rise to a dual PDA and two posterolateral branches  There was moderate diffuse plaque  There was a 60% lesion of the distal vessel after the PDA  However, there were no flow-critical  lesions that appeared to be culprits for the patient's STEMI      1ST LESION INTERVENTIONS:  Following IVUS interrogation and pre-dilation, a [employer] [place] Rx 3 25 x 33mm drug-eluting stent was placed across the 80% lesion in the proximal diagonal and deployed at a maximum inflation pressure of 18 ivelisse  After post-dilation to  3 5mm, there was full stent expansion and apposition, with 0% residual stenosis   DAMIAN flow remained grade 3      REPORT ELEMENT SELECTION:  Right radial access  There were no complications      Summary:  Diffuse atherosclerotic plaque  An 80% proximal LAD stenosis was the likely culprit for the patient's STEMI  Plan: DAPT, statin, beta-blocker, cardiac rehabilitation    Echo7/4/21:  LEFT VENTRICLE:  Systolic function was normal by visual assessment  Ejection fraction was estimated to be 55 %  Doppler parameters were consistent with abnormal left ventricular relaxation (grade 1 diastolic dysfunction)

## 2021-09-17 ENCOUNTER — APPOINTMENT (OUTPATIENT)
Dept: CARDIAC REHAB | Age: 76
End: 2021-09-17
Payer: COMMERCIAL

## 2021-09-20 ENCOUNTER — CLINICAL SUPPORT (OUTPATIENT)
Dept: CARDIAC REHAB | Age: 76
End: 2021-09-20
Payer: COMMERCIAL

## 2021-09-20 DIAGNOSIS — I21.3 ST ELEVATION MYOCARDIAL INFARCTION (STEMI), UNSPECIFIED ARTERY (HCC): Primary | ICD-10-CM

## 2021-09-20 PROCEDURE — 93798 PHYS/QHP OP CAR RHAB W/ECG: CPT

## 2021-09-22 ENCOUNTER — TELEPHONE (OUTPATIENT)
Dept: HEMATOLOGY ONCOLOGY | Facility: CLINIC | Age: 76
End: 2021-09-22

## 2021-09-22 ENCOUNTER — CLINICAL SUPPORT (OUTPATIENT)
Dept: CARDIAC REHAB | Age: 76
End: 2021-09-22
Payer: COMMERCIAL

## 2021-09-22 DIAGNOSIS — I21.3 ST ELEVATION MYOCARDIAL INFARCTION (STEMI), INITIAL EPISODE OF CARE (HCC): ICD-10-CM

## 2021-09-22 PROCEDURE — 93798 PHYS/QHP OP CAR RHAB W/ECG: CPT

## 2021-09-22 NOTE — TELEPHONE ENCOUNTER
I called the patient and offered 10/18 @ 8:30 am, patient voiced agreement and understanding  I reviewed the location in [place] then  Patient voiced understanding

## 2021-09-22 NOTE — TELEPHONE ENCOUNTER
Spoke with patient  Labs are good  Reviewed cardiology note  To consider clinical trial with znubrutinib  Please keep patient's appointment for December but also schedule a sooner appointment at [facility]  Patient does go to cardiac rehab  Please see those dates and times in his my chart schedule    He also is not available October 8th, 11th and October 20th to 31st

## 2021-09-24 ENCOUNTER — CLINICAL SUPPORT (OUTPATIENT)
Dept: CARDIAC REHAB | Age: 76
End: 2021-09-24
Payer: COMMERCIAL

## 2021-09-24 DIAGNOSIS — I21.3 ST ELEVATION MYOCARDIAL INFARCTION (STEMI), UNSPECIFIED ARTERY (HCC): ICD-10-CM

## 2021-09-24 PROCEDURE — 93798 PHYS/QHP OP CAR RHAB W/ECG: CPT

## 2021-09-27 ENCOUNTER — CLINICAL SUPPORT (OUTPATIENT)
Dept: CARDIAC REHAB | Age: 76
End: 2021-09-27
Payer: COMMERCIAL

## 2021-09-27 DIAGNOSIS — I21.3 ST ELEVATION MYOCARDIAL INFARCTION (STEMI), UNSPECIFIED ARTERY (HCC): Primary | ICD-10-CM

## 2021-09-27 PROCEDURE — 93798 PHYS/QHP OP CAR RHAB W/ECG: CPT

## 2021-09-27 NOTE — PROGRESS NOTES
Cardiac Rehabilitation Plan of Care   30 Day Reassessment          Today's date: 2021   # of Exercise Sessions Completed: 11  Patient name: Donnie Dominique      : 1945  Age: 77 y o  MRN: [id number]  Referring Physician: Shauna Rowan*  Cardiologist: Charly Garduno MD  Provider: Serge Kulkarni  Clinician: Kaci White RN      Dx:   Encounter Diagnosis   Name Primary?  ST elevation myocardial infarction (STEMI), unspecified artery ([facility] [address] ) Yes     Date of onset: 21      SUMMARY OF PROGRESS:  Rajeev Molina is compliant attending cardiac rehab exercise sessions 3x/wk  He tolerates 38 mins at 2 6 - 3 8 METs  Weight training was introduced today  He is tolerating progression of intensity levels to maintain RPE 4-6  Resting /60 - 142/80 with appropriate response to exercise reaching 138/60- 178/80  NSR on tele without ectopy observed  RHR 64 - 84 ExHR 98 - 115  No cardiac complaints  He is progressing toward wt loss goals with a weight loss of 1 lb  Patient has been working on dietary modifications with the goal of rare red/processed meats, low fat dairy, reduced added sugars and refined flours  Depression screening using the PHQ-9 was reassessed  The patient's score was 0 (0 =No Depression) showing an NO CHANGE   JAYME-7 was reassessed  The patient's score was 0  (0-4  = Not anxious) showing an NO CHANGE  When addressed, the patient denies depression/anxiety  Patient reports excellent social/emotional support  Patient attends group educational classes on cardiac risk factor modification  He has not added home exercise   Encourage walking 2x/week, 20 mins  His exercise program will be progressed as tolerated to maintain RPE 4-6   The patient has the following personal goals he hopes to achieved by discharge: wt goal of 200 lbs, consistent exercise program    Pt will continue to be educated on lifestyle modifications and encouraged to supplement with a home exercise program to reach the following goals in the next 30 days: start home walking program, attend CR 3x/wk, decrease food portions        Medication compliance: Yes   Comments: Pt reports to be compliant with medications  Fall Risk: Low   Comments: Ambulates with a steady gait with no assist device and Denies a fall in the past 6 months    EKG Interpretation: NSR      EXERCISE ASSESSMENT and PLAN    Current Exercise Program in Rehab:       Frequency: 3 days/week Supplement with home exercise 2+ days/wk as tolerated       Minutes: 36-38        METS: 2 6-3 8            HR: 98 - 155   RPE: 4-5         Modalities: Treadmill, UBE, Lifecycle, Rower, NuStep and Recumbent bike      Exercise Progression 30 Day Goals :    Frequency: 3 days/week of cardiac rehab     Supplement with home exercise 2+ days/wk as tolerated    Minutes: 40                            >150 mins/wk of moderate intensity exercise   METS: 3 0 - 4 0   HR: 100-125    RPE: 4-5   Modalities: Treadmill, Airdyne bike, UBE, Lifecycle, Rower and Recumbent bike    Strength trainin-3 days / week  12-15 repetitions  1-2 sets per modality    Modalities: Leg Press, Chest Press, Pull Downs and Lateral Raise    Home Exercise: none    Goals: 10% improvement in functional capacity - based on max METs achieved in fitness assessment, improved DASI score by 10%, Increase in exercise capacity by 40% - based on peak METs tolerated in cardiac rehab exercise session, Exercise 5 days/wk, >150mins/wk of moderate intensity exercise, Resume ADLs with increased strength, Attend Rehab regularly and Start a walking program    Progression Toward Goals:  Pt is progressing and showing improvement  toward the following goals:  10% improvement in functional capacity - based on max METs achieved in fitness assessment, improved DASI score by 10%, Increase in exercise capacity by 40% - based on peak METs tolerated in cardiac rehab exercise session, Exercise 5 days/wk, >150mins/wk of moderate intensity exercise, Resume ADLs with increased strength, Attend Rehab regularly and Start a walking program   , Patient will start home walking program, attend CR 3x/wk in the next 30 days, Will continue to educate and progress as tolerated  Education: benefit of exercise for CAD risk factors, AHA guidelines to achieve >150 mins/wk of moderate exercise and RPE scale   Plan:education on home exercise guidelines, home exercise 30+ mins 2 days opposite CR and Education class: Risk Factors for Heart Disease  Readiness to change: Preparation:  (Getting ready to change)       NUTRITION ASSESSMENT AND PLAN    Weight control:    Starting weight: 212 4   Current weight:   211  Waist circumference:    Startin   Current:      Diabetes: N/A  A1c: 5 5    last measured: 21    Lipid management: Discussed diet and lipid management and Last lipid profile 21  Chol 156    HDL 41  LDL 80    Goals:reduced BMI to < 25, HDL >40, TRG <150, reduced waist circumference <40 inches (M), Improved Rate Your Plate score  >63, Wt  loss 1-2 ppw,  goal of 200 lbs  , reduce portion sizes of meat to 3oz or less, increase intake of meatless meals, reduce cheese intake or use reduced-fat, Eat 4-5 cups of fruits and vegetables daily, use fat-free dressings/rm or seldom use, choose healthy snacks: light popcorn, plain pretzels, eliminate or choose low-fat sweets and daily saturated fat intake <7%/13g    Progression Toward Goals: Pt is progressing and showing improvement  toward the following goals:  reduced BMI to < 25, HDL >40, TRG <150, reduced waist circumference <40 inches (M), Improved Rate Your Plate score  >10, Wt  loss 1-2 ppw,  goal of 200 lbs  , reduce portion sizes of meat to 3oz or less, increase intake of meatless meals, reduce cheese intake or use reduced-fat, Eat 4-5 cups of fruits and vegetables daily, use fat-free dressings/rm or seldom use, choose healthy snacks: light popcorn, plain pretzels, eliminate or choose low-fat sweets and daily saturated fat intake <7%/13g   , Patient will make use Ujogo devorah- make healthy snack choices, decrease portion sizes in the next 30 days, Will continue to educate and progress as tolerated  Education: heart healthy eating  low sodium diet  nutrition for  lipid management  wt  loss   education class:  Label Reading  Plan: Education Class: Heart Healthy Eating, replace refined grain bread with whole grain bread, reduce red meat 1x/wk, switch to skim or 1% milk, avoid processed foods, drink more water, learn how to read food labels, replace sugar with stevia or truvia and keep added daily sugar <25g/day  Readiness to change: Action:  (Changing behavior)      PSYCHOSOCIAL ASSESSMENT AND PLAN    Emotional:  Depression assessment:  PHQ-9 = 0  0 =No Depression            Anxiety measure:  JAYME-7 = 0-4  = Not anxious  Self-reported stress level:  3  Social support: Excellent and Patient reports excellent emotional/social support from family    Goals:  Physical Fitness in [facility] Score < 3, Overall Health in [facility] Score < 3 and Change in Health in [facility] Score < 3     Progression Toward Goals: Pt is progressing and showing improvement  toward the following goals:  Physical Fitness in [facility] Score < 3, Overall Health in [facility] Score < 3 and Change in Health in [place] Score < 3    , Patient will attend Stress & Relaxation class, take time to relax in the next 30 days, Will continue to educate and progress as tolerated      Education: signs/sxs of depression, benefits of a positive support system, stress management techniques and depression and CAD  Plan: Class: Stress and Your Health, Class: Relaxation, Exercise, Spend time outdoors, Enjoy a hobby, Enjoy family, Return to previous social activity and continue with medical therapy for history of anxiety  Readiness to change: Action:  (Changing behavior)      OTHER CORE COMPONENTS     Tobacco:   Social History     Tobacco Use   Smoking Status Never Smoker Smokeless Tobacco Never Used       Tobacco Use Intervention:   N/A:  Patient is a non-smoker     Anginal Symptoms:  felt "off" - no pain   NTG use: Compliant with carrying NTG, Understands proper use, Reviewed Proper use and Pt has not used NTG since event    Blood pressure:    Restin/60 - 142/80   Exercise: 138/60- 178/80    Goals: consistent BP < 130/80, reduced dietary sodium <2300mg, moderate intensity exercise >150 mins/wk and medication compliance    Progression Toward Goals: Pt is progressing and showing improvement  toward the following goals:  consistent BP < 130/80, reduced dietary sodium <2300mg, moderate intensity exercise >150 mins/wk and medication compliance   , Patient will add home walking program, attend CR 3x/wk in the next 30 days, Will continue to educate and progress as tolerated      Education:  low sodium diet and HTN and proper use of sublingual NTG  Plan: Class: Understanding Heart Disease, Class: Common Heart Medications, Avoid Processed foods, engage in regular exercise, eliminate salt shaker at the table, use salt substitutes and check labels for sodium content  Readiness to change: Preparation:  (Getting ready to change)

## 2021-09-29 ENCOUNTER — CLINICAL SUPPORT (OUTPATIENT)
Dept: CARDIAC REHAB | Age: 76
End: 2021-09-29
Payer: COMMERCIAL

## 2021-09-29 DIAGNOSIS — I21.3 ST ELEVATION MYOCARDIAL INFARCTION (STEMI), UNSPECIFIED ARTERY (HCC): ICD-10-CM

## 2021-09-29 PROCEDURE — 93798 PHYS/QHP OP CAR RHAB W/ECG: CPT

## 2021-10-01 ENCOUNTER — CLINICAL SUPPORT (OUTPATIENT)
Dept: CARDIAC REHAB | Age: 76
End: 2021-10-01
Payer: COMMERCIAL

## 2021-10-01 DIAGNOSIS — I21.3 ST ELEVATION MYOCARDIAL INFARCTION (STEMI), UNSPECIFIED ARTERY (HCC): ICD-10-CM

## 2021-10-01 PROCEDURE — 93798 PHYS/QHP OP CAR RHAB W/ECG: CPT

## 2021-10-04 ENCOUNTER — CLINICAL SUPPORT (OUTPATIENT)
Dept: CARDIAC REHAB | Age: 76
End: 2021-10-04
Payer: COMMERCIAL

## 2021-10-04 DIAGNOSIS — I21.3 ST ELEVATION MYOCARDIAL INFARCTION (STEMI), UNSPECIFIED ARTERY (HCC): ICD-10-CM

## 2021-10-04 PROCEDURE — 93798 PHYS/QHP OP CAR RHAB W/ECG: CPT

## 2021-10-06 ENCOUNTER — CLINICAL SUPPORT (OUTPATIENT)
Dept: CARDIAC REHAB | Age: 76
End: 2021-10-06
Payer: COMMERCIAL

## 2021-10-06 DIAGNOSIS — I21.3 ST ELEVATION MYOCARDIAL INFARCTION (STEMI), UNSPECIFIED ARTERY (HCC): Primary | ICD-10-CM

## 2021-10-06 PROCEDURE — 93798 PHYS/QHP OP CAR RHAB W/ECG: CPT

## 2021-10-08 ENCOUNTER — APPOINTMENT (OUTPATIENT)
Dept: CARDIAC REHAB | Age: 76
End: 2021-10-08
Payer: COMMERCIAL

## 2021-10-11 ENCOUNTER — APPOINTMENT (OUTPATIENT)
Dept: CARDIAC REHAB | Age: 76
End: 2021-10-11
Payer: COMMERCIAL

## 2021-10-14 ENCOUNTER — APPOINTMENT (OUTPATIENT)
Dept: LAB | Facility: CLINIC | Age: 76
End: 2021-10-14
Payer: COMMERCIAL

## 2021-10-14 LAB
CHOLEST SERPL-MCNC: 136 MG/DL (ref 50–200)
HDLC SERPL-MCNC: 37 MG/DL
LDLC SERPL CALC-MCNC: 76 MG/DL (ref 0–100)
LDLC SERPL DIRECT ASSAY-MCNC: 69 MG/DL (ref 0–100)
NONHDLC SERPL-MCNC: 99 MG/DL
TRIGL SERPL-MCNC: 113 MG/DL

## 2021-10-14 PROCEDURE — 83721 ASSAY OF BLOOD LIPOPROTEIN: CPT | Performed by: INTERNAL MEDICINE

## 2021-10-14 PROCEDURE — 80061 LIPID PANEL: CPT | Performed by: INTERNAL MEDICINE

## 2021-10-15 ENCOUNTER — CLINICAL SUPPORT (OUTPATIENT)
Dept: CARDIAC REHAB | Age: 76
End: 2021-10-15
Payer: COMMERCIAL

## 2021-10-15 DIAGNOSIS — I21.3 ST ELEVATION MYOCARDIAL INFARCTION (STEMI), UNSPECIFIED ARTERY (HCC): ICD-10-CM

## 2021-10-15 PROCEDURE — 93798 PHYS/QHP OP CAR RHAB W/ECG: CPT

## 2021-10-18 ENCOUNTER — OFFICE VISIT (OUTPATIENT)
Dept: HEMATOLOGY ONCOLOGY | Facility: CLINIC | Age: 76
End: 2021-10-18
Payer: COMMERCIAL

## 2021-10-18 ENCOUNTER — CLINICAL SUPPORT (OUTPATIENT)
Dept: CARDIAC REHAB | Age: 76
End: 2021-10-18
Payer: COMMERCIAL

## 2021-10-18 ENCOUNTER — TELEPHONE (OUTPATIENT)
Dept: HEMATOLOGY ONCOLOGY | Facility: CLINIC | Age: 76
End: 2021-10-18

## 2021-10-18 VITALS
WEIGHT: 215 LBS | OXYGEN SATURATION: 98 % | TEMPERATURE: 97.2 F | SYSTOLIC BLOOD PRESSURE: 126 MMHG | BODY MASS INDEX: 30.1 KG/M2 | HEIGHT: 71 IN | HEART RATE: 66 BPM | RESPIRATION RATE: 18 BRPM | DIASTOLIC BLOOD PRESSURE: 72 MMHG

## 2021-10-18 DIAGNOSIS — C91.10 CLL (CHRONIC LYMPHOCYTIC LEUKEMIA) (HCC): Primary | Chronic | ICD-10-CM

## 2021-10-18 DIAGNOSIS — I21.3 ST ELEVATION MYOCARDIAL INFARCTION (STEMI), UNSPECIFIED ARTERY (HCC): Primary | ICD-10-CM

## 2021-10-18 PROCEDURE — 99214 OFFICE O/P EST MOD 30 MIN: CPT | Performed by: PHYSICIAN ASSISTANT

## 2021-10-18 PROCEDURE — 93798 PHYS/QHP OP CAR RHAB W/ECG: CPT

## 2021-10-20 ENCOUNTER — APPOINTMENT (OUTPATIENT)
Dept: CARDIAC REHAB | Age: 76
End: 2021-10-20
Payer: COMMERCIAL

## 2021-10-22 ENCOUNTER — APPOINTMENT (OUTPATIENT)
Dept: CARDIAC REHAB | Age: 76
End: 2021-10-22
Payer: COMMERCIAL

## 2021-10-25 ENCOUNTER — APPOINTMENT (OUTPATIENT)
Dept: CARDIAC REHAB | Age: 76
End: 2021-10-25
Payer: COMMERCIAL

## 2021-10-27 ENCOUNTER — APPOINTMENT (OUTPATIENT)
Dept: CARDIAC REHAB | Age: 76
End: 2021-10-27
Payer: COMMERCIAL

## 2021-10-29 ENCOUNTER — APPOINTMENT (OUTPATIENT)
Dept: CARDIAC REHAB | Age: 76
End: 2021-10-29
Payer: COMMERCIAL

## 2021-11-01 ENCOUNTER — APPOINTMENT (OUTPATIENT)
Dept: CARDIAC REHAB | Age: 76
End: 2021-11-01
Payer: COMMERCIAL

## 2021-11-03 ENCOUNTER — CLINICAL SUPPORT (OUTPATIENT)
Dept: CARDIAC REHAB | Age: 76
End: 2021-11-03
Payer: COMMERCIAL

## 2021-11-03 DIAGNOSIS — I21.3 ST ELEVATION MYOCARDIAL INFARCTION (STEMI), UNSPECIFIED ARTERY (HCC): ICD-10-CM

## 2021-11-03 PROCEDURE — 93798 PHYS/QHP OP CAR RHAB W/ECG: CPT

## 2021-11-05 ENCOUNTER — CLINICAL SUPPORT (OUTPATIENT)
Dept: CARDIAC REHAB | Age: 76
End: 2021-11-05
Payer: COMMERCIAL

## 2021-11-05 DIAGNOSIS — I21.3 ST ELEVATION MYOCARDIAL INFARCTION (STEMI), UNSPECIFIED ARTERY (HCC): ICD-10-CM

## 2021-11-05 PROCEDURE — 93798 PHYS/QHP OP CAR RHAB W/ECG: CPT

## 2021-11-08 ENCOUNTER — CLINICAL SUPPORT (OUTPATIENT)
Dept: CARDIAC REHAB | Age: 76
End: 2021-11-08
Payer: COMMERCIAL

## 2021-11-08 DIAGNOSIS — I21.3 ST ELEVATION MYOCARDIAL INFARCTION (STEMI), UNSPECIFIED ARTERY (HCC): ICD-10-CM

## 2021-11-08 PROCEDURE — 93798 PHYS/QHP OP CAR RHAB W/ECG: CPT

## 2021-11-10 ENCOUNTER — CLINICAL SUPPORT (OUTPATIENT)
Dept: CARDIAC REHAB | Age: 76
End: 2021-11-10
Payer: COMMERCIAL

## 2021-11-10 DIAGNOSIS — I21.3 ST ELEVATION MYOCARDIAL INFARCTION (STEMI), UNSPECIFIED ARTERY (HCC): ICD-10-CM

## 2021-11-10 PROCEDURE — 93798 PHYS/QHP OP CAR RHAB W/ECG: CPT

## 2021-11-12 ENCOUNTER — CLINICAL SUPPORT (OUTPATIENT)
Dept: CARDIAC REHAB | Age: 76
End: 2021-11-12
Payer: COMMERCIAL

## 2021-11-12 DIAGNOSIS — I21.3 ST ELEVATION MYOCARDIAL INFARCTION (STEMI), UNSPECIFIED ARTERY (HCC): ICD-10-CM

## 2021-11-12 PROCEDURE — 93798 PHYS/QHP OP CAR RHAB W/ECG: CPT

## 2021-11-15 ENCOUNTER — CLINICAL SUPPORT (OUTPATIENT)
Dept: CARDIAC REHAB | Age: 76
End: 2021-11-15
Payer: COMMERCIAL

## 2021-11-15 ENCOUNTER — APPOINTMENT (OUTPATIENT)
Dept: LAB | Facility: CLINIC | Age: 76
End: 2021-11-15
Payer: COMMERCIAL

## 2021-11-15 DIAGNOSIS — I21.3 ST ELEVATION MYOCARDIAL INFARCTION (STEMI), UNSPECIFIED ARTERY (HCC): Primary | ICD-10-CM

## 2021-11-15 PROCEDURE — 93798 PHYS/QHP OP CAR RHAB W/ECG: CPT

## 2021-11-17 ENCOUNTER — CLINICAL SUPPORT (OUTPATIENT)
Dept: CARDIAC REHAB | Age: 76
End: 2021-11-17
Payer: COMMERCIAL

## 2021-11-17 DIAGNOSIS — I21.3 ST ELEVATION MYOCARDIAL INFARCTION (STEMI), UNSPECIFIED ARTERY (HCC): ICD-10-CM

## 2021-11-17 PROCEDURE — 93798 PHYS/QHP OP CAR RHAB W/ECG: CPT

## 2021-11-19 ENCOUNTER — CLINICAL SUPPORT (OUTPATIENT)
Dept: CARDIAC REHAB | Age: 76
End: 2021-11-19
Payer: COMMERCIAL

## 2021-11-19 DIAGNOSIS — I21.3 ST ELEVATION MYOCARDIAL INFARCTION (STEMI), UNSPECIFIED ARTERY (HCC): ICD-10-CM

## 2021-11-19 PROCEDURE — 93798 PHYS/QHP OP CAR RHAB W/ECG: CPT

## 2021-11-22 ENCOUNTER — CLINICAL SUPPORT (OUTPATIENT)
Dept: CARDIAC REHAB | Age: 76
End: 2021-11-22
Payer: COMMERCIAL

## 2021-11-22 DIAGNOSIS — I21.3 ST ELEVATION MYOCARDIAL INFARCTION (STEMI), UNSPECIFIED ARTERY (HCC): ICD-10-CM

## 2021-11-22 PROCEDURE — 93798 PHYS/QHP OP CAR RHAB W/ECG: CPT

## 2021-11-24 ENCOUNTER — CLINICAL SUPPORT (OUTPATIENT)
Dept: CARDIAC REHAB | Age: 76
End: 2021-11-24
Payer: COMMERCIAL

## 2021-11-24 DIAGNOSIS — I21.3 ST ELEVATION MYOCARDIAL INFARCTION (STEMI), UNSPECIFIED ARTERY (HCC): ICD-10-CM

## 2021-11-24 PROCEDURE — 93798 PHYS/QHP OP CAR RHAB W/ECG: CPT

## 2021-11-26 ENCOUNTER — CLINICAL SUPPORT (OUTPATIENT)
Dept: CARDIAC REHAB | Age: 76
End: 2021-11-26
Payer: COMMERCIAL

## 2021-11-26 DIAGNOSIS — I21.3 ST ELEVATION MYOCARDIAL INFARCTION (STEMI), UNSPECIFIED ARTERY (HCC): ICD-10-CM

## 2021-11-26 PROCEDURE — 93798 PHYS/QHP OP CAR RHAB W/ECG: CPT

## 2021-11-29 ENCOUNTER — CLINICAL SUPPORT (OUTPATIENT)
Dept: CARDIAC REHAB | Age: 76
End: 2021-11-29
Payer: COMMERCIAL

## 2021-11-29 DIAGNOSIS — I21.3 ST ELEVATION MYOCARDIAL INFARCTION (STEMI), UNSPECIFIED ARTERY (HCC): ICD-10-CM

## 2021-11-29 PROCEDURE — 93798 PHYS/QHP OP CAR RHAB W/ECG: CPT

## 2021-12-01 ENCOUNTER — CLINICAL SUPPORT (OUTPATIENT)
Dept: CARDIAC REHAB | Age: 76
End: 2021-12-01
Payer: COMMERCIAL

## 2021-12-01 DIAGNOSIS — I21.3 ST ELEVATION MYOCARDIAL INFARCTION (STEMI), UNSPECIFIED ARTERY (HCC): ICD-10-CM

## 2021-12-01 PROCEDURE — 93798 PHYS/QHP OP CAR RHAB W/ECG: CPT

## 2021-12-03 ENCOUNTER — APPOINTMENT (OUTPATIENT)
Dept: CARDIAC REHAB | Age: 76
End: 2021-12-03
Payer: COMMERCIAL

## 2021-12-06 ENCOUNTER — APPOINTMENT (OUTPATIENT)
Dept: CARDIAC REHAB | Age: 76
End: 2021-12-06
Payer: COMMERCIAL

## 2021-12-08 ENCOUNTER — APPOINTMENT (OUTPATIENT)
Dept: CARDIAC REHAB | Age: 76
End: 2021-12-08
Payer: COMMERCIAL

## 2021-12-09 ENCOUNTER — CLINICAL SUPPORT (OUTPATIENT)
Dept: CARDIAC REHAB | Age: 76
End: 2021-12-09
Payer: COMMERCIAL

## 2021-12-09 DIAGNOSIS — I21.3 ST ELEVATION MYOCARDIAL INFARCTION (STEMI), UNSPECIFIED ARTERY (HCC): ICD-10-CM

## 2021-12-09 PROCEDURE — 93798 PHYS/QHP OP CAR RHAB W/ECG: CPT

## 2021-12-10 ENCOUNTER — APPOINTMENT (OUTPATIENT)
Dept: CARDIAC REHAB | Age: 76
End: 2021-12-10
Payer: COMMERCIAL

## 2021-12-13 ENCOUNTER — CLINICAL SUPPORT (OUTPATIENT)
Dept: CARDIAC REHAB | Age: 76
End: 2021-12-13
Payer: COMMERCIAL

## 2021-12-13 DIAGNOSIS — I21.3 ST ELEVATION MYOCARDIAL INFARCTION (STEMI), UNSPECIFIED ARTERY (HCC): ICD-10-CM

## 2021-12-13 PROCEDURE — 93798 PHYS/QHP OP CAR RHAB W/ECG: CPT

## 2021-12-14 ENCOUNTER — APPOINTMENT (OUTPATIENT)
Dept: LAB | Facility: CLINIC | Age: 76
End: 2021-12-14
Payer: COMMERCIAL

## 2021-12-15 ENCOUNTER — APPOINTMENT (OUTPATIENT)
Dept: CARDIAC REHAB | Age: 76
End: 2021-12-15
Payer: COMMERCIAL

## 2021-12-17 ENCOUNTER — APPOINTMENT (OUTPATIENT)
Dept: CARDIAC REHAB | Age: 76
End: 2021-12-17
Payer: COMMERCIAL

## 2021-12-20 ENCOUNTER — OFFICE VISIT (OUTPATIENT)
Dept: CARDIOLOGY CLINIC | Facility: CLINIC | Age: 76
End: 2021-12-20
Payer: COMMERCIAL

## 2021-12-20 ENCOUNTER — APPOINTMENT (OUTPATIENT)
Dept: CARDIAC REHAB | Age: 76
End: 2021-12-20
Payer: COMMERCIAL

## 2021-12-20 VITALS
WEIGHT: 214 LBS | OXYGEN SATURATION: 99 % | HEART RATE: 62 BPM | HEIGHT: 71 IN | DIASTOLIC BLOOD PRESSURE: 70 MMHG | SYSTOLIC BLOOD PRESSURE: 119 MMHG | BODY MASS INDEX: 29.96 KG/M2

## 2021-12-20 DIAGNOSIS — I25.10 CORONARY ARTERY DISEASE INVOLVING NATIVE CORONARY ARTERY OF NATIVE HEART WITHOUT ANGINA PECTORIS: Primary | ICD-10-CM

## 2021-12-20 DIAGNOSIS — I10 HYPERTENSION, ESSENTIAL: Chronic | ICD-10-CM

## 2021-12-20 PROCEDURE — 93000 ELECTROCARDIOGRAM COMPLETE: CPT | Performed by: INTERNAL MEDICINE

## 2021-12-20 PROCEDURE — 99214 OFFICE O/P EST MOD 30 MIN: CPT | Performed by: INTERNAL MEDICINE

## 2021-12-22 ENCOUNTER — CLINICAL SUPPORT (OUTPATIENT)
Dept: CARDIAC REHAB | Age: 76
End: 2021-12-22
Payer: COMMERCIAL

## 2021-12-22 DIAGNOSIS — I21.3 ST ELEVATION MYOCARDIAL INFARCTION (STEMI), UNSPECIFIED ARTERY (HCC): ICD-10-CM

## 2021-12-22 PROCEDURE — 93798 PHYS/QHP OP CAR RHAB W/ECG: CPT

## 2021-12-29 ENCOUNTER — CLINICAL SUPPORT (OUTPATIENT)
Dept: CARDIAC REHAB | Age: 76
End: 2021-12-29
Payer: COMMERCIAL

## 2021-12-29 DIAGNOSIS — I21.3 ST ELEVATION MYOCARDIAL INFARCTION (STEMI), UNSPECIFIED ARTERY (HCC): ICD-10-CM

## 2021-12-29 PROCEDURE — 93798 PHYS/QHP OP CAR RHAB W/ECG: CPT

## 2021-12-31 ENCOUNTER — CLINICAL SUPPORT (OUTPATIENT)
Dept: CARDIAC REHAB | Age: 76
End: 2021-12-31
Payer: COMMERCIAL

## 2021-12-31 DIAGNOSIS — I21.3 ST ELEVATION MYOCARDIAL INFARCTION (STEMI), UNSPECIFIED ARTERY (HCC): ICD-10-CM

## 2021-12-31 PROCEDURE — 93798 PHYS/QHP OP CAR RHAB W/ECG: CPT

## 2022-01-03 ENCOUNTER — CLINICAL SUPPORT (OUTPATIENT)
Dept: CARDIAC REHAB | Age: 77
End: 2022-01-03
Payer: COMMERCIAL

## 2022-01-03 DIAGNOSIS — I21.3 ST ELEVATION MYOCARDIAL INFARCTION (STEMI), UNSPECIFIED ARTERY (HCC): ICD-10-CM

## 2022-01-03 PROCEDURE — 93798 PHYS/QHP OP CAR RHAB W/ECG: CPT

## 2022-01-03 NOTE — PROGRESS NOTES
Cardiac Rehabilitation Plan of Care   Discharge          Today's date: 1/3/2022   # of Exercise Sessions Completed: 36  Patient name: Victorino Kocher      : 1945  Age: 77 y o  MRN: [id number]  Referring Physician: Marion Son*  Cardiologist: Avtar Villalpando MD  Provider: Ismael Telles  Clinician: Wille Moritz, RN      Dx:   Encounter Diagnosis   Name Primary?  ST elevation myocardial infarction (STEMI), unspecified artery [facility])      Date of onset: 21      SUMMARY OF PROGRESS:  Discharge note for [facility]  He had 141% improvement in functional capacity increasing His max METs in the submaximal TM ETT  from 3 1 to 7 5 with test termination of RPE 6 and RHR +30  He had a 16% improvement in the [facility] activity estimated MET level with ADLs and physical activity  His [facility] QOL decreased by 10%  PHQ-9 score increased from 0 to 2  JAYME-7 increased from 0 to 1  His weight increased by 2 pounds  Waist circumference increased by 1 inches  Rate Your Plate score remained the same, no change  [facility] has been supplementing CR sessions with home exercise which includes walking 1 mile every other day with his dog  He reports increased stamina, strength and reduced SOB with activity  He tolerates 40 mins at 3 0 - 4 4 METs plus wt training  NSR on telemetry with occasional PACs  RHR 73 - 85, ExHR 105 - 123  Resting /64 - 142/82 with appropriate response to exercise reaching 162/70 - 174/80  Group education classes on cardiac risk factor modification were attended by the patient  Discharge plans include home TM and Lifecycle plus light dumbbells  Encouraged Pt to continue exercise  Frequency: 4-6 days/wk, Intensity: RPE 4-5, Time: 40-50 mins daily, 150-200 mins/wk  Pt was encouraged to continue eating heart healthy  Pt was encouraged to remain compliant with medications and f/u with cardiologist with any cardiac symptoms, medication management and updated lipid profile         Medication compliance: Yes   Comments: Pt reports to be compliant with medications  Fall Risk: Low   Comments: Ambulates with a steady gait with no assist device and Denies a fall in the past 6 months    EKG Interpretation: NSR, occ PAC      EXERCISE ASSESSMENT and PLAN    Current Exercise Program in Rehab:       Frequency: 3 days/week Supplement with home exercise 2+ days/wk as tolerated       Minutes: 40       METS: 3 0-4 4            HR: 105-123   RPE: 4-5         Modalities: Treadmill, UBE, Lifecycle, Rower, NuStep and Recumbent bike      Exercise Progression 30 Day Goals :    Frequency: 3 days/week of cardiac rehab     Supplement with home exercise 2+ days/wk as tolerated    Minutes: 40                            >150 mins/wk of moderate intensity exercise   METS: 2 5 - 4 5   HR: 105-120   RPE: 4-5   Modalities: Treadmill, Airdyne bike, UBE, Lifecycle, Rower and Recumbent bike    Strength trainin-3 days / week  12-15 repetitions  1-2 sets per modality    Modalities: Leg Press, Chest Press, Pull Downs and Lateral Raise    Home Exercise: Type: walking , Frequency: 3-4 days/week, with his dog, slow pace, -     Goals: 10% improvement in functional capacity - based on max METs achieved in fitness assessment, improved DASI score by 10%, Increase in exercise capacity by 40% - based on peak METs tolerated in cardiac rehab exercise session, Exercise 5 days/wk, >150mins/wk of moderate intensity exercise, Resume ADLs with increased strength, Attend Rehab regularly and Start a walking program    Progression Toward Goals:  Goals met: 10% improvement in functional capacity - based on max METs achieved in fitness assessment, improved DASI score by 10%, Increase in exercise capacity by 40% - based on peak METs tolerated in cardiac rehab exercise session, Exercise 5 days/wk, >150mins/wk of moderate intensity exercise, Resume ADLs with increased strength, Attend Rehab regularly and Start a walking program , Patient will be encouraged to focus on lifestyle modifications following discharge  Education: benefit of exercise for CAD risk factors, AHA guidelines to achieve >150 mins/wk of moderate exercise and RPE scale   Plan:home exercise 30+ mins 2 days opposite CR  Readiness to change: Action:  (Changing behavior)      NUTRITION ASSESSMENT AND PLAN    Weight control:    Starting weight: 212 4   Current weight:   214  Waist circumference:    Startin   Current:  41    Diabetes: N/A  A1c: 5 5    last measured: 21    Lipid management: Last lipid profile 10/14/21  Chol 135    HDL 37  LDL 76    Goals:reduced BMI to < 25, HDL >40, TRG <150, reduced waist circumference <40 inches (M), Improved Rate Your Plate score  >82, Wt  loss 1-2 ppw,  goal of 200 lbs  , reduce portion sizes of meat to 3oz or less, increase intake of meatless meals, reduce cheese intake or use reduced-fat, Eat 4-5 cups of fruits and vegetables daily, use fat-free dressings/rm or seldom use, choose healthy snacks: light popcorn, plain pretzels, eliminate or choose low-fat sweets and daily saturated fat intake <7%/13g    Progression Toward Goals: Goals not met: reduced BMI to < 25, HDL >40, TRG <150, reduced waist circumference <40 inches (M),  Wt  loss 1-2 ppw,  goal of 200 lbs  ,    , Goals met: TRG <150,  Improved Rate Your Plate score  >78,   reduce portion sizes of meat to 3oz or less, increase intake of meatless meals, reduce cheese intake or use reduced-fat, Eat 4-5 cups of fruits and vegetables daily, use fat-free dressings/rm or seldom use, choose healthy snacks: light popcorn, plain pretzels, eliminate or choose low-fat sweets and daily saturated fat intake <7%/13g , Patient will be encouraged to focus on lifestyle modifications following discharge ,     Education: heart healthy eating  low sodium diet  nutrition for  lipid management  wt  loss   education class: Heart Healthy Eating  education class:  Label Reading  Plan: replace refined grain bread with whole grain bread, reduce red meat 1x/wk, switch to skim or 1% milk, avoid processed foods, drink more water, learn how to read food labels, replace sugar with stevia or truvia and keep added daily sugar <25g/day  Readiness to change: Action:  (Changing behavior)      PSYCHOSOCIAL ASSESSMENT AND PLAN    Emotional:  Depression assessment:  PHQ-9 = 2 0 =No Depression             Anxiety measure:  JAYME-7 = 1  0-4  = Not anxious   Self-reported stress level:  3  Social support: Excellent and Patient reports excellent emotional/social support from family    Goals:  Physical Fitness in [place] Score < 3, Overall Health in [place] Score < 3 and Change in Health in [place] Score < 3     Progression Toward Goals: Goals not met: Physical Fitness in [place] Score < 3, Overall Health in [place] Score < 3    , Goals met:  Change in Health in [place] Score < 3  , Patient will be encouraged to focus on lifestyle modifications following discharge      Education: signs/sxs of depression, benefits of a positive support system, stress management techniques, depression and CAD, class:  Stress and Your Health  and class:  Relaxation  Plan: Exercise, Spend time outdoors, Enjoy a hobby, Enjoy family, Return to previous social activity and continue with medical therapy for history of anxiety  Readiness to change: Action:  (Changing behavior)      OTHER CORE COMPONENTS     Tobacco:   Social History     Tobacco Use   Smoking Status Never Smoker   Smokeless Tobacco Never Used       Tobacco Use Intervention:   N/A:  Patient is a non-smoker     Anginal Symptoms:  felt "off" - no pain   NTG use: Compliant with carrying NTG, Understands proper use, Reviewed Proper use and Pt has not used NTG since event    Blood pressure:    Restin/64 - 142/82   Exercise:  162/70 - 174/80    Goals: consistent BP < 130/80, reduced dietary sodium <2300mg, moderate intensity exercise >150 mins/wk and medication compliance    Progression Toward Goals: Goals met: consistent BP < 130/80, reduced dietary sodium <2300mg, moderate intensity exercise >150 mins/wk and medication compliance , Patient will be encouraged to focus on lifestyle modifications following discharge      Education:  low sodium diet and HTN, proper use of sublingual NTG and Education class:  Common Heart Medications  Plan: Avoid Processed foods, engage in regular exercise, eliminate salt shaker at the table, use salt substitutes and check labels for sodium content  Readiness to change: Action:  (Changing behavior)

## 2022-01-06 ENCOUNTER — OFFICE VISIT (OUTPATIENT)
Dept: HEMATOLOGY ONCOLOGY | Facility: CLINIC | Age: 77
End: 2022-01-06
Payer: COMMERCIAL

## 2022-01-06 VITALS
RESPIRATION RATE: 16 BRPM | WEIGHT: 214 LBS | HEIGHT: 71 IN | DIASTOLIC BLOOD PRESSURE: 76 MMHG | BODY MASS INDEX: 29.96 KG/M2 | OXYGEN SATURATION: 97 % | HEART RATE: 69 BPM | TEMPERATURE: 97.4 F | SYSTOLIC BLOOD PRESSURE: 122 MMHG

## 2022-01-06 DIAGNOSIS — D69.6 THROMBOCYTOPENIA (HCC): ICD-10-CM

## 2022-01-06 DIAGNOSIS — C91.10 CLL (CHRONIC LYMPHOCYTIC LEUKEMIA) (HCC): Primary | Chronic | ICD-10-CM

## 2022-01-06 DIAGNOSIS — Z95.5 STATUS POST CORONARY ARTERY STENT PLACEMENT: ICD-10-CM

## 2022-01-06 DIAGNOSIS — I10 HYPERTENSION, ESSENTIAL: Chronic | ICD-10-CM

## 2022-01-06 PROCEDURE — 99214 OFFICE O/P EST MOD 30 MIN: CPT | Performed by: INTERNAL MEDICINE

## 2022-01-06 NOTE — PROGRESS NOTES
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  Stage IV because of less than 100,000 platelet counts  Patient was being observed when platelet count was more than 100,000 but then in June 2020 he was started on acalabrutinib that he took until June or July 2021 and was put on hold because of acute MI and he has 1 stent in LAD and is on aspirin plus Plavix  He states Plavix will be stopped in near future and he will be continued on baby aspirin  Right now his condition and CLL parameters are being monitored and later when he needed treatment again he could be considered for venetoclax or zanubrutinib on clinical trial   Patient has been aware of all this  He feels well at present and does not have CLL related symptoms and does not have cardiac symptoms  No bleeding  No atrial fibrillation  Has some tiredness and minor arthritic symptoms     Spleen size was 16 cm the last time                  Current Outpatient Medications:     aspirin 81 mg chewable tablet, Chew 1 tablet (81 mg total) daily, Disp: 30 tablet, Rfl: 0    atorvastatin (LIPITOR) 40 mg tablet, Take 1 tablet (40 mg total) by mouth daily with dinner, Disp: 30 tablet, Rfl: 1    clopidogrel (PLAVIX) 75 mg tablet, Take 1 tablet (75 mg total) by mouth daily, Disp: 30 tablet, Rfl: 5    co-enzyme Q-10 50 MG capsule, Take 100 mg by mouth daily, Disp: , Rfl:     fosinopril (MONOPRIL) 20 mg tablet, Take 20 mg by mouth daily, Disp: , Rfl:     magnesium gluconate (MAGONATE) 500 mg tablet, Take 500 mg by mouth daily, Disp: , Rfl:     mometasone (NASONEX) 50 mcg/act nasal spray, 2 sprays into each nostril daily, Disp: , Rfl:     Multiple Vitamin (MULTIVITAMIN) tablet, Take 1 tablet by mouth daily, Disp: , Rfl:     nitroglycerin (NITROSTAT) 0 4 mg SL tablet, Place 1 tablet (0 4 mg total) under the tongue every 5 (five) minutes as needed for chest pain, Disp: 24 tablet, Rfl: 1    sertraline (ZOLOFT) 50 mg tablet, Take 75 mg by mouth daily  , Disp: , Rfl:     Allergies   Allergen Reactions    Sulfa Antibiotics        Oncology History Overview Note   chronic lymphocytic leukemia, diagnosed in August 2017  CLL has mixed good and bad prognostic features, 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, IgVH mutation, lack of CD38 expression, deletion of 13 q14 in 6% and no 11 q deletion        There was no trisomy 12  Lymphocytes were CD5 and CD23 positive, dim kappa expression and moderate CD20 expression had splenomegaly on CT scan but not on palpation     CLL (chronic lymphocytic leukemia) ([facility] )   3/27/2018 Initial Diagnosis    CLL (chronic lymphocytic leukemia) ([facility] )         ROS:  01/06/22 Reviewed 13 systems: See symptoms in HPI[de-identified]   Presently no neurological, cardiac, pulmonary, GI  symptoms  Symptoms are in HPI  [place] No fever, chills, bleeding, bone pains, skin rash, weight loss,   weakness, numbness,  claudication and gait problem  No frequent infections  Not unusually sensitive to heat or cold  No swelling of the ankles  No swollen glands  Patient is not anxious     No CLL related symptoms      /76 (BP Location: Left arm, Patient Position: Sitting, Cuff Size: Adult)   Pulse 69   Temp (!) 97 4 °F (36 3 °C)   Resp 16   Ht 5' 11" (1 803 m)   Wt 97 1 kg (214 lb)   SpO2 97%   BMI 29 85 kg/m²     Physical Exam:    Patient is alert and oriented  Patient is not in distress  Vital signs are stable  There is no icterus,  no oral thrush, no palpable neck mass, clear lung fields, regular heart rate , abdomen  soft and non tender, no palpable abdominal mass, no ascites, no edema of ankles, no calf tenderness, no focal neurological deficit, no skin rash, no palpable lymphadenopathy, good arterial pulses, no clubbing  Patient is anxious  Performance status 0-1      IMAGING:      LABS:    Results for orders placed or performed in visit on 11/19/21   CBC and differential   Result Value Ref Range    WBC 12 58 (H) 4 31 - 10 16 Thousand/uL    RBC 5 54 3 88 - 5 62 Million/uL    Hemoglobin 15 1 12 0 - 17 0 g/dL    Hematocrit 48 3 36 5 - 49 3 %    MCV 87 82 - 98 fL    MCH 27 3 26 8 - 34 3 pg    MCHC 31 3 (L) 31 4 - 37 4 g/dL    RDW 14 4 11 6 - 15 1 %    MPV 9 7 8 9 - 12 7 fL    Platelets 150 (L) 918 - 390 Thousands/uL   Comprehensive metabolic panel   Result Value Ref Range    Sodium 140 136 - 145 mmol/L    Potassium 5 2 3 5 - 5 3 mmol/L    Chloride 105 100 - 108 mmol/L    CO2 29 21 - 32 mmol/L    ANION GAP 6 4 - 13 mmol/L    BUN 28 (H) 5 - 25 mg/dL    Creatinine 1 24 0 60 - 1 30 mg/dL    Glucose, Fasting 106 (H) 65 - 99 mg/dL    Calcium 8 9 8 3 - 10 1 mg/dL    AST 19 5 - 45 U/L    ALT 26 12 - 78 U/L    Alkaline Phosphatase 76 46 - 116 U/L    Total Protein 6 7 6 4 - 8 2 g/dL    Albumin 3 7 3 5 - 5 0 g/dL    Total Bilirubin 0 63 0 20 - 1 00 mg/dL    eGFR 57 ml/min/1 73sq m   IgG   Result Value Ref Range     0 700 0-1,600 0 mg/dL   LD,Blood   Result Value Ref Range     81 - 234 U/L   Manual Differential(PHLEBS Do Not Order)   Result Value Ref Range    Segmented % 27 (L) 43 - 75 %    Lymphocytes % 63 (H) 14 - 44 %    Monocytes % 3 (L) 4 - 12 %    Eosinophils, % 2 0 - 6 %    Basophils % 1 0 - 1 %    Atypical Lymphocytes % 4 (H) <=0 %    Absolute Neutrophils 3 40 1 85 - 7 62 Thousand/uL    Lymphocytes Absolute 7 93 (H) 0 60 - 4 47 Thousand/uL    Monocytes Absolute 0 38 0 00 - 1 22 Thousand/uL    Eosinophils Absolute 0 25 0 00 - 0 40 Thousand/uL    Basophils Absolute 0 13 (H) 0 00 - 0 10 Thousand/uL    Total Counted      RBC Morphology Normal     Platelet Estimate Borderline (A) Adequate    Large Platelet Present      Labs, Imaging, & Other studies:   All pertinent labs and imaging studies were personally reviewed    Lab Results   Component Value Date    K 5 2 12/14/2021     12/14/2021    CO2 29 12/14/2021    BUN 28 (H) 12/14/2021    CREATININE 1 24 12/14/2021    GLUF 106 (H) 12/14/2021    CALCIUM 8 9 12/14/2021    CORRECTEDCA 9 2 07/14/2021    AST 19 12/14/2021    ALT 26 12/14/2021    ALKPHOS 76 12/14/2021    EGFR 57 12/14/2021     Lab Results   Component Value Date    WBC 12 58 (H) 12/14/2021    HGB 15 1 12/14/2021    HCT 48 3 12/14/2021    MCV 87 12/14/2021     (L) 12/14/2021     Component      Latest Ref Rng & Units 6/17/2020 6/24/2020 7/1/2020 7/8/2020                WBC      4 31 - 10 16 Thousand/uL 240 92 (HH) 212 22 (HH) 209 17 (HH) 197 11 (HH)   Red Blood Cell Count      3 88 - 5 62 Million/uL 4 76 4 73 4 78 4 92   Hemoglobin      12 0 - 17 0 g/dL 11 9 (L) 11 7 (L) 12 7 13 0   HCT      36 5 - 49 3 % 44 7 44 8 44 9 46 2   MCV      82 - 98 fL 94 95 94 94   MCH      26 8 - 34 3 pg 25 0 (L) 24 7 (L) 26 6 (L) 26 4 (L)   MCHC      31 4 - 37 4 g/dL 26 6 (L) 26 1 (L) 28 3 (L) 28 1 (L)   RDW      11 6 - 15 1 % 16 7 (H) 17 6 (H) 16 5 (H) 17 5 (H)   MPV      8 9 - 12 7 fL 10 0 10 2 10 6 10 7   Platelet Count      251 - 390 Thousands/uL 87 (L) 101 (L) 93 (L) 95 (L)   nRBC      /100 WBCs 0 0 0 0     Component      Latest Ref Rng & Units 7/15/2020 8/14/2020 9/14/2020               WBC      4 31 - 10 16 Thousand/uL 177 24 (HH) 134 78 (HH) 109 54 (HH)   Red Blood Cell Count      3 88 - 5 62 Million/uL 4 92 5 01 5 06   Hemoglobin      12 0 - 17 0 g/dL 12 9 13 4 13 6   HCT      36 5 - 49 3 % 46 2 46 1 46 5   MCV      82 - 98 fL 94 92 92   MCH      26 8 - 34 3 pg 26 2 (L) 26 7 (L) 26 9   MCHC      31 4 - 37 4 g/dL 27 9 (L) 29 1 (L) 29 2 (L)   RDW      11 6 - 15 1 % 17 4 (H) 16 5 (H) 15 5 (H)   MPV      8 9 - 12 7 fL 10 0 10 3 10 7   Platelet Count      417 - 390 Thousands/uL 100 (L) 103 (L) 121 (L)   nRBC      /100 WBCs 0 0 0     Reviewed test results  and discussed with patient  Assessment and plan: Follow-up visit for 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  Stage IV because of less than 100,000 platelet counts    Patient was being observed when platelet count was more than 100,000 but then in June 2020 he was started on acalabrutinib that he took until June or July 2021 and was put on hold because of acute MI and he has 1 stent in LAD and is on aspirin plus Plavix  He states Plavix will be stopped in near future and he will be continued on baby aspirin  Right now his condition and CLL parameters are being monitored and later when he needed treatment again he could be considered for venetoclax or zanubrutinib on clinical trial   Patient has been aware of all this  He feels well at present and does not have CLL related symptoms and does not have cardiac symptoms  No bleeding  No atrial fibrillation  Has some tiredness and minor arthritic symptoms  Spleen size was 16 cm the last time   Physical examination and test results are as recorded and discussed  Plan is surveillance again for now with above options when treatment is indicated again and discussed treatment indications for CLL  All discussed in very much detail  Questions answered  Discussed the importance of  eating healthy foods, diet and activities as prescribed by patient's cardiologist and he goes for cardiac rehab  Health screening tests   Patient is capable of self-care  Discussed precautions against Coronavirus especially his immune system is low        See diagnoses, orders and instructions  1  CLL (chronic lymphocytic leukemia) (HCC)    - CBC and differential; Future  - Comprehensive metabolic panel; Future  - LD,Blood; Future  - Uric acid; Future  - IgG, IgA, IgM; Future    2  Status post coronary artery stent placement      3  Hypertension, essential      4  Thrombocytopenia ([facility] )             [facility] work prior to next visit in 3 months        Patient voiced understanding and agreed      Counseling / Coordination of Care       Provided counseling and support

## 2022-01-20 DIAGNOSIS — I25.10 CAD (CORONARY ARTERY DISEASE): ICD-10-CM

## 2022-01-20 DIAGNOSIS — I21.3 STEMI (ST ELEVATION MYOCARDIAL INFARCTION) (HCC): ICD-10-CM

## 2022-01-20 RX ORDER — CLOPIDOGREL BISULFATE 75 MG/1
75 TABLET ORAL DAILY
Qty: 30 TABLET | Refills: 11 | Status: SHIPPED | OUTPATIENT
Start: 2022-01-20 | End: 2022-05-27

## 2022-01-26 ENCOUNTER — IMMUNIZATIONS (OUTPATIENT)
Dept: FAMILY MEDICINE CLINIC | Facility: HOSPITAL | Age: 77
End: 2022-01-26

## 2022-01-26 DIAGNOSIS — Z23 ENCOUNTER FOR IMMUNIZATION: Primary | ICD-10-CM

## 2022-01-26 PROCEDURE — 0064A COVID-19 MODERNA VACC 0.25 ML BOOSTER: CPT

## 2022-01-26 PROCEDURE — 91306 COVID-19 MODERNA VACC 0.25 ML BOOSTER: CPT

## 2022-03-14 ENCOUNTER — APPOINTMENT (OUTPATIENT)
Dept: LAB | Facility: CLINIC | Age: 77
End: 2022-03-14
Payer: COMMERCIAL

## 2022-03-14 DIAGNOSIS — C91.10 CLL (CHRONIC LYMPHOCYTIC LEUKEMIA) (HCC): ICD-10-CM

## 2022-03-14 LAB
ALBUMIN SERPL BCP-MCNC: 3.7 G/DL (ref 3.5–5)
ALP SERPL-CCNC: 77 U/L (ref 46–116)
ALT SERPL W P-5'-P-CCNC: 25 U/L (ref 12–78)
ANION GAP SERPL CALCULATED.3IONS-SCNC: 9 MMOL/L (ref 4–13)
AST SERPL W P-5'-P-CCNC: 21 U/L (ref 5–45)
BASOPHILS # BLD MANUAL: 0.58 THOUSAND/UL (ref 0–0.1)
BASOPHILS NFR MAR MANUAL: 3 % (ref 0–1)
BILIRUB SERPL-MCNC: 0.5 MG/DL (ref 0.2–1)
BUN SERPL-MCNC: 22 MG/DL (ref 5–25)
CALCIUM SERPL-MCNC: 8.6 MG/DL (ref 8.3–10.1)
CHLORIDE SERPL-SCNC: 107 MMOL/L (ref 100–108)
CO2 SERPL-SCNC: 28 MMOL/L (ref 21–32)
CREAT SERPL-MCNC: 1.12 MG/DL (ref 0.6–1.3)
EOSINOPHIL # BLD MANUAL: 0.77 THOUSAND/UL (ref 0–0.4)
EOSINOPHIL NFR BLD MANUAL: 4 % (ref 0–6)
ERYTHROCYTE [DISTWIDTH] IN BLOOD BY AUTOMATED COUNT: 14.6 % (ref 11.6–15.1)
GFR SERPL CREATININE-BSD FRML MDRD: 63 ML/MIN/1.73SQ M
GLUCOSE P FAST SERPL-MCNC: 99 MG/DL (ref 65–99)
HCT VFR BLD AUTO: 46.4 % (ref 36.5–49.3)
HGB BLD-MCNC: 15.1 G/DL (ref 12–17)
IGA SERPL-MCNC: 172 MG/DL (ref 70–400)
IGG SERPL-MCNC: 660 MG/DL (ref 700–1600)
IGM SERPL-MCNC: 39 MG/DL (ref 40–230)
LDH SERPL-CCNC: 188 U/L (ref 81–234)
LYMPHOCYTES # BLD AUTO: 12.73 THOUSAND/UL (ref 0.6–4.47)
LYMPHOCYTES # BLD AUTO: 66 % (ref 14–44)
MCH RBC QN AUTO: 27.6 PG (ref 26.8–34.3)
MCHC RBC AUTO-ENTMCNC: 32.5 G/DL (ref 31.4–37.4)
MCV RBC AUTO: 85 FL (ref 82–98)
MONOCYTES # BLD AUTO: 1.16 THOUSAND/UL (ref 0–1.22)
MONOCYTES NFR BLD: 6 % (ref 4–12)
NEUTROPHILS # BLD MANUAL: 3.86 THOUSAND/UL (ref 1.85–7.62)
NEUTS SEG NFR BLD AUTO: 20 % (ref 43–75)
PLATELET # BLD AUTO: 98 THOUSANDS/UL (ref 149–390)
PLATELET BLD QL SMEAR: ABNORMAL
PMV BLD AUTO: 10.2 FL (ref 8.9–12.7)
POTASSIUM SERPL-SCNC: 4.6 MMOL/L (ref 3.5–5.3)
PROT SERPL-MCNC: 6.3 G/DL (ref 6.4–8.2)
RBC # BLD AUTO: 5.47 MILLION/UL (ref 3.88–5.62)
RBC MORPH BLD: NORMAL
SODIUM SERPL-SCNC: 144 MMOL/L (ref 136–145)
URATE SERPL-MCNC: 5.5 MG/DL (ref 4.2–8)
VARIANT LYMPHS # BLD AUTO: 1 %
WBC # BLD AUTO: 19.29 THOUSAND/UL (ref 4.31–10.16)

## 2022-03-14 PROCEDURE — 82784 ASSAY IGA/IGD/IGG/IGM EACH: CPT

## 2022-03-14 PROCEDURE — 83615 LACTATE (LD) (LDH) ENZYME: CPT

## 2022-03-14 PROCEDURE — 80053 COMPREHEN METABOLIC PANEL: CPT

## 2022-03-14 PROCEDURE — 84550 ASSAY OF BLOOD/URIC ACID: CPT

## 2022-03-14 PROCEDURE — 36415 COLL VENOUS BLD VENIPUNCTURE: CPT

## 2022-03-14 PROCEDURE — 85007 BL SMEAR W/DIFF WBC COUNT: CPT

## 2022-03-14 PROCEDURE — 85027 COMPLETE CBC AUTOMATED: CPT

## 2022-04-07 ENCOUNTER — OFFICE VISIT (OUTPATIENT)
Dept: HEMATOLOGY ONCOLOGY | Facility: CLINIC | Age: 77
End: 2022-04-07
Payer: COMMERCIAL

## 2022-04-07 VITALS
HEIGHT: 71 IN | OXYGEN SATURATION: 97 % | HEART RATE: 63 BPM | WEIGHT: 209 LBS | SYSTOLIC BLOOD PRESSURE: 124 MMHG | DIASTOLIC BLOOD PRESSURE: 78 MMHG | RESPIRATION RATE: 17 BRPM | TEMPERATURE: 97.4 F | BODY MASS INDEX: 29.26 KG/M2

## 2022-04-07 DIAGNOSIS — D69.6 THROMBOCYTOPENIA (HCC): ICD-10-CM

## 2022-04-07 DIAGNOSIS — I25.10 CORONARY ARTERY DISEASE INVOLVING NATIVE CORONARY ARTERY OF NATIVE HEART WITHOUT ANGINA PECTORIS: ICD-10-CM

## 2022-04-07 DIAGNOSIS — I10 HYPERTENSION, ESSENTIAL: Chronic | ICD-10-CM

## 2022-04-07 DIAGNOSIS — C91.10 CLL (CHRONIC LYMPHOCYTIC LEUKEMIA) (HCC): Primary | ICD-10-CM

## 2022-04-07 PROCEDURE — 99214 OFFICE O/P EST MOD 30 MIN: CPT | Performed by: INTERNAL MEDICINE

## 2022-04-07 NOTE — PROGRESS NOTES
HPI:  Patient has history of 17p deletion CLL, an unfavorable prognostic feature and mutated Ig VH, a favorable prognostic feature  After a long period of observation patient was started on acalabrutinib in June 2020 when platelet counts dropped below 100,000, stage IV CLL  He responded but in June or July 2021 acalabrutinib was discontinued because patient went on aspirin and Plavix post MRI and 1 stent in LAD  Patient's condition and counts are being monitored  There has been some increase in lymphocyte count and decrease in platelet count and he could be requiring therapy again in the near future and I thing he could have venetoclax because of risk of bleeding with acalabrutinib in presence of anti-platelet medications  We discussed that  He could also be considered for zanubrutinib on clinical trial?    He feels well at present and does not have CLL related symptoms and does not have cardiac symptoms  No bleeding  No atrial fibrillation  Has some tiredness and minor arthritic symptoms     Spleen size was 16 cm the last time                  Current Outpatient Medications:     aspirin 81 mg chewable tablet, Chew 1 tablet (81 mg total) daily, Disp: 30 tablet, Rfl: 0    atorvastatin (LIPITOR) 40 mg tablet, Take 1 tablet (40 mg total) by mouth daily with dinner, Disp: 30 tablet, Rfl: 1    clopidogrel (PLAVIX) 75 mg tablet, Take 1 tablet (75 mg total) by mouth daily, Disp: 30 tablet, Rfl: 11    co-enzyme Q-10 50 MG capsule, Take 100 mg by mouth daily, Disp: , Rfl:     fosinopril (MONOPRIL) 20 mg tablet, Take 20 mg by mouth daily, Disp: , Rfl:     magnesium gluconate (MAGONATE) 500 mg tablet, Take 500 mg by mouth daily, Disp: , Rfl:     mometasone (NASONEX) 50 mcg/act nasal spray, 2 sprays into each nostril daily, Disp: , Rfl:     Multiple Vitamin (MULTIVITAMIN) tablet, Take 1 tablet by mouth daily, Disp: , Rfl:     sertraline (ZOLOFT) 50 mg tablet, Take 75 mg by mouth daily  , Disp: , Rfl:    nitroglycerin (NITROSTAT) 0 4 mg SL tablet, Place 1 tablet (0 4 mg total) under the tongue every 5 (five) minutes as needed for chest pain (Patient not taking: Reported on 4/7/2022 ), Disp: 24 tablet, Rfl: 1    Allergies   Allergen Reactions    Sulfa Antibiotics        Oncology History Overview Note   chronic lymphocytic leukemia, diagnosed in August 2017  CLL has mixed good and bad prognostic features, 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, IgVH mutation, lack of CD38 expression, deletion of 13 q14 in 6% and no 11 q deletion        There was no trisomy 12  Lymphocytes were CD5 and CD23 positive, dim kappa expression and moderate CD20 expression had splenomegaly on CT scan but not on palpation     CLL (chronic lymphocytic leukemia) ([facility] )   3/27/2018 Initial Diagnosis    CLL (chronic lymphocytic leukemia) ([facility] )         ROS:  04/07/22 Reviewed 13 systems: See symptoms in HPI[de-identified]   Presently no neurological, cardiac, pulmonary, GI  symptoms  Symptoms are in HPI  No symptoms like  fever, chills, bleeding, bone pains, skin rash, weight loss,   weakness, numbness,  claudication and gait problem  No frequent infections  Not unusually sensitive to heat or cold  No swelling of the ankles  No swollen glands  Patient is not anxious  No CLL related symptoms      /78 (BP Location: Left arm, Patient Position: Sitting, Cuff Size: Adult)   Pulse 63   Temp (!) 97 4 °F (36 3 °C)   Resp 17   Ht 5' 11" (1 803 m)   Wt 94 8 kg (209 lb)   SpO2 97%   BMI 29 15 kg/m²     Physical Exam:    Alert and oriented and not in distress  Stable vitals  No icterus  ,  no oral thrush, no palpable neck mass, lung fields clear to percussion and auscultation, regular heart rate ,   soft and non tender abdomen, no palpable abdominal mass, no ascites, no edema of ankles, no calf tenderness, no focal neurological deficit, no skin rash, no palpable lymphadenopathy,  no clubbing  Patient is anxious  Performance status 0-1      IMAGING:      LABS:    Results for orders placed or performed in visit on 03/14/22   CBC and differential   Result Value Ref Range    WBC 19 29 (H) 4 31 - 10 16 Thousand/uL    RBC 5 47 3 88 - 5 62 Million/uL    Hemoglobin 15 1 12 0 - 17 0 g/dL    Hematocrit 46 4 36 5 - 49 3 %    MCV 85 82 - 98 fL    MCH 27 6 26 8 - 34 3 pg    MCHC 32 5 31 4 - 37 4 g/dL    RDW 14 6 11 6 - 15 1 %    MPV 10 2 8 9 - 12 7 fL    Platelets 98 (L) 290 - 390 Thousands/uL   Comprehensive metabolic panel   Result Value Ref Range    Sodium 144 136 - 145 mmol/L    Potassium 4 6 3 5 - 5 3 mmol/L    Chloride 107 100 - 108 mmol/L    CO2 28 21 - 32 mmol/L    ANION GAP 9 4 - 13 mmol/L    BUN 22 5 - 25 mg/dL    Creatinine 1 12 0 60 - 1 30 mg/dL    Glucose, Fasting 99 65 - 99 mg/dL    Calcium 8 6 8 3 - 10 1 mg/dL    AST 21 5 - 45 U/L    ALT 25 12 - 78 U/L    Alkaline Phosphatase 77 46 - 116 U/L    Total Protein 6 3 (L) 6 4 - 8 2 g/dL    Albumin 3 7 3 5 - 5 0 g/dL    Total Bilirubin 0 50 0 20 - 1 00 mg/dL    eGFR 63 ml/min/1 73sq m   LD,Blood   Result Value Ref Range     81 - 234 U/L   Uric acid   Result Value Ref Range    Uric Acid 5 5 4 2 - 8 0 mg/dL   IgG, IgA, IgM   Result Value Ref Range     0 70 0 - 400 0 mg/dL     0 (L) 700 0-1,600 0 mg/dL    IGM 39 0 (L) 40 0 - 230 0 mg/dL   Manual Differential(PHLEBS Do Not Order)   Result Value Ref Range    Segmented % 20 (L) 43 - 75 %    Lymphocytes % 66 (H) 14 - 44 %    Monocytes % 6 4 - 12 %    Eosinophils, % 4 0 - 6 %    Basophils % 3 (H) 0 - 1 %    Atypical Lymphocytes % 1 (H) <=0 %    Absolute Neutrophils 3 86 1 85 - 7 62 Thousand/uL    Lymphocytes Absolute 12 73 (H) 0 60 - 4 47 Thousand/uL    Monocytes Absolute 1 16 0 00 - 1 22 Thousand/uL    Eosinophils Absolute 0 77 (H) 0 00 - 0 40 Thousand/uL    Basophils Absolute 0 58 (H) 0 00 - 0 10 Thousand/uL    Total Counted      RBC Morphology Normal     Platelet Estimate Borderline (A) Adequate     Labs, Imaging, & Other studies:   All pertinent labs and imaging studies were personally reviewed        Assessment and plan:   Patient has history of 17p deletion CLL, an unfavorable prognostic feature and mutated Ig VH, a favorable prognostic feature  After a long period of observation patient was started on acalabrutinib in June 2020 when platelet counts dropped below 100,000, stage IV CLL  He responded but in June or July 2021 acalabrutinib was discontinued because patient went on aspirin and Plavix post MRI and 1 stent in LAD  Patient's condition and counts are being monitored  There has been some increase in lymphocyte count and decrease in platelet count and he could be requiring therapy again in the near future and I thing he could have venetoclax because of risk of bleeding with acalabrutinib in presence of anti-platelet medications  We discussed that  He could also be considered for zanubrutinib on clinical trial?    He feels well at present and does not have CLL related symptoms and does not have cardiac symptoms  No bleeding  No atrial fibrillation  Has some tiredness and minor arthritic symptoms  Spleen size was 16 cm the last time     Physical examination and test results are as recorded and discussed  Plan is surveillance for now   We discussed above options   Platelet count has decreased again to less than 100,000  [place]atelet count is [address]  He has an indication for treatment  We mutually decided to wait until next visit and decide then but blood counts will be checked every month in the meantime  All discussed in very much detail  Questions answered  Discussed the importance of  eating healthy foods, diet and activities as prescribed by patient's cardiologist and he goes for cardiac rehab  Health screening tests   Patient is capable of self-care  Discussed precautions against Coronavirus especially his immune system is low        See diagnoses, orders and instructions  1   CLL (chronic lymphocytic leukemia) (HCC)    - CBC and differential; Standing  - Comprehensive metabolic panel; Standing  - LD,Blood; Standing  - CBC and differential  - Comprehensive metabolic panel  - LD,Blood    2  Coronary artery disease involving native coronary artery of native heart without angina pectoris      3  Hypertension, essential      4  Thrombocytopenia (HCC)    - CBC and differential; Standing  - CBC and differential      Blood work every month  Visit in 3 months     Patient will be getting Evusheld as discussed and decided  Patient has been aware of Evusheld and he was actually going to ask me about that         [address] [employer] work prior to next visit in 3 months        Patient voiced understanding and agreed      Counseling / Coordination of Care       Provided counseling and support

## 2022-04-14 ENCOUNTER — APPOINTMENT (OUTPATIENT)
Dept: LAB | Facility: CLINIC | Age: 77
End: 2022-04-14
Payer: COMMERCIAL

## 2022-04-15 ENCOUNTER — HOSPITAL ENCOUNTER (OUTPATIENT)
Dept: INFUSION CENTER | Facility: CLINIC | Age: 77
Discharge: HOME | End: 2022-04-15
Payer: COMMERCIAL

## 2022-04-15 VITALS
OXYGEN SATURATION: 96 % | TEMPERATURE: 97.2 F | SYSTOLIC BLOOD PRESSURE: 136 MMHG | DIASTOLIC BLOOD PRESSURE: 72 MMHG | RESPIRATION RATE: 18 BRPM | HEART RATE: 60 BPM

## 2022-04-15 DIAGNOSIS — C91.10 CLL (CHRONIC LYMPHOCYTIC LEUKEMIA) (HCC): Primary | ICD-10-CM

## 2022-04-15 PROCEDURE — M0220 HB TIXAGEV AND CILGAV INF ADMIN: HCPCS | Performed by: INTERNAL MEDICINE

## 2022-04-15 PROCEDURE — 96372 THER/PROPH/DIAG INJ SC/IM: CPT

## 2022-04-15 RX ADMIN — AZD7442 600 MG COMBINED: KIT at 15:11

## 2022-04-15 NOTE — PROGRESS NOTES
Pt to clinic for Evusheld injections  Vital signs stable  EUA criteria reviewed and copy is given to pt  Pt gives verbal consent to proceed with treatment  Tixagevimab injection given in RIGHT buttock an Cilgavimab injection given in LEFT buttock  Pt tolerated well  Pt resting comfortably in recliner at this time  Pt aware they will be observed for one hour  Call bell within reach

## 2022-05-13 ENCOUNTER — APPOINTMENT (OUTPATIENT)
Dept: LAB | Facility: CLINIC | Age: 77
End: 2022-05-13
Payer: COMMERCIAL

## 2022-05-27 ENCOUNTER — OFFICE VISIT (OUTPATIENT)
Dept: CARDIOLOGY CLINIC | Facility: CLINIC | Age: 77
End: 2022-05-27
Payer: COMMERCIAL

## 2022-05-27 VITALS
BODY MASS INDEX: 28.42 KG/M2 | SYSTOLIC BLOOD PRESSURE: 120 MMHG | DIASTOLIC BLOOD PRESSURE: 64 MMHG | HEART RATE: 71 BPM | OXYGEN SATURATION: 97 % | WEIGHT: 203 LBS | HEIGHT: 71 IN

## 2022-05-27 DIAGNOSIS — I25.10 CORONARY ARTERY DISEASE INVOLVING NATIVE CORONARY ARTERY OF NATIVE HEART WITHOUT ANGINA PECTORIS: Primary | ICD-10-CM

## 2022-05-27 PROCEDURE — 99214 OFFICE O/P EST MOD 30 MIN: CPT | Performed by: INTERNAL MEDICINE

## 2022-05-27 NOTE — PROGRESS NOTES
Cardiology Follow Up    Johny Tomlinson  1945  [id number]  [facility] [address]  [address] [address] [phone]    1  Coronary artery disease involving native coronary artery of native heart without angina pectoris       Discussion/Summary: The beginning of July will be 1 year post MI/PCI  He has ecchymosis  He has CLL  His cancer therapy is been on hold  I would like him to stop Plavix at the beginning of July  He has a follow-up appoint with his oncologist shortly thereafter  Any treatment or therapy or investigational study needed from a cancer standpoint would be fine in my opinion  There is no cardiac contraindications  His blood pressure remains well controlled  His lipid panel looks good with 40 mg of atorvastatin  He knows he will continue 81 mg of aspirin long-term  As long as he remains stable, I can see him back in 1 year  History of Present Illness:   Pleasant 78-year-old man  History of CAD with prior MI  He had symptoms of burning chest discomfort which had been ongoing for a few months, but on the day of his admission to the hospital, he had worsening of symptoms after working outside  He had inferior ST elevations  He went for an urgent cardiac catheterization  Interestingly, there was no thrombotic lesion present  There was 60% disease in the distal RCA  He did have an 80% disease in the proximal LAD noted which was subsequently stented  His LV function was preserved  He does have a history of CLL, had been on treatment directed by his oncologist, Dr Lucie Haynes, which is currently held in the setting of antiplatelet therapy  He has done very well since his hospitalization  He gets blood work done monthly and this has been stable despite being off of his cancer therapy (some increase in WBC)    His lipid-lowering therapy was intensified during the hospitalization  Interval History:    Continues to feel great  No symptoms of angina  He is exercising inactive  Lost a few lb since last visit  Currently on dual antiplatelet therapy  Getting very easy bruising  He had a tetanus booster the other day and there is a large ecchymosis on his upper arm  His platelet count has been slightly dropping  It is now below 100,000  Lipid panel in October looked good  He is going to be 1 year out from his MI in July      Medical Problems     Problem List     Coronary artery disease involving native coronary artery of native heart without angina pectoris    Lymphocytosis    Chills    Acute kidney insufficiency    Hyperlipidemia, unspecified (Chronic)    Hypertension, essential (Chronic)    Nausea    CLL (chronic lymphocytic leukemia) (HCC) (Chronic)    Thrombocytopenia (HCC)    Serum potassium elevated    Anxiety              Past Medical History:   Diagnosis Date    Anxiety     Hypertension     Leukemia ([facility] [address] )      Social History     Tobacco Use    Smoking status: Never Smoker    Smokeless tobacco: Never Used   Substance Use Topics    Alcohol use: Not Currently     Comment: minimal    Drug use: No      Family History   Problem Relation Age of Onset    No Known Problems Mother     No Known Problems Father      Past Surgical History:   Procedure Laterality Date    APPENDECTOMY      COLONOSCOPY      TONSILLECTOMY         Current Outpatient Medications:     aspirin 81 mg chewable tablet, Chew 1 tablet (81 mg total) daily, Disp: 30 tablet, Rfl: 0    atorvastatin (LIPITOR) 40 mg tablet, Take 1 tablet (40 mg total) by mouth daily with dinner, Disp: 30 tablet, Rfl: 1    co-enzyme Q-10 50 MG capsule, Take 100 mg by mouth daily, Disp: , Rfl:     fosinopril (MONOPRIL) 20 mg tablet, Take 20 mg by mouth daily, Disp: , Rfl:     magnesium gluconate (MAGONATE) 500 mg tablet, Take 500 mg by mouth daily, Disp: , Rfl:     mometasone (NASONEX) 50 mcg/act nasal spray, 2 sprays into each nostril daily, Disp: , Rfl:     Multiple Vitamin (MULTIVITAMIN) tablet, Take 1 tablet by mouth daily, Disp: , Rfl:     sertraline (ZOLOFT) 50 mg tablet, Take 75 mg by mouth daily  , Disp: , Rfl:     nitroglycerin (NITROSTAT) 0 4 mg SL tablet, Place 1 tablet (0 4 mg total) under the tongue every 5 (five) minutes as needed for chest pain (Patient not taking: No sig reported), Disp: 24 tablet, Rfl: 1  Allergies   Allergen Reactions    Sulfa Antibiotics      Vitals:    05/27/22 1252   BP: 120/64   BP Location: Left arm   Patient Position: Sitting   Cuff Size: Standard   Pulse: 71   SpO2: 97%   Weight: 92 1 kg (203 lb)   Height: 5' 11" (1 803 m)     Vitals:    05/27/22 1252   Weight: 92 1 kg (203 lb)      Height: 5' 11" (180 3 cm)   Body mass index is 28 31 kg/m²  Physical Exam:  GEN: Nino Hammer appears well, alert and oriented x 3, pleasant and cooperative   HEENT: pupils equal, round, and reactive to light; extraocular muscles intact  NECK: supple, no carotid bruits   HEART: regular rhythm, normal S1 and S2, no murmurs, clicks, gallops or rubs   LUNGS: clear to auscultation bilaterally; no wheezes, rales, or rhonchi   ABDOMEN: normal bowel sounds, soft, no tenderness, no distention  EXTREMITIES: peripheral pulses normal; no clubbing, cyanosis, or edema  NEURO: no focal findings   SKIN: multiple ecchymoses noted  ROS:  Positive for bruising  Except as noted in HPI, is otherwise reviewed in detail and a 12 point review of systems is negative    ROS reviewed and is unchanged    Labs:  Lab Results   Component Value Date    SODIUM 138 05/13/2022    K 4 7 05/13/2022     05/13/2022    CREATININE 1 13 05/13/2022    BUN 32 (H) 05/13/2022    CO2 30 05/13/2022    ALT 15 05/13/2022    AST 19 05/13/2022    INR 1 00 07/02/2021    GLUF 95 05/13/2022    HGBA1C 5 5 05/11/2021    WBC 22 15 (H) 05/13/2022    HGB 14 3 05/13/2022    HCT 46 4 05/13/2022    PLT 93 (L) 05/13/2022     No results found for: CHOL  Lab Results   Component Value Date    LDLCALC 76 10/14/2021    LDLCALC 80 07/02/2021    LDLCALC 94 05/11/2021     Lab Results   Component Value Date    HDL 37 (L) 10/14/2021    HDL 41 07/02/2021    HDL 41 05/11/2021     Lab Results   Component Value Date    TRIG 113 10/14/2021    TRIG 173 (H) 07/02/2021    TRIG 121 05/11/2021     Testing:  Cardiac Cath 7/2/21:  CORONARY CIRCULATION:  Left main: The vessel was normal sized  There was moderate plaque  There were no obstructive lesions  LAD: The vessel was normal sized  There was a long, irregular lesion of the proximal vessel  There were no other significant lesions  Circumflex: The vessel was normal sized and gave rise to one major OM branch  There was moderate plaque  There were no flow-limiting lesions  RCA: The vessel was normal sized and dominant, giving rise to a dual PDA and two posterolateral branches  There was moderate diffuse plaque  There was a 60% lesion of the distal vessel after the PDA  However, there were no flow-critical  lesions that appeared to be culprits for the patient's STEMI      1ST LESION INTERVENTIONS:  Following IVUS interrogation and pre-dilation, a Xience [place] Rx 3 25 x 33mm drug-eluting stent was placed across the 80% lesion in the proximal diagonal and deployed at a maximum inflation pressure of 18 ivelisse  After post-dilation to  3 5mm, there was full stent expansion and apposition, with 0% residual stenosis  DAMIAN flow remained grade 3      REPORT ELEMENT SELECTION:  Right radial access  There were no complications      Summary:  Diffuse atherosclerotic plaque  An 80% proximal LAD stenosis was the likely culprit for the patient's STEMI  Plan: DAPT, statin, beta-blocker, cardiac rehabilitation    Echo7/4/21:  LEFT VENTRICLE:  Systolic function was normal by visual assessment  Ejection fraction was estimated to be 55 %    Doppler parameters were consistent with abnormal left ventricular relaxation (grade 1 diastolic dysfunction)

## 2022-06-10 ENCOUNTER — TELEPHONE (OUTPATIENT)
Dept: HEMATOLOGY ONCOLOGY | Facility: CLINIC | Age: 77
End: 2022-06-10

## 2022-06-10 NOTE — TELEPHONE ENCOUNTER
----- Message from Marelyn Fleischer, [place] sent at 6/10/2022  9:24 AM EDT -----  Regardinth COVID shot  Please review, thank you     ----- Message -----  From: Porsha Gutierrez  Sent: 6/10/2022   5:50 AM EDT  To: Hematology Oncology [facility]  Subject: Tessy Stewart shot                                   Dear Dr Sakina Jones - I am unable to get a 5th Covid-19 shoot (2nd booster) - here is the message from my Primary care physician, can you help/advise - THANKS!!!    Delaney Corrales,  I am sorry for the confusion about the COVID vaccine  When we last talked, I thought we were talking about the second booster (4th shot total)  I see now that you have had 4 shots already  Even in people who are immunocompromised, the CDC is not yet recommending a 5th shot  Did your oncologist specifically recommend a 5th shot? If so, I can write a letter to get you the vaccine based on that recommendation  Please ask him at your next appt with him or through the [facility]'s portal  Thanks, and I am sorry for the confusion! Sincerely,     Asad Bella MD      heres the CDC recommendation  Nicole Martinez been followinth dose  This is also a booster dose, and it should be given at least 4 months after your fourth dose (your first booster dose)  This must be a Pfizer or Moderna shot

## 2022-06-14 ENCOUNTER — APPOINTMENT (OUTPATIENT)
Dept: LAB | Facility: CLINIC | Age: 77
End: 2022-06-14
Payer: COMMERCIAL

## 2022-06-16 DIAGNOSIS — C91.10 CLL (CHRONIC LYMPHOCYTIC LEUKEMIA) (HCC): Primary | ICD-10-CM

## 2022-06-17 DIAGNOSIS — C91.10 CLL (CHRONIC LYMPHOCYTIC LEUKEMIA) (HCC): Primary | ICD-10-CM

## 2022-06-20 ENCOUNTER — DOCUMENTATION (OUTPATIENT)
Dept: OTHER | Facility: HOSPITAL | Age: 77
End: 2022-06-20

## 2022-06-20 NOTE — PROGRESS NOTES
I met with patient in a room in Med/Onc office  Consent was reviewed and signed by patient and PI  Copy of consent given to patient  IVRS completed for patient and assigned screening #[id number]  Attempt at central samples was unsuccessful  CRA will meet the patient tomorrow morning at the outpatient lab to complete blood draw  He was given a copy of all orders to be completed for screening  These have all been scheduled for the upcoming week  Screening QOL was completed today  I will meet with the patient at his f/u office visit July 7th

## 2022-06-20 NOTE — PROGRESS NOTES
Documentation of Informed Consent Process for Clinical Research Study    Study Tydaamelie Bill [id number]  Patient Name: Karishma Briones  YOB: 1945    This signed and dated document shall serve as certification that all of the below listed required elements of informed consent were provided to the subject or legally authorized representative signing the actual Informed Consent Document, both in written and verbal format  The HIPAA consent is contained within the Informed Consent document, and has also been discussed  A copy of the actual signed and dated Informed Consent has also been provided to the subject or legally authorized representative, and the original signed document shall be maintained in the research shadow chart  Element of Informed Consent Discussed Date Initials/ Person obtaining consent   A statement that the study involves research, an explanation of the purposes of the research and the expected duration of the subject's participation, a description of the procedures to be followed, and identification of any procedures which are experimental 6/20/2022   VC   A description of any reasonably foreseeable risks or discomforts to the subject  6/20/2022 VC   A description of any benefits to the subject or to others which may reasonably be expected from the research  6/20/2022 VC   A disclosure of appropriate alternative procedures or courses of treatment, if any, that might be advantageous to the subject   6/20/2022 VC   A statement describing the extent, if any, to which confidentiality of records identifying the subject will be maintained and that notes the possibility that the Food and Drug Administration may inspect the records 6/20/2022 VC   For research involving more than minimal risk, an explanation as to whether any compensation and an explanation as to whether any medical treatments are available if injury occurs and, if so, what they consist of, or where further information may be obtained  6/20/2022 VC   An explanation of whom to contact for answers to pertinent questions about the research and research subjects' rights, and whom to contact in the event of a research-related injury to the subject  6/20/2022 VC   A statement that participation is voluntary, that refusal to participate will involve no penalty or loss of benefits to which the subject is otherwise entitled, and that the subject may discontinue participation at any time without penalty or loss of benefits to which the subject is otherwise entitled  6/20/2022 VC   A statement that the particular treatment or procedure may involve risks to the subject (or to the embryo or fetus, if the subject is or may become pregnant) which are currently unforeseeable 6/20/2022 VC   Anticipated circumstances under which the subject's participation may be terminated by the investigator without regard to the subject's consent  6/20/2022 VC   Any additional costs to the subject that may result from participation in the research 6/20/2022 VC   The consequences of a subjects' decision to withdraw from the research and procedures for orderly termination of participation by the subject  6/20/2022 VC   A statement that significant new findings developed during the course of the research which may relate to the subject's willingness to continue participation will be provided to the subject  6/20/2022 VC   The approximate number of subjects involved in the study  6/20/2022 VC     The patient speaks, reads and understands English? YES    If NO, Name of :___________________________________      speaks, reads, and understands English? Process utilized to obtain consent:_______________________________________________     Subject meets eligibility criteria as outline in the current protocol?  YES   N/A - Reason: ___________________________________________________________    The protocol consent was reviewed with the patient and all questions were addressed and answered? YES    The Informed Consent Teach-back section was reviewed with the subject and all questions and/or lack of understanding were addressed? YES     The subject agreed to participate and the consent was signed and dated? YES       Consent was obtained prior to any research procedures being performed? YES       Date & Time ICF signed:   6/20/2022 10:20am    Were any recruitment materials or advertisements used/discussed with the subject?  NO      **If yes file a copy with the signed consent form and provide a copy to the subject**      Certification of Person Conducting the Consent Process:                                                                                _Shanta Velez__ ___  Printed Name

## 2022-06-21 ENCOUNTER — APPOINTMENT (OUTPATIENT)
Dept: LAB | Facility: CLINIC | Age: 77
End: 2022-06-21

## 2022-06-21 ENCOUNTER — OFFICE VISIT (OUTPATIENT)
Dept: LAB | Facility: CLINIC | Age: 77
End: 2022-06-21

## 2022-06-21 DIAGNOSIS — C91.10 CLL (CHRONIC LYMPHOCYTIC LEUKEMIA) (HCC): ICD-10-CM

## 2022-06-21 LAB
ALBUMIN SERPL BCP-MCNC: 4 G/DL (ref 3.5–5)
ALP SERPL-CCNC: 66 U/L (ref 34–104)
ALT SERPL W P-5'-P-CCNC: 14 U/L (ref 7–52)
ANION GAP SERPL CALCULATED.3IONS-SCNC: 6 MMOL/L (ref 4–13)
APTT PPP: 27 SECONDS (ref 23–37)
AST SERPL W P-5'-P-CCNC: 17 U/L (ref 13–39)
ATRIAL RATE: 63 BPM
BASOPHILS # BLD MANUAL: 0 THOUSAND/UL (ref 0–0.1)
BASOPHILS NFR MAR MANUAL: 0 % (ref 0–1)
BILIRUB SERPL-MCNC: 0.56 MG/DL (ref 0.2–1)
BUN SERPL-MCNC: 32 MG/DL (ref 5–25)
CALCIUM SERPL-MCNC: 8.8 MG/DL (ref 8.4–10.2)
CHLORIDE SERPL-SCNC: 104 MMOL/L (ref 96–108)
CO2 SERPL-SCNC: 28 MMOL/L (ref 21–32)
CREAT SERPL-MCNC: 1.03 MG/DL (ref 0.6–1.3)
DAT POLY-SP REAG RBC QL: NEGATIVE
EOSINOPHIL # BLD MANUAL: 0.55 THOUSAND/UL (ref 0–0.4)
EOSINOPHIL NFR BLD MANUAL: 2 % (ref 0–6)
ERYTHROCYTE [DISTWIDTH] IN BLOOD BY AUTOMATED COUNT: 15 % (ref 11.6–15.1)
GFR SERPL CREATININE-BSD FRML MDRD: 70 ML/MIN/1.73SQ M
GLUCOSE P FAST SERPL-MCNC: 99 MG/DL (ref 65–99)
HBV CORE AB SER QL: NORMAL
HBV SURFACE AB SER-ACNC: <3.1 MIU/ML
HBV SURFACE AG SER QL: NORMAL
HCT VFR BLD AUTO: 46.2 % (ref 36.5–49.3)
HCV AB SER QL: NORMAL
HGB BLD-MCNC: 14.6 G/DL (ref 12–17)
IGA SERPL-MCNC: 158 MG/DL (ref 70–400)
IGG SERPL-MCNC: 661 MG/DL (ref 700–1600)
IGM SERPL-MCNC: 34 MG/DL (ref 40–230)
INR PPP: 0.91 (ref 0.84–1.19)
LYMPHOCYTES # BLD AUTO: 21.98 THOUSAND/UL (ref 0.6–4.47)
LYMPHOCYTES # BLD AUTO: 80 % (ref 14–44)
MCH RBC QN AUTO: 27.6 PG (ref 26.8–34.3)
MCHC RBC AUTO-ENTMCNC: 31.6 G/DL (ref 31.4–37.4)
MCV RBC AUTO: 87 FL (ref 82–98)
MONOCYTES # BLD AUTO: 0.82 THOUSAND/UL (ref 0–1.22)
MONOCYTES NFR BLD: 3 % (ref 4–12)
NEUTROPHILS # BLD MANUAL: 4.12 THOUSAND/UL (ref 1.85–7.62)
NEUTS SEG NFR BLD AUTO: 15 % (ref 43–75)
P AXIS: -24 DEGREES
PLATELET # BLD AUTO: 111 THOUSANDS/UL (ref 149–390)
PLATELET BLD QL SMEAR: ABNORMAL
PMV BLD AUTO: 10 FL (ref 8.9–12.7)
POTASSIUM SERPL-SCNC: 5.4 MMOL/L (ref 3.5–5.3)
PR INTERVAL: 142 MS
PROT SERPL-MCNC: 6.3 G/DL (ref 6.4–8.4)
PROTHROMBIN TIME: 12.2 SECONDS (ref 11.6–14.5)
QRS AXIS: 14 DEGREES
QRSD INTERVAL: 82 MS
QT INTERVAL: 412 MS
QTC INTERVAL: 421 MS
RBC # BLD AUTO: 5.29 MILLION/UL (ref 3.88–5.62)
RBC MORPH BLD: NORMAL
SODIUM SERPL-SCNC: 138 MMOL/L (ref 135–147)
T WAVE AXIS: 53 DEGREES
VENTRICULAR RATE: 63 BPM
WBC # BLD AUTO: 27.47 THOUSAND/UL (ref 4.31–10.16)

## 2022-06-21 PROCEDURE — 85007 BL SMEAR W/DIFF WBC COUNT: CPT

## 2022-06-21 PROCEDURE — 86704 HEP B CORE ANTIBODY TOTAL: CPT

## 2022-06-21 PROCEDURE — 88185 FLOWCYTOMETRY/TC ADD-ON: CPT

## 2022-06-21 PROCEDURE — 82784 ASSAY IGA/IGD/IGG/IGM EACH: CPT

## 2022-06-21 PROCEDURE — 85732 THROMBOPLASTIN TIME PARTIAL: CPT

## 2022-06-21 PROCEDURE — 85610 PROTHROMBIN TIME: CPT

## 2022-06-21 PROCEDURE — 88184 FLOWCYTOMETRY/ TC 1 MARKER: CPT

## 2022-06-21 PROCEDURE — 86706 HEP B SURFACE ANTIBODY: CPT

## 2022-06-21 PROCEDURE — 84165 PROTEIN E-PHORESIS SERUM: CPT | Performed by: PATHOLOGY

## 2022-06-21 PROCEDURE — 86880 COOMBS TEST DIRECT: CPT

## 2022-06-21 PROCEDURE — 81263 IGH VARI REGIONAL MUTATION: CPT

## 2022-06-21 PROCEDURE — 86803 HEPATITIS C AB TEST: CPT

## 2022-06-21 PROCEDURE — 88373 M/PHMTRC ALYS ISHQUANT/SEMIQ: CPT

## 2022-06-21 PROCEDURE — 87340 HEPATITIS B SURFACE AG IA: CPT

## 2022-06-21 PROCEDURE — 81407 MOPATH PROCEDURE LEVEL 8: CPT

## 2022-06-21 PROCEDURE — 85027 COMPLETE CBC AUTOMATED: CPT

## 2022-06-21 PROCEDURE — 93005 ELECTROCARDIOGRAM TRACING: CPT

## 2022-06-21 PROCEDURE — 93010 ELECTROCARDIOGRAM REPORT: CPT | Performed by: INTERNAL MEDICINE

## 2022-06-21 PROCEDURE — 82232 ASSAY OF BETA-2 PROTEIN: CPT

## 2022-06-21 PROCEDURE — 80053 COMPREHEN METABOLIC PANEL: CPT

## 2022-06-21 PROCEDURE — 84165 PROTEIN E-PHORESIS SERUM: CPT

## 2022-06-21 PROCEDURE — 36415 COLL VENOUS BLD VENIPUNCTURE: CPT

## 2022-06-21 PROCEDURE — 88367 INSITU HYBRIDIZATION AUTO: CPT

## 2022-06-22 LAB
ALBUMIN SERPL ELPH-MCNC: 3.96 G/DL (ref 3.5–5)
ALBUMIN SERPL ELPH-MCNC: 61.9 % (ref 52–65)
ALPHA1 GLOB SERPL ELPH-MCNC: 0.36 G/DL (ref 0.1–0.4)
ALPHA1 GLOB SERPL ELPH-MCNC: 5.6 % (ref 2.5–5)
ALPHA2 GLOB SERPL ELPH-MCNC: 0.67 G/DL (ref 0.4–1.2)
ALPHA2 GLOB SERPL ELPH-MCNC: 10.5 % (ref 7–13)
BETA GLOB ABNORMAL SERPL ELPH-MCNC: 0.48 G/DL (ref 0.4–0.8)
BETA1 GLOB SERPL ELPH-MCNC: 7.5 % (ref 5–13)
BETA2 GLOB SERPL ELPH-MCNC: 4.9 % (ref 2–8)
BETA2+GAMMA GLOB SERPL ELPH-MCNC: 0.31 G/DL (ref 0.2–0.5)
GAMMA GLOB ABNORMAL SERPL ELPH-MCNC: 0.61 G/DL (ref 0.5–1.6)
GAMMA GLOB SERPL ELPH-MCNC: 9.6 % (ref 12–22)
IGG/ALB SER: 1.62 {RATIO} (ref 1.1–1.8)
PROT PATTERN SERPL ELPH-IMP: ABNORMAL
PROT SERPL-MCNC: 6.4 G/DL (ref 6.4–8.2)

## 2022-06-23 LAB
B2 MICROGLOB SERPL-MCNC: 2.7 MG/L (ref 0.6–2.4)
SCAN RESULT: NORMAL

## 2022-06-27 ENCOUNTER — HOSPITAL ENCOUNTER (OUTPATIENT)
Dept: NON INVASIVE DIAGNOSTICS | Facility: HOSPITAL | Age: 77
Discharge: HOME | End: 2022-06-27
Payer: COMMERCIAL

## 2022-06-27 VITALS
HEART RATE: 71 BPM | SYSTOLIC BLOOD PRESSURE: 120 MMHG | HEIGHT: 71 IN | WEIGHT: 203 LBS | DIASTOLIC BLOOD PRESSURE: 64 MMHG | BODY MASS INDEX: 28.42 KG/M2

## 2022-06-27 DIAGNOSIS — C91.10 CLL (CHRONIC LYMPHOCYTIC LEUKEMIA) (HCC): ICD-10-CM

## 2022-06-27 LAB
AORTIC ROOT: 3.1 CM
APICAL FOUR CHAMBER EJECTION FRACTION: 62 %
E WAVE DECELERATION TIME: 277 MS
FRACTIONAL SHORTENING: 29 % (ref 28–44)
GLOBAL LONGITUIDAL STRAIN: -20 %
INTERVENTRICULAR SEPTUM IN DIASTOLE (PARASTERNAL SHORT AXIS VIEW): 1.2 CM
INTERVENTRICULAR SEPTUM: 1.2 CM (ref 0.6–1.1)
LAAS-AP4: 20.7 CM2
LEFT ATRIUM SIZE: 3.9 CM
LEFT INTERNAL DIMENSION IN SYSTOLE: 3 CM (ref 2.1–4)
LEFT VENTRICULAR INTERNAL DIMENSION IN DIASTOLE: 4.2 CM (ref 3.5–6)
LEFT VENTRICULAR POSTERIOR WALL IN END DIASTOLE: 1.2 CM
LEFT VENTRICULAR STROKE VOLUME: 45 ML
LVSV (TEICH): 45 ML
MV E'TISSUE VEL-SEP: 8 CM/S
MV PEAK A VEL: 0.99 M/S
MV PEAK E VEL: 72 CM/S
MV STENOSIS PRESSURE HALF TIME: 80 MS
MV VALVE AREA P 1/2 METHOD: 2.75 CM2
RA PRESSURE ESTIMATED: 3 MMHG
RIGHT ATRIAL 2D VOLUME: 34 ML
RIGHT ATRIUM AREA SYSTOLE A4C: 14.6 CM2
RIGHT VENTRICLE ID DIMENSION: 3.3 CM
RV PSP: 29 MMHG
SL CV LV EF: 60
SL CV PED ECHO LEFT VENTRICLE DIASTOLIC VOLUME (MOD BIPLANE) 2D: 79 ML
SL CV PED ECHO LEFT VENTRICLE SYSTOLIC VOLUME (MOD BIPLANE) 2D: 35 ML
TR MAX PG: 26 MMHG
TR PEAK VELOCITY: 2.5 M/S
TRICUSPID VALVE PEAK REGURGITATION VELOCITY: 2.53 M/S

## 2022-06-27 PROCEDURE — 93356 MYOCRD STRAIN IMG SPCKL TRCK: CPT | Performed by: INTERNAL MEDICINE

## 2022-06-27 PROCEDURE — 93356 MYOCRD STRAIN IMG SPCKL TRCK: CPT

## 2022-06-27 PROCEDURE — 93306 TTE W/DOPPLER COMPLETE: CPT

## 2022-06-27 PROCEDURE — 93306 TTE W/DOPPLER COMPLETE: CPT | Performed by: INTERNAL MEDICINE

## 2022-06-28 LAB — SCAN RESULT: NORMAL

## 2022-06-29 ENCOUNTER — APPOINTMENT (OUTPATIENT)
Dept: RADIOLOGY | Facility: IMAGING CENTER | Age: 77
End: 2022-06-29
Payer: COMMERCIAL

## 2022-06-29 ENCOUNTER — HOSPITAL ENCOUNTER (OUTPATIENT)
Dept: RADIOLOGY | Facility: IMAGING CENTER | Age: 77
Discharge: HOME | End: 2022-06-29
Payer: COMMERCIAL

## 2022-06-29 DIAGNOSIS — C91.10 CLL (CHRONIC LYMPHOCYTIC LEUKEMIA) (HCC): ICD-10-CM

## 2022-06-29 LAB — SCAN RESULT: NORMAL

## 2022-06-29 PROCEDURE — G1004 CDSM NDSC: HCPCS

## 2022-06-29 PROCEDURE — 70491 CT SOFT TISSUE NECK W/DYE: CPT

## 2022-06-29 PROCEDURE — 74177 CT ABD & PELVIS W/CONTRAST: CPT

## 2022-06-29 PROCEDURE — 71260 CT THORAX DX C+: CPT

## 2022-06-29 RX ADMIN — IOHEXOL 80 ML: 300 INJECTION, SOLUTION INTRAVENOUS at 10:18

## 2022-06-30 ENCOUNTER — TELEPHONE (OUTPATIENT)
Dept: HEMATOLOGY ONCOLOGY | Facility: CLINIC | Age: 77
End: 2022-06-30

## 2022-06-30 DIAGNOSIS — C91.10 CLL (CHRONIC LYMPHOCYTIC LEUKEMIA) (HCC): Primary | ICD-10-CM

## 2022-06-30 NOTE — TELEPHONE ENCOUNTER
Called the radiology reading room requesting both CT scans to be read that were completed on 6/29/22

## 2022-07-06 DIAGNOSIS — C91.10 CLL (CHRONIC LYMPHOCYTIC LEUKEMIA) (HCC): Primary | ICD-10-CM

## 2022-07-07 ENCOUNTER — APPOINTMENT (OUTPATIENT)
Dept: LAB | Facility: HOSPITAL | Age: 77
End: 2022-07-07
Attending: INTERNAL MEDICINE
Payer: COMMERCIAL

## 2022-07-07 ENCOUNTER — OFFICE VISIT (OUTPATIENT)
Dept: HEMATOLOGY ONCOLOGY | Facility: CLINIC | Age: 77
End: 2022-07-07
Payer: COMMERCIAL

## 2022-07-07 ENCOUNTER — DOCUMENTATION (OUTPATIENT)
Dept: OTHER | Facility: HOSPITAL | Age: 77
End: 2022-07-07

## 2022-07-07 VITALS
WEIGHT: 203 LBS | DIASTOLIC BLOOD PRESSURE: 70 MMHG | HEIGHT: 71 IN | HEART RATE: 68 BPM | OXYGEN SATURATION: 98 % | BODY MASS INDEX: 28.42 KG/M2 | RESPIRATION RATE: 17 BRPM | SYSTOLIC BLOOD PRESSURE: 130 MMHG | TEMPERATURE: 97.8 F

## 2022-07-07 DIAGNOSIS — C91.10 CLL (CHRONIC LYMPHOCYTIC LEUKEMIA) (HCC): Primary | ICD-10-CM

## 2022-07-07 DIAGNOSIS — Z95.5 STATUS POST CORONARY ARTERY STENT PLACEMENT: ICD-10-CM

## 2022-07-07 DIAGNOSIS — D69.6 THROMBOCYTOPENIA (HCC): ICD-10-CM

## 2022-07-07 DIAGNOSIS — C91.10 CLL (CHRONIC LYMPHOCYTIC LEUKEMIA) (HCC): ICD-10-CM

## 2022-07-07 LAB
ALBUMIN SERPL BCP-MCNC: 3.6 G/DL (ref 3.5–5)
ALP SERPL-CCNC: 76 U/L (ref 46–116)
ALT SERPL W P-5'-P-CCNC: 25 U/L (ref 12–78)
ANION GAP SERPL CALCULATED.3IONS-SCNC: 5 MMOL/L (ref 4–13)
AST SERPL W P-5'-P-CCNC: 22 U/L (ref 5–45)
BASOPHILS # BLD MANUAL: 0 THOUSAND/UL (ref 0–0.1)
BASOPHILS NFR MAR MANUAL: 0 % (ref 0–1)
BILIRUB SERPL-MCNC: 0.83 MG/DL (ref 0.2–1)
BUN SERPL-MCNC: 29 MG/DL (ref 5–25)
CALCIUM SERPL-MCNC: 9.5 MG/DL (ref 8.3–10.1)
CHLORIDE SERPL-SCNC: 108 MMOL/L (ref 100–108)
CO2 SERPL-SCNC: 30 MMOL/L (ref 21–32)
CREAT SERPL-MCNC: 1.13 MG/DL (ref 0.6–1.3)
EOSINOPHIL # BLD MANUAL: 0 THOUSAND/UL (ref 0–0.4)
EOSINOPHIL NFR BLD MANUAL: 0 % (ref 0–6)
ERYTHROCYTE [DISTWIDTH] IN BLOOD BY AUTOMATED COUNT: 14.9 % (ref 11.6–15.1)
GFR SERPL CREATININE-BSD FRML MDRD: 62 ML/MIN/1.73SQ M
GLUCOSE P FAST SERPL-MCNC: 94 MG/DL (ref 65–99)
HCT VFR BLD AUTO: 48.2 % (ref 36.5–49.3)
HGB BLD-MCNC: 14.7 G/DL (ref 12–17)
IGA SERPL-MCNC: 160 MG/DL (ref 70–400)
IGG SERPL-MCNC: 693 MG/DL (ref 700–1600)
IGM SERPL-MCNC: 30 MG/DL (ref 40–230)
LDH SERPL-CCNC: 209 U/L (ref 81–234)
LYMPHOCYTES # BLD AUTO: 20.38 THOUSAND/UL (ref 0.6–4.47)
LYMPHOCYTES # BLD AUTO: 83 % (ref 14–44)
MCH RBC QN AUTO: 27.4 PG (ref 26.8–34.3)
MCHC RBC AUTO-ENTMCNC: 30.5 G/DL (ref 31.4–37.4)
MCV RBC AUTO: 90 FL (ref 82–98)
MONOCYTES # BLD AUTO: 1.47 THOUSAND/UL (ref 0–1.22)
MONOCYTES NFR BLD: 6 % (ref 4–12)
NEUTROPHILS # BLD MANUAL: 2.7 THOUSAND/UL (ref 1.85–7.62)
NEUTS SEG NFR BLD AUTO: 11 % (ref 43–75)
PLATELET # BLD AUTO: 100 THOUSANDS/UL (ref 149–390)
PLATELET BLD QL SMEAR: ABNORMAL
PMV BLD AUTO: 10.3 FL (ref 8.9–12.7)
POTASSIUM SERPL-SCNC: 4.9 MMOL/L (ref 3.5–5.3)
PROT SERPL-MCNC: 6.8 G/DL (ref 6.4–8.2)
RBC # BLD AUTO: 5.36 MILLION/UL (ref 3.88–5.62)
RBC MORPH BLD: NORMAL
SMUDGE CELLS BLD QL SMEAR: PRESENT
SODIUM SERPL-SCNC: 143 MMOL/L (ref 136–145)
URATE SERPL-MCNC: 4.8 MG/DL (ref 4.2–8)
WBC # BLD AUTO: 24.55 THOUSAND/UL (ref 4.31–10.16)

## 2022-07-07 PROCEDURE — 83615 LACTATE (LD) (LDH) ENZYME: CPT | Performed by: INTERNAL MEDICINE

## 2022-07-07 PROCEDURE — 85027 COMPLETE CBC AUTOMATED: CPT | Performed by: INTERNAL MEDICINE

## 2022-07-07 PROCEDURE — 82784 ASSAY IGA/IGD/IGG/IGM EACH: CPT | Performed by: INTERNAL MEDICINE

## 2022-07-07 PROCEDURE — 82232 ASSAY OF BETA-2 PROTEIN: CPT | Performed by: INTERNAL MEDICINE

## 2022-07-07 PROCEDURE — 80053 COMPREHEN METABOLIC PANEL: CPT | Performed by: INTERNAL MEDICINE

## 2022-07-07 PROCEDURE — 99214 OFFICE O/P EST MOD 30 MIN: CPT | Performed by: INTERNAL MEDICINE

## 2022-07-07 PROCEDURE — 36415 COLL VENOUS BLD VENIPUNCTURE: CPT | Performed by: INTERNAL MEDICINE

## 2022-07-07 PROCEDURE — 84550 ASSAY OF BLOOD/URIC ACID: CPT | Performed by: INTERNAL MEDICINE

## 2022-07-07 PROCEDURE — 93005 ELECTROCARDIOGRAM TRACING: CPT

## 2022-07-07 PROCEDURE — 85007 BL SMEAR W/DIFF WBC COUNT: CPT | Performed by: INTERNAL MEDICINE

## 2022-07-07 RX ORDER — ALLOPURINOL 300 MG/1
300 TABLET ORAL DAILY
Qty: 30 TABLET | Refills: 2 | Status: SHIPPED | OUTPATIENT
Start: 2022-07-07 | End: 2022-08-07

## 2022-07-07 NOTE — PROGRESS NOTES
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  Better prognostic CLL  CLL was diagnosed few years ago and after a long period of observation in June 2020 he was  acalabrutinib when platelet counts dropped below 100,000, stage IV CLL  He responded but in June or July 2021 acalabrutinib was discontinued because patient went on aspirin and Plavix post MRI and 1 stent in LAD  Patient is off Plavix now  He is on baby aspirin  He wants to go back to therapy  He qualifies for clinical trial for zanubrutinib and he wants to get started even though he may not have to and could stay on surveillance  He understands all that  He wants to get back on therapy  Plavix was stopped on June 1, 2022  He feels well at present and does not have CLL related symptoms and does not have cardiac symptoms  No bleeding  No atrial fibrillation    Has some tiredness and minor arthritic symptoms                     Current Outpatient Medications:     allopurinol (ZYLOPRIM) 300 mg tablet, Take 1 tablet (300 mg total) by mouth daily, Disp: 30 tablet, Rfl: 2    aspirin 81 mg chewable tablet, Chew 1 tablet (81 mg total) daily, Disp: 30 tablet, Rfl: 0    atorvastatin (LIPITOR) 40 mg tablet, Take 1 tablet (40 mg total) by mouth daily with dinner, Disp: 30 tablet, Rfl: 1    co-enzyme Q-10 50 MG capsule, Take 100 mg by mouth daily, Disp: , Rfl:     fosinopril (MONOPRIL) 20 mg tablet, Take 20 mg by mouth daily, Disp: , Rfl:     magnesium gluconate (MAGONATE) 500 mg tablet, Take 500 mg by mouth daily, Disp: , Rfl:     Multiple Vitamin (MULTIVITAMIN) tablet, Take 1 tablet by mouth daily, Disp: , Rfl:     sertraline (ZOLOFT) 50 mg tablet, Take 75 mg by mouth daily  , Disp: , Rfl:     mometasone (NASONEX) 50 mcg/act nasal spray, 2 sprays into each nostril daily (Patient not taking: Reported on 7/7/2022), Disp: , Rfl:     nitroglycerin (NITROSTAT) 0 4 mg SL tablet, Place 1 tablet (0 4 mg total) under the tongue every 5 (five) minutes as needed for chest pain (Patient not taking: No sig reported), Disp: 24 tablet, Rfl: 1    Allergies   Allergen Reactions    Sulfa Antibiotics        Oncology History Overview Note   chronic lymphocytic leukemia, diagnosed in August 2017  CLL has mixed good and bad prognostic features, 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, IgVH mutation, lack of CD38 expression, deletion of 13 q14 in 6% and no 11 q deletion        There was no trisomy 12  Lymphocytes were CD5 and CD23 positive, dim kappa expression and moderate CD20 expression had splenomegaly on CT scan but not on palpation     CLL (chronic lymphocytic leukemia) ([facility] )   3/27/2018 Initial Diagnosis    CLL (chronic lymphocytic leukemia) ([facility] )         ROS:  07/07/22 Reviewed 13 systems: See symptoms in HPI[de-identified]   Presently no neurological, cardiac, pulmonary, GI,  symptoms  Other  symptoms are in HPI  No symptoms like  fever, chills, bleeding, bone pains, skin rash, weight loss,   weakness, numbness,  claudication and gait problem  No frequent infections  Not unusually sensitive to heat or cold  No swelling of the ankles  No swollen glands  Patient is not anxious  No CLL related symptoms      /70 (BP Location: Right arm, Patient Position: Sitting, Cuff Size: Adult)   Pulse 68   Temp 97 8 °F (36 6 °C)   Resp 17   Ht 5' 11" (1 803 m)   Wt 92 1 kg (203 lb)   SpO2 98%   BMI 28 31 kg/m²     Physical Exam:       Patient is alert and oriented  Patient is not in distress  Vital signs are above  There is no icterus , no oral thrush, no palpable neck mass, clear lung fields  to percussion and auscultation, regular heart rate , abdomen   soft and non tender , no palpable abdominal mass, no ascites, no edema of ankles, no calf tenderness, no focal neurological deficit, no skin rash, no palpable lymphadenopathy,  no clubbing  Patient is anxious  Performance status 0  IMAGING:  OSSEOUS STRUCTURES:  No acute fracture or destructive osseous lesion  Degenerative changes of the spine are noted with osteophytes      IMPRESSION:     Axillary adenopathy is mildly improved  Right subpectoral nodes are similar  Splenomegaly improved  Splenic lesion resolved  Mediastinal calcified nodes stable  Small pelvic nodes are noted  One may be slightly larger on the right                  Workstation performed: [id number]          Imaging    CT chest abdomen pelvis w contrast (Order: [id number]) - 6/29/2022    IMPRESSION:        1  Soft tissue fullness in the right oropharynx and right vallecula possibly pooled secretion versus mucosal mass lesion in this patient with a history of chronic lymphocytic leukemia  Direct visualization/ENT assessment advised  2   A few right periparotid/intraparotid subcentimeter lymph nodes or other nodules can be evaluated on follow-up imaging  3   Although not enlarged by size criteria there are more numerous than typically seen bilateral axillary and supraclavicular lymph nodes, left greater than right, potentially related to the patient's leukemia    Further clinical assessment and imaging   surveillance advised            Workstation performed: [id number]          Imaging    CT soft tissue neck w contrast (Order: [id number]) - 6/29/2022    LABS:    Results for orders placed or performed during the hospital encounter of 06/27/22   Echo complete w/ contrast if indicated   Result Value Ref Range    LA size 3 9 cm    Triscuspid Valve Regurgitation Peak Gradient 26 0 mmHg    Tricuspid valve peak regurgitation velocity 2 53 m/s    LVPWd 1 20 cm    Left Atrium Area-systolic Four Chamber 43 5 cm2    MV E' Tissue Velocity Septal 8 cm/s    RA 2D Volume 34 0 mL    TR Peak Wayne 2 5 m/s    IVSd 2 08 cm    LV DIASTOLIC VOLUME (MOD BIPLANE) 2D 79 mL    LEFT VENTRICLE SYSTOLIC VOLUME (MOD BIPLANE) 2D 35 mL    Left ventricular stroke volume (2D) 45 00 mL    A4C EF 62 %    LVIDd 4 20 cm    IVS 1 2 cm    LVIDS 3 00 cm    FS 29 28 - 44 %    Ao root 3 10 cm    RVID d 3 3 cm    MV valve area p 1/2 method 2 75 cm2    E wave deceleration time 277 ms    MV Peak E Wayne 72 cm/s    MV Peak A Wayne 0 99 m/s    RAA A4C 14 6 cm2    MV stenosis pressure 1/2 time 80 ms    LVSV, 2D 45 mL    GLS -20 %    LV EF 60     Est  RA pres 3 0 mmHg    Right Ventricular Peak Systolic Pressure 96 38 mmHg     Labs, Imaging, & Other studies:   All pertinent labs and imaging studies were personally reviewed        Assessment and plan:     Physical examination and test results are as recorded and discussed  Plan is surveillance for now   We discussed above options   Platelet count has decreased again to less than 100,000  Platelet count is [address]  He has an indication for treatment  We mutually decided to wait until next visit and decide then but blood counts will be checked every month in the meantime  All discussed in very much detail  Questions answered  Discussed the importance of  eating healthy foods, diet and activities as prescribed by patient's cardiologist and he goes for cardiac rehab  Health screening tests   Patient is capable of self-care  Discussed precautions against Coronavirus especially his immune system is low        See diagnoses, orders and instructions  1  CLL (chronic lymphocytic leukemia) (HCC)    - Beta 2 microglobulin, serum; Standing  - CBC and differential; Standing  - Comprehensive metabolic panel; Standing  - IgG, IgA, IgM; Standing  - LD,Blood; Standing  - Uric acid; Standing  - Beta 2 microglobulin, serum  - CBC and differential  - Comprehensive metabolic panel  - IgG, IgA, IgM  - LD,Blood  - Uric acid  - allopurinol (ZYLOPRIM) 300 mg tablet; Take 1 tablet (300 mg total) by mouth daily  Dispense: 30 tablet; Refill: 2    2  Status post coronary artery stent placement    3   Thrombocytopenia ([address] )    Patient to start allopurinol 300 mg p o  daily today   He will call if he gets rash  He starting zanubrutinib today on clinical trial and he has signed informed consent  Blood work every 4 weeks  Visit every 4 weeks    - CBC and differential; Standing  - CBC and differential      Blood work every month  Visit in 3 months     Patient will be getting Evusheld as discussed and decided  Patient has been aware of Evusheld and he was actually going to ask me about that         3 [facility] work prior to next visit in 3 months        Patient voiced understanding and agreed      Counseling / Coordination of Care       Provided counseling and support

## 2022-07-07 NOTE — PROGRESS NOTES
Patient seen in office today for C1D1 of the [address] [place] [address] study  After detailed discussion the patient agreed to start on trial today  I dispensed 2 pill bottles and pill diary for C1 today  He will start on 160mg BID, 2 pills in the am and 2 in the pm   I walked patient down to the lab to have his predose ECG and labs completed  He will take his first set of pills in a half hour then return to the lab in 2 hours for postdose ECG  He was also started on allopurinol today per PI  Patient completed C1 QOL's  AE's and conmeds were reviewed along with PI  I will place orders for C2 pre/post ECG's and labs  Patient will return to clinic in 28 days for C2 dispensation

## 2022-07-07 NOTE — PATIENT INSTRUCTIONS
Patient to start allopurinol 300 mg p o  daily today  He will call if he gets rash  He starting zanubrutinib today on clinical trial and he has signed informed consent  Blood work every 4 weeks    Visit in 6 weeks

## 2022-07-08 LAB
ATRIAL RATE: 61 BPM
P AXIS: -3 DEGREES
PR INTERVAL: 150 MS
QRS AXIS: 50 DEGREES
QRSD INTERVAL: 72 MS
QT INTERVAL: 388 MS
QTC INTERVAL: 390 MS
T WAVE AXIS: 72 DEGREES
VENTRICULAR RATE: 61 BPM

## 2022-07-08 PROCEDURE — 93010 ELECTROCARDIOGRAM REPORT: CPT | Performed by: INTERNAL MEDICINE

## 2022-07-08 NOTE — PROGRESS NOTES
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  Better prognostic CLL  CLL was diagnosed few years ago and after a long period of observation in June 2020 he was  acalabrutinib when platelet counts dropped below 100,000, stage IV CLL  He responded but in June or July 2021 acalabrutinib was discontinued because patient went on aspirin and Plavix post MRI and 1 stent in LAD  Patient is off Plavix now  He is on baby aspirin  He wants to go back to therapy  He qualifies for clinical trial for zanubrutinib and he wants to get started even though he may not have to and could stay on surveillance  He understands all that  He wants to get back on therapy  Plavix was stopped on June 1, 2022  He feels well at present and does not have CLL related symptoms and does not have cardiac symptoms  No bleeding  No atrial fibrillation    Has some tiredness and minor arthritic symptoms                     Current Outpatient Medications:     allopurinol (ZYLOPRIM) 300 mg tablet, Take 1 tablet (300 mg total) by mouth daily, Disp: 30 tablet, Rfl: 2    aspirin 81 mg chewable tablet, Chew 1 tablet (81 mg total) daily, Disp: 30 tablet, Rfl: 0    atorvastatin (LIPITOR) 40 mg tablet, Take 1 tablet (40 mg total) by mouth daily with dinner, Disp: 30 tablet, Rfl: 1    co-enzyme Q-10 50 MG capsule, Take 100 mg by mouth daily, Disp: , Rfl:     fosinopril (MONOPRIL) 20 mg tablet, Take 20 mg by mouth daily, Disp: , Rfl:     magnesium gluconate (MAGONATE) 500 mg tablet, Take 500 mg by mouth daily, Disp: , Rfl:     Multiple Vitamin (MULTIVITAMIN) tablet, Take 1 tablet by mouth daily, Disp: , Rfl:     sertraline (ZOLOFT) 50 mg tablet, Take 75 mg by mouth daily  , Disp: , Rfl:     mometasone (NASONEX) 50 mcg/act nasal spray, 2 sprays into each nostril daily (Patient not taking: Reported on 7/7/2022), Disp: , Rfl:     nitroglycerin (NITROSTAT) 0 4 mg SL tablet, Place 1 tablet (0 4 mg total) under the tongue every 5 (five) minutes as needed for chest pain (Patient not taking: No sig reported), Disp: 24 tablet, Rfl: 1    Allergies   Allergen Reactions    Sulfa Antibiotics        Oncology History Overview Note   chronic lymphocytic leukemia, diagnosed in August 2017  CLL has mixed good and bad prognostic features, 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, IgVH mutation, lack of CD38 expression, deletion of 13 q14 in 6% and no 11 q deletion        There was no trisomy 12  Lymphocytes were CD5 and CD23 positive, dim kappa expression and moderate CD20 expression had splenomegaly on CT scan but not on palpation     CLL (chronic lymphocytic leukemia) ([facility] )   3/27/2018 Initial Diagnosis    CLL (chronic lymphocytic leukemia) ([facility] )         ROS:  07/07/22 Reviewed 13 systems: See symptoms in HPI[de-identified]   Presently no neurological, cardiac, pulmonary, GI,  symptoms  Other  symptoms are in HPI  No symptoms like  fever, chills, bleeding, bone pains, skin rash, weight loss,   weakness, numbness,  claudication and gait problem  No frequent infections  Not unusually sensitive to heat or cold  No swelling of the ankles  No swollen glands  Patient is not anxious  No CLL related symptoms      /70 (BP Location: Right arm, Patient Position: Sitting, Cuff Size: Adult)   Pulse 68   Temp 97 8 °F (36 6 °C)   Resp 17   Ht 5' 11" (1 803 m)   Wt 92 1 kg (203 lb)   SpO2 98%   BMI 28 31 kg/m²     Physical Exam:       Patient is alert and oriented  Patient is not in distress  Vital signs are above  There is no icterus , no oral thrush, no palpable neck mass, clear lung fields  to percussion and auscultation, regular heart rate , abdomen   soft and non tender , no palpable abdominal mass, no ascites, no edema of ankles, no calf tenderness, no focal neurological deficit, no skin rash, no palpable lymphadenopathy,  no clubbing  Patient is anxious  Performance status 0  IMAGING:  OSSEOUS STRUCTURES:  No acute fracture or destructive osseous lesion  Degenerative changes of the spine are noted with osteophytes      IMPRESSION:     Axillary adenopathy is mildly improved  Right subpectoral nodes are similar  Splenomegaly improved  Splenic lesion resolved  Mediastinal calcified nodes stable  Small pelvic nodes are noted  One may be slightly larger on the right                  Workstation performed: [id number]          Imaging    CT chest abdomen pelvis w contrast (Order: [id number]) - 6/29/2022    IMPRESSION:        1  Soft tissue fullness in the right oropharynx and right vallecula possibly pooled secretion versus mucosal mass lesion in this patient with a history of chronic lymphocytic leukemia  Direct visualization/ENT assessment advised  2   A few right periparotid/intraparotid subcentimeter lymph nodes or other nodules can be evaluated on follow-up imaging  3   Although not enlarged by size criteria there are more numerous than typically seen bilateral axillary and supraclavicular lymph nodes, left greater than right, potentially related to the patient's leukemia    Further clinical assessment and imaging   surveillance advised            Workstation performed: [id number]          Imaging    CT soft tissue neck w contrast (Order: [id number]) - 6/29/2022    LABS:    Results for orders placed or performed during the hospital encounter of 06/27/22   Echo complete w/ contrast if indicated   Result Value Ref Range    LA size 3 9 cm    Triscuspid Valve Regurgitation Peak Gradient 26 0 mmHg    Tricuspid valve peak regurgitation velocity 2 53 m/s    LVPWd 1 20 cm    Left Atrium Area-systolic Four Chamber 47 5 cm2    MV E' Tissue Velocity Septal 8 cm/s    RA 2D Volume 34 0 mL    TR Peak Wayne 2 5 m/s    IVSd 4 93 cm    LV DIASTOLIC VOLUME (MOD BIPLANE) 2D 79 mL    LEFT VENTRICLE SYSTOLIC VOLUME (MOD BIPLANE) 2D 35 mL    Left ventricular stroke volume (2D) 45 00 mL    A4C EF 62 %    LVIDd 4 20 cm    IVS 1 2 cm    LVIDS 3 00 cm    FS 29 28 - 44 %    Ao root 3 10 cm    RVID d 3 3 cm    MV valve area p 1/2 method 2 75 cm2    E wave deceleration time 277 ms    MV Peak E Wayne 72 cm/s    MV Peak A Wayne 0 99 m/s    RAA A4C 14 6 cm2    MV stenosis pressure 1/2 time 80 ms    LVSV, 2D 45 mL    GLS -20 %    LV EF 60     Est  RA pres 3 0 mmHg    Right Ventricular Peak Systolic Pressure 64 10 mmHg     Labs, Imaging, & Other studies:   All pertinent labs and imaging studies were personally reviewed  Hemoglobin 14 6  WBC 15454  Platelets 968644  Lymphocyte 21,980  Normal liver function tests, creatinine, calcium and albumin  Normal PT and PTT  Normal LDH  IgG 661  Patient will have blood tests again today per clinical trial protocol    Assessment and plan: Follow-up visit for CLL that was previously 17p deletion positive but now negative  He has 13q deletion  Negative for trisomy 12 and 11q  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  Better prognostic CLL  CLL was diagnosed few years ago and after a long period of observation in June 2020 he was  acalabrutinib when platelet counts dropped below 100,000, stage IV CLL  He responded but in June or July 2021 acalabrutinib was discontinued because patient went on aspirin and Plavix post MRI and 1 stent in LAD  Patient is off Plavix now  He is on baby aspirin  He wants to go back to therapy  He qualifies for clinical trial for zanubrutinib and he wants to get started even though he may not have to and could stay on surveillance  He understands all that  He wants to get back on therapy  Plavix was stopped on June 1, 2022  He feels well at present and does not have CLL related symptoms and does not have cardiac symptoms  No bleeding  No atrial fibrillation  Has some tiredness and minor arthritic symptoms             Physical examination and test results are as recorded and discussed  Plan is zanubrutinib on clinical trial as above  Patient has signed informed consent for zanubrutinib on clinical trial    He will start taking allopurinol today  Blood work and visit every 4 weeks on clinical trial        All discussed in very much detail  Questions answered  Discussed the importance of  eating healthy foods, diet and activities as prescribed by patient's cardiologist   Health screening tests   Patient is capable of self-care  Discussed precautions against Coronavirus especially his immune system is low        See diagnoses, orders and instructions  1  CLL (chronic lymphocytic leukemia) (HCC)    - Beta 2 microglobulin, serum; Standing  - CBC and differential; Standing  - Comprehensive metabolic panel; Standing  - IgG, IgA, IgM; Standing  - LD,Blood; Standing  - Uric acid; Standing  - Beta 2 microglobulin, serum  - CBC and differential  - Comprehensive metabolic panel  - IgG, IgA, IgM  - LD,Blood  - Uric acid  - allopurinol (ZYLOPRIM) 300 mg tablet; Take 1 tablet (300 mg total) by mouth daily  Dispense: 30 tablet; Refill: 2    2  Status post coronary artery stent placement      3  Thrombocytopenia ([facility] )      Patient to start allopurinol 300 mg p o  daily today  He will call if he gets rash  He starting zanubrutinib today on clinical trial and he has signed informed consent  Blood work every 4 weeks  Visit every 4 weeks                                  Patient voiced understanding and agreed      Counseling / Coordination of Care       Provided counseling and support

## 2022-07-09 LAB — B2 MICROGLOB SERPL-MCNC: 2.5 MG/L (ref 0.6–2.4)

## 2022-07-12 LAB
ATRIAL RATE: 61 BPM
ATRIAL RATE: 65 BPM
P AXIS: -3 DEGREES
P AXIS: 12 DEGREES
PR INTERVAL: 140 MS
PR INTERVAL: 150 MS
QRS AXIS: 42 DEGREES
QRS AXIS: 50 DEGREES
QRSD INTERVAL: 72 MS
QRSD INTERVAL: 72 MS
QT INTERVAL: 388 MS
QT INTERVAL: 388 MS
QTC INTERVAL: 390 MS
QTC INTERVAL: 403 MS
T WAVE AXIS: 65 DEGREES
T WAVE AXIS: 72 DEGREES
VENTRICULAR RATE: 61 BPM
VENTRICULAR RATE: 65 BPM

## 2022-07-12 PROCEDURE — 93010 ELECTROCARDIOGRAM REPORT: CPT | Performed by: INTERNAL MEDICINE

## 2022-07-14 LAB — MISCELLANEOUS LAB TEST RESULT: NORMAL

## 2022-07-22 LAB — MISCELLANEOUS LAB TEST RESULT: NORMAL

## 2022-07-25 DIAGNOSIS — C91.10 CLL (CHRONIC LYMPHOCYTIC LEUKEMIA) (HCC): Primary | ICD-10-CM

## 2022-08-06 DIAGNOSIS — C91.10 CLL (CHRONIC LYMPHOCYTIC LEUKEMIA) (HCC): ICD-10-CM

## 2022-08-07 RX ORDER — ALLOPURINOL 300 MG/1
TABLET ORAL
Qty: 30 TABLET | Refills: 1 | Status: SHIPPED | OUTPATIENT
Start: 2022-08-07 | End: 2022-10-02

## 2022-08-08 ENCOUNTER — APPOINTMENT (OUTPATIENT)
Dept: LAB | Facility: CLINIC | Age: 77
End: 2022-08-08
Payer: COMMERCIAL

## 2022-08-08 ENCOUNTER — TELEPHONE (OUTPATIENT)
Dept: HEMATOLOGY ONCOLOGY | Facility: CLINIC | Age: 77
End: 2022-08-08

## 2022-08-11 ENCOUNTER — OFFICE VISIT (OUTPATIENT)
Dept: LAB | Facility: HOSPITAL | Age: 77
End: 2022-08-11
Attending: INTERNAL MEDICINE
Payer: COMMERCIAL

## 2022-08-11 ENCOUNTER — OFFICE VISIT (OUTPATIENT)
Dept: HEMATOLOGY ONCOLOGY | Facility: CLINIC | Age: 77
End: 2022-08-11
Payer: COMMERCIAL

## 2022-08-11 ENCOUNTER — DOCUMENTATION (OUTPATIENT)
Dept: OTHER | Facility: HOSPITAL | Age: 77
End: 2022-08-11

## 2022-08-11 VITALS
BODY MASS INDEX: 28.56 KG/M2 | DIASTOLIC BLOOD PRESSURE: 82 MMHG | OXYGEN SATURATION: 96 % | HEART RATE: 65 BPM | TEMPERATURE: 97.1 F | HEIGHT: 71 IN | RESPIRATION RATE: 17 BRPM | WEIGHT: 204 LBS | SYSTOLIC BLOOD PRESSURE: 142 MMHG

## 2022-08-11 DIAGNOSIS — C91.10 CLL (CHRONIC LYMPHOCYTIC LEUKEMIA) (HCC): ICD-10-CM

## 2022-08-11 DIAGNOSIS — C91.10 CLL (CHRONIC LYMPHOCYTIC LEUKEMIA) (HCC): Primary | Chronic | ICD-10-CM

## 2022-08-11 LAB
ATRIAL RATE: 60 BPM
P AXIS: 81 DEGREES
PR INTERVAL: 136 MS
QRS AXIS: 62 DEGREES
QRSD INTERVAL: 74 MS
QT INTERVAL: 396 MS
QTC INTERVAL: 396 MS
T WAVE AXIS: 73 DEGREES
VENTRICULAR RATE: 60 BPM

## 2022-08-11 PROCEDURE — 93010 ELECTROCARDIOGRAM REPORT: CPT | Performed by: INTERNAL MEDICINE

## 2022-08-11 PROCEDURE — 93005 ELECTROCARDIOGRAM TRACING: CPT

## 2022-08-11 PROCEDURE — 99214 OFFICE O/P EST MOD 30 MIN: CPT | Performed by: PHYSICIAN ASSISTANT

## 2022-08-11 NOTE — PROGRESS NOTES
Patient seen in office today for C2D1 of the [address] [place] [address] study  Patient completed PreDose ECG at 8:30 this morning and will have PostDose ECG completed at 11am today  2 Pill bottles and pill diary for C1 were returned today  I dispensed 2 pill bottles and pill diary for C2 today  AE's and conmeds reviewed  No changes to medications  Patient stats he has noticed a significant increase in his energy levels  He is walking 1 mile daily  Patient completed C2 QOL's  Patient will return to clinic in 28 days for C3 dispensation with 1 ECG and labs to be completed a week before office visit

## 2022-08-11 NOTE — PROGRESS NOTES
Hematology/Oncology Outpatient Follow-up  Liset Curran 69 y o  male 1945 [id number]    Date:  8/11/2022      Assessment and Plan:  1  CLL (chronic lymphocytic leukemia) [facility])  20-year-old male presents for follow-up regarding history of CLL  He was on a acalabrutinib and had a MI while on this  Medication was held  He entered observation  After coming off of Plavix he was told that he could go on a clinical trial with zenubrutinib versus observation  Patient elected to go on medication on trial   He is tolerating very well  As expected he has an increase in WBC  Platelets and hemoglobin are stable  EKG completed this morning is not available for review  He will be having another 1 this morning per protocol  Follow-up in 4 weeks per protocol  ECOG 0     HPI:  69year old male presents for follow up for chronic lymphocytic leukemia, This was diagnosed in August 2017  CLL has mixed good and bad prognostic features, 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, IgVH mutation, lack of CD38 expression, deletion of 13 q14 in 6% and no 11 q deletion      There was no trisomy 12  Lymphocytes were CD5 and CD23 positive, dim kappa expression and moderate CD20 expression had splenomegaly on CT scan but not on palpation    Patient WBC was increasing and platelet count remained decreased   Patient was seen by Dr Leonel Brumfield on 5/29/20 and advised to begin treatment  Patient was started on acalabrutinib 100 mg PO daily       Since beginning treatment in June 2020 patient has had significant improvement in his energy  [facility] feels better since being on this medication      On 07/02/2021 patient was admitted to [facility] for an inferior STEMI  [facility] was having burning chest pain   He states he was having intermittent burning chest pain occasionally when walking his dog   He states this was even happening prior to being on acalabrutinib  Ronak Alex underwent cardiac catheterization which demonstrated 80% lesion in the proximal LAD and 60% distal RCA lesion after the Brittani Mcrae underwent PCI/ PRETTY x1 to the proximal RCA lesion with 0% residual stenosis   He was initiated on dual anti-platelet therapy with aspirin and Brilinta, high-intensity statin and beta-blocker was held due to sinus bradycardia   Patient cost was barrier for Brilinta so he is on Plavix   He underwent a transesophageal echo which showed 55% EF, grade 1 DD, RV normal size and systolic function with no valvular disease      He was placed on Plavix and aspirin due to STEMI  Acalabrutinib was placed on hold  He was under observation  In July 2022 he was placed on clinical trial with zanubrutinib  Interval history: he states since being on zanubrutinib he feels that his energy level is even better  He continues to walk 1 mile per day and works outside doing yard work at his home  ROS: Review of Systems   Constitutional: Negative for activity change, appetite change, chills, fatigue, fever and unexpected weight change  Respiratory: Negative for cough and shortness of breath  Cardiovascular: Negative for chest pain, palpitations and leg swelling  Gastrointestinal: Negative for abdominal pain, constipation, diarrhea, nausea and vomiting  Genitourinary: Negative for difficulty urinating, dysuria and hematuria  Musculoskeletal: Negative for arthralgias  Skin: Negative  Neurological: Negative for dizziness, weakness, light-headedness, numbness and headaches  Hematological: Negative  Psychiatric/Behavioral: Negative          Past Medical History:   Diagnosis Date    Anxiety     Hypertension     Leukemia ([facility] )        Past Surgical History:   Procedure Laterality Date    APPENDECTOMY      COLONOSCOPY      TONSILLECTOMY         Social History     Socioeconomic History    Marital status: /Civil Union     Spouse name: None    Number of children: None    Years of education: None    Highest education level: None   Occupational History    None   Tobacco Use    Smoking status: Never Smoker    Smokeless tobacco: Never Used   Vaping Use    Vaping Use: None   Substance and Sexual Activity    Alcohol use: Not Currently     Comment: minimal    Drug use: No    Sexual activity: None   Other Topics Concern    None   Social History Narrative    None     Social Determinants of Health     Financial Resource Strain: Not on file   Food Insecurity: Not on file   Transportation Needs: Not on file   Physical Activity: Not on file   Stress: Not on file   Social Connections: Not on file   Intimate Partner Violence: Not on file   Housing Stability: Not on file       Family History   Problem Relation Age of Onset    No Known Problems Mother     No Known Problems Father        Allergies   Allergen Reactions    Sulfa Antibiotics          Current Outpatient Medications:     aspirin 81 mg chewable tablet, Chew 1 tablet (81 mg total) daily, Disp: 30 tablet, Rfl: 0    atorvastatin (LIPITOR) 40 mg tablet, Take 1 tablet (40 mg total) by mouth daily with dinner, Disp: 30 tablet, Rfl: 1    co-enzyme Q-10 50 MG capsule, Take 100 mg by mouth daily, Disp: , Rfl:     fosinopril (MONOPRIL) 20 mg tablet, Take 20 mg by mouth daily, Disp: , Rfl:     magnesium gluconate (MAGONATE) 500 mg tablet, Take 500 mg by mouth daily, Disp: , Rfl:     Multiple Vitamin (MULTIVITAMIN) tablet, Take 1 tablet by mouth daily, Disp: , Rfl:     sertraline (ZOLOFT) 50 mg tablet, Take 75 mg by mouth daily  , Disp: , Rfl:     Zanubrutinib 80 MG CAPS, Take by mouth, Disp: , Rfl:     allopurinol (ZYLOPRIM) 300 mg tablet, Take 1 tablet by mouth once daily   (Patient not taking: Reported on 8/11/2022), Disp: 30 tablet, Rfl: 1    mometasone (NASONEX) 50 mcg/act nasal spray, 2 sprays into each nostril daily (Patient not taking: No sig reported), Disp: , Rfl:     nitroglycerin (NITROSTAT) 0 4 mg SL tablet, Place 1 tablet (0 4 mg total) under the tongue every 5 (five) minutes as needed for chest pain (Patient not taking: No sig reported), Disp: 24 tablet, Rfl: 1      Physical Exam:  /82   Pulse 65   Temp (!) 97 1 °F (36 2 °C)   Resp 17   Ht 5' 11" (1 803 m)   Wt 92 5 kg (204 lb)   SpO2 96%   BMI 28 45 kg/m²     Physical Exam  Vitals reviewed  Constitutional:       General: He is not in acute distress  Appearance: He is well-developed  He is not ill-appearing  HENT:      Head: Normocephalic and atraumatic  Eyes:      General: No scleral icterus  Conjunctiva/sclera: Conjunctivae normal    Cardiovascular:      Rate and Rhythm: Normal rate and regular rhythm  Heart sounds: Normal heart sounds  No murmur heard  Pulmonary:      Effort: Pulmonary effort is normal  No respiratory distress  Breath sounds: Normal breath sounds  Chest:   Breasts:      Right: No axillary adenopathy or supraclavicular adenopathy  Left: No axillary adenopathy or supraclavicular adenopathy  Abdominal:      Palpations: Abdomen is soft  Tenderness: There is no abdominal tenderness  Musculoskeletal:         General: No tenderness  Normal range of motion  Cervical back: Normal range of motion and neck supple  Right lower leg: No edema  Left lower leg: No edema  Lymphadenopathy:      Cervical: No cervical adenopathy  Upper Body:      Right upper body: No supraclavicular or axillary adenopathy  Left upper body: No supraclavicular or axillary adenopathy  Skin:     General: Skin is warm and dry  Neurological:      Mental Status: He is alert and oriented to person, place, and time  Cranial Nerves: No cranial nerve deficit     Psychiatric:         Mood and Affect: Mood normal          Behavior: Behavior normal        Labs:  Lab Results   Component Value Date    WBC 67 21 (HH) 08/08/2022    HGB 14 2 08/08/2022    HCT 45 8 08/08/2022    MCV 88 08/08/2022     (L) 08/08/2022     Lab Results   Component Value Date    K 4 5 08/08/2022     08/08/2022    CO2 28 08/08/2022 BUN 27 (H) 08/08/2022    CREATININE 1 10 08/08/2022    GLUF 103 (H) 08/08/2022    CALCIUM 8 9 08/08/2022    CORRECTEDCA 9 2 07/14/2021    AST 17 08/08/2022    ALT 14 08/08/2022    ALKPHOS 64 08/08/2022    EGFR 64 08/08/2022       Patient voiced understanding and agreement in the above discussion  Aware to contact our office with questions/symptoms in the interim  This note has been generated by voice recognition software system  Therefore, there may be spelling, grammar, and or syntax errors  Please contact if questions arise

## 2022-08-12 LAB
ATRIAL RATE: 60 BPM
ATRIAL RATE: 62 BPM
P AXIS: 61 DEGREES
P AXIS: 81 DEGREES
PR INTERVAL: 136 MS
PR INTERVAL: 142 MS
QRS AXIS: 57 DEGREES
QRS AXIS: 62 DEGREES
QRSD INTERVAL: 74 MS
QRSD INTERVAL: 76 MS
QT INTERVAL: 394 MS
QT INTERVAL: 396 MS
QTC INTERVAL: 396 MS
QTC INTERVAL: 399 MS
T WAVE AXIS: 73 DEGREES
T WAVE AXIS: 79 DEGREES
VENTRICULAR RATE: 60 BPM
VENTRICULAR RATE: 62 BPM

## 2022-08-12 PROCEDURE — 93010 ELECTROCARDIOGRAM REPORT: CPT | Performed by: INTERNAL MEDICINE

## 2022-09-02 ENCOUNTER — TELEPHONE (OUTPATIENT)
Dept: HEMATOLOGY ONCOLOGY | Facility: CLINIC | Age: 77
End: 2022-09-02

## 2022-09-02 ENCOUNTER — APPOINTMENT (OUTPATIENT)
Dept: LAB | Facility: CLINIC | Age: 77
End: 2022-09-02
Payer: COMMERCIAL

## 2022-09-02 ENCOUNTER — OFFICE VISIT (OUTPATIENT)
Dept: LAB | Facility: CLINIC | Age: 77
End: 2022-09-02
Payer: COMMERCIAL

## 2022-09-02 DIAGNOSIS — C91.10 CLL (CHRONIC LYMPHOCYTIC LEUKEMIA) (HCC): ICD-10-CM

## 2022-09-02 LAB
ATRIAL RATE: 56 BPM
P AXIS: 60 DEGREES
PR INTERVAL: 160 MS
QRS AXIS: 20 DEGREES
QRSD INTERVAL: 84 MS
QT INTERVAL: 422 MS
QTC INTERVAL: 407 MS
T WAVE AXIS: 63 DEGREES
VENTRICULAR RATE: 56 BPM

## 2022-09-02 PROCEDURE — 93005 ELECTROCARDIOGRAM TRACING: CPT

## 2022-09-02 PROCEDURE — 93010 ELECTROCARDIOGRAM REPORT: CPT | Performed by: INTERNAL MEDICINE

## 2022-09-02 NOTE — TELEPHONE ENCOUNTER
Lab called with critical result  WBC = 67 95 - He aware   Dx CLL  Follow up with Dr Gentry Garcia next week 9/8/22

## 2022-09-07 NOTE — DISCHARGE INSTRUCTIONS
Near Syncope   WHAT YOU NEED TO KNOW:   Near syncope, also called presyncope, is the feeling that you may faint (lose consciousness), but you do not  You can control some health conditions that cause near syncope  Your healthcare providers can help you create a plan to manage near syncope and prevent episodes  DISCHARGE INSTRUCTIONS:   Return to the emergency department if:   · You have sudden chest pain  · You have trouble breathing or shortness of breath  · You have vision changes, are sweating, and have nausea while you are sitting or lying down  · You feel dizzy or flushed and your heart is fluttering  · You lose consciousness  · You cannot use your arm, hand, foot, or leg, or it feels weak  · You have trouble speaking or understanding others when they speak  Contact your healthcare provider if:   · You have new or worsening symptoms  · Your heart beats faster or slower than usual      · You have questions or concerns about your condition or care  Follow up with your healthcare provider as directed: You may need more tests to help find the cause of your near syncope episodes  The tests will help healthcare providers plan the best treatment for you  Write down your questions so you remember to ask them during your visits  Manage near syncope:   · Sit or lie down when needed  This includes when you feel dizzy, your throat is getting tight, and your vision changes  Raise your legs above the level of your heart  · Take slow, deep breaths if you start to breathe faster with anxiety or fear  This can help decrease dizziness and the feeling that you might faint  · Keep a record of your near syncope episodes  Include your symptoms and your activity before and after the episode  The record can help your healthcare provider find the cause of your near syncope and help you manage episodes    Prevent a near syncope episode:   · Move slowly and let yourself get used to one position before you move to another position  This is very important when you change from a lying or sitting position to a standing position  Take some deep breaths before you stand up from a lying position  Stand up slowly  Sudden movements may cause a fainting spell  Sit on the side of the bed or couch for a few minutes before you stand up  If you are on bedrest, try to be upright for about 2 hours each day, or as directed  Do not lock your legs if you are standing for a long period of time  Move your legs and bend your knees to keep blood flowing  · Follow your healthcare provider's recommendations  Your provider may  recommend that you drink more liquids to prevent dehydration  You may also need to have more salt to keep your blood pressure from dropping too low and causing syncope  Your provider will tell you how much liquid and sodium to have each day  · Watch for signs of low blood sugar  These include hunger, nervousness, sweating, and fast or fluttery heartbeats  Talk with your healthcare provider about ways to keep your blood sugar level steady  · Check your blood pressure often  This is important if you take medicine to lower your blood pressure  Check your blood pressure when you are lying down and when you are standing  Ask how often to check during the day  Keep a record of your blood pressure numbers  Your healthcare provider may use the record to help plan your treatment  · Do not strain if you are constipated  You may faint if you strain to have a bowel movement  Walking is the best way to get your bowels moving  Eat foods high in fiber to make it easier to have a bowel movement  Good examples are high-fiber cereals, beans, vegetables, and whole-grain breads  Prune juice may help make bowel movements softer  · Do not exercise outside on a hot day  The combination of physical activity and heat can lead to dehydration  This can cause syncope    © 2017 [employer] [facility] is for End User's use only and may not be sold, redistributed or otherwise used for commercial purposes  All illustrations and images included in CareNotes® are the copyrighted property of [employer]  or [employer]  The above information is an  only  It is not intended as medical advice for individual conditions or treatments  Talk to your doctor, nurse or pharmacist before following any medical regimen to see if it is safe and effective for you  Anxiety   WHAT YOU NEED TO KNOW:   Anxiety is a condition that causes you to feel extremely worried or nervous  The feelings are so strong that they can cause problems with your daily activities or sleep  Anxiety may be triggered by something you fear, or it may happen without a cause  Family or work stress, smoking, caffeine, and alcohol can increase your risk for anxiety  Certain medicines or health conditions can also increase your risk  Anxiety can become a long-term condition if it is not managed or treated  DISCHARGE INSTRUCTIONS:   Call 911 if:   · You have chest pain, tightness, or heaviness that may spread to your shoulders, arms, jaw, neck, or back  · You feel like hurting yourself or someone else  Contact your healthcare provider if:   · Your symptoms get worse or do not get better with treatment  · Your anxiety keeps you from doing your regular daily activities  · You have new symptoms since your last visit  · You have questions or concerns about your condition or care  Medicines:   · Medicines  may be given to help you feel more calm and relaxed, and decrease your symptoms  · Take your medicine as directed  Contact your healthcare provider if you think your medicine is not helping or if you have side effects  Tell him of her if you are allergic to any medicine  Keep a list of the medicines, vitamins, and herbs you take  Include the amounts, and when and why you take them   Bring the list or the pill bottles to follow-up visits  Carry your medicine list with you in case of an emergency  Follow up with your healthcare provider within 2 weeks or as directed:  Write down your questions so you remember to ask them during your visits  Manage anxiety:   · Talk to someone about your anxiety  Your healthcare provider may suggest counseling  Cognitive behavioral therapy can help you understand and change how you react to events that trigger your symptoms  You might feel more comfortable talking with a friend or family member about your anxiety  Choose someone you know will be supportive and encouraging  · Find ways to relax  Activities such as exercise, meditation, or listening to music can help you relax  Spend time with friends, or do things you enjoy  · Practice deep breathing  Deep breathing can help you relax when you feel anxious  Focus on taking slow, deep breaths several times a day, or during an anxiety attack  Breathe in through your nose and out through your mouth  · Create a regular sleep routine  Regular sleep can help you feel calmer during the day  Go to sleep and wake up at the same times every day  Do not watch television or use the computer right before bed  Your room should be comfortable, dark, and quiet  · Eat a variety of healthy foods  Healthy foods include fruits, vegetables, low-fat dairy products, lean meats, fish, whole-grain breads, and cooked beans  Healthy foods can help you feel less anxious and have more energy  · Exercise regularly  Exercise can increase your energy level  Exercise may also lift your mood and help you sleep better  Your healthcare provider can help you create an exercise plan  · Do not smoke  Nicotine and other chemicals in cigarettes and cigars can increase anxiety  Ask your healthcare provider for information if you currently smoke and need help to quit  E-cigarettes or smokeless tobacco still contain nicotine   Talk to your healthcare provider before you use these products  · Do not have caffeine  Caffeine can make your symptoms worse  Do not have foods or drinks that are meant to increase your energy level  · Limit or do not drink alcohol  Ask your healthcare provider if alcohol is safe for you  You may not be able to drink alcohol if you take certain anxiety or depression medicines  Limit alcohol to 1 drink per day if you are a woman  Limit alcohol to 2 drinks per day if you are a man  A drink of alcohol is 12 ounces of beer, 5 ounces of wine, or 1½ ounces of liquor  · Do not use drugs  Drugs can make your anxiety worse  It can also make anxiety hard to manage  Talk to your healthcare provider if you use drugs and want help to quit  © 2017 [facility] Information is for End User's use only and may not be sold, redistributed or otherwise used for commercial purposes  All illustrations and images included in CareNotes® are the copyrighted property of A D A M [employer]  or [employer]  The above information is an  only  It is not intended as medical advice for individual conditions or treatments  Talk to your doctor, nurse or pharmacist before following any medical regimen to see if it is safe and effective for you  Detail Level: Detailed Size Of Lesion: 0.6 x 1 Size Of Lesion: 0.6 x 0.5

## 2022-09-08 ENCOUNTER — DOCUMENTATION (OUTPATIENT)
Dept: OTHER | Facility: HOSPITAL | Age: 77
End: 2022-09-08

## 2022-09-08 ENCOUNTER — OFFICE VISIT (OUTPATIENT)
Dept: HEMATOLOGY ONCOLOGY | Facility: CLINIC | Age: 77
End: 2022-09-08

## 2022-09-08 VITALS
DIASTOLIC BLOOD PRESSURE: 82 MMHG | TEMPERATURE: 97.2 F | RESPIRATION RATE: 17 BRPM | HEART RATE: 68 BPM | HEIGHT: 71 IN | OXYGEN SATURATION: 98 % | BODY MASS INDEX: 28.98 KG/M2 | SYSTOLIC BLOOD PRESSURE: 140 MMHG | WEIGHT: 207 LBS

## 2022-09-08 DIAGNOSIS — I10 HYPERTENSION, ESSENTIAL: Chronic | ICD-10-CM

## 2022-09-08 DIAGNOSIS — E78.5 HYPERLIPIDEMIA, UNSPECIFIED HYPERLIPIDEMIA TYPE: Chronic | ICD-10-CM

## 2022-09-08 DIAGNOSIS — C91.10 CLL (CHRONIC LYMPHOCYTIC LEUKEMIA) (HCC): Primary | ICD-10-CM

## 2022-09-08 DIAGNOSIS — C91.10 CLL (CHRONIC LYMPHOCYTIC LEUKEMIA) (HCC): Primary | Chronic | ICD-10-CM

## 2022-09-08 DIAGNOSIS — D69.6 THROMBOCYTOPENIA (HCC): ICD-10-CM

## 2022-09-08 DIAGNOSIS — Z95.5 STATUS POST CORONARY ARTERY STENT PLACEMENT: ICD-10-CM

## 2022-09-08 NOTE — PATIENT INSTRUCTIONS
Clinically stable  No change in treatment, zanubrutinib  He has standing orders for blood work  Follow-up in 1 month

## 2022-09-08 NOTE — PROGRESS NOTES
Patient presented to office for C3D1 of the [address] [place] [address] study  C2 ECG and labs reviewed and signed by PI  2 Pill bottles and pill diaries for C2 returned today  2 pill bottles and a new pill diary dispensed for C3 today  Patient complains of some minor increased fatigue which went from a 2 to a 3  He will keep an eye on his energy levels and notify me of any changes  He also notes a slight change in mood agitation  Amena reviewed  QOL's were completed in office at this time  He will return in 28 days with new ECG, Labs and CT C/A/P and CT soft tissue neck

## 2022-09-08 NOTE — PROGRESS NOTES
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  Better prognostic CLL  CLL was diagnosed few years ago and after a long period of observation, in June 2020 he was started on  acalabrutinib when platelet counts dropped below 100,000, stage IV CLL  He responded but in June or July 2021 acalabrutinib was discontinued because patient went on aspirin and Plavix post MRI and 1 stent in LAD  Patient is off Plavix now  He is on baby aspirin  He wanted to go back to therapy  He qualified for clinical trial for zanubrutinib and he wanted to get started on therapy  Plavix was stopped on June 1, 2022  He feels well at present and does not have CLL related symptoms and does not have cardiac symptoms  No bleeding  No atrial fibrillation  Has some tiredness and minor arthritic symptoms                     Current Outpatient Medications:     aspirin 81 mg chewable tablet, Chew 1 tablet (81 mg total) daily, Disp: 30 tablet, Rfl: 0    atorvastatin (LIPITOR) 40 mg tablet, Take 1 tablet (40 mg total) by mouth daily with dinner, Disp: 30 tablet, Rfl: 1    co-enzyme Q-10 50 MG capsule, Take 100 mg by mouth daily, Disp: , Rfl:     fosinopril (MONOPRIL) 20 mg tablet, Take 20 mg by mouth daily, Disp: , Rfl:     magnesium gluconate (MAGONATE) 500 mg tablet, Take 500 mg by mouth daily, Disp: , Rfl:     Multiple Vitamin (MULTIVITAMIN) tablet, Take 1 tablet by mouth daily, Disp: , Rfl:     sertraline (ZOLOFT) 50 mg tablet, Take 75 mg by mouth daily  , Disp: , Rfl:     Zanubrutinib 80 MG CAPS, Take by mouth, Disp: , Rfl:     allopurinol (ZYLOPRIM) 300 mg tablet, Take 1 tablet by mouth once daily   (Patient not taking: No sig reported), Disp: 30 tablet, Rfl: 1    mometasone (NASONEX) 50 mcg/act nasal spray, 2 sprays into each nostril daily (Patient not taking: No sig reported), Disp: , Rfl:     nitroglycerin (NITROSTAT) 0 4 mg SL tablet, Place 1 tablet (0 4 mg total) under the tongue every 5 (five) minutes as needed for chest pain (Patient not taking: No sig reported), Disp: 24 tablet, Rfl: 1    Allergies   Allergen Reactions    Sulfa Antibiotics        Oncology History Overview Note   chronic lymphocytic leukemia, diagnosed in August 2017  CLL has mixed good and bad prognostic features, 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, IgVH mutation, lack of CD38 expression, deletion of 13 q14 in 6% and no 11 q deletion        There was no trisomy 12  Lymphocytes were CD5 and CD23 positive, dim kappa expression and moderate CD20 expression had splenomegaly on CT scan but not on palpation     CLL (chronic lymphocytic leukemia) ([facility] )   3/27/2018 Initial Diagnosis    CLL (chronic lymphocytic leukemia) ([facility] )         ROS:  09/08/22 Reviewed 13 systems: See symptoms in HPI[de-identified]   Presently no neurological, cardiac, pulmonary, GI,  symptoms  Other  symptoms are in HPI  No  fever, chills, bleeding, bone pains, skin rash, weight loss,   weakness, numbness,  claudication and gait problem  No frequent infections  Not unusually sensitive to heat or cold  No swelling of the ankles  No swollen glands  Patient is not anxious  /82 (BP Location: Left arm, Patient Position: Sitting, Cuff Size: Adult)   Pulse 68   Temp (!) 97 2 °F (36 2 °C)   Resp 17   Ht 5' 11" (1 803 m)   Wt 93 9 kg (207 lb)   SpO2 98%   BMI 28 87 kg/m²     Physical Exam:  Alert and oriented and not in distress  Stable vitals   No icterus , no oral thrush, no palpable neck mass,  lung fields clear  to percussion and auscultation, regular heart rate ,  soft and non tender abdomen , no palpable abdominal mass, no ascites, no edema of ankles, no calf tenderness, no focal neurological deficit, no skin rash, no palpable lymphadenopathy,  no clubbing  Patient is anxious  Performance status 1      IMAGING:  OSSEOUS STRUCTURES:  No acute fracture or destructive osseous lesion  Degenerative changes of the spine are noted with osteophytes      IMPRESSION:     Axillary adenopathy is mildly improved  Right subpectoral nodes are similar  Splenomegaly improved  Splenic lesion resolved  Mediastinal calcified nodes stable  Small pelvic nodes are noted  One may be slightly larger on the right                  Workstation performed: [id number]          Imaging    CT chest abdomen pelvis w contrast (Order: [id number]) - 6/29/2022    IMPRESSION:        1  Soft tissue fullness in the right oropharynx and right vallecula possibly pooled secretion versus mucosal mass lesion in this patient with a history of chronic lymphocytic leukemia  Direct visualization/ENT assessment advised  2   A few right periparotid/intraparotid subcentimeter lymph nodes or other nodules can be evaluated on follow-up imaging  3   Although not enlarged by size criteria there are more numerous than typically seen bilateral axillary and supraclavicular lymph nodes, left greater than right, potentially related to the patient's leukemia  Further clinical assessment and imaging   surveillance advised            Workstation performed: [id number]          Imaging    CT soft tissue neck w contrast (Order: [id number]) - 6/29/2022    LABS:    Results for orders placed or performed in visit on 09/02/22   ECG 12 lead   Result Value Ref Range    Ventricular Rate 56 BPM    Atrial Rate 56 BPM    UT Interval 160 ms    QRSD Interval 84 ms    QT Interval 422 ms    QTC Interval 407 ms    P Axis 60 degrees    QRS Axis 20 degrees    T Wave [place] 63 degrees     Labs, Imaging, & Other studies:   All pertinent labs and imaging studies were personally reviewed    IgG 694  Normal LDH and uric acid  Near normal chemistry profile except total protein 5 9 and albumin 3 9      Assessment and plan:   Since July 2022 patient has been on zanubrutinib on clinical trial for 13q deletion positive CLL    Previously he had 17p positive deletion  Negative for trisomy 12 and 11q  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  Better prognostic CLL  CLL was diagnosed few years ago and after a long period of observation, in June 2020 he was started on  acalabrutinib when platelet counts dropped below 100,000, stage IV CLL  He responded but in June or July 2021 acalabrutinib was discontinued because patient went on aspirin and Plavix post MRI and 1 stent in LAD  Patient is off Plavix now  He is on baby aspirin  He wanted to go back to therapy  He qualified for clinical trial for zanubrutinib and he wanted to get started on therapy  Plavix was stopped on June 1, 2022  He feels well at present and does not have CLL related symptoms and does not have cardiac symptoms  No bleeding  No atrial fibrillation  Has some tiredness and minor arthritic symptoms          Physical examination and test results are as recorded and discussed  Patient is on zanubrutinib on clinical trial as above  Blood work  every 4 weeks on clinical trial        All discussed  detail  Questions answered  Discussed the importance of  eating healthy foods, diet and activities as prescribed by patient's cardiologist   Health screening tests   Patient is capable of self-care  Discussed precautions against Coronavirus especially his immune system is low  Geethaeld    See diagnoses, orders and instructions    1  CLL (chronic lymphocytic leukemia) ([facility] )      2  Thrombocytopenia ([facility] )      3  Status post coronary artery stent placement      4  Hypertension, essential      5  Hyperlipidemia, unspecified hyperlipidemia type      Clinically stable  No change in treatment, zanubrutinib  He has standing orders for blood work  Follow-up in 1 month                                   Patient voiced understanding and agreed      Counseling / Coordination of Care       Provided counseling and support

## 2022-09-25 DIAGNOSIS — C91.10 CLL (CHRONIC LYMPHOCYTIC LEUKEMIA) (HCC): Primary | ICD-10-CM

## 2022-09-26 ENCOUNTER — HOSPITAL ENCOUNTER (OUTPATIENT)
Dept: RADIOLOGY | Facility: HOSPITAL | Age: 77
Discharge: HOME | End: 2022-09-26
Attending: INTERNAL MEDICINE
Payer: COMMERCIAL

## 2022-09-26 DIAGNOSIS — C91.10 CLL (CHRONIC LYMPHOCYTIC LEUKEMIA) (HCC): ICD-10-CM

## 2022-09-26 PROCEDURE — G1004 CDSM NDSC: HCPCS

## 2022-09-26 PROCEDURE — 70491 CT SOFT TISSUE NECK W/DYE: CPT

## 2022-09-26 PROCEDURE — 71260 CT THORAX DX C+: CPT

## 2022-09-26 PROCEDURE — 74177 CT ABD & PELVIS W/CONTRAST: CPT

## 2022-09-26 RX ADMIN — IOHEXOL 100 ML: 350 INJECTION, SOLUTION INTRAVENOUS at 13:04

## 2022-09-30 ENCOUNTER — TELEPHONE (OUTPATIENT)
Dept: HEMATOLOGY ONCOLOGY | Facility: CLINIC | Age: 77
End: 2022-09-30

## 2022-09-30 ENCOUNTER — OFFICE VISIT (OUTPATIENT)
Dept: LAB | Facility: CLINIC | Age: 77
End: 2022-09-30
Payer: COMMERCIAL

## 2022-09-30 ENCOUNTER — APPOINTMENT (OUTPATIENT)
Dept: LAB | Facility: CLINIC | Age: 77
End: 2022-09-30
Payer: COMMERCIAL

## 2022-09-30 DIAGNOSIS — C91.10 CLL (CHRONIC LYMPHOCYTIC LEUKEMIA) (HCC): ICD-10-CM

## 2022-09-30 LAB
ATRIAL RATE: 58 BPM
PR INTERVAL: 144 MS
QRS AXIS: 29 DEGREES
QRSD INTERVAL: 80 MS
QT INTERVAL: 420 MS
QTC INTERVAL: 412 MS
T WAVE AXIS: 66 DEGREES
VENTRICULAR RATE: 58 BPM

## 2022-09-30 PROCEDURE — 93005 ELECTROCARDIOGRAM TRACING: CPT

## 2022-09-30 PROCEDURE — 93010 ELECTROCARDIOGRAM REPORT: CPT | Performed by: INTERNAL MEDICINE

## 2022-10-02 DIAGNOSIS — C91.10 CLL (CHRONIC LYMPHOCYTIC LEUKEMIA) (HCC): ICD-10-CM

## 2022-10-02 RX ORDER — ALLOPURINOL 300 MG/1
TABLET ORAL
Qty: 30 TABLET | Refills: 0 | Status: SHIPPED | OUTPATIENT
Start: 2022-10-02

## 2022-10-06 ENCOUNTER — OFFICE VISIT (OUTPATIENT)
Dept: HEMATOLOGY ONCOLOGY | Facility: CLINIC | Age: 77
End: 2022-10-06
Payer: COMMERCIAL

## 2022-10-06 ENCOUNTER — DOCUMENTATION (OUTPATIENT)
Dept: OTHER | Facility: HOSPITAL | Age: 77
End: 2022-10-06

## 2022-10-06 VITALS
HEIGHT: 71 IN | SYSTOLIC BLOOD PRESSURE: 124 MMHG | BODY MASS INDEX: 29.26 KG/M2 | HEART RATE: 67 BPM | WEIGHT: 209 LBS | DIASTOLIC BLOOD PRESSURE: 80 MMHG | RESPIRATION RATE: 17 BRPM | OXYGEN SATURATION: 98 %

## 2022-10-06 DIAGNOSIS — C91.10 CLL (CHRONIC LYMPHOCYTIC LEUKEMIA) (HCC): Primary | ICD-10-CM

## 2022-10-06 DIAGNOSIS — D69.6 THROMBOCYTOPENIA (HCC): ICD-10-CM

## 2022-10-06 DIAGNOSIS — R93.89 ABNORMAL CT SCAN, NECK: ICD-10-CM

## 2022-10-06 DIAGNOSIS — I10 HYPERTENSION, ESSENTIAL: ICD-10-CM

## 2022-10-06 DIAGNOSIS — M54.10 RADICULOPATHY OF LEG: ICD-10-CM

## 2022-10-06 DIAGNOSIS — Z95.5 STATUS POST CORONARY ARTERY STENT PLACEMENT: ICD-10-CM

## 2022-10-06 DIAGNOSIS — E78.5 HYPERLIPIDEMIA, UNSPECIFIED HYPERLIPIDEMIA TYPE: ICD-10-CM

## 2022-10-06 PROCEDURE — 99214 OFFICE O/P EST MOD 30 MIN: CPT | Performed by: INTERNAL MEDICINE

## 2022-10-06 NOTE — PROGRESS NOTES
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  Previously he had 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  Negative for trisomy 12 and 11 q deletion  Mutated Ig VH  CLL was diagnosed few years ago and after a long period of observation he was started on  acalabrutinib in June 2020 when platelet counts dropped below 100,000, stage IV CLL  He responded but in June or July 2021 acalabrutinib was discontinued because patient went on aspirin and Plavix post MI and 1 stent in LAD  Patient went off Plavix on 06/01/2022   He is on baby aspirin  He wanted to go back to therapy  He qualified for clinical trial for zanubrutinib and he wanted to get started on therapy and zanubrutinib was started in July 2022  He has been tolerating zanubrutinib without much problem  No bleeding  No atrial fibrillation  No CLL related symptoms  No cardiac symptoms     Has some tiredness and minor arthritic symptoms  Occasionally low back pain that radiates to right leg laterally                 Current Outpatient Medications:     allopurinol (ZYLOPRIM) 300 mg tablet, Take 1 tablet by mouth once daily  , Disp: 30 tablet, Rfl: 0    aspirin 81 mg chewable tablet, Chew 1 tablet (81 mg total) daily, Disp: 30 tablet, Rfl: 0    atorvastatin (LIPITOR) 40 mg tablet, Take 1 tablet (40 mg total) by mouth daily with dinner, Disp: 30 tablet, Rfl: 1    co-enzyme Q-10 50 MG capsule, Take 100 mg by mouth daily, Disp: , Rfl:     fosinopril (MONOPRIL) 20 mg tablet, Take 20 mg by mouth daily, Disp: , Rfl:     magnesium gluconate (MAGONATE) 500 mg tablet, Take 500 mg by mouth daily, Disp: , Rfl:     Multiple Vitamin (MULTIVITAMIN) tablet, Take 1 tablet by mouth daily, Disp: , Rfl:     sertraline (ZOLOFT) 50 mg tablet, Take 75 mg by mouth daily  , Disp: , Rfl:     Zanubrutinib 80 MG CAPS, Take by mouth, Disp: , Rfl:     mometasone (NASONEX) 50 mcg/act nasal spray, 2 sprays into each nostril daily (Patient not taking: No sig reported), Disp: , Rfl:    nitroglycerin (NITROSTAT) 0 4 mg SL tablet, Place 1 tablet (0 4 mg total) under the tongue every 5 (five) minutes as needed for chest pain (Patient not taking: No sig reported), Disp: 24 tablet, Rfl: 1    Allergies   Allergen Reactions    Sulfa Antibiotics        Oncology History Overview Note   chronic lymphocytic leukemia, diagnosed in August 2017  CLL has mixed good and bad prognostic features, 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, IgVH mutation, lack of CD38 expression, deletion of 13 q14 in 6% and no 11 q deletion        There was no trisomy 12  Lymphocytes were CD5 and CD23 positive, dim kappa expression and moderate CD20 expression had splenomegaly on CT scan but not on palpation     CLL (chronic lymphocytic leukemia) ([facility] )   3/27/2018 Initial Diagnosis    CLL (chronic lymphocytic leukemia) ([facility] )         ROS:  10/06/22 Reviewed 13 systems: See symptoms in HPI[de-identified]   Presently no neurological, cardiac, pulmonary, GI,  symptoms  Other  symptoms are in HPI  No symptoms like  fever, chills, bleeding, bone pains, skin rash, weight loss,   weakness, numbness,  claudication and gait problem  No frequent infections  Not unusually sensitive to heat or cold  No swelling of the ankles  No swollen glands  Patient is not anxious  /80 (BP Location: Left arm, Patient Position: Sitting, Cuff Size: Adult)   Pulse 67   Resp 17   Ht 5' 11" (1 803 m)   Wt 94 8 kg (209 lb)   SpO2 98%   BMI 29 15 kg/m²     Physical Exam:  Patient is alert and oriented  Patient is not in distress  Vital signs are above  There is no icterus , no oral thrush, no palpable neck mass, clear lung fields   to percussion and auscultation, regular heart rate , abdomen  soft and non tender  , no palpable abdominal mass, no ascites, no edema of ankles, no calf tenderness, no focal neurological deficit, no skin rash, no palpable lymphadenopathy,  no clubbing  Patient is anxious  Performance status 1      IMAGING:  OSSEOUS STRUCTURES:  No acute fracture or destructive osseous lesion  Degenerative changes of the spine are noted with osteophytes      IMPRESSION:     Axillary adenopathy is mildly improved  Right subpectoral nodes are similar  Splenomegaly improved  Splenic lesion resolved  Mediastinal calcified nodes stable  Small pelvic nodes are noted  One may be slightly larger on the right                  Workstation performed: [id number]          Imaging    CT chest abdomen pelvis w contrast (Order: [id number]) - 6/29/2022    IMPRESSION:        1  Soft tissue fullness in the right oropharynx and right vallecula possibly pooled secretion versus mucosal mass lesion in this patient with a history of chronic lymphocytic leukemia  Direct visualization/ENT assessment advised  2   A few right periparotid/intraparotid subcentimeter lymph nodes or other nodules can be evaluated on follow-up imaging  3   Although not enlarged by size criteria there are more numerous than typically seen bilateral axillary and supraclavicular lymph nodes, left greater than right, potentially related to the patient's leukemia  Further clinical assessment and imaging   surveillance advised            Workstation performed: [id number]          Imaging    CT soft tissue neck w contrast (Order: [id number]) - 6/29/2022    IMPRESSION:     There is asymmetry of the lingual tonsils, more prominent on the right  Findings are similar to the previous noncontrast examination dated 6/29/2022  ENT consultation and direct visual inspection recommended      No enlarged or pathologic adenopathy  The previously seen mildly prominent nodes throughout the neck are decreased in size            Workstation performed: [id number]          Imaging    CT soft tissue neck w contrast (Order: [id number]) - 9/26/2022    IMPRESSION:     Chest:  Decreased size of axillary lymph nodes compared to 6/29/2022    Lymph nodes have normal size and morphology      Abdomen and pelvis:  No enlarged or new lymph nodes      Other stable, nonemergent findings above      Workstation performed: [id number]          Imaging    CT chest abdomen pelvis w contrast (Order: [id number]) - 9/26/2022    LABS:    Results for orders placed or performed in visit on 09/30/22   ECG 12 lead   Result Value Ref Range    Ventricular Rate 58 BPM    Atrial Rate 58 BPM    PA Interval 144 ms    QRSD Interval 80 ms    QT Interval 420 ms    QTC Interval 412 ms    P Axis  degrees    QRS Axis 29 degrees    T Wave Axis 66 degrees     Labs, Imaging, & Other studies:   All pertinent labs and imaging studies were personally reviewed    Hemoglobin 14  WBC 33661 and platelets 737,365  Lymphocytes 54,080  No blast  cell  Normal LDH and uric acid  Total protein 6 2 and albumin 3 9  Remaining test results on CMP are near normal range  IgG 659  Beta-2 microglobulin 1 6      Assessment and plan: Follow-up visit for 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  Previously he had 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  Negative for trisomy 12 and 11 q deletion  Mutated Ig VH  CLL was diagnosed few years ago and after a long period of observation he was started on  acalabrutinib in June 2020 when platelet counts dropped below 100,000, stage IV CLL  He responded but in June or July 2021 acalabrutinib was discontinued because patient went on aspirin and Plavix post MI and 1 stent in LAD  Patient went off Plavix on 06/01/2022   He is on baby aspirin  He wanted to go back to therapy  He qualified for clinical trial for zanubrutinib and he wanted to get started on therapy and zanubrutinib was started in July 2022  He has been tolerating zanubrutinib without much problem  No bleeding  No atrial fibrillation  No CLL related symptoms  No cardiac symptoms     Has some tiredness and minor arthritic symptoms  Occasionally low back pain that radiates to right leg laterally          Physical examination and test results are as recorded and discussed    Patient is on zanubrutinib on clinical trial as above and he seems to be responding to therapy and has been tolerating treatment without much problem  For low back discomfort and radiculopathy he will have MRI scan of lumbar spine  There is some asymmetry of lingular  tonsils and he will see ENT specialist    Blood work  every 4 weeks on clinical trial        All discussed  detail  Questions answered  Discussed the importance of  eating healthy foods, diet and activities as prescribed by patient's cardiologist   Health screening tests   Patient is capable of self-care  Discussed precautions against Coronavirus especially his immune system is low  Tonia    See diagnoses, orders and instructions    1  CLL (chronic lymphocytic leukemia) ([facility] )      2  Thrombocytopenia ([facility] )      3  Abnormal CT scan, neck    - Ambulatory Referral to Otolaryngology; Future    4  Radiculopathy of leg    - MRI lumbar spine wo contrast; Future    5  Status post coronary artery stent placement      6  Hypertension, essential      7  Hyperlipidemia, unspecified hyperlipidemia type    Patient has standing orders for  blood work  No change in zanubrutinib  Referral to ENT for  asymmetrical Lingular tonsil within 2 weeks MRI lumbar spine within 2 weeks  Has appointment                Patient saw me using dictation device and I will be using it for rest of the dictation and there could be mistakes in dictation                      Patient voiced understanding and agreed      Counseling / Coordination of Care       Provided counseling and support

## 2022-10-06 NOTE — PROGRESS NOTES
Patient seen in clinic for C4 D1 of the [facility] study  Patient returned 2 pill bottles for C3 and pill diary  2 pill bottles and pill diary for C4 were dispensed  ECG, Labs and Scans were reviewed and signed  Patient states his fatigue has improved and his energy levels are back up  Conmeds reviewed  Patient referred to ENT for asymmetry of the lingual tonsils  QOL's completed in office  Patient to return in 28 days with labs  No imaging or ECG needed for C5 and C6  Study to be transitioned to [facility] Edward prior to next office visit  Patient was notified of transition and introduced to Yvette Sweeney

## 2022-10-06 NOTE — PATIENT INSTRUCTIONS
Patient has standing orders for  blood work  No change in zanubrutinib    Referral to ENT for asymmetrical Lingular tonsil within 2 weeks MRI lumbar spine within 2 weeks

## 2022-10-12 ENCOUNTER — HOSPITAL ENCOUNTER (OUTPATIENT)
Dept: INFUSION CENTER | Facility: CLINIC | Age: 77
Discharge: HOME | End: 2022-10-12
Payer: COMMERCIAL

## 2022-10-12 VITALS
SYSTOLIC BLOOD PRESSURE: 134 MMHG | TEMPERATURE: 97.2 F | RESPIRATION RATE: 16 BRPM | HEART RATE: 70 BPM | DIASTOLIC BLOOD PRESSURE: 75 MMHG | OXYGEN SATURATION: 96 %

## 2022-10-12 DIAGNOSIS — C91.10 CLL (CHRONIC LYMPHOCYTIC LEUKEMIA) (HCC): Primary | ICD-10-CM

## 2022-10-12 PROCEDURE — M0220 HB TIXAGEV AND CILGAV INF ADMIN: HCPCS | Performed by: INTERNAL MEDICINE

## 2022-10-12 RX ADMIN — AZD7442 600 MG COMBINED: KIT at 15:13

## 2022-10-12 NOTE — PROGRESS NOTES
Patient here for evusheld injection, patient tolerated injections in BL glute  VSS  Patient aware of EUA status of medication, declines printout as this is his second dose and he is aware of information  Patient aware of 1 hours observation now in progress

## 2022-10-28 ENCOUNTER — HOSPITAL ENCOUNTER (OUTPATIENT)
Dept: MRI IMAGING | Facility: HOSPITAL | Age: 77
Discharge: HOME | End: 2022-10-28
Attending: INTERNAL MEDICINE
Payer: COMMERCIAL

## 2022-10-28 DIAGNOSIS — M54.10 RADICULOPATHY OF LEG: ICD-10-CM

## 2022-10-28 PROCEDURE — G1004 CDSM NDSC: HCPCS

## 2022-10-28 PROCEDURE — 72148 MRI LUMBAR SPINE W/O DYE: CPT

## 2022-10-31 ENCOUNTER — APPOINTMENT (OUTPATIENT)
Dept: LAB | Facility: CLINIC | Age: 77
End: 2022-10-31

## 2022-10-31 ENCOUNTER — TELEPHONE (OUTPATIENT)
Dept: HEMATOLOGY ONCOLOGY | Facility: CLINIC | Age: 77
End: 2022-10-31

## 2022-10-31 NOTE — TELEPHONE ENCOUNTER
8/30/2023              [facility]        [address] [facility] [address]         Dear Jeremi Mckeon,    [facility] records indicate that the Mammogram tests ordered for you by Fely Dumont MD  have not been done. If you have, in fact, already completed the tests or you do not wish to have the tests done, please contact our office at [address] [place] [address]. Otherwise, please proceed with the testing. Enclosed is a duplicate order for your convenience.         Sincerely,    Fely Dumont MD and Staff  [facility], [address] [phone]        Document electronically generated by:  Tayo Velázquez Critical WBC count 53 98  Labs trending down, patient has CLL

## 2022-11-03 ENCOUNTER — DOCUMENTATION (OUTPATIENT)
Dept: OTHER | Facility: HOSPITAL | Age: 77
End: 2022-11-03

## 2022-11-03 ENCOUNTER — OFFICE VISIT (OUTPATIENT)
Dept: HEMATOLOGY ONCOLOGY | Facility: CLINIC | Age: 77
End: 2022-11-03

## 2022-11-03 VITALS
HEART RATE: 72 BPM | DIASTOLIC BLOOD PRESSURE: 80 MMHG | SYSTOLIC BLOOD PRESSURE: 128 MMHG | WEIGHT: 212 LBS | RESPIRATION RATE: 17 BRPM | BODY MASS INDEX: 29.68 KG/M2 | OXYGEN SATURATION: 98 % | HEIGHT: 71 IN

## 2022-11-03 DIAGNOSIS — C91.10 CLL (CHRONIC LYMPHOCYTIC LEUKEMIA) (HCC): Primary | ICD-10-CM

## 2022-11-03 DIAGNOSIS — C91.10 CLL (CHRONIC LYMPHOCYTIC LEUKEMIA) (HCC): Primary | Chronic | ICD-10-CM

## 2022-11-03 NOTE — PROGRESS NOTES
Patient seen in the office with Sandra Velasco PA-C and patient's spouse for C5 D1 of the [facility] study  Patient returned two pill bottles for C4 and pill diary  Two pill bottles and pill diary for C5 were dispensed  Updated Informed Consent (Version SL 6 0; Sponsor 5 1) with Covid-19 Procedures Addendum (Version 1 0) were reviewed and signed and a copy was provided to the patient  Labs and updated RECIST read scans were reviewed and signed  Patient states his fatigue has returned along with chronic lower back pain that extends to his right leg  Concomitant meds reviewed  QOL's completed in office  Patient to return in 28 days with labs, next appointment is scheduled for December 1 with Dr Dickson Pruitt  No imaging or ECG needed for C6  Patient reported that he received both the flu shot and the bivalent Covid booster last Friday October 29

## 2022-11-03 NOTE — PROGRESS NOTES
Hematology/Oncology Outpatient Follow-up  Jovanna Fitzgerald 69 y o  male 1945 [id number]    Date:  11/3/2022      Assessment and Plan:  1  CLL (chronic lymphocytic leukemia) [facility])  61-year-old male presents for follow-up regarding history of CLL  He was on a acalabrutinib and had a MI while on this  Medication was held  He entered observation  After coming off of Plavix he was told that he could go on a clinical trial with zenubrutinib versus observation  Patient elected to go on medication on trial   He is tolerating well  He notes some increase in fatigue over the last 1-2 weeks  He continues to sleep well  He continues to walk daily with his dog as well  The fatigue is not affecting his QOL at this time  We reviewed WBC/lymphocytes continue to decrease  This shows response to therapy  He also had MRI ordered by Dr Hima Bertrand due to low back pain and pain down the right leg  This showed mild scoliosis, degenerative disc disease, disc herniations, stenosis  Recommended to see spine and pain  He does not want to make an appt right now  ECOG 1      HPI:  Oncology History Overview Note   chronic lymphocytic leukemia, diagnosed in August 2017  CLL has mixed good and bad prognostic features, 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, IgVH mutation, lack of CD38 expression, deletion of 13 q14 in 6% and no 11 q deletion        There was no trisomy 12  Lymphocytes were CD5 and CD23 positive, dim kappa expression and moderate CD20 expression had splenomegaly on CT scan but not on palpation     [facility] lymphocytic [facility] )   3/27/2018 Initial Diagnosis    CLL [facility]chronic lymphocytic leukemia[facility] )       69year old male presents for follow up for chronic lymphocytic leukemia, This was diagnosed in August 2017  CLL has mixed good and bad prognostic features, 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, IgVH mutation, lack of CD38 expression, deletion of 13 q14 in 6% and no 11 q deletion      There was no trisomy 12   Lymphocytes were CD5 and CD23 positive, dim kappa expression and moderate CD20 expression had splenomegaly on CT scan but not on palpation     Patient WBC was increasing and platelet count remained decreased  Patient was seen by Dr Jennifer Painting on 5/29/20 and advised to begin treatment  Patient was started on acalabrutinib 100 mg PO daily       Since beginning treatment in June 2020 patient has had significant improvement in his energy  [facility] feels better since being on this medication      On 07/02/2021 patient was admitted to [facility] for an inferior STEMI  [facility] was having burning chest pain   He states he was having intermittent burning chest pain occasionally when walking his dog   He states this was even happening prior to being on acalabrutinib  Jie Quispe underwent cardiac catheterization which demonstrated 80% lesion in the proximal LAD and 60% distal RCA lesion after the RPDA   He underwent PCI/ PRETTY x1 to the proximal RCA lesion with 0% residual stenosis   He was initiated on dual anti-platelet therapy with aspirin and Brilinta, high-intensity statin and beta-blocker was held due to sinus bradycardia   Patient cost was barrier for Brilinta so he is on Plavix   He underwent a transesophageal echo which showed 55% EF, grade 1 DD, RV normal size and systolic function with no valvular disease      He was placed on Plavix and aspirin due to STEMI       Acalabrutinib was placed on hold  He was under observation       In July 2022 he was placed on clinical trial with zanubrutinib  Interval history:    ROS: Review of Systems   Constitutional: Positive for fatigue  Negative for appetite change, chills, fever and unexpected weight change  Respiratory: Negative for cough and shortness of breath  Cardiovascular: Negative for chest pain, palpitations and leg swelling  Gastrointestinal: Negative for abdominal pain, constipation, diarrhea, nausea and vomiting  Genitourinary: Negative for difficulty urinating, dysuria and hematuria     Musculoskeletal: Positive for back pain (low back, pain extends into the right leg)  Negative for arthralgias  Skin: Negative  Neurological: Negative for dizziness, light-headedness, numbness and headaches  Hematological: Negative  Psychiatric/Behavioral: Negative  Past Medical History:   Diagnosis Date   • Anxiety    • Hypertension    • Leukemia ([facility] )        Past Surgical History:   Procedure Laterality Date   • APPENDECTOMY     • COLONOSCOPY     • TONSILLECTOMY         Social History     Socioeconomic History   • Marital status: /Civil Union     Spouse name: None   • Number of children: None   • Years of education: None   • Highest education level: None   Occupational History   • None   Tobacco Use   • Smoking status: Never Smoker   • Smokeless tobacco: Never Used   Vaping Use   • Vaping Use: None   Substance and Sexual Activity   • Alcohol use: Not Currently     Comment: minimal   • Drug use: No   • Sexual activity: None   Other Topics Concern   • None   Social History Narrative   • None     Social Determinants of Health     Financial Resource Strain: Not on file   Food Insecurity: Not on file   Transportation Needs: Not on file   Physical Activity: Not on file   Stress: Not on file   Social Connections: Not on file   Intimate Partner Violence: Not on file   Housing Stability: Not on file       Family History   Problem Relation Age of Onset   • No Known Problems Mother    • No Known Problems Father        Allergies   Allergen Reactions   • Sulfa Antibiotics          Current Outpatient Medications:   •  allopurinol (ZYLOPRIM) 300 mg tablet, Take 1 tablet by mouth once daily  , Disp: 30 tablet, Rfl: 0  •  aspirin 81 mg chewable tablet, Chew 1 tablet (81 mg total) daily, Disp: 30 tablet, Rfl: 0  •  atorvastatin (LIPITOR) 40 mg tablet, Take 1 tablet (40 mg total) by mouth daily with dinner, Disp: 30 tablet, Rfl: 1  •  co-enzyme Q-10 50 MG capsule, Take 100 mg by mouth daily, Disp: , Rfl:   •  fosinopril (MONOPRIL) 20 mg tablet, Take 20 mg by mouth daily, Disp: , Rfl:   •  magnesium gluconate (MAGONATE) 500 mg tablet, Take 500 mg by mouth daily, Disp: , Rfl:   •  Multiple Vitamin (MULTIVITAMIN) tablet, Take 1 tablet by mouth daily, Disp: , Rfl:   •  sertraline (ZOLOFT) 50 mg tablet, Take 75 mg by mouth daily  , Disp: , Rfl:   •  Zanubrutinib 80 MG CAPS, Take by mouth, Disp: , Rfl:   •  mometasone (NASONEX) 50 mcg/act nasal spray, 2 sprays into each nostril daily (Patient not taking: No sig reported), Disp: , Rfl:   •  nitroglycerin (NITROSTAT) 0 4 mg SL tablet, Place 1 tablet (0 4 mg total) under the tongue every 5 (five) minutes as needed for chest pain (Patient not taking: No sig reported), Disp: 24 tablet, Rfl: 1      Physical Exam:  /80 (BP Location: Left arm, Patient Position: Sitting, Cuff Size: Adult)   Pulse 72   Resp 17   Ht 5' 11" (1 803 m)   Wt 96 2 kg (212 lb)   SpO2 98%   BMI 29 57 kg/m²     Physical Exam  Vitals reviewed  Constitutional:       General: He is not in acute distress  Appearance: He is well-developed  He is not ill-appearing  HENT:      Head: Normocephalic and atraumatic  Eyes:      General: No scleral icterus  Conjunctiva/sclera: Conjunctivae normal    Cardiovascular:      Rate and Rhythm: Normal rate and regular rhythm  Heart sounds: Normal heart sounds  No murmur heard  Pulmonary:      Effort: Pulmonary effort is normal  No respiratory distress  Breath sounds: Normal breath sounds  Chest:   Breasts:      Right: No axillary adenopathy or supraclavicular adenopathy  Left: No axillary adenopathy or supraclavicular adenopathy  Abdominal:      Palpations: Abdomen is soft  Tenderness: There is no abdominal tenderness  Musculoskeletal:         General: No tenderness  Normal range of motion  Cervical back: Normal range of motion and neck supple  Right lower leg: No edema  Left lower leg: No edema     Lymphadenopathy:      Cervical: No cervical adenopathy  Upper Body:      Right upper body: No supraclavicular or axillary adenopathy  Left upper body: No supraclavicular or axillary adenopathy  Skin:     General: Skin is warm and dry  Neurological:      Mental Status: He is alert and oriented to person, place, and time  Cranial Nerves: No cranial nerve deficit  Psychiatric:         Mood and Affect: Mood normal          Behavior: Behavior normal        Labs:  Lab Results   Component Value Date    WBC 53 98 (HH) 10/31/2022    HGB 14 3 10/31/2022    HCT 46 8 10/31/2022    MCV 89 10/31/2022     (L) 10/31/2022     Lab Results   Component Value Date    K 4 6 10/31/2022     10/31/2022    CO2 29 10/31/2022    BUN 27 (H) 10/31/2022    CREATININE 1 09 10/31/2022    GLUF 98 10/31/2022    CALCIUM 8 9 10/31/2022    CORRECTEDCA 9 2 07/14/2021    AST 15 10/31/2022    ALT 12 10/31/2022    ALKPHOS 60 10/31/2022    EGFR 65 10/31/2022     Patient voiced understanding and agreement in the above discussion  Aware to contact our office with questions/symptoms in the interim  This note has been generated by voice recognition software system  Therefore, there may be spelling, grammar, and or syntax errors  Please contact if questions arise

## 2022-11-04 ENCOUNTER — TELEPHONE (OUTPATIENT)
Dept: HEMATOLOGY ONCOLOGY | Facility: CLINIC | Age: 77
End: 2022-11-04

## 2022-11-04 DIAGNOSIS — C91.10 CLL (CHRONIC LYMPHOCYTIC LEUKEMIA) (HCC): Primary | ICD-10-CM

## 2022-11-04 NOTE — TELEPHONE ENCOUNTER
I left message for the patient on his cellphone with findings on MRI of his lower spine and asked if he would like me to refer him to spine specialist and he can call me back today or Monday and I left my name and office phone number

## 2022-11-08 ENCOUNTER — DOCUMENTATION (OUTPATIENT)
Dept: OTHER | Facility: HOSPITAL | Age: 77
End: 2022-11-08

## 2022-11-08 DIAGNOSIS — C91.10 CLL (CHRONIC LYMPHOCYTIC LEUKEMIA) (HCC): ICD-10-CM

## 2022-11-08 RX ORDER — ALLOPURINOL 300 MG/1
TABLET ORAL
Qty: 30 TABLET | Refills: 0 | Status: SHIPPED | OUTPATIENT
Start: 2022-11-08

## 2022-11-08 NOTE — PROGRESS NOTES
Documentation of Informed Consent Process for Clinical Research Study    Study [facility] [id number]  Patient Name: Johny Tomlinson  YOB: 1945    This signed and dated document shall serve as certification that all of the below listed required elements of informed consent were provided to the subject or legally authorized representative signing the actual Informed Consent Document, both in written and verbal format  The HIPAA consent is contained within the Informed Consent document, and has also been discussed  A copy of the actual signed and dated Informed Consent has also been provided to the subject or legally authorized representative, and the original signed document shall be maintained in the research shadow chart  Element of Informed Consent Discussed Date Initials/ Person obtaining consent   A statement that the study involves research, an explanation of the purposes of the research and the expected duration of the subject's participation, a description of the procedures to be followed, and identification of any procedures which are experimental 11/3/2022 CE   A description of any reasonably foreseeable risks or discomforts to the subject  11/3/2022 CE   A description of any benefits to the subject or to others which may reasonably be expected from the research  11/3/2022 CE   A disclosure of appropriate alternative procedures or courses of treatment, if any, that might be advantageous to the subject   11/3/2022 CE   A statement describing the extent, if any, to which confidentiality of records identifying the subject will be maintained and that notes the possibility that the Food and Drug Administration may inspect the records 11/3/2022 CE   For research involving more than minimal risk, an explanation as to whether any compensation and an explanation as to whether any medical treatments are available if injury occurs and, if so, what they consist of, or where further information may be obtained  11/3/2022 CE   An explanation of whom to contact for answers to pertinent questions about the research and research subjects' rights, and whom to contact in the event of a research-related injury to the subject  11/3/2022 CE   A statement that participation is voluntary, that refusal to participate will involve no penalty or loss of benefits to which the subject is otherwise entitled, and that the subject may discontinue participation at any time without penalty or loss of benefits to which the subject is otherwise entitled  11/3/2022 CE   A statement that the particular treatment or procedure may involve risks to the subject (or to the embryo or fetus, if the subject is or may become pregnant) which are currently unforeseeable 11/3/2022 CE   Anticipated circumstances under which the subject's participation may be terminated by the investigator without regard to the subject's consent  11/3/2022 CE   Any additional costs to the subject that may result from participation in the research 11/3/2022 CE   The consequences of a subjects' decision to withdraw from the research and procedures for orderly termination of participation by the subject  11/3/2022 CE   A statement that significant new findings developed during the course of the research which may relate to the subject's willingness to continue participation will be provided to the subject  11/3/2022 CE   The approximate number of subjects involved in the study   11/3/2022 CE

## 2022-11-28 ENCOUNTER — APPOINTMENT (OUTPATIENT)
Dept: LAB | Facility: CLINIC | Age: 77
End: 2022-11-28

## 2022-11-28 ENCOUNTER — TELEPHONE (OUTPATIENT)
Dept: HEMATOLOGY ONCOLOGY | Facility: CLINIC | Age: 77
End: 2022-11-28

## 2022-11-28 DIAGNOSIS — C91.10 CLL (CHRONIC LYMPHOCYTIC LEUKEMIA) (HCC): ICD-10-CM

## 2022-12-01 ENCOUNTER — OFFICE VISIT (OUTPATIENT)
Dept: HEMATOLOGY ONCOLOGY | Facility: CLINIC | Age: 77
End: 2022-12-01

## 2022-12-01 ENCOUNTER — DOCUMENTATION (OUTPATIENT)
Dept: OTHER | Facility: HOSPITAL | Age: 77
End: 2022-12-01

## 2022-12-01 VITALS
RESPIRATION RATE: 17 BRPM | HEART RATE: 60 BPM | BODY MASS INDEX: 29.4 KG/M2 | SYSTOLIC BLOOD PRESSURE: 140 MMHG | HEIGHT: 71 IN | DIASTOLIC BLOOD PRESSURE: 80 MMHG | WEIGHT: 210 LBS | OXYGEN SATURATION: 97 %

## 2022-12-01 DIAGNOSIS — C91.10 CLL (CHRONIC LYMPHOCYTIC LEUKEMIA) (HCC): Primary | ICD-10-CM

## 2022-12-01 DIAGNOSIS — D69.6 THROMBOCYTOPENIA (HCC): ICD-10-CM

## 2022-12-01 DIAGNOSIS — Z95.5 STATUS POST CORONARY ARTERY STENT PLACEMENT: ICD-10-CM

## 2022-12-01 DIAGNOSIS — I10 HYPERTENSION, ESSENTIAL: ICD-10-CM

## 2022-12-01 DIAGNOSIS — E78.5 HYPERLIPIDEMIA, UNSPECIFIED HYPERLIPIDEMIA TYPE: ICD-10-CM

## 2022-12-01 DIAGNOSIS — I25.10 CORONARY ARTERY DISEASE INVOLVING NATIVE CORONARY ARTERY OF NATIVE HEART WITHOUT ANGINA PECTORIS: ICD-10-CM

## 2022-12-01 DIAGNOSIS — R93.89 ABNORMAL CT SCAN, NECK: ICD-10-CM

## 2022-12-01 NOTE — PROGRESS NOTES
Met with patient and Dr Luis E Lema in the clinic for Cycle 6 Day 1 of the [facility] [id number] Study  Two pill bottles returned along with the patient diary and two new bottles and diary dispensed  Patient questionnaires completed in the office  Hematology and chemistry labs reviewed  Patient reports feeling very well, no new complaints  Fatigue unchanged  No new medications reported  Patient enjoys walking every morning with his dog  Patient will return in 28 days with labs, ECG and CT scans per protocol  CT scans were scheduled at check out

## 2022-12-01 NOTE — PROGRESS NOTES
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  Previously he had 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  Negative for trisomy 12 and 11 q deletion  Mutated Ig VH  CLL was diagnosed few years ago and after a long period of observation he was started on acalabrutinib in June 2020 when platelet counts dropped below 100,000, stage IV CLL  He responded but in June or July 2021 acalabrutinib was discontinued because patient went on aspirin and Plavix post MI and 1 stent in LAD  Patient went off Plavix on 06/01/2022   He is on baby aspirin  He wanted to go back to therapy  He qualified for clinical trial for zanubrutinib and he wanted to get started on therapy and zanubrutinib was started in July 2022  No CLL related symptoms  No cardiac symptoms     Has some tiredness and minor arthritic symptoms  Occasionally low back pain that radiates to right leg laterally  MRI scan of lumbar spine showed degenerative changes and stenosis and foraminal narrowing  He was offered to see a spine specialist   Patient preferred to wait and he states that he has much less low back discomfort and would not rush into seeing a spine specialist                 Current Outpatient Medications:   •  allopurinol (ZYLOPRIM) 300 mg tablet, Take 1 tablet by mouth once daily  , Disp: 30 tablet, Rfl: 0  •  aspirin 81 mg chewable tablet, Chew 1 tablet (81 mg total) daily, Disp: 30 tablet, Rfl: 0  •  atorvastatin (LIPITOR) 40 mg tablet, Take 1 tablet (40 mg total) by mouth daily with dinner, Disp: 30 tablet, Rfl: 1  •  co-enzyme Q-10 50 MG capsule, Take 100 mg by mouth daily, Disp: , Rfl:   •  fosinopril (MONOPRIL) 20 mg tablet, Take 20 mg by mouth daily, Disp: , Rfl:   •  magnesium gluconate (MAGONATE) 500 mg tablet, Take 500 mg by mouth daily, Disp: , Rfl:   •  Multiple Vitamin (MULTIVITAMIN) tablet, Take 1 tablet by mouth daily, Disp: , Rfl:   •  sertraline (ZOLOFT) 50 mg tablet, Take 75 mg by mouth daily  , Disp: , Rfl:   •  Zanubrutinib 80 MG CAPS, Take by mouth, Disp: , Rfl:   •  mometasone (NASONEX) 50 mcg/act nasal spray, 2 sprays into each nostril daily (Patient not taking: Reported on 7/7/2022), Disp: , Rfl:   •  nitroglycerin (NITROSTAT) 0 4 mg SL tablet, Place 1 tablet (0 4 mg total) under the tongue every 5 (five) minutes as needed for chest pain (Patient not taking: Reported on 4/7/2022), Disp: 24 tablet, Rfl: 1    Allergies   Allergen Reactions   • Sulfa Antibiotics        Oncology History Overview Note   chronic lymphocytic leukemia, diagnosed in August 2017  CLL has mixed good and bad prognostic features, 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, IgVH mutation, lack of CD38 expression, deletion of 13 q14 in 6% and no 11 q deletion        There was no trisomy 12  Lymphocytes were CD5 and CD23 positive, dim kappa expression and moderate CD20 expression had splenomegaly on CT scan but not on palpation     CLL (chronic lymphocytic leukemia) ([facility] )   3/27/2018 Initial Diagnosis    CLL (chronic lymphocytic leukemia) ([facility] )         ROS:  12/01/22 Reviewed 13 systems: See symptoms in HPI[de-identified]   Presently no neurological, cardiac, pulmonary, GI,  symptoms  Other  symptoms are in HPI  No   fever, chills, bleeding, bone pains, skin rash, weight loss,   weakness, numbness and gait problem  No frequent infections  Not unusually sensitive to heat or cold  No swelling of the ankles  No swollen glands  Patient is not anxious           /80 (BP Location: Left arm, Patient Position: Sitting, Cuff Size: Adult)   Pulse 60   Resp 17   Ht 5' 11" (1 803 m)   Wt 95 3 kg (210 lb)   SpO2 97%   BMI 29 29 kg/m²     Physical Exam:  Alert and oriented and not in distress  Stable vitals  No icterus , no oral thrush, no palpable neck mass,  lung fields clear  to percussion and auscultation, regular heart rate ,   soft and non tender abdomen , no palpable abdominal mass, no ascites, no edema of ankles, no calf tenderness, no focal neurological deficit, no skin rash, no palpable lymphadenopathy,  no clubbing  Patient is anxious  Performance status 1  IMAGING:  OSSEOUS STRUCTURES:  No acute fracture or destructive osseous lesion  Degenerative changes of the spine are noted with osteophytes      IMPRESSION:     Axillary adenopathy is mildly improved  Right subpectoral nodes are similar  Splenomegaly improved  Splenic lesion resolved  Mediastinal calcified nodes stable  Small pelvic nodes are noted  One may be slightly larger on the right                  Workstation performed: [id number]          Imaging    CT chest abdomen pelvis w contrast (Order: [id number]) - 6/29/2022    IMPRESSION:        1  Soft tissue fullness in the right oropharynx and right vallecula possibly pooled secretion versus mucosal mass lesion in this patient with a history of chronic lymphocytic leukemia  Direct visualization/ENT assessment advised  2   A few right periparotid/intraparotid subcentimeter lymph nodes or other nodules can be evaluated on follow-up imaging  3   Although not enlarged by size criteria there are more numerous than typically seen bilateral axillary and supraclavicular lymph nodes, left greater than right, potentially related to the patient's leukemia  Further clinical assessment and imaging   surveillance advised            Workstation performed: [id number]          Imaging    CT soft tissue neck w contrast (Order: [id number]) - 6/29/2022    IMPRESSION:     There is asymmetry of the lingual tonsils, more prominent on the right  Findings are similar to the previous noncontrast examination dated 6/29/2022    ENT consultation and direct visual inspection recommended      No enlarged or pathologic adenopathy  The previously seen mildly prominent nodes throughout the neck are decreased in size            Workstation performed: [id number]          Imaging    CT soft tissue neck w contrast (Order: [id number]) - 9/26/2022    IMPRESSION:     Chest:  Decreased size of axillary lymph nodes compared to 6/29/2022    Lymph nodes have normal size and morphology      Abdomen and pelvis:  No enlarged or new lymph nodes      Other stable, nonemergent findings above      Workstation performed: [id number]          Imaging    CT chest abdomen pelvis w contrast (Order: [id number]) - 9/26/2022    LABS:    Results for orders placed or performed in visit on 11/18/22   Beta 2 microglobulin, serum   Result Value Ref Range    Beta-2 Microglobulin 2 2 0 6 - 2 4 mg/L   CBC and differential   Result Value Ref Range    WBC 48 13 (HH) 4 31 - 10 16 Thousand/uL    RBC 5 28 3 88 - 5 62 Million/uL    Hemoglobin 14 5 12 0 - 17 0 g/dL    Hematocrit 46 5 36 5 - 49 3 %    MCV 88 82 - 98 fL    MCH 27 5 26 8 - 34 3 pg    MCHC 31 2 (L) 31 4 - 37 4 g/dL    RDW 15 7 (H) 11 6 - 15 1 %    MPV 11 8 8 9 - 12 7 fL    Platelets 850 (L) 314 - 390 Thousands/uL   Comprehensive metabolic panel   Result Value Ref Range    Sodium 140 135 - 147 mmol/L    Potassium 4 5 3 5 - 5 3 mmol/L    Chloride 106 96 - 108 mmol/L    CO2 28 21 - 32 mmol/L    ANION GAP 6 4 - 13 mmol/L    BUN 30 (H) 5 - 25 mg/dL    Creatinine 1 22 0 60 - 1 30 mg/dL    Glucose, Fasting 94 65 - 99 mg/dL    Calcium 8 7 8 4 - 10 2 mg/dL    AST 17 13 - 39 U/L    ALT 14 7 - 52 U/L    Alkaline Phosphatase 65 34 - 104 U/L    Total Protein 6 1 (L) 6 4 - 8 4 g/dL    Albumin 3 9 3 5 - 5 0 g/dL    Total Bilirubin 0 65 0 20 - 1 00 mg/dL    eGFR 57 ml/min/1 73sq m   IgG, IgA, IgM   Result Value Ref Range     0 70 0 - 400 0 mg/dL     0 (L) 700 0 - 1,600 0 mg/dL    IGM 38 0 (L) 40 0 - 230 0 mg/dL   LD,Blood   Result Value Ref Range     140 - 271 U/L Uric acid   Result Value Ref Range    Uric Acid 3 6 3 5 - 8 5 mg/dL   Manual Differential(PHLEBS Do Not Order)   Result Value Ref Range    Segmented % 8 (L) 43 - 75 %    Lymphocytes % 90 (H) 14 - 44 %    Monocytes % 2 (L) 4 - 12 %    Eosinophils, % 0 0 - 6 %    Basophils % 0 0 - 1 %    Absolute Neutrophils 3 85 1 85 - 7 62 Thousand/uL    Lymphocytes Absolute 43 32 (H) 0 60 - 4 47 Thousand/uL    Monocytes Absolute 0 96 0 00 - 1 22 Thousand/uL    Eosinophils Absolute 0 00 0 00 - 0 40 Thousand/uL    Basophils Absolute 0 00 0 00 - 0 10 Thousand/uL    Total Counted      RBC Morphology Normal     Platelet Estimate Borderline (A) Adequate     Labs, Imaging, & Other studies:   All pertinent labs and imaging studies were personally reviewed          Assessment and plan:   Clinical trial nurse is in the room with patient   Patient is on zanubrutinib for CLL on clinical trial and he continues to show slow response and has been tolerating zanubrutinib without problem, especially no bleeding no atrial fibrillation  Patient is on baby aspirin because of coronary stent in LAD  He is not on Plavix anymore  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  Previously he had 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  Negative for trisomy 12 and 11 q deletion  Mutated Ig VH  CLL was diagnosed few years ago and after a long period of observation he was started on acalabrutinib in June 2020 when platelet counts dropped below 100,000, stage IV CLL  He responded but in June or July 2021 acalabrutinib was discontinued because patient went on aspirin and Plavix post MI and 1 stent in LAD  Patient went off Plavix on 06/01/2022   He is on baby aspirin  He wanted to go back to therapy  He qualified for clinical trial for zanubrutinib and he wanted to get started on therapy and zanubrutinib was started in July 2022  No CLL related symptoms  No cardiac symptoms     Has some tiredness and minor arthritic symptoms      Occasionally low back pain that radiates to right leg laterally  MRI scan of lumbar spine showed degenerative changes and stenosis and foraminal narrowing  He was offered to see a spine specialist   Patient preferred to wait and he states that he has much less low back discomfort and would not rush into seeing a spine specialist          Physical examination and test results are as recorded and discussed  Patient is on zanubrutinib on clinical trial as above and he seems to be responding to therapy and has been tolerating treatment without much problem  For  some asymmetry of lingular  tonsils and he was referred to an ENT specialist    Blood work  every 4 weeks on clinical trial   Ordered CT scans per clinical trial   All discussed in detail  Questions answered  Discussed the importance of  eating healthy foods, diet and activities as prescribed by patient's cardiologist   Health screening tests   Patient is capable of self-care  Discussed precautions against Coronavirus and flu especially his immune system is low  [place]      See diagnoses, orders and instructions    1  CLL (chronic lymphocytic leukemia) (HCC)    - CT chest abdomen pelvis w contrast; Future  - CT soft tissue neck w contrast; Future    2  Thrombocytopenia ([address] )      3  Abnormal CT scan, neck      4  Status post coronary artery stent placement      5  Hypertension, essential      6  Hyperlipidemia, unspecified hyperlipidemia type      7  Coronary artery disease involving native coronary artery of native heart without angina pectoris    Patient has standing orders for blood work  Ordered CT scans  He has follow-up appointments                Patient saw me using dictation device and I will be using it for rest of the dictation and there could be mistakes in dictation                      Patient voiced understanding and agreed      Counseling / Coordination of Care       Provided counseling and support

## 2022-12-02 DIAGNOSIS — C91.10 CLL (CHRONIC LYMPHOCYTIC LEUKEMIA) (HCC): Primary | ICD-10-CM

## 2022-12-30 ENCOUNTER — HOSPITAL ENCOUNTER (OUTPATIENT)
Dept: CT IMAGING | Facility: HOSPITAL | Age: 77
Discharge: HOME | End: 2022-12-30
Attending: INTERNAL MEDICINE

## 2022-12-30 DIAGNOSIS — C91.10 CLL (CHRONIC LYMPHOCYTIC LEUKEMIA) (HCC): ICD-10-CM

## 2022-12-30 RX ADMIN — IOHEXOL 100 ML: 350 INJECTION, SOLUTION INTRAVENOUS at 09:44

## 2023-01-03 ENCOUNTER — OFFICE VISIT (OUTPATIENT)
Dept: LAB | Facility: CLINIC | Age: 78
End: 2023-01-03

## 2023-01-03 ENCOUNTER — APPOINTMENT (OUTPATIENT)
Dept: LAB | Facility: CLINIC | Age: 78
End: 2023-01-03

## 2023-01-03 DIAGNOSIS — C91.10 CLL (CHRONIC LYMPHOCYTIC LEUKEMIA) (HCC): ICD-10-CM

## 2023-01-03 LAB
ATRIAL RATE: 61 BPM
PR INTERVAL: 150 MS
QRS AXIS: 20 DEGREES
QRSD INTERVAL: 82 MS
QT INTERVAL: 414 MS
QTC INTERVAL: 416 MS
T WAVE AXIS: 54 DEGREES
VENTRICULAR RATE: 61 BPM

## 2023-01-06 ENCOUNTER — DOCUMENTATION (OUTPATIENT)
Dept: OTHER | Facility: HOSPITAL | Age: 78
End: 2023-01-06

## 2023-01-06 ENCOUNTER — OFFICE VISIT (OUTPATIENT)
Dept: HEMATOLOGY ONCOLOGY | Facility: CLINIC | Age: 78
End: 2023-01-06

## 2023-01-06 VITALS
DIASTOLIC BLOOD PRESSURE: 78 MMHG | OXYGEN SATURATION: 97 % | WEIGHT: 212 LBS | HEIGHT: 71 IN | SYSTOLIC BLOOD PRESSURE: 130 MMHG | TEMPERATURE: 97.9 F | BODY MASS INDEX: 29.68 KG/M2 | HEART RATE: 77 BPM

## 2023-01-06 DIAGNOSIS — D49.0 IPMN (INTRADUCTAL PAPILLARY MUCINOUS NEOPLASM): ICD-10-CM

## 2023-01-06 DIAGNOSIS — C91.10 CLL (CHRONIC LYMPHOCYTIC LEUKEMIA) (HCC): Primary | ICD-10-CM

## 2023-01-06 DIAGNOSIS — D69.6 THROMBOCYTOPENIA (HCC): ICD-10-CM

## 2023-01-06 NOTE — PROGRESS NOTES
Hematology/Oncology Outpatient Follow-up  Aldo Alvarez 69 y o  male 1945 [id number]    Date:  1/6/2023      Assessment and Plan:  1  CLL (chronic lymphocytic leukemia) [facility])  74-year male presents for follow-up regarding history of CLL  He is on clinical trial and is taking zanubrutinib 80 mg p o  daily  He is tolerating well  Lymphocyte count continues to decrease  His platelets are stable  Hemoglobin is normal   Per clinical trial guidelines patient had a CT of the neck and CT chest abdomen and pelvis on 12/30/2022  RECIST measurements were provided  There is no pathologically enlarged lymph nodes  No change in therapy  Per clinical trial guidelines he is due for repeat labs in 3 months and follow-up at that time  - CBC and differential; Future  - Comprehensive metabolic panel; Future  - IgG, IgA, IgM; Future  - LD,Blood; Future  - Uric acid; Future  - Beta 2 microglobulin, serum; Future    2  IPMN (intraductal papillary mucinous neoplasm)  There was finding of a 9 x 10 mm cyst in the pancreatic head most likely a branch duct IPMN  This was noted to have mild increase in size compared to 2018, at that time 6 x 7 mm  Patient is recommended an MRI  Depending on the findings we will determine referral to surgical oncology or gastroenterology     - MRI abdomen wo contrast and mrcp; Future    ECOG 1    HPI:  Oncology History Overview Note   chronic lymphocytic leukemia, diagnosed in August 2017  CLL has mixed good and bad prognostic features, 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, IgVH mutation, lack of CD38 expression, deletion of 13 q14 in 6% and no 11 q deletion        There was no trisomy 12   Lymphocytes were CD5 and CD23 positive, dim kappa expression and moderate CD20 expression had splenomegaly on CT scan but not on palpation     CLL (chronic lymphocytic leukemia) ([facility] [address] )   3/27/2018 Initial Diagnosis    CLL (chronic lymphocytic leukemia) ([facility] [address] )       69year old male presents for follow up for chronic lymphocytic leukemia, This was diagnosed in August 2017  CLL has mixed good and bad prognostic features, 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, IgVH mutation, lack of CD38 expression, deletion of 13 q14 in 6% and no 11 q deletion      There was no trisomy 12  Lymphocytes were CD5 and CD23 positive, dim kappa expression and moderate CD20 expression had splenomegaly on CT scan but not on palpation     Patient WBC was increasing and platelet count remained decreased  Patient was seen by Dr Mike Gore on 5/29/20 and advised to begin treatment  Patient was started on acalabrutinib 100 mg PO daily       Since beginning treatment in June 2020 patient has had significant improvement in his energy  [facility] feels better since being on this medication      On 07/02/2021 patient was admitted to [facility] for an inferior STEMI  [facility] was having burning chest pain   He states he was having intermittent burning chest pain occasionally when walking his dog   He states this was even happening prior to being on acalabrutinib  Daniel Muñiz underwent cardiac catheterization which demonstrated 80% lesion in the proximal LAD and 60% distal RCA lesion after the RPDA   He underwent PCI/ PRETTY x1 to the proximal RCA lesion with 0% residual stenosis   He was initiated on dual anti-platelet therapy with aspirin and Brilinta, high-intensity statin and beta-blocker was held due to sinus bradycardia   Patient cost was barrier for Brilinta so he is on Plavix   He underwent a transesophageal echo which showed 55% EF, grade 1 DD, RV normal size and systolic function with no valvular disease      He was placed on Plavix and aspirin due to STEMI       Acalabrutinib was placed on hold  He was under observation       In July 2022 he was placed on clinical trial with zanubrutinib    Interval history:    ROS: Review of Systems   Constitutional: Negative for activity change, appetite change, chills, fatigue, fever and unexpected weight change  Respiratory: Negative for cough and shortness of breath  Cardiovascular: Negative for chest pain, palpitations and leg swelling  Gastrointestinal: Negative for abdominal pain, constipation, diarrhea, nausea and vomiting  Genitourinary: Negative for difficulty urinating, dysuria and hematuria  Musculoskeletal: Positive for arthralgias and back pain  Skin: Negative  Neurological: Negative for dizziness, weakness, light-headedness, numbness and headaches  Hematological: Negative  Psychiatric/Behavioral: Negative          Past Medical History:   Diagnosis Date   • Anxiety    • Hypertension    • [facility]        Past Surgical History:   Procedure Laterality Date   • APPENDECTOMY     • COLONOSCOPY     • TONSILLECTOMY         Social History     Socioeconomic History   • Marital status: /Civil Union     Spouse name: Not on file   • Number of children: Not on file   • Years of education: Not on file   • Highest education level: Not on file   Occupational History   • Not on file   Tobacco Use   • Smoking status: Never   • Smokeless tobacco: Never   Vaping Use   • Vaping Use: Not on file   Substance and Sexual Activity   • Alcohol use: Not Currently     Comment: minimal   • Drug use: No   • Sexual activity: Not on file   Other Topics Concern   • Not on file   Social History Narrative   • Not on file     Social Determinants of Health     Financial Resource Strain: Not on file   Food Insecurity: Not on file   Transportation Needs: Not on file   Physical Activity: Not on file   Stress: Not on file   Social Connections: Not on file   Intimate Partner Violence: Not on file   Housing Stability: Not on file       Family History   Problem Relation Age of Onset   • No Known Problems Mother    • No Known Problems Father        Allergies   Allergen Reactions   • Sulfa Antibiotics          Current Outpatient Medications:   •  aspirin 81 mg chewable tablet, Chew 1 tablet (81 mg total) daily, Disp: 30 tablet, Rfl: 0  •  atorvastatin (LIPITOR) 40 mg tablet, Take 1 tablet (40 mg total) by mouth daily with dinner, Disp: 30 tablet, Rfl: 1  •  co-enzyme Q-10 50 MG capsule, Take 100 mg by mouth daily, Disp: , Rfl:   •  fosinopril (MONOPRIL) 20 mg tablet, Take 20 mg by mouth daily, Disp: , Rfl:   •  magnesium gluconate (MAGONATE) 500 mg tablet, Take 500 mg by mouth daily, Disp: , Rfl:   •  Multiple Vitamin (MULTIVITAMIN) tablet, Take 1 tablet by mouth daily, Disp: , Rfl:   •  sertraline (ZOLOFT) 50 mg tablet, Take 75 mg by mouth daily  , Disp: , Rfl:   •  Zanubrutinib 80 MG CAPS, Take by mouth, Disp: , Rfl:   •  allopurinol (ZYLOPRIM) 300 mg tablet, Take 1 tablet by mouth once daily  , Disp: 30 tablet, Rfl: 0  •  mometasone (NASONEX) 50 mcg/act nasal spray, 2 sprays into each nostril daily (Patient not taking: Reported on 7/7/2022), Disp: , Rfl:   •  nitroglycerin (NITROSTAT) 0 4 mg SL tablet, Place 1 tablet (0 4 mg total) under the tongue every 5 (five) minutes as needed for chest pain (Patient not taking: Reported on 4/7/2022), Disp: 24 tablet, Rfl: 1      Physical Exam:  /78 (BP Location: Left arm, Patient Position: Sitting, Cuff Size: Large)   Pulse 77   Temp 97 9 °F (36 6 °C) (Temporal)   Ht 5' 11" (1 803 m)   Wt 96 2 kg (212 lb)   SpO2 97%   BMI 29 57 kg/m²     Physical Exam  Vitals reviewed  Constitutional:       General: He is not in acute distress  Appearance: He is well-developed  HENT:      Head: Normocephalic and atraumatic  Eyes:      General: No scleral icterus  Conjunctiva/sclera: Conjunctivae normal    Cardiovascular:      Rate and Rhythm: Normal rate and regular rhythm  Heart sounds: Normal heart sounds  No murmur heard  Pulmonary:      Effort: Pulmonary effort is normal  No respiratory distress  Breath sounds: Normal breath sounds  Abdominal:      Palpations: Abdomen is soft  Tenderness: There is no abdominal tenderness  Musculoskeletal:         General: No tenderness  Normal range of motion        Cervical back: Normal range of motion and neck supple  Right lower leg: No edema  Left lower leg: No edema  Lymphadenopathy:      Head:      Right side of head: No submandibular or tonsillar adenopathy  Left side of head: No submandibular or tonsillar adenopathy  Cervical: No cervical adenopathy  Upper Body:      Right upper body: No supraclavicular or axillary adenopathy  Left upper body: No supraclavicular or axillary adenopathy  Skin:     General: Skin is warm and dry  Neurological:      Mental Status: He is alert and oriented to person, place, and time  Cranial Nerves: No cranial nerve deficit  Psychiatric:         Mood and Affect: Mood normal          Behavior: Behavior normal            Labs:  Lab Results   Component Value Date    WBC 44 19 (HH) 01/03/2023    HGB 14 1 01/03/2023    HCT 46 5 01/03/2023    MCV 89 01/03/2023     (L) 01/03/2023     Lab Results   Component Value Date    K 4 5 01/03/2023     01/03/2023    CO2 28 01/03/2023    BUN 28 (H) 01/03/2023    CREATININE 1 12 01/03/2023    GLUF 103 (H) 01/03/2023    CALCIUM 8 7 01/03/2023    CORRECTEDCA 9 2 07/14/2021    AST 18 01/03/2023    ALT 19 01/03/2023    ALKPHOS 69 01/03/2023    EGFR 63 01/03/2023     @RESULTFAST(TSH)@    Patient voiced understanding and agreement in the above discussion  Aware to contact our office with questions/symptoms in the interim  This note has been generated by voice recognition software system  Therefore, there may be spelling, grammar, and or syntax errors  Please contact if questions arise

## 2023-01-06 NOTE — PROGRESS NOTES
Met with patient and Angelica Sam in the clinic for Cycle 7 Day 1 of the [facility] [id number] Study  Two pill bottles returned along with the patient diary; and six new bottles and diary for three months dispensed  Patient questionnaires completed in the office  Serum Immunoglobulins, Hematology and Chemistry labs reviewed  CT scans reviewed, ECG reviewed  Patient reports feeling very well, no new complaints  No new medications reported  Patient will return in three months with labs, ECG and CT scans per protocol

## 2023-01-09 DIAGNOSIS — C91.10 CLL (CHRONIC LYMPHOCYTIC LEUKEMIA) (HCC): Primary | ICD-10-CM

## 2023-01-09 DIAGNOSIS — Z00.6 CLINICAL TRIAL PARTICIPANT: ICD-10-CM

## 2023-01-23 ENCOUNTER — HOSPITAL ENCOUNTER (OUTPATIENT)
Dept: MRI IMAGING | Facility: HOSPITAL | Age: 78
Discharge: HOME | End: 2023-01-23

## 2023-01-23 DIAGNOSIS — D49.0 IPMN (INTRADUCTAL PAPILLARY MUCINOUS NEOPLASM): ICD-10-CM

## 2023-01-26 ENCOUNTER — TELEPHONE (OUTPATIENT)
Dept: HEMATOLOGY ONCOLOGY | Facility: CLINIC | Age: 78
End: 2023-01-26

## 2023-01-26 NOTE — TELEPHONE ENCOUNTER
I spoke with patient yesterday about cyst in the pancreas and he was aware of that and he already had MRI scan and report is pending

## 2023-03-20 DIAGNOSIS — Z00.6 PATIENT IN CLINICAL RESEARCH STUDY: Primary | ICD-10-CM

## 2023-03-20 DIAGNOSIS — C91.10 CLL (CHRONIC LYMPHOCYTIC LEUKEMIA) (HCC): ICD-10-CM

## 2023-03-22 DIAGNOSIS — C91.10 CLL (CHRONIC LYMPHOCYTIC LEUKEMIA) (HCC): ICD-10-CM

## 2023-03-22 DIAGNOSIS — Z00.6 PATIENT IN CLINICAL RESEARCH STUDY: Primary | ICD-10-CM

## 2023-03-30 ENCOUNTER — HOSPITAL ENCOUNTER (OUTPATIENT)
Dept: RADIOLOGY | Facility: HOSPITAL | Age: 78
Discharge: HOME | End: 2023-03-30
Attending: INTERNAL MEDICINE

## 2023-03-30 DIAGNOSIS — Z00.6 CLINICAL TRIAL PARTICIPANT: ICD-10-CM

## 2023-03-30 DIAGNOSIS — C91.10 CLL (CHRONIC LYMPHOCYTIC LEUKEMIA) (HCC): ICD-10-CM

## 2023-03-30 RX ADMIN — IOHEXOL 100 ML: 350 INJECTION, SOLUTION INTRAVENOUS at 08:00

## 2023-03-31 ENCOUNTER — APPOINTMENT (OUTPATIENT)
Dept: LAB | Facility: CLINIC | Age: 78
End: 2023-03-31

## 2023-03-31 DIAGNOSIS — C91.10 CLL (CHRONIC LYMPHOCYTIC LEUKEMIA) (HCC): ICD-10-CM

## 2023-04-03 ENCOUNTER — OFFICE VISIT (OUTPATIENT)
Dept: LAB | Facility: CLINIC | Age: 78
End: 2023-04-03

## 2023-04-03 ENCOUNTER — OFFICE VISIT (OUTPATIENT)
Dept: HEMATOLOGY ONCOLOGY | Facility: CLINIC | Age: 78
End: 2023-04-03

## 2023-04-03 ENCOUNTER — DOCUMENTATION (OUTPATIENT)
Dept: OTHER | Facility: HOSPITAL | Age: 78
End: 2023-04-03

## 2023-04-03 VITALS
HEART RATE: 69 BPM | OXYGEN SATURATION: 97 % | DIASTOLIC BLOOD PRESSURE: 78 MMHG | WEIGHT: 212 LBS | RESPIRATION RATE: 17 BRPM | BODY MASS INDEX: 29.68 KG/M2 | HEIGHT: 71 IN | SYSTOLIC BLOOD PRESSURE: 120 MMHG

## 2023-04-03 DIAGNOSIS — I10 HYPERTENSION, ESSENTIAL: ICD-10-CM

## 2023-04-03 DIAGNOSIS — D49.0 IPMN (INTRADUCTAL PAPILLARY MUCINOUS NEOPLASM): ICD-10-CM

## 2023-04-03 DIAGNOSIS — Z00.6 PATIENT IN CLINICAL RESEARCH STUDY: ICD-10-CM

## 2023-04-03 DIAGNOSIS — E78.5 HYPERLIPIDEMIA, UNSPECIFIED HYPERLIPIDEMIA TYPE: ICD-10-CM

## 2023-04-03 DIAGNOSIS — Z00.6 CLINICAL TRIAL PARTICIPANT: Primary | ICD-10-CM

## 2023-04-03 DIAGNOSIS — C91.10 CLL (CHRONIC LYMPHOCYTIC LEUKEMIA) (HCC): Primary | ICD-10-CM

## 2023-04-03 DIAGNOSIS — D69.6 THROMBOCYTOPENIA (HCC): ICD-10-CM

## 2023-04-03 DIAGNOSIS — M54.10 RADICULOPATHY OF LEG: ICD-10-CM

## 2023-04-03 DIAGNOSIS — C91.10 CLL (CHRONIC LYMPHOCYTIC LEUKEMIA) (HCC): ICD-10-CM

## 2023-04-03 DIAGNOSIS — Z95.5 STATUS POST CORONARY ARTERY STENT PLACEMENT: ICD-10-CM

## 2023-04-03 NOTE — PROGRESS NOTES
Patient presented to clinic for Cycle 10 of the [id number] trial   Patient returned 5 pill bottles and pill diaries for C7, C8 and C9  5 pill bottles for Cycle 10, C11 and C12 and diaries were dispensed  Labs and CT's were reviewed and signed by PI  Patient forgot to have ECG completed  He will go this morning to have it done  Patient complains of 1 episode last night of trouble focusing or blurred vision  I advised him to let Jacklyn know if this continues or becomes more frequent  He also complains of continued pain down right leg  Patient states he will be traveling to [facility] for 10 days this month  He will take study drug with him  QOL's completed  Patient to return in July with new labs, ECG and CT scans

## 2023-04-03 NOTE — PROGRESS NOTES
HPI:  Clinical trial nurse is in the room with patient   Follow-up visit for IGVH mutated CLL and patient has been on zanubrutinib on clinical trial   Patient is responding to zanubrutinib and blood counts are improving  Patient has not symptomatic from CLL and patient is not symptomatic from zanubrutinib  No atrial fibrillation and no bleeding in spite of patient being on baby aspirin for coronary stent in LAD  He is not on Plavix anymore  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  Previously he had 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  Negative for trisomy 12 and 11 q deletion  Mutated Ig VH  CLL was diagnosed few years ago and after a long period of observation he was started on acalabrutinib in June 2020 when platelet counts dropped below 100,000, stage IV CLL  He responded but in June or July 2021 acalabrutinib was discontinued because patient went on aspirin and Plavix post MI and 1 stent in LAD  Patient went off Plavix on 06/01/2022   He is on baby aspirin  He wanted to go back to therapy  He qualified for clinical trial for zanubrutinib and he wanted to get started on therapy and zanubrutinib was started in July 2022  Patient has some tiredness and minor arthritic symptoms  Occasionally low back pain that radiates to right leg laterally  MRI scan of lumbar spine showed degenerative changes and stenosis and foraminal narrowing    He was offered to see a spine specialist   Patient  would not rush into seeing a spine specialist   Pain has lessened                 Current Outpatient Medications:   •  aspirin 81 mg chewable tablet, Chew 1 tablet (81 mg total) daily, Disp: 30 tablet, Rfl: 0  •  atorvastatin (LIPITOR) 40 mg tablet, Take 1 tablet (40 mg total) by mouth daily with dinner, Disp: 30 tablet, Rfl: 1  •  co-enzyme Q-10 50 MG capsule, Take 100 mg by mouth daily, Disp: , Rfl:   •  fosinopril (MONOPRIL) 20 mg tablet, Take 20 mg by mouth daily, Disp: , Rfl:   •  magnesium gluconate (MAGONATE) 500 mg tablet, "Take 500 mg by mouth daily, Disp: , Rfl:   •  Multiple Vitamin (MULTIVITAMIN) tablet, Take 1 tablet by mouth daily, Disp: , Rfl:   •  sertraline (ZOLOFT) 50 mg tablet, Take 75 mg by mouth daily  , Disp: , Rfl:   •  Zanubrutinib 80 MG CAPS, Take by mouth, Disp: , Rfl:   •  allopurinol (ZYLOPRIM) 300 mg tablet, Take 1 tablet by mouth once daily  , Disp: 30 tablet, Rfl: 0  •  mometasone (NASONEX) 50 mcg/act nasal spray, 2 sprays into each nostril daily (Patient not taking: Reported on 7/7/2022), Disp: , Rfl:   •  nitroglycerin (NITROSTAT) 0 4 mg SL tablet, Place 1 tablet (0 4 mg total) under the tongue every 5 (five) minutes as needed for chest pain (Patient not taking: Reported on 4/7/2022), Disp: 24 tablet, Rfl: 1    Allergies   Allergen Reactions   • Sulfa Antibiotics        Oncology History Overview Note   chronic lymphocytic leukemia, diagnosed in August 2017  CLL has mixed good and bad prognostic features, 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, IgVH mutation, lack of CD38 expression, deletion of 13 q14 in 6% and no 11 q deletion        There was no trisomy 12  Lymphocytes were CD5 and CD23 positive, dim kappa expression and moderate CD20 expression had splenomegaly on CT scan but not on palpation     CLL (chronic lymphocytic leukemia) ([facility] [address] )   3/27/2018 Initial Diagnosis    CLL (chronic lymphocytic leukemia) ([facility] )         ROS:  04/03/23 Reviewed 13 systems: See symptoms in HPI[de-identified]   Presently no neurological, cardiac, pulmonary, GI,  symptoms  Other  symptoms are in HPI  No symptoms like fever, chills, bleeding, bone pains, skin rash, weight loss,   weakness, numbness and gait problem  No frequent infections  Not unusually sensitive to heat or cold  No swelling of the ankles  No swollen glands  Patient is not anxious           /78 (BP Location: Left arm, Patient Position: Sitting, Cuff Size: Adult)   Pulse 69   Resp 17   Ht 5' 11\" (1 803 m)   Wt 96 2 kg (212 lb)   SpO2 97%   BMI 29 57 kg/m²     Physical Exam:  Patient is " alert and oriented  Patient is not in distress  Vital signs are above  There is no icterus  No oral thrush  There is no palpable neck mass  Clear lung fields  to percussion and auscultation, regular heart rate ,   soft and non tender abdomen , no palpable abdominal mass, liver and spleen are not palpably enlarged, no ascites, no edema of ankles, no calf tenderness, no focal neurological deficit, no skin rash, no palpable lymphadenopathy,  no clubbing  Patient is not anxious  Performance status 1  IMAGING:  OSSEOUS STRUCTURES:  No acute fracture or destructive osseous lesion  Degenerative changes of the spine are noted with osteophytes      IMPRESSION:     Axillary adenopathy is mildly improved  Right subpectoral nodes are similar  Splenomegaly improved  Splenic lesion resolved  Mediastinal calcified nodes stable  Small pelvic nodes are noted  One may be slightly larger on the right                  Workstation performed: [id number]          Imaging    CT chest abdomen pelvis w contrast (Order: [id number]) - 6/29/2022    IMPRESSION:        1  Soft tissue fullness in the right oropharynx and right vallecula possibly pooled secretion versus mucosal mass lesion in this patient with a history of chronic lymphocytic leukemia  Direct visualization/ENT assessment advised  2   A few right periparotid/intraparotid subcentimeter lymph nodes or other nodules can be evaluated on follow-up imaging  3   Although not enlarged by size criteria there are more numerous than typically seen bilateral axillary and supraclavicular lymph nodes, left greater than right, potentially related to the patient's leukemia  Further clinical assessment and imaging   surveillance advised            Workstation performed: [id number]          Imaging    IMPRESSION:     No significant interval change from previous examination    No cervical lymphadenopathy by size criteria      Asymmetric prominence of right lingual tonsil, unchanged      Workstation performed: [id number]        Imaging    CT soft tissue neck w contrast (Order: [id number]) - 3/30/2023    OSSEOUS STRUCTURES:  Degenerative disease throughout the thoracic and lumbar spine  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  Advanced osteoarthritis in the right hip  No evidence of recent fracture or destructive lesion      IMPRESSION:     No evidence for recurrent lymphoma and specifically, no significant lymphadenopathy in the chest, abdomen or pelvis  See above description of unchanged nontarget lesions      Reidentified simple appearing pancreatic head cyst better evaluate on recent pancreatic protocol MRI      Increasing size of a right inguinal hernia which contains peritoneal fat, a nonobstructed loop of small bowel, and intermittently, a portion of anterior right urinary bladder dome      Prostatomegaly      Workstation performed: [id number]        Imaging    CT chest abdomen pelvis w contrast (Order: [id number]) - 3/30/2023    IMPRESSION:     1   Scattered pancreatic cysts without suspicious features measuring up to 1 2 cm, likely side branch IPMNs  For simple cyst(s) less than 1 5 cm, recommend followup every 2 year for 5 times or to age [de-identified], whichever comes first  Followup can stop at age [de-identified]   or can switch over to [de-identified] year or older algorithm  Recommend next followup in 2 years  Preferred imaging modality: abdomen MRI and MRCP with and without IV contrast, or triple phase abdomen CT with IV contrast, or abdomen MRI and MRCP without IV   contrast      2    Hepatic segment 5 lesion with features suggesting hemangioma when correlating with recent CT, noting that it cannot be completely characterized without intravenous contrast   Recommend attention on follow-up MRI with and without contrast as   recommended above      The study was marked in EPIC for significant notification                  Workstation performed: [id number]        Imaging    MRI abdomen wo contrast and mrcp (Order: [id number]) - 1/23/2023          LABS:    Results for orders placed or performed in visit on 01/13/23   Beta 2 microglobulin, serum   Result Value Ref Range    Beta-2 Microglobulin 2 1 0 6 - 2 4 mg/L   CBC and differential   Result Value Ref Range    WBC 33 43 (HH) 4 31 - 10 16 Thousand/uL    RBC 5 33 3 88 - 5 62 Million/uL    Hemoglobin 14 8 12 0 - 17 0 g/dL    Hematocrit 47 7 36 5 - 49 3 %    MCV 90 82 - 98 fL    MCH 27 8 26 8 - 34 3 pg    MCHC 31 0 (L) 31 4 - 37 4 g/dL    RDW 14 8 11 6 - 15 1 %    MPV 11 7 8 9 - 12 7 fL    Platelets 599 (L) 627 - 390 Thousands/uL   Comprehensive metabolic panel   Result Value Ref Range    Sodium 139 135 - 147 mmol/L    Potassium 4 4 3 5 - 5 3 mmol/L    Chloride 106 96 - 108 mmol/L    CO2 28 21 - 32 mmol/L    ANION GAP 5 4 - 13 mmol/L    BUN 29 (H) 5 - 25 mg/dL    Creatinine 1 12 0 60 - 1 30 mg/dL    Glucose, Fasting 97 65 - 99 mg/dL    Calcium 8 8 8 4 - 10 2 mg/dL    AST 18 13 - 39 U/L    ALT 15 7 - 52 U/L    Alkaline Phosphatase 66 34 - 104 U/L    Total Protein 6 2 (L) 6 4 - 8 4 g/dL    Albumin 3 8 3 5 - 5 0 g/dL    Total Bilirubin 0 68 0 20 - 1 00 mg/dL    eGFR 63 ml/min/1 73sq m   IgG, IgA, IgM   Result Value Ref Range     0 70 0 - 400 0 mg/dL     0 (L) 700 0 - 1,600 0 mg/dL    IGM 36 0 (L) 40 0 - 230 0 mg/dL   LD,Blood   Result Value Ref Range     140 - 271 U/L   Uric acid   Result Value Ref Range    Uric Acid 5 1 3 5 - 8 5 mg/dL   Manual Differential(PHLEBS Do Not Order)   Result Value Ref Range    Segmented % 7 (L) 43 - 75 %    Lymphocytes % 88 (H) 14 - 44 %    Monocytes % 3 (L) 4 - 12 %    Eosinophils, % 0 0 - 6 %    Basophils % 0 0 - 1 %    Atypical Lymphocytes % 2 (H) <=0 %    Absolute Neutrophils 2 34 1 85 - 7 62 Thousand/uL    Lymphocytes Absolute 29 42 (H) 0 60 - 4 47 Thousand/uL    Monocytes Absolute 1 00 0 00 - 1 22 Thousand/uL    Eosinophils Absolute 0 00 0 00 - 0 40 Thousand/uL    Basophils Absolute 0 00 0 00 - 0 10 Thousand/uL    Total Counted      RBC Morphology Normal     Platelet Estimate Borderline (A) Adequate     Labs, Imaging, & Other studies:   All pertinent labs and imaging studies were personally reviewed          Assessment and plan:   Clinical trial nurse is in the room with patient   Follow-up visit for IGVH mutated CLL and patient has been on zanubrutinib on clinical trial   Patient is responding to zanubrutinib and blood counts are improving  Patient has not symptomatic from CLL and patient is not symptomatic from zanubrutinib  No atrial fibrillation and no bleeding in spite of patient being on baby aspirin for coronary stent in LAD  He is not on Plavix anymore  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  Previously he had 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  Negative for trisomy 12 and 11 q deletion  Mutated Ig VH  CLL was diagnosed few years ago and after a long period of observation he was started on acalabrutinib in June 2020 when platelet counts dropped below 100,000, stage IV CLL  He responded but in June or July 2021 acalabrutinib was discontinued because patient went on aspirin and Plavix post MI and 1 stent in LAD  Patient went off Plavix on 06/01/2022   He is on baby aspirin  He wanted to go back to therapy  He qualified for clinical trial for zanubrutinib and he wanted to get started on therapy and zanubrutinib was started in July 2022  Patient has some tiredness and minor arthritic symptoms  Occasionally low back pain that radiates to right leg laterally  MRI scan of lumbar spine showed degenerative changes and stenosis and foraminal narrowing  He was offered to see a spine specialist   Patient  would not rush into seeing a spine specialist   Pain has lessened       Physical examination and test results are as recorded and discussed  Patient is on zanubrutinib on clinical trial as above and he seems to be responding to therapy and has been tolerating treatment without much problem    For  some asymmetry of lingular tonsils and he was referred to an ENT specialist    Blood work  every 4 weeks on clinical trial     All discussed in detail  Questions answered  Discussed the importance of  eating healthy foods, diet and activities as prescribed by patient's cardiologist   Health screening tests   Patient is capable of self-care  Discussed precautions against Coronavirus and flu especially his immune system is low  Evusheld      See diagnoses, orders and instructions    1  CLL (chronic lymphocytic leukemia) (HCC)    - Beta 2 microglobulin, serum; Future  - CBC and differential; Future  - Comprehensive metabolic panel; Future  - IgG, IgA, IgM; Future  - LD,Blood; Future  - Uric acid; Future    2  IPMN (intraductal papillary mucinous neoplasm)-stable on MRI scan of the abdomen      3  Thrombocytopenia ([address] )      4  Status post coronary artery stent placement      5  Hypertension, essential      6  Hyperlipidemia, unspecified hyperlipidemia type      7  Radiculopathy of leg        Blood work prior to next visit in 3 months  Patient has appointment  Patient saw me using dictation device and I will be using it for rest of the dictation and there could be mistakes in dictation  Patient is encouraged to contact my office for the mistakes                      Patient voiced understanding and agreed      Counseling / Coordination of Care       Provided counseling and support

## 2023-04-04 LAB
ATRIAL RATE: 60 BPM
P AXIS: -14 DEGREES
PR INTERVAL: 146 MS
QRS AXIS: 16 DEGREES
QRSD INTERVAL: 82 MS
QT INTERVAL: 400 MS
QTC INTERVAL: 400 MS
T WAVE AXIS: 49 DEGREES
VENTRICULAR RATE: 60 BPM

## 2023-04-06 ENCOUNTER — DOCUMENTATION (OUTPATIENT)
Dept: OTHER | Facility: HOSPITAL | Age: 78
End: 2023-04-06

## 2023-04-06 NOTE — PROGRESS NOTES
Reviewed patient's ECG from April 3 with Dr Lex Jerez who recommends a follow up ECG in 2 and 4 weeks  Called patient, left voicemail requesting a return call

## 2023-04-07 DIAGNOSIS — Z00.6 PATIENT IN CLINICAL RESEARCH STUDY: ICD-10-CM

## 2023-04-07 DIAGNOSIS — I49.1 PREMATURE ATRIAL COMPLEXES: Primary | ICD-10-CM

## 2023-05-24 ENCOUNTER — OFFICE VISIT (OUTPATIENT)
Dept: LAB | Facility: CLINIC | Age: 78
End: 2023-05-24

## 2023-05-24 ENCOUNTER — APPOINTMENT (OUTPATIENT)
Dept: LAB | Facility: CLINIC | Age: 78
End: 2023-05-24
Payer: COMMERCIAL

## 2023-05-24 DIAGNOSIS — I49.1 PREMATURE ATRIAL COMPLEXES: ICD-10-CM

## 2023-05-24 DIAGNOSIS — E78.00 PURE HYPERCHOLESTEROLEMIA: ICD-10-CM

## 2023-05-24 DIAGNOSIS — Z00.6 PATIENT IN CLINICAL RESEARCH STUDY: ICD-10-CM

## 2023-05-24 LAB
CHOLEST SERPL-MCNC: 125 MG/DL
HDLC SERPL-MCNC: 39 MG/DL
LDLC SERPL CALC-MCNC: 70 MG/DL (ref 0–100)
NONHDLC SERPL-MCNC: 86 MG/DL
TRIGL SERPL-MCNC: 78 MG/DL

## 2023-05-24 PROCEDURE — 80061 LIPID PANEL: CPT

## 2023-05-24 PROCEDURE — 36415 COLL VENOUS BLD VENIPUNCTURE: CPT

## 2023-05-26 LAB
ATRIAL RATE: 55 BPM
P AXIS: -26 DEGREES
PR INTERVAL: 158 MS
QRS AXIS: 15 DEGREES
QRSD INTERVAL: 76 MS
QT INTERVAL: 410 MS
QTC INTERVAL: 392 MS
T WAVE AXIS: 58 DEGREES
VENTRICULAR RATE: 55 BPM

## 2023-05-31 ENCOUNTER — OFFICE VISIT (OUTPATIENT)
Dept: CARDIOLOGY CLINIC | Facility: CLINIC | Age: 78
End: 2023-05-31

## 2023-05-31 VITALS
DIASTOLIC BLOOD PRESSURE: 70 MMHG | HEIGHT: 71 IN | BODY MASS INDEX: 28.7 KG/M2 | SYSTOLIC BLOOD PRESSURE: 122 MMHG | WEIGHT: 205 LBS | OXYGEN SATURATION: 98 % | HEART RATE: 66 BPM

## 2023-05-31 DIAGNOSIS — I21.02 ST ELEVATION MYOCARDIAL INFARCTION INVOLVING LEFT ANTERIOR DESCENDING (LAD) CORONARY ARTERY (HCC): ICD-10-CM

## 2023-05-31 DIAGNOSIS — C91.10 CLL (CHRONIC LYMPHOCYTIC LEUKEMIA) (HCC): Chronic | ICD-10-CM

## 2023-05-31 DIAGNOSIS — I10 HYPERTENSION, ESSENTIAL: Chronic | ICD-10-CM

## 2023-05-31 DIAGNOSIS — Z95.5 STENTED CORONARY ARTERY: ICD-10-CM

## 2023-05-31 DIAGNOSIS — I25.10 CORONARY ARTERY DISEASE INVOLVING NATIVE CORONARY ARTERY OF NATIVE HEART WITHOUT ANGINA PECTORIS: Primary | ICD-10-CM

## 2023-05-31 RX ORDER — NITROGLYCERIN 0.4 MG/1
0.4 TABLET SUBLINGUAL
Qty: 24 TABLET | Refills: 1 | Status: SHIPPED | OUTPATIENT
Start: 2023-05-31

## 2023-05-31 NOTE — PROGRESS NOTES
Cardiology Follow Up    Jose Pratt  1945  [id number]  [facility] [place] [id number] [phone]  1  Coronary artery disease involving native coronary artery of native heart without angina pectoris        2  ST elevation myocardial infarction involving left anterior descending (LAD) coronary artery ([address] )        3  Hypertension, essential        4  CLL (chronic lymphocytic leukemia) ([facility])        5  Stented coronary artery  nitroglycerin (NITROSTAT) 0 4 mg SL tablet        Discussion/Summary:      Coronary artery disease with EKG showing inferior ST elevations previously but no thrombotic lesion  Status post PCI to the LAD  EF is preserved  Was on dual antiplatelet therapy but now on single antiplatelet agent alone given his CLL and low platelets  He does get some ecchymosis recently had blood work done  However, not having any major bleeding  He remains on his investigational trial for his CLL  EKGs are followed regularly in this regard and reviewed by me  PAC was recently noted we discussed this is benign  Given his relative bradycardia, he is not on a beta-blocker  He is asymptomatic  His EF is preserved  No additional cardiac testing recommended at this time  Follow-up yearly  History of Present Illness:   Pleasant 76-year-old man  History of CAD with prior MI  He had symptoms of burning chest discomfort which had been ongoing for a few months, but on the day of his admission to the hospital, he had worsening of symptoms after working outside  He had inferior ST elevations  He went for an urgent cardiac catheterization  Interestingly, there was no thrombotic lesion present  There was 60% disease in the distal RCA  He did have an 80% disease in the proximal LAD noted which was subsequently stented  His LV function was preserved    He does have a history of CLL, in clinical trial now after medication had to be adjusted post MI  Interval History:    Continues with his clinical trial for CLL  Having good in his counts  No cardiac issues  Remains on 81 mg aspirin alone  Given chronic bradycardia, not on beta-blocker  Blood pressure controlled with Monopril  Lipid panel controlled with 40 mg of atorvastatin  He is not needing any sublingual nitroglycerin, but a new prescription was given because the tablets are old  [place] 1 [place]      Medical Problems     Problem List     Coronary artery disease involving native coronary artery of native heart without angina pectoris    Lymphocytosis    Chills    Acute kidney insufficiency    Hyperlipidemia, unspecified (Chronic)    Hypertension, essential (Chronic)    Nausea    CLL (chronic lymphocytic leukemia) (HCC) (Chronic)    Thrombocytopenia (HCC)    Serum potassium elevated    Anxiety              Past Medical History:   Diagnosis Date   • Anxiety    • Hypertension    • Leukemia ([facility] [address] )      Social History     Tobacco Use   • Smoking status: Never   • Smokeless tobacco: Never   Substance Use Topics   • Alcohol use: Not Currently     Comment: minimal   • Drug use: No      Family History   Problem Relation Age of Onset   • No Known Problems Mother    • No Known Problems Father      Past Surgical History:   Procedure Laterality Date   • APPENDECTOMY     • COLONOSCOPY     • TONSILLECTOMY         Current Outpatient Medications:   •  aspirin 81 mg chewable tablet, Chew 1 tablet (81 mg total) daily, Disp: 30 tablet, Rfl: 0  •  atorvastatin (LIPITOR) 40 mg tablet, Take 1 tablet (40 mg total) by mouth daily with dinner, Disp: 30 tablet, Rfl: 1  •  co-enzyme Q-10 50 MG capsule, Take 100 mg by mouth daily, Disp: , Rfl:   •  fosinopril (MONOPRIL) 20 mg tablet, Take 20 mg by mouth daily, Disp: , Rfl:   •  magnesium gluconate (MAGONATE) 500 mg tablet, Take 500 mg by mouth daily, Disp: , Rfl:   • "mometasone (NASONEX) 50 mcg/act nasal spray, 2 sprays into each nostril daily, Disp: , Rfl:   •  Multiple Vitamin (MULTIVITAMIN) tablet, Take 1 tablet by mouth daily, Disp: , Rfl:   •  nitroglycerin (NITROSTAT) 0 4 mg SL tablet, Place 1 tablet (0 4 mg total) under the tongue every 5 (five) minutes as needed for chest pain, Disp: 24 tablet, Rfl: 1  •  sertraline (ZOLOFT) 50 mg tablet, Take 75 mg by mouth daily  , Disp: , Rfl:   •  Zanubrutinib 80 MG CAPS, Take by mouth, Disp: , Rfl:   Allergies   Allergen Reactions   • Sulfa Antibiotics      Vitals:    05/31/23 0852   BP: 122/70   BP Location: Left arm   Patient Position: Sitting   Cuff Size: Standard   Pulse: 66   SpO2: 98%   Weight: 93 kg (205 lb)   Height: 5' 11\" (1 803 m)     Vitals:    05/31/23 0852   Weight: 93 kg (205 lb)      Height: 5' 11\" (180 3 cm)   Body mass index is 28 59 kg/m²  Physical Exam:  GEN: Gigi  appears well, alert and oriented x 3, pleasant and cooperative   HEENT: pupils equal, round, and reactive to light; extraocular muscles intact  NECK: supple, no carotid bruits   HEART: regular rhythm, normal S1 and S2, no murmurs, clicks, gallops or rubs   LUNGS: clear to auscultation bilaterally; no wheezes, rales, or rhonchi   ABDOMEN: normal bowel sounds, soft, no tenderness, no distention  EXTREMITIES: peripheral pulses normal; no clubbing, cyanosis, or edema  NEURO: no focal findings   SKIN: RUE ecchymosis from blood draw    ROS:  Positive for bruising  Except as noted in HPI, is otherwise reviewed in detail and a 12 point review of systems is negative    ROS reviewed and is unchanged    Labs:  Lab Results   Component Value Date    ALT 15 03/31/2023    AST 18 03/31/2023    BUN 29 (H) 03/31/2023     03/31/2023    CO2 28 03/31/2023    CREATININE 1 12 03/31/2023    GLUF 97 03/31/2023    HCT 47 7 03/31/2023    HGB 14 8 03/31/2023    HGBA1C 5 5 05/11/2021    INR 0 91 06/21/2022    K 4 4 03/31/2023     (L) 03/31/2023    SODIUM 139 " "03/31/2023    WBC 33 43 (HH) 03/31/2023     No results found for: \"CHOL\"  Lab Results   Component Value Date    LDLCALC 70 05/24/2023    LDLCALC 76 10/14/2021    [address] 07/02/2021     Lab Results   Component Value Date    HDL 39 (L) 05/24/2023    HDL 37 (L) 10/14/2021    HDL 41 07/02/2021     Lab Results   Component Value Date    TRIG 78 05/24/2023    TRIG 113 10/14/2021    TRIG 173 (H) 07/02/2021     Testing:  Cardiac Cath 7/2/21:  CORONARY CIRCULATION:  Left main: The vessel was normal sized  There was moderate plaque  There were no obstructive lesions  LAD: The vessel was normal sized  There was a long, irregular lesion of the proximal vessel  There were no other significant lesions  Circumflex: The vessel was normal sized and gave rise to one major OM branch  There was moderate plaque  There were no flow-limiting lesions  RCA: The vessel was normal sized and dominant, giving rise to a dual PDA and two posterolateral branches  There was moderate diffuse plaque  There was a 60% lesion of the distal vessel after the PDA  However, there were no flow-critical  lesions that appeared to be culprits for the patient's STEMI      1ST LESION INTERVENTIONS:  Following IVUS interrogation and pre-dilation, a [employer] [place] Rx 3 25 x 33mm drug-eluting stent was placed across the 80% lesion in the proximal diagonal and deployed at a maximum inflation pressure of 18 ivelisse  After post-dilation to  3 5mm, there was full stent expansion and apposition, with 0% residual stenosis  DAMIAN flow remained grade 3      REPORT ELEMENT SELECTION:  Right radial access  There were no complications      Summary:  Diffuse atherosclerotic plaque  An 80% proximal LAD stenosis was the likely culprit for the patient's STEMI  Plan: DAPT, statin, beta-blocker, cardiac rehabilitation    Echo7/4/21:  LEFT VENTRICLE:  Systolic function was normal by visual assessment  Ejection fraction was estimated to be 55 %    Doppler parameters were consistent " with abnormal left ventricular relaxation (grade 1 diastolic dysfunction)

## 2023-06-05 DIAGNOSIS — Z00.6 PATIENT IN CLINICAL RESEARCH STUDY: ICD-10-CM

## 2023-06-05 DIAGNOSIS — Z00.6 CLINICAL TRIAL PARTICIPANT: Primary | ICD-10-CM

## 2023-06-13 ENCOUNTER — TELEPHONE (OUTPATIENT)
Dept: CARDIOLOGY CLINIC | Facility: CLINIC | Age: 78
End: 2023-06-13

## 2023-06-13 NOTE — TELEPHONE ENCOUNTER
[address] [facility] called for clarification on a medication   States that the prescription was sent for 24 tablets, but the package size is 25 tablets and it cannot be broken up

## 2023-06-22 ENCOUNTER — HOSPITAL ENCOUNTER (OUTPATIENT)
Dept: CT IMAGING | Facility: HOSPITAL | Age: 78
Discharge: HOME | End: 2023-06-22
Attending: INTERNAL MEDICINE

## 2023-06-22 DIAGNOSIS — Z00.6 CLINICAL TRIAL PARTICIPANT: ICD-10-CM

## 2023-06-22 DIAGNOSIS — C91.10 CLL (CHRONIC LYMPHOCYTIC LEUKEMIA) (HCC): ICD-10-CM

## 2023-06-22 PROCEDURE — G1004 CDSM NDSC: HCPCS

## 2023-06-22 PROCEDURE — 74177 CT ABD & PELVIS W/CONTRAST: CPT

## 2023-06-22 PROCEDURE — 71260 CT THORAX DX C+: CPT

## 2023-06-22 RX ADMIN — IOHEXOL 100 ML: 350 INJECTION, SOLUTION INTRAVENOUS at 08:34

## 2023-06-28 ENCOUNTER — APPOINTMENT (OUTPATIENT)
Dept: LAB | Facility: CLINIC | Age: 78
End: 2023-06-28
Payer: COMMERCIAL

## 2023-06-28 DIAGNOSIS — C91.10 CLL (CHRONIC LYMPHOCYTIC LEUKEMIA) (HCC): ICD-10-CM

## 2023-06-28 LAB
ALBUMIN SERPL BCP-MCNC: 3.8 G/DL (ref 3.5–5)
ALP SERPL-CCNC: 65 U/L (ref 34–104)
ALT SERPL W P-5'-P-CCNC: 15 U/L (ref 7–52)
ANION GAP SERPL CALCULATED.3IONS-SCNC: 4 MMOL/L
AST SERPL W P-5'-P-CCNC: 16 U/L (ref 13–39)
BASOPHILS # BLD MANUAL: 0 THOUSAND/UL (ref 0–0.1)
BASOPHILS NFR MAR MANUAL: 0 % (ref 0–1)
BILIRUB SERPL-MCNC: 0.53 MG/DL (ref 0.2–1)
BUN SERPL-MCNC: 26 MG/DL (ref 5–25)
CALCIUM SERPL-MCNC: 8.5 MG/DL (ref 8.4–10.2)
CHLORIDE SERPL-SCNC: 105 MMOL/L (ref 96–108)
CO2 SERPL-SCNC: 30 MMOL/L (ref 21–32)
CREAT SERPL-MCNC: 1.06 MG/DL (ref 0.6–1.3)
EOSINOPHIL # BLD MANUAL: 0 THOUSAND/UL (ref 0–0.4)
EOSINOPHIL NFR BLD MANUAL: 0 % (ref 0–6)
ERYTHROCYTE [DISTWIDTH] IN BLOOD BY AUTOMATED COUNT: 15 % (ref 11.6–15.1)
GFR SERPL CREATININE-BSD FRML MDRD: 67 ML/MIN/1.73SQ M
GIANT PLATELETS BLD QL SMEAR: PRESENT
GLUCOSE P FAST SERPL-MCNC: 108 MG/DL (ref 65–99)
HCT VFR BLD AUTO: 47.9 % (ref 36.5–49.3)
HGB BLD-MCNC: 14.8 G/DL (ref 12–17)
IGA SERPL-MCNC: 225 MG/DL (ref 70–400)
IGG SERPL-MCNC: 685 MG/DL (ref 700–1600)
IGM SERPL-MCNC: 33 MG/DL (ref 40–230)
LDH SERPL-CCNC: 141 U/L (ref 140–271)
LYMPHOCYTES # BLD AUTO: 21.15 THOUSAND/UL (ref 0.6–4.47)
LYMPHOCYTES # BLD AUTO: 81 % (ref 14–44)
MCH RBC QN AUTO: 27.9 PG (ref 26.8–34.3)
MCHC RBC AUTO-ENTMCNC: 30.9 G/DL (ref 31.4–37.4)
MCV RBC AUTO: 90 FL (ref 82–98)
MONOCYTES # BLD AUTO: 0.78 THOUSAND/UL (ref 0–1.22)
MONOCYTES NFR BLD: 3 % (ref 4–12)
NEUTROPHILS # BLD MANUAL: 3.39 THOUSAND/UL (ref 1.85–7.62)
NEUTS SEG NFR BLD AUTO: 13 % (ref 43–75)
PLATELET # BLD AUTO: 108 THOUSANDS/UL (ref 149–390)
PLATELET BLD QL SMEAR: ABNORMAL
PMV BLD AUTO: 11.2 FL (ref 8.9–12.7)
POTASSIUM SERPL-SCNC: 4.7 MMOL/L (ref 3.5–5.3)
PROT SERPL-MCNC: 6 G/DL (ref 6.4–8.4)
RBC # BLD AUTO: 5.3 MILLION/UL (ref 3.88–5.62)
RBC MORPH BLD: NORMAL
SODIUM SERPL-SCNC: 139 MMOL/L (ref 135–147)
URATE SERPL-MCNC: 4.7 MG/DL (ref 3.5–8.5)
VARIANT LYMPHS # BLD AUTO: 3 %
WBC # BLD AUTO: 26.11 THOUSAND/UL (ref 4.31–10.16)

## 2023-06-28 PROCEDURE — 80053 COMPREHEN METABOLIC PANEL: CPT

## 2023-06-28 PROCEDURE — 83615 LACTATE (LD) (LDH) ENZYME: CPT

## 2023-06-28 PROCEDURE — 82232 ASSAY OF BETA-2 PROTEIN: CPT

## 2023-06-28 PROCEDURE — 84550 ASSAY OF BLOOD/URIC ACID: CPT

## 2023-06-28 PROCEDURE — 85027 COMPLETE CBC AUTOMATED: CPT

## 2023-06-28 PROCEDURE — 82784 ASSAY IGA/IGD/IGG/IGM EACH: CPT

## 2023-06-28 PROCEDURE — 85007 BL SMEAR W/DIFF WBC COUNT: CPT

## 2023-06-28 PROCEDURE — 36415 COLL VENOUS BLD VENIPUNCTURE: CPT

## 2023-06-30 ENCOUNTER — DOCUMENTATION (OUTPATIENT)
Dept: OTHER | Facility: HOSPITAL | Age: 78
End: 2023-06-30

## 2023-06-30 ENCOUNTER — OFFICE VISIT (OUTPATIENT)
Dept: HEMATOLOGY ONCOLOGY | Facility: CLINIC | Age: 78
End: 2023-06-30

## 2023-06-30 VITALS
RESPIRATION RATE: 18 BRPM | TEMPERATURE: 96.5 F | OXYGEN SATURATION: 98 % | SYSTOLIC BLOOD PRESSURE: 124 MMHG | HEART RATE: 59 BPM | BODY MASS INDEX: 28.84 KG/M2 | DIASTOLIC BLOOD PRESSURE: 78 MMHG | HEIGHT: 71 IN | WEIGHT: 206 LBS

## 2023-06-30 DIAGNOSIS — Z00.6 CLINICAL TRIAL PARTICIPANT: Primary | ICD-10-CM

## 2023-06-30 DIAGNOSIS — C91.10 CLL (CHRONIC LYMPHOCYTIC LEUKEMIA) (HCC): ICD-10-CM

## 2023-06-30 DIAGNOSIS — C91.10 CLL (CHRONIC LYMPHOCYTIC LEUKEMIA) (HCC): Primary | Chronic | ICD-10-CM

## 2023-06-30 LAB — B2 MICROGLOB SERPL-MCNC: 2.1 MG/L (ref 0.6–2.4)

## 2023-06-30 NOTE — PROGRESS NOTES
Documentation of Informed Consent Process for Clinical Research Study    Study Nish Abdul [id number]  Patient Name: Rebeca Coleman  YOB: 1945    This signed and dated document shall serve as certification that all of the below listed required elements of informed consent were provided to the subject or legally authorized representative signing the actual Informed Consent Document, both in written and verbal format  The HIPAA consent is contained within the Informed Consent document, and has also been discussed  A copy of the actual signed and dated Informed Consent has also been provided to the subject or legally authorized representative, and the original signed document shall be maintained in the research shadow chart  Element of Informed Consent Discussed Date Initials/ Person obtaining consent   A statement that the study involves research, an explanation of the purposes of the research and the expected duration of the subject's participation, a description of the procedures to be followed, and identification of any procedures which are experimental 6/30/2023 CE   A description of any reasonably foreseeable risks or discomforts to the subject  6/30/2023 CE   A description of any benefits to the subject or to others which may reasonably be expected from the research  6/30/2023 CE   A disclosure of appropriate alternative procedures or courses of treatment, if any, that might be advantageous to the subject   6/30/2023 CE   A statement describing the extent, if any, to which confidentiality of records identifying the subject will be maintained and that notes the possibility that the Food and Drug Administration may inspect the records 6/30/2023 CE   For research involving more than minimal risk, an explanation as to whether any compensation and an explanation as to whether any medical treatments are available if injury occurs and, if so, what they consist of, or where further information may be obtained  6/30/2023 CE   An explanation of whom to contact for answers to pertinent questions about the research and research subjects' rights, and whom to contact in the event of a research-related injury to the subject  6/30/2023 CE   A statement that participation is voluntary, that refusal to participate will involve no penalty or loss of benefits to which the subject is otherwise entitled, and that the subject may discontinue participation at any time without penalty or loss of benefits to which the subject is otherwise entitled  6/30/2023 CE   A statement that the particular treatment or procedure may involve risks to the subject (or to the embryo or fetus, if the subject is or may become pregnant) which are currently unforeseeable 6/30/2023 CE   Anticipated circumstances under which the subject's participation may be terminated by the investigator without regard to the subject's consent  6/30/2023 CE   Any additional costs to the subject that may result from participation in the research 6/30/2023 CE   The consequences of a subjects' decision to withdraw from the research and procedures for orderly termination of participation by the subject  6/30/2023 CE   A statement that significant new findings developed during the course of the research which may relate to the subject's willingness to continue participation will be provided to the subject  6/30/2023 CE   The approximate number of subjects involved in the study   6/30/2023 CE

## 2023-06-30 NOTE — PROGRESS NOTES
Hematology/Oncology Outpatient Follow-up  Ezekiel Castro 69 y o  male 1945 [id number]    Date:  6/30/2023  Assessment and Plan:  1  CLL (chronic lymphocytic leukemia) [facility])  71-year-old male presents for follow-up visit regarding history of CLL  He is on clinical trial and is taking zanubrutinib 80 mg p o  daily  He is tolerating well  Lymphocyte count continues to decrease  His platelets are stable  Hemoglobin is normal     Per clinical trial guidelines patient had CT chest abdomen and pelvis in June 2023  This showed no evidence of recurrent lymphoma, no significant lymphadenopathy in chest abdomen or pelvis  The target lesions measuring were unchanged  See body of report attached  Reviewed the patient is responding  No change in therapy  Follow-up in 3 months  ECOG 1    HPI:  Oncology History Overview Note   chronic lymphocytic leukemia, diagnosed in August 2017  CLL has mixed good and bad prognostic features, 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, IgVH mutation, lack of CD38 expression, deletion of 13 q14 in 6% and no 11 q deletion        There was no trisomy 12  Lymphocytes were CD5 and CD23 positive, dim kappa expression and moderate CD20 expression had splenomegaly on CT scan but not on palpation     CLL (chronic lymphocytic leukemia) ([facility] )   3/27/2018 Initial Diagnosis    CLL (chronic lymphocytic leukemia) ([facility] )       69year old male presents for follow up for chronic lymphocytic leukemia, This was diagnosed in August 2017  CLL has mixed good and bad prognostic features, 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, IgVH mutation, lack of CD38 expression, deletion of 13 q14 in 6% and no 11 q deletion      There was no trisomy 12  Lymphocytes were CD5 and CD23 positive, dim kappa expression and moderate CD20 expression had splenomegaly on CT scan but not on palpation     Patient WBC was increasing and platelet count remained decreased   Patient was seen by Dr Caitlyn Jin on 5/29/20 and advised to begin treatment  Patient was started on acalabrutinib 100 mg PO daily       Since beginning treatment in June 2020 patient has had significant improvement in his energy  [facility] feels better since being on this medication      On 07/02/2021 patient was admitted to [facility] for an inferior STEMI  [facility] was having burning chest pain   He states he was having intermittent burning chest pain occasionally when walking his dog   He states this was even happening prior to being on acalabrutinib  Nithya Calvillo underwent cardiac catheterization which demonstrated 80% lesion in the proximal LAD and 60% distal RCA lesion after the RPDA   He underwent PCI/ PRETTY x1 to the proximal RCA lesion with 0% residual stenosis   He was initiated on dual anti-platelet therapy with aspirin and Brilinta, high-intensity statin and beta-blocker was held due to sinus bradycardia   Patient cost was barrier for Brilinta so he is on Plavix   He underwent a transesophageal echo which showed 55% EF, grade 1 DD, RV normal size and systolic function with no valvular disease      He was placed on Plavix and aspirin due to STEMI       Acalabrutinib was placed on hold  He was under observation       In July 2022 he was placed on clinical trial with zanubrutinib    Interval history: Rhoda Hickey on a cruise on the [facility] in April States he developed COVID during that time  He did not require hospitalization or COVID directed antiviral therapy  He did take zanubrutinib continuously during this time    ROS: Review of Systems   Constitutional: Negative for activity change, appetite change, chills, fatigue, fever and unexpected weight change  Respiratory: Negative for cough and shortness of breath  Cardiovascular: Negative for chest pain, palpitations and leg swelling  Gastrointestinal: Negative for abdominal pain, constipation, diarrhea, nausea and vomiting  Genitourinary: Negative for difficulty urinating, dysuria and hematuria  Musculoskeletal: Negative for arthralgias  Skin: Negative      Neurological: Negative for dizziness, weakness, light-headedness, numbness and headaches  Hematological: Negative  Psychiatric/Behavioral: Negative          Past Medical History:   Diagnosis Date   • Anxiety    • Hypertension    • Leukemia ([facility] )        Past Surgical History:   Procedure Laterality Date   • APPENDECTOMY     • COLONOSCOPY     • TONSILLECTOMY         Social History     Socioeconomic History   • Marital status: /Civil Union     Spouse name: None   • Number of children: None   • Years of education: None   • Highest education level: None   Occupational History   • None   Tobacco Use   • Smoking status: Never   • Smokeless tobacco: Never   Vaping Use   • Vaping Use: None   Substance and Sexual Activity   • Alcohol use: Not Currently     Comment: minimal   • Drug use: No   • Sexual activity: None   Other Topics Concern   • None   Social History Narrative   • None     Social Determinants of Health     Financial Resource Strain: Not on file   Food Insecurity: Not on file   Transportation Needs: Not on file   Physical Activity: Not on file   Stress: Not on file   Social Connections: Not on file   Intimate Partner Violence: Not on file   Housing Stability: Not on file       Family History   Problem Relation Age of Onset   • No Known Problems Mother    • No Known Problems Father        Allergies   Allergen Reactions   • Sulfa Antibiotics          Current Outpatient Medications:   •  aspirin 81 mg chewable tablet, Chew 1 tablet (81 mg total) daily, Disp: 30 tablet, Rfl: 0  •  atorvastatin (LIPITOR) 40 mg tablet, Take 1 tablet (40 mg total) by mouth daily with dinner, Disp: 30 tablet, Rfl: 1  •  co-enzyme Q-10 50 MG capsule, Take 100 mg by mouth daily, Disp: , Rfl:   •  fosinopril (MONOPRIL) 20 mg tablet, Take 20 mg by mouth daily, Disp: , Rfl:   •  magnesium gluconate (MAGONATE) 500 mg tablet, Take 500 mg by mouth daily, Disp: , Rfl:   •  mometasone (NASONEX) 50 mcg/act nasal spray, 2 sprays into each nostril daily, "Disp: , Rfl:   •  Multiple Vitamin (MULTIVITAMIN) tablet, Take 1 tablet by mouth daily, Disp: , Rfl:   •  nitroglycerin (NITROSTAT) 0 4 mg SL tablet, Place 1 tablet (0 4 mg total) under the tongue every 5 (five) minutes as needed for chest pain, Disp: 24 tablet, Rfl: 1  •  sertraline (ZOLOFT) 50 mg tablet, Take 75 mg by mouth daily  , Disp: , Rfl:   •  Zanubrutinib 80 MG CAPS, Take by mouth, Disp: , Rfl:     Physical Exam:  /78 (BP Location: Right arm, Patient Position: Sitting, Cuff Size: Adult)   Pulse 59   Temp (!) 96 5 °F (35 8 °C)   Resp 18   Ht 5' 11\" (1 803 m)   Wt 93 4 kg (206 lb)   SpO2 98%   BMI 28 73 kg/m²     Physical Exam  Vitals reviewed  Constitutional:       General: He is not in acute distress  Appearance: He is well-developed  He is not ill-appearing  HENT:      Head: Normocephalic and atraumatic  Eyes:      General: No scleral icterus  Conjunctiva/sclera: Conjunctivae normal    Cardiovascular:      Rate and Rhythm: Normal rate and regular rhythm  Heart sounds: Normal heart sounds  No murmur heard  Pulmonary:      Effort: Pulmonary effort is normal  No respiratory distress  Breath sounds: Normal breath sounds  Abdominal:      Palpations: Abdomen is soft  Tenderness: There is no abdominal tenderness  Musculoskeletal:         General: No tenderness  Normal range of motion  Cervical back: Normal range of motion and neck supple  Right lower leg: No edema  Left lower leg: No edema  Lymphadenopathy:      Cervical: No cervical adenopathy  Skin:     General: Skin is warm and dry  Neurological:      Mental Status: He is alert and oriented to person, place, and time  Cranial Nerves: No cranial nerve deficit     Psychiatric:         Mood and Affect: Mood normal          Behavior: Behavior normal        Labs:  Lab Results   Component Value Date    WBC 26 11 (H) 06/28/2023    HGB 14 8 06/28/2023    HCT 47 9 06/28/2023    MCV 90 06/28/2023 "  (L) 06/28/2023       CT CHEST, ABDOMEN AND PELVIS WITH IV CONTRAST 6/22/23     INDICATION:   C91 10: Chronic lymphocytic leukemia of B-cell type not having achieved remission  Z00 6: Encounter for examination for normal comparison and control in clinical research program      COMPARISON: Multiple prior examinations, most recently March 30, 2023     TECHNIQUE: CT examination of the chest, abdomen and pelvis was performed  Multiplanar 2D reformatted images were created from the source data      This examination, like all CT scans performed in the [facility], was performed utilizing techniques to minimize radiation dose exposure, including the use of iterative reconstruction and automated exposure control  Radiation dose length   product (DLP) for this visit:  888 mGy-cm     IV Contrast:  100 mL of iohexol (OMNIPAQUE)  Enteric Contrast: Enteric contrast was not administered      FINDINGS:     RECIST evaluation:     No target lesions      Nontarget lesions:     1  Right axillary lymph node 1 2 x 0 9 (series 2 image 25)  Previously 1 1 x 0 8 cm   2   Right subpectoral lymph node 0 8 x 0 5 cm (series 2 image 28)  Previously 0 7 x 0 4 cm  3   Left axillary lymph node barely detectable at 0 4 x 0 2 cm (series 2 image 34)  Previously 0 6 x 0 4 cm  4   Left subpectoral lymph node 0 7 x 0 4 cm (series 2 image 21)  Previously 0 8 x 0 5 cm   5   Right common iliac lymph node measures 1 2 x 0 8 cm (series 2 image 201)  Previously 1 5 x 0 8 cm      CHEST     LUNGS:  Subpleural scarring with intermixed punctate calcifications in the lateral right upper lobe, unchanged since August 2018  Unchanged subpleural atelectatic change in the posterior lower lung zones  No suspicious pulmonary nodule  No consolidative   or groundglass airspace opacity to suggest pneumonia  Endotracheal endobronchial lesion      PLEURA:  Unremarkable      HEART/GREAT VESSELS: Heavy coronary artery calcifications    Heart otherwise unremarkable  No thoracic aortic aneurysm      MEDIASTINUM AND GALI: No lymphadenopathy  Several chronic benign calcified mediastinal and right hilar lymph nodes again noted  Reidentified trace fluid in a pericardial recess adjacent to the right hilum  Esophagus unremarkable      CHEST WALL AND LOWER NECK:  Several small, normal-appearing bilateral axillary lymph nodes, unchanged over multiple prior examinations described as evaluation of nontarget lesions above  No developing lymphadenopathy or mass      ABDOMEN     LIVER/BILIARY TREE:  Unchanged hepatic cysts measuring up to 2 2 cm  No suspicious solid hepatic mass  No biliary dilatation  Normal hepatic contours      GALLBLADDER:  No calcified gallstones  No pericholecystic inflammatory change      SPLEEN:  Calcified granulomata  Otherwise normal      PANCREAS:  Circumscribed 12 mm cyst in the pancreatic head on image 140 of series 2, slowly increasing in size when compared to remote prior examinations and better characterized on most recent prior MR of January 23, 2023  No solid pancreatic mass or   pancreatic ductal enlargement      ADRENAL GLANDS:  Unremarkable      KIDNEYS/URETERS: Unchanged scarring, anterior lower pole right kidney  Bilateral parapelvic cysts  No suspicious mass  No calculus or hydronephrosis      STOMACH AND BOWEL: Scattered sigmoid diverticula  No acute diverticulitis  Nonobstructed loop of small bowel at the orifice of right inguinal hernia      APPENDIX:  No findings to suggest appendicitis      ABDOMINOPELVIC CAVITY: No lymphadenopathy  No ascites or discrete fluid collection  No extraluminal gas      VESSELS:  Atherosclerotic changes are present    No evidence of aneurysm      PELVIS     REPRODUCTIVE ORGANS:  Prostatomegaly      URINARY BLADDER:  Mild thickening of the right anterior dome of the urinary bladder presumably because this portion of the bladder is intermittently pulled into prominent right inguinal hernia sac      ABDOMINAL WALL/INGUINAL REGIONS:  Right inguinal hernia again containing a nonobstructed loop of bowel and containing a slightly thick walled portion of the anterior dome of the urinary bladder  No lymphadenopathy or mass      OSSEOUS STRUCTURES:  Degenerative disease throughout the thoracic and lumbar spine  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  Advanced osteoarthritis in the right hip  No evidence of recent fracture or destructive lesion      IMPRESSION:     No evidence for recurrent lymphoma and specifically, no significant lymphadenopathy in the chest, abdomen or pelvis  See above description of unchanged nontarget lesions      Reidentified simple appearing pancreatic head cyst better evaluated on prior pancreatic protocol MRI      Reidentified right inguinal hernia containing omental/peritoneal fatty tissue, nonobstructed loop of small bowel, and a portion of the anterior dome of the urinary bladder      Prostatomegaly  I have spent 20 minutes with Patient  today in which greater than 50% of this time was spent in counseling/coordination of care regarding Diagnostic results, Risks and benefits of tx options, Instructions for management, Impressions, Counseling / Coordination of care, Documenting in the medical record, Reviewing / ordering tests, medicine, procedures   and Obtaining or reviewing history    Patient voiced understanding and agreement in the above discussion  Aware to contact our office with questions/symptoms in the interim  This note has been generated by voice recognition software system  Therefore, there may be spelling, grammar, and or syntax errors  Please contact if questions arise

## 2023-06-30 NOTE — PROGRESS NOTES
Clinical Research Nurse met with patient and Stephon Burgess PA-C in the Hem/Onc Clinic in [place] for Cycle #13 on the [facility] [id number] Clinical Trial   Updated Consent Form, Version 7 for Protocol Amendment 5 discussed  Patient is interested in continuing his participation in the study and signed consent  A copy was provided to the patient  Medications reviewed  No AEs reported  Patient states that he feels wonderful and fatigue has even improved  Patient returned his used pill bottles and diaries; new medication and diaries dispensed  Patient is on Zanubrutinib 160mg twice daily (80mg capsules)  Quality of life and PRO questionnaires completed in person  Patient has plans for vacation on [place]  CT chest, abdomen pelvis and labs reviewed with the BHUPENDRA  Patient did not complete the protocol required EKG for this visit since he completed one on May 24 for his Cardiology office visit on May 31 and no concerns for an additional EKG expressed at that time  Patient did not receive a CT Neck as requested per protocol and patient chooses to wait until the next required CT scans for Cycle 19  Clinical Research Nurse will meet with the patient and Dr Radha Cid on September 29, 2023 for his next 3 month protocol required office visit

## 2023-09-19 DIAGNOSIS — Z95.5 STENTED CORONARY ARTERY: ICD-10-CM

## 2023-09-19 RX ORDER — NITROGLYCERIN 0.4 MG/1
0.4 TABLET SUBLINGUAL
Qty: 24 TABLET | Refills: 1 | Status: SHIPPED | OUTPATIENT
Start: 2023-09-19

## 2023-09-26 ENCOUNTER — OFFICE VISIT (OUTPATIENT)
Dept: LAB | Facility: CLINIC | Age: 78
End: 2023-09-26
Payer: COMMERCIAL

## 2023-09-26 ENCOUNTER — APPOINTMENT (OUTPATIENT)
Dept: LAB | Facility: CLINIC | Age: 78
End: 2023-09-26
Payer: COMMERCIAL

## 2023-09-26 DIAGNOSIS — C91.10 CLL (CHRONIC LYMPHOCYTIC LEUKEMIA) (HCC): ICD-10-CM

## 2023-09-26 DIAGNOSIS — Z00.6 CLINICAL TRIAL PARTICIPANT: ICD-10-CM

## 2023-09-26 LAB
ALBUMIN SERPL BCP-MCNC: 3.9 G/DL (ref 3.5–5)
ALP SERPL-CCNC: 73 U/L (ref 34–104)
ALT SERPL W P-5'-P-CCNC: 13 U/L (ref 7–52)
ANION GAP SERPL CALCULATED.3IONS-SCNC: 7 MMOL/L
AST SERPL W P-5'-P-CCNC: 16 U/L (ref 13–39)
BASOPHILS # BLD MANUAL: 0.26 THOUSAND/UL (ref 0–0.1)
BASOPHILS NFR MAR MANUAL: 1 % (ref 0–1)
BILIRUB SERPL-MCNC: 0.49 MG/DL (ref 0.2–1)
BUN SERPL-MCNC: 23 MG/DL (ref 5–25)
CALCIUM SERPL-MCNC: 8.8 MG/DL (ref 8.4–10.2)
CHLORIDE SERPL-SCNC: 104 MMOL/L (ref 96–108)
CO2 SERPL-SCNC: 28 MMOL/L (ref 21–32)
CREAT SERPL-MCNC: 1.19 MG/DL (ref 0.6–1.3)
EOSINOPHIL # BLD MANUAL: 0 THOUSAND/UL (ref 0–0.4)
EOSINOPHIL NFR BLD MANUAL: 0 % (ref 0–6)
ERYTHROCYTE [DISTWIDTH] IN BLOOD BY AUTOMATED COUNT: 14.5 % (ref 11.6–15.1)
GFR SERPL CREATININE-BSD FRML MDRD: 58 ML/MIN/1.73SQ M
GLUCOSE P FAST SERPL-MCNC: 97 MG/DL (ref 65–99)
HCT VFR BLD AUTO: 46.4 % (ref 36.5–49.3)
HGB BLD-MCNC: 14.2 G/DL (ref 12–17)
LG PLATELETS BLD QL SMEAR: PRESENT
LYMPHOCYTES # BLD AUTO: 19.45 THOUSAND/UL (ref 0.6–4.47)
LYMPHOCYTES # BLD AUTO: 42 % (ref 14–44)
MCH RBC QN AUTO: 27.4 PG (ref 26.8–34.3)
MCHC RBC AUTO-ENTMCNC: 30.6 G/DL (ref 31.4–37.4)
MCV RBC AUTO: 89 FL (ref 82–98)
MONOCYTES # BLD AUTO: 0.53 THOUSAND/UL (ref 0–1.22)
MONOCYTES NFR BLD: 2 % (ref 4–12)
NEUTROPHILS # BLD MANUAL: 6.04 THOUSAND/UL (ref 1.85–7.62)
NEUTS SEG NFR BLD AUTO: 23 % (ref 43–75)
PLATELET # BLD AUTO: 141 THOUSANDS/UL (ref 149–390)
PLATELET BLD QL SMEAR: ADEQUATE
PMV BLD AUTO: 11.4 FL (ref 8.9–12.7)
POTASSIUM SERPL-SCNC: 4.2 MMOL/L (ref 3.5–5.3)
PROT SERPL-MCNC: 6.2 G/DL (ref 6.4–8.4)
RBC # BLD AUTO: 5.19 MILLION/UL (ref 3.88–5.62)
RBC MORPH BLD: NORMAL
SODIUM SERPL-SCNC: 139 MMOL/L (ref 135–147)
VARIANT LYMPHS # BLD AUTO: 32 %
WBC # BLD AUTO: 26.28 THOUSAND/UL (ref 4.31–10.16)

## 2023-09-26 PROCEDURE — 85027 COMPLETE CBC AUTOMATED: CPT

## 2023-09-26 PROCEDURE — 85007 BL SMEAR W/DIFF WBC COUNT: CPT

## 2023-09-26 PROCEDURE — 36415 COLL VENOUS BLD VENIPUNCTURE: CPT

## 2023-09-26 PROCEDURE — 80053 COMPREHEN METABOLIC PANEL: CPT

## 2023-09-26 PROCEDURE — 93005 ELECTROCARDIOGRAM TRACING: CPT

## 2023-09-27 LAB
ATRIAL RATE: 62 BPM
PR INTERVAL: 144 MS
QRS AXIS: 11 DEGREES
QRSD INTERVAL: 82 MS
QT INTERVAL: 400 MS
QTC INTERVAL: 406 MS
T WAVE AXIS: 51 DEGREES
VENTRICULAR RATE: 62 BPM

## 2023-09-29 ENCOUNTER — DOCUMENTATION (OUTPATIENT)
Dept: OTHER | Facility: HOSPITAL | Age: 78
End: 2023-09-29

## 2023-09-29 ENCOUNTER — OFFICE VISIT (OUTPATIENT)
Dept: HEMATOLOGY ONCOLOGY | Facility: CLINIC | Age: 78
End: 2023-09-29

## 2023-09-29 VITALS
WEIGHT: 214.5 LBS | HEART RATE: 65 BPM | BODY MASS INDEX: 30.03 KG/M2 | SYSTOLIC BLOOD PRESSURE: 130 MMHG | TEMPERATURE: 97.3 F | OXYGEN SATURATION: 97 % | RESPIRATION RATE: 17 BRPM | DIASTOLIC BLOOD PRESSURE: 80 MMHG | HEIGHT: 71 IN

## 2023-09-29 DIAGNOSIS — D69.6 THROMBOCYTOPENIA (HCC): ICD-10-CM

## 2023-09-29 DIAGNOSIS — C91.10 CLL (CHRONIC LYMPHOCYTIC LEUKEMIA) (HCC): Primary | ICD-10-CM

## 2023-09-29 DIAGNOSIS — K86.2 PANCREATIC CYST: ICD-10-CM

## 2023-09-29 PROCEDURE — 99214 OFFICE O/P EST MOD 30 MIN: CPT | Performed by: INTERNAL MEDICINE

## 2023-09-29 NOTE — PATIENT INSTRUCTIONS
Please get bloodwork 2-3 days before your scheduled CT date. Follow-up in office 3-5 days following CT imaging.

## 2023-09-29 NOTE — PROGRESS NOTES
Documentation of Informed Consent Process for Clinical Research Study    Study Rose Marie Leyva [id number]  Patient Name: Iliana Linda  YOB: 1945    This signed and dated document shall serve as certification that all of the below listed required elements of informed consent were provided to the subject or legally authorized representative signing the actual Informed Consent Document, both in written and verbal format. The HIPAA consent is contained within the Informed Consent document, and has also been discussed. A copy of the actual signed and dated Informed Consent has also been provided to the subject or legally authorized representative, and the original signed document shall be maintained in the research shadow chart. Element of Informed Consent Discussed Date Initials/ Person obtaining consent   A statement that the study involves research, an explanation of the purposes of the research and the expected duration of the subject's participation, a description of the procedures to be followed, and identification of any procedures which are experimental 9/29/2023 CE   A description of any reasonably foreseeable risks or discomforts to the subject. 9/29/2023 CE   A description of any benefits to the subject or to others which may reasonably be expected from the research. 9/29/2023 CE   A disclosure of appropriate alternative procedures or courses of treatment, if any, that might be advantageous to the subject.  9/29/2023 CE   A statement describing the extent, if any, to which confidentiality of records identifying the subject will be maintained and that notes the possibility that the Food and Drug Administration may inspect the records 9/29/2023 CE   For research involving more than minimal risk, an explanation as to whether any compensation and an explanation as to whether any medical treatments are available if injury occurs and, if so, what they consist of, or where further information may be obtained. 9/29/2023 CE   An explanation of whom to contact for answers to pertinent questions about the research and research subjects' rights, and whom to contact in the event of a research-related injury to the subject. 9/29/2023 CE   A statement that participation is voluntary, that refusal to participate will involve no penalty or loss of benefits to which the subject is otherwise entitled, and that the subject may discontinue participation at any time without penalty or loss of benefits to which the subject is otherwise entitled. 9/29/2023 CE   A statement that the particular treatment or procedure may involve risks to the subject (or to the embryo or fetus, if the subject is or may become pregnant) which are currently unforeseeable 9/29/2023 CE   Anticipated circumstances under which the subject's participation may be terminated by the investigator without regard to the subject's consent. 9/29/2023 CE   Any additional costs to the subject that may result from participation in the research 9/29/2023 CE   The consequences of a subjects' decision to withdraw from the research and procedures for orderly termination of participation by the subject. 9/29/2023 CE   A statement that significant new findings developed during the course of the research which may relate to the subject's willingness to continue participation will be provided to the subject. 9/29/2023 CE   The approximate number of subjects involved in the study.  9/29/2023 CE

## 2023-09-29 NOTE — PROGRESS NOTES
Clinical Research Nurse met with patient, Dr. Kristopher Berger and Dr. Anthony Marie in the Hematology Oncology Clinic in [facility] for Cycle 16 of the [facility] [id number] Clinical Trial. Patient reports feeling well. Labs and ECG reviewed. No AEs reported. Patient does report what he considers a mild exacerbation of his allergies, he continues on Nasonex. Previous cycles' medication bottles and drug diaries returned, new medication and patient diaries dispensed. Patient is on study medication zanubrutinib 160 mg twice daily (80mg capsules) for a total daily dose of 320mg. Patient is tolerating this treatment well. Patient questionnaires completed today in the clinic. The updated Informed Consent Form for Protocol Version 6.0 was discussed. Patient chooses to remain on study and signed the updated ICF which is SL version 8.0. A copy was provided to the patient. Patient made aware that the amendment requests imaging every 6 cycles. Patients next appointment and CT scans were scheduled. Clinical Research Nurse will meet with the patient at his next Hem/Onc clinic visit scheduled for December 28, 2023 for Cycle 19. Patient instructed to call me with any study related questions.

## 2023-09-29 NOTE — PROGRESS NOTES
Hematology/Oncology Outpatient Follow-up  George Child 69 y.o. male 1945 [id number]    Date:  9/29/2023     Assessment and Plan:    1. CLL (chronic lymphocytic leukemia) ([address] [facility])  2. Thrombocytopenia [facility])    Mr. George Argueta is a 69-year-old male with CLL. He is currently on a clinical trial and taking zanubrutinib 80 mg p.o. daily, which he is tolerating well. His total WBC count and absolute lymphocyte counts are stable to mildly improved, suggestive of treatment to zanabrutinib. Absolute lymphocyte count 19k and plt count 141k. Clinically, he doesn't have any palpable lymphadenopathy, night sweats, fevers, or weight loss. Patient was advised to get a CT CAP in December 2023 as per clinical trial guidelines; imaging to be preceded with repeat CBC, CMP, LD, and immunoglobulin levels 3-4 days prior. We will see the patient back in office in about 3 months to discuss about the lab and imaging results. - CBC and differential; Future  - Comprehensive metabolic panel; Future  - LD,Blood; Future  - IgG, IgA, IgM; Future      3. Pancreatic cyst    Patient with known circumscribed cyst involving the pancreatic head, measuring ~12mm. 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 Patient was advised to get a MR abdomen in about 6 months (around March 2023) to evaluate for any interval changes.    - MRI abdomen w wo contrast and mrcp; Future    ECOG 1    HPI:  Oncology History Overview Note   chronic lymphocytic leukemia, diagnosed in August 2017. CLL has mixed good and bad prognostic features, 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, IgVH mutation, lack of CD38 expression, deletion of 13 q14 in 6% and no 11 q deletion. .   . There was no trisomy 12.  Lymphocytes were CD5 and CD23 positive, dim kappa expression and moderate CD20 expression had splenomegaly on CT scan but not on palpation     CLL (chronic lymphocytic leukemia) ([facility])   3/27/2018 Initial Diagnosis    CLL (chronic lymphocytic leukemia) [facility])       69year old male presents for follow up for chronic lymphocytic leukemia, This was diagnosed in August 2017. CLL has mixed good and bad prognostic features, 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, IgVH mutation, lack of CD38 expression, deletion of 13 q14 in 6% and no 11 q deletion. There was no trisomy 12. Lymphocytes were CD5 and CD23 positive, dim kappa expression and moderate CD20 expression had splenomegaly on CT scan but not on palpation.      Patient WBC was increasing and platelet count remained decreased. Patient was seen by Dr Sima Pardo on 5/29/20 and advised to begin treatment. Patient was started on acalabrutinib 100 mg PO daily.      Since beginning treatment in June 2020 patient has had significant improvement in his energy. uMrali Russo feels better since being on this medication.     On 07/02/2021 patient was admitted to [facility] for an inferior STEMI. Murali Russo was having burning chest pain.  He states he was having intermittent burning chest pain occasionally when walking his dog.  He states this was even happening prior to being on acalabrutinib. Britney Ayon underwent cardiac catheterization which demonstrated 80% lesion in the proximal LAD and 60% distal RCA lesion after the RPDA.  He underwent PCI/ PRETTY x1 to the proximal RCA lesion with 0% residual stenosis.  He was initiated on dual anti-platelet therapy with aspirin and Brilinta, high-intensity statin and beta-blocker was held due to sinus bradycardia.  Patient cost was barrier for Brilinta so he is on Plavix.  He underwent a transesophageal echo which showed 55% EF, grade 1 DD, RV normal size and systolic function with no valvular disease.     He was placed on Plavix and aspirin due to STEMI.      Acalabrutinib was placed on hold. He was under observation.      In July 2022 he was placed on clinical trial with zanubrutinib    Interval history:     Most recent CBC from September 2023 shows slightly improved total WBC count and absolute lymphocyte counts.  Plts are improved from recent baseline at 141 and Hgb is within normal range. He has been tolerating zanabrutinib well. Patient denied any acute complaints. Denied any weight loss, fevers, night sweats, or lymphadenopathy. ROS: Review of Systems   Constitutional: Positive for fatigue (chronic). Negative for chills, fever and unexpected weight change. HENT: Negative for ear pain and sore throat. Eyes: Negative for pain and visual disturbance. Respiratory: Negative for cough and shortness of breath. Cardiovascular: Negative for chest pain and palpitations. Gastrointestinal: Negative for abdominal pain, constipation, diarrhea, nausea and vomiting. Genitourinary: Negative for dysuria and hematuria. Musculoskeletal: Negative for arthralgias and back pain. Skin: Negative for color change and rash. Neurological: Negative for dizziness, seizures, syncope, facial asymmetry, light-headedness, numbness and headaches. Hematological: Negative for adenopathy. Does not bruise/bleed easily. Psychiatric/Behavioral: Negative for agitation, behavioral problems, confusion, decreased concentration and dysphoric mood. All other systems reviewed and are negative.       Past Medical History:   Diagnosis Date   • Anxiety    • Hypertension    • Leukemia ([address])        Past Surgical History:   Procedure Laterality Date   • APPENDECTOMY     • COLONOSCOPY     • TONSILLECTOMY         Social History     Socioeconomic History   • Marital status: /Civil Union     Spouse name: Not on file   • Number of children: Not on file   • Years of education: Not on file   • Highest education level: Not on file   Occupational History   • Not on file   Tobacco Use   • Smoking status: Never   • Smokeless tobacco: Never   Vaping Use   • Vaping Use: Not on file   Substance and Sexual Activity   • Alcohol use: Not Currently     Comment: minimal   • Drug use: No   • Sexual activity: Not on file   Other Topics Concern   • Not on file Social History Narrative   • Not on file     Social Determinants of Health     Financial Resource Strain: Not on file   Food Insecurity: Not on file   Transportation Needs: Not on file   Physical Activity: Not on file   Stress: Not on file   Social Connections: Not on file   Intimate Partner Violence: Not on file   Housing Stability: Not on file       Family History   Problem Relation Age of Onset   • No Known Problems Mother    • No Known Problems Father        Allergies   Allergen Reactions   • Sulfa Antibiotics          Current Outpatient Medications:   •  aspirin 81 mg chewable tablet, Chew 1 tablet (81 mg total) daily, Disp: 30 tablet, Rfl: 0  •  atorvastatin (LIPITOR) 40 mg tablet, Take 1 tablet (40 mg total) by mouth daily with dinner, Disp: 30 tablet, Rfl: 1  •  co-enzyme Q-10 50 MG capsule, Take 100 mg by mouth daily, Disp: , Rfl:   •  fosinopril (MONOPRIL) 20 mg tablet, Take 20 mg by mouth daily, Disp: , Rfl:   •  magnesium gluconate (MAGONATE) 500 mg tablet, Take 500 mg by mouth daily, Disp: , Rfl:   •  mometasone (NASONEX) 50 mcg/act nasal spray, 2 sprays into each nostril daily, Disp: , Rfl:   •  Multiple Vitamin (MULTIVITAMIN) tablet, Take 1 tablet by mouth daily, Disp: , Rfl:   •  nitroglycerin (NITROSTAT) 0.4 mg SL tablet, Place 1 tablet (0.4 mg total) under the tongue every 5 (five) minutes as needed for chest pain, Disp: 24 tablet, Rfl: 1  •  sertraline (ZOLOFT) 50 mg tablet, Take 75 mg by mouth daily  , Disp: , Rfl:   •  Zanubrutinib 80 MG CAPS, Take by mouth, Disp: , Rfl:     Physical Exam:  /80 (BP Location: Left arm, Patient Position: Sitting, Cuff Size: Adult)   Pulse 65   Temp (!) 97.3 °F (36.3 °C)   Resp 17   Ht 5' 11" (1.803 m)   Wt 97.3 kg (214 lb 8 oz)   SpO2 97%   BMI 29.92 kg/m²     Physical Exam  Vitals reviewed. Constitutional:       General: He is not in acute distress. Appearance: He is not ill-appearing, toxic-appearing or diaphoretic.    HENT:      Head: Normocephalic and atraumatic. Mouth/Throat:      Mouth: Mucous membranes are moist.      Pharynx: No oropharyngeal exudate or posterior oropharyngeal erythema. Eyes:      General: No scleral icterus. Extraocular Movements: Extraocular movements intact. Conjunctiva/sclera: Conjunctivae normal.   Cardiovascular:      Rate and Rhythm: Normal rate and regular rhythm. Heart sounds: No murmur heard. No gallop. Pulmonary:      Effort: No respiratory distress. Breath sounds: Normal breath sounds. No wheezing, rhonchi or rales. Abdominal:      General: Bowel sounds are normal. There is no distension. Palpations: Abdomen is soft. There is no mass. Tenderness: There is no abdominal tenderness. There is no guarding. Musculoskeletal:         General: No swelling, tenderness or deformity. Cervical back: Normal range of motion. No rigidity. Right lower leg: No edema. Left lower leg: No edema. Lymphadenopathy:      Cervical: No cervical adenopathy. Skin:     General: Skin is warm and dry. Capillary Refill: Capillary refill takes less than 2 seconds. Findings: No erythema, lesion or rash. Neurological:      General: No focal deficit present. Mental Status: He is alert and oriented to person, place, and time.    Psychiatric:         Mood and Affect: Mood normal.         Judgment: Judgment normal.       Labs:  Lab Results   Component Value Date    WBC 26.28 (H) 09/26/2023    HGB 14.2 09/26/2023    HCT 46.4 09/26/2023    MCV 89 09/26/2023     (L) 09/26/2023       CT CHEST, ABDOMEN AND PELVIS WITH IV CONTRAST 6/22/23     INDICATION:   C91.10: Chronic lymphocytic leukemia of B-cell type not having achieved remission  Z00.6: Encounter for examination for normal comparison and control in clinical research program.     COMPARISON: Multiple prior examinations, most recently March 30, 2023     TECHNIQUE: CT examination of the chest, abdomen and pelvis was performed. Multiplanar 2D reformatted images were created from the source data.     This examination, like all CT scans performed in the [facility], was performed utilizing techniques to minimize radiation dose exposure, including the use of iterative reconstruction and automated exposure control. Radiation dose length   product (DLP) for this visit:  888 mGy-cm     IV Contrast:  100 mL of iohexol (OMNIPAQUE)  Enteric Contrast: Enteric contrast was not administered.     FINDINGS:     RECIST evaluation:     No target lesions.     Nontarget lesions:     1. Right axillary lymph node 1.2 x 0.9 (series 2 image 25). Previously 1.1 x 0.8 cm.  2.  Right subpectoral lymph node 0.8 x 0.5 cm (series 2 image 28). Previously 0.7 x 0.4 cm. 3.  Left axillary lymph node barely detectable at 0.4 x 0.2 cm (series 2 image 34). Previously 0.6 x 0.4 cm. 4.  Left subpectoral lymph node 0.7 x 0.4 cm (series 2 image 21). Previously 0.8 x 0.5 cm.  5.  Right common iliac lymph node measures 1.2 x 0.8 cm (series 2 image 201). Previously 1.5 x 0.8 cm.     CHEST     LUNGS:  Subpleural scarring with intermixed punctate calcifications in the lateral right upper lobe, unchanged since August 2018. Unchanged subpleural atelectatic change in the posterior lower lung zones. No suspicious pulmonary nodule. No consolidative   or groundglass airspace opacity to suggest pneumonia. Endotracheal endobronchial lesion.     PLEURA:  Unremarkable.     HEART/GREAT VESSELS: Heavy coronary artery calcifications. Heart otherwise unremarkable. No thoracic aortic aneurysm.     MEDIASTINUM AND GALI: No lymphadenopathy. Several chronic benign calcified mediastinal and right hilar lymph nodes again noted. Reidentified trace fluid in a pericardial recess adjacent to the right hilum.  Esophagus unremarkable.     CHEST WALL AND LOWER NECK:  Several small, normal-appearing bilateral axillary lymph nodes, unchanged over multiple prior examinations described as evaluation of nontarget lesions above. No developing lymphadenopathy or mass.     ABDOMEN     LIVER/BILIARY TREE:  Unchanged hepatic cysts measuring up to 2.2 cm. No suspicious solid hepatic mass. No biliary dilatation. Normal hepatic contours.     GALLBLADDER:  No calcified gallstones. No pericholecystic inflammatory change.     SPLEEN:  Calcified granulomata. Otherwise normal.     PANCREAS:  Circumscribed 12 mm cyst in the pancreatic head on image 140 of series 2, slowly increasing in size when compared to remote prior examinations and better characterized on most recent prior MR of January 23, 2023. No solid pancreatic mass or   pancreatic ductal enlargement.     ADRENAL GLANDS:  Unremarkable.     KIDNEYS/URETERS: Unchanged scarring, anterior lower pole right kidney. Bilateral parapelvic cysts. No suspicious mass. No calculus or hydronephrosis.     STOMACH AND BOWEL: Scattered sigmoid diverticula. No acute diverticulitis. Nonobstructed loop of small bowel at the orifice of right inguinal hernia.     APPENDIX:  No findings to suggest appendicitis.     ABDOMINOPELVIC CAVITY: No lymphadenopathy. No ascites or discrete fluid collection. No extraluminal gas.     VESSELS:  Atherosclerotic changes are present. No evidence of aneurysm.     PELVIS     REPRODUCTIVE ORGANS:  Prostatomegaly.     URINARY BLADDER:  Mild thickening of the right anterior dome of the urinary bladder presumably because this portion of the bladder is intermittently pulled into prominent right inguinal hernia sac.     ABDOMINAL WALL/INGUINAL REGIONS:  Right inguinal hernia again containing a nonobstructed loop of bowel and containing a slightly thick walled portion of the anterior dome of the urinary bladder. No lymphadenopathy or mass.     OSSEOUS STRUCTURES:  Degenerative disease throughout the thoracic and lumbar spine. 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. Advanced osteoarthritis in the right hip.   No evidence of recent fracture or destructive lesion.     IMPRESSION:     No evidence for recurrent lymphoma and specifically, no significant lymphadenopathy in the chest, abdomen or pelvis. See above description of unchanged nontarget lesions.     Reidentified simple appearing pancreatic head cyst better evaluated on prior pancreatic protocol MRI.     Reidentified right inguinal hernia containing omental/peritoneal fatty tissue, nonobstructed loop of small bowel, and a portion of the anterior dome of the urinary bladder.     Prostatomegaly. I have spent 20 minutes with Patient  today in which greater than 50% of this time was spent in counseling/coordination of care regarding Diagnostic results, Risks and benefits of tx options, Instructions for management, Impressions, Counseling / Coordination of care, Documenting in the medical record, Reviewing / ordering tests, medicine, procedures   and Obtaining or reviewing history  . Patient voiced understanding and agreement in the above discussion. Aware to contact our office with questions/symptoms in the interim. This note has been generated by voice recognition software system. Therefore, there may be spelling, grammar, and or syntax errors. Please contact if questions arise.

## 2023-11-15 ENCOUNTER — TELEPHONE (OUTPATIENT)
Dept: HEMATOLOGY ONCOLOGY | Facility: CLINIC | Age: 78
End: 2023-11-15

## 2023-11-15 NOTE — TELEPHONE ENCOUNTER
Appointment Change  Cancel, Reschedule, Change to Virtual      Who are you speaking with? Patient   If it is not the patient, is the caller listed on the communication consent form? N/A   Which provider is the appointment scheduled with? Dr. Nuris Mclean   When was the original appointment scheduled? Please list date and time 12/27/23 @ 840   At which location is the appointment scheduled to take place? [facility]   Was the appointment rescheduled? Was the appointment changed from an in person visit to a virtual visit? If so, please list the details of the change. 1/8/24 @ 1040   What is the reason for the appointment change? provider       Was STAR transport scheduled? No   Does STAR transport need to be scheduled for the new visit (if applicable) No   Does the patient need an infusion appointment rescheduled? No   Does the patient have an upcoming infusion appointment scheduled? If so, when? No   Is the patient undergoing chemotherapy? No   For appointments cancelled with less than 24 hours:  Was the no-show policy reviewed?  N/A

## 2023-12-20 ENCOUNTER — HOSPITAL ENCOUNTER (OUTPATIENT)
Dept: CT IMAGING | Facility: HOSPITAL | Age: 78
Discharge: HOME | End: 2023-12-20
Attending: INTERNAL MEDICINE
Payer: COMMERCIAL

## 2023-12-20 DIAGNOSIS — C91.10 CLL (CHRONIC LYMPHOCYTIC LEUKEMIA) (HCC): ICD-10-CM

## 2023-12-20 DIAGNOSIS — Z00.6 CLINICAL TRIAL PARTICIPANT: ICD-10-CM

## 2023-12-20 PROCEDURE — 71260 CT THORAX DX C+: CPT

## 2023-12-20 PROCEDURE — 74177 CT ABD & PELVIS W/CONTRAST: CPT

## 2023-12-20 PROCEDURE — G1004 CDSM NDSC: HCPCS

## 2023-12-20 PROCEDURE — 70491 CT SOFT TISSUE NECK W/DYE: CPT

## 2023-12-20 RX ADMIN — IOHEXOL 100 ML: 350 INJECTION, SOLUTION INTRAVENOUS at 08:35

## 2023-12-22 ENCOUNTER — LAB (OUTPATIENT)
Dept: LAB | Facility: CLINIC | Age: 78
End: 2023-12-22
Payer: COMMERCIAL

## 2023-12-22 ENCOUNTER — APPOINTMENT (OUTPATIENT)
Dept: LAB | Facility: CLINIC | Age: 78
End: 2023-12-22
Payer: COMMERCIAL

## 2023-12-22 DIAGNOSIS — Z00.6 CLINICAL TRIAL PARTICIPANT: ICD-10-CM

## 2023-12-22 DIAGNOSIS — Z00.6 PATIENT IN CLINICAL RESEARCH STUDY: ICD-10-CM

## 2023-12-22 DIAGNOSIS — C91.10 CLL (CHRONIC LYMPHOCYTIC LEUKEMIA) (HCC): ICD-10-CM

## 2023-12-22 DIAGNOSIS — D69.6 THROMBOCYTOPENIA (HCC): ICD-10-CM

## 2023-12-22 LAB
ALBUMIN SERPL BCP-MCNC: 3.9 G/DL (ref 3.5–5)
ALP SERPL-CCNC: 66 U/L (ref 34–104)
ALT SERPL W P-5'-P-CCNC: 15 U/L (ref 7–52)
ANION GAP SERPL CALCULATED.3IONS-SCNC: 6 MMOL/L
AST SERPL W P-5'-P-CCNC: 19 U/L (ref 13–39)
BASOPHILS # BLD MANUAL: 0 THOUSAND/UL (ref 0–0.1)
BASOPHILS NFR MAR MANUAL: 0 % (ref 0–1)
BILIRUB SERPL-MCNC: 0.51 MG/DL (ref 0.2–1)
BUN SERPL-MCNC: 27 MG/DL (ref 5–25)
CALCIUM SERPL-MCNC: 8.6 MG/DL (ref 8.4–10.2)
CHLORIDE SERPL-SCNC: 106 MMOL/L (ref 96–108)
CO2 SERPL-SCNC: 29 MMOL/L (ref 21–32)
CREAT SERPL-MCNC: 1.16 MG/DL (ref 0.6–1.3)
EOSINOPHIL # BLD MANUAL: 0.4 THOUSAND/UL (ref 0–0.4)
EOSINOPHIL NFR BLD MANUAL: 2 % (ref 0–6)
ERYTHROCYTE [DISTWIDTH] IN BLOOD BY AUTOMATED COUNT: 14.6 % (ref 11.6–15.1)
GFR SERPL CREATININE-BSD FRML MDRD: 60 ML/MIN/1.73SQ M
GLUCOSE P FAST SERPL-MCNC: 99 MG/DL (ref 65–99)
HCT VFR BLD AUTO: 47.4 % (ref 36.5–49.3)
HGB BLD-MCNC: 14.9 G/DL (ref 12–17)
IGA SERPL-MCNC: 233 MG/DL (ref 66–433)
IGG SERPL-MCNC: 629 MG/DL (ref 635–1741)
IGM SERPL-MCNC: 31 MG/DL (ref 45–281)
LDH SERPL-CCNC: 152 U/L (ref 140–271)
LYMPHOCYTES # BLD AUTO: 16.1 THOUSAND/UL (ref 0.6–4.47)
LYMPHOCYTES # BLD AUTO: 80 % (ref 14–44)
MCH RBC QN AUTO: 28.1 PG (ref 26.8–34.3)
MCHC RBC AUTO-ENTMCNC: 31.4 G/DL (ref 31.4–37.4)
MCV RBC AUTO: 89 FL (ref 82–98)
MONOCYTES # BLD AUTO: 0.4 THOUSAND/UL (ref 0–1.22)
MONOCYTES NFR BLD: 2 % (ref 4–12)
NEUTROPHILS # BLD MANUAL: 3.22 THOUSAND/UL (ref 1.85–7.62)
NEUTS SEG NFR BLD AUTO: 16 % (ref 43–75)
PLATELET # BLD AUTO: 118 THOUSANDS/UL (ref 149–390)
PLATELET BLD QL SMEAR: ABNORMAL
PMV BLD AUTO: 11.4 FL (ref 8.9–12.7)
POTASSIUM SERPL-SCNC: 4.3 MMOL/L (ref 3.5–5.3)
PROT SERPL-MCNC: 6 G/DL (ref 6.4–8.4)
RBC # BLD AUTO: 5.31 MILLION/UL (ref 3.88–5.62)
RBC MORPH BLD: NORMAL
SODIUM SERPL-SCNC: 141 MMOL/L (ref 135–147)
WBC # BLD AUTO: 20.13 THOUSAND/UL (ref 4.31–10.16)

## 2023-12-22 PROCEDURE — 36415 COLL VENOUS BLD VENIPUNCTURE: CPT

## 2023-12-22 PROCEDURE — 93005 ELECTROCARDIOGRAM TRACING: CPT

## 2023-12-22 PROCEDURE — 80053 COMPREHEN METABOLIC PANEL: CPT

## 2023-12-22 PROCEDURE — 82784 ASSAY IGA/IGD/IGG/IGM EACH: CPT

## 2023-12-22 PROCEDURE — 83615 LACTATE (LD) (LDH) ENZYME: CPT

## 2023-12-22 PROCEDURE — 85027 COMPLETE CBC AUTOMATED: CPT

## 2023-12-22 PROCEDURE — 85007 BL SMEAR W/DIFF WBC COUNT: CPT

## 2023-12-24 LAB
ATRIAL RATE: 57 BPM
ATRIAL RATE: 60 BPM
P AXIS: -2 DEGREES
P AXIS: 9 DEGREES
PR INTERVAL: 146 MS
PR INTERVAL: 156 MS
QRS AXIS: 27 DEGREES
QRS AXIS: 31 DEGREES
QRSD INTERVAL: 86 MS
QRSD INTERVAL: 90 MS
QT INTERVAL: 412 MS
QT INTERVAL: 420 MS
QTC INTERVAL: 408 MS
QTC INTERVAL: 412 MS
T WAVE AXIS: 65 DEGREES
T WAVE AXIS: 67 DEGREES
VENTRICULAR RATE: 57 BPM
VENTRICULAR RATE: 60 BPM

## 2023-12-27 ENCOUNTER — DOCUMENTATION (OUTPATIENT)
Dept: HEMATOLOGY ONCOLOGY | Facility: CLINIC | Age: 78
End: 2023-12-27

## 2023-12-27 ENCOUNTER — TELEPHONE (OUTPATIENT)
Dept: HEMATOLOGY ONCOLOGY | Facility: CLINIC | Age: 78
End: 2023-12-27

## 2023-12-27 DIAGNOSIS — I65.22 STENOSIS OF LEFT CAROTID ARTERY: Primary | ICD-10-CM

## 2023-12-27 NOTE — PROGRESS NOTES
I discussed the CT scan findings with the patient.  He was seen by ENT and was given a follow-up but he felt that was not necessary because he gets imaging studies.  I told him about the severe left carotid calcified atherosclerosis.  He said his heart doctor has been aware and he has 1 coronary stent.  I suggested referral to vascular surgery and he was okay with that.  Referral made.  Checkout person Kathrin from [facility] will get him an appointment with vascular surgery.  Message sent to Kathrin.

## 2023-12-27 NOTE — TELEPHONE ENCOUNTER
Reviewed the scan with Dr. Castro    Left VM for patient. I advised patient to contact my direct line to discuss further.

## 2024-01-02 ENCOUNTER — TELEPHONE (OUTPATIENT)
Dept: HEMATOLOGY ONCOLOGY | Facility: CLINIC | Age: 79
End: 2024-01-02

## 2024-01-08 ENCOUNTER — DOCUMENTATION (OUTPATIENT)
Dept: OTHER | Facility: HOSPITAL | Age: 79
End: 2024-01-08

## 2024-01-08 ENCOUNTER — OFFICE VISIT (OUTPATIENT)
Dept: HEMATOLOGY ONCOLOGY | Facility: CLINIC | Age: 79
End: 2024-01-08

## 2024-01-08 VITALS
SYSTOLIC BLOOD PRESSURE: 140 MMHG | TEMPERATURE: 97.6 F | OXYGEN SATURATION: 97 % | WEIGHT: 215 LBS | DIASTOLIC BLOOD PRESSURE: 82 MMHG | BODY MASS INDEX: 30.1 KG/M2 | RESPIRATION RATE: 17 BRPM | HEIGHT: 71 IN | HEART RATE: 65 BPM

## 2024-01-08 DIAGNOSIS — Z00.6 CLINICAL TRIAL PARTICIPANT: ICD-10-CM

## 2024-01-08 DIAGNOSIS — E78.2 MIXED HYPERLIPIDEMIA: Chronic | ICD-10-CM

## 2024-01-08 DIAGNOSIS — D49.0 IPMN (INTRADUCTAL PAPILLARY MUCINOUS NEOPLASM): ICD-10-CM

## 2024-01-08 DIAGNOSIS — C91.10 CLL (CHRONIC LYMPHOCYTIC LEUKEMIA) (HCC): Primary | Chronic | ICD-10-CM

## 2024-01-08 DIAGNOSIS — K86.2 PANCREATIC CYST: ICD-10-CM

## 2024-01-08 DIAGNOSIS — M19.90 ARTHRITIS: ICD-10-CM

## 2024-01-08 DIAGNOSIS — I10 HYPERTENSION, ESSENTIAL: Chronic | ICD-10-CM

## 2024-01-08 DIAGNOSIS — C91.10 CLL (CHRONIC LYMPHOCYTIC LEUKEMIA) (HCC): Primary | ICD-10-CM

## 2024-01-08 DIAGNOSIS — Z95.5 STATUS POST CORONARY ARTERY STENT PLACEMENT: ICD-10-CM

## 2024-01-08 DIAGNOSIS — D69.6 THROMBOCYTOPENIA (HCC): ICD-10-CM

## 2024-01-08 NOTE — PROGRESS NOTES
Patient presented to clinic for Cycle 19 of the [id number] trial.  Patient returned 5 pill bottles and pill diaries for C16, C17 and C18. 5 pill bottles for Cycle 19, C20 and C21 and diaries were dispensed.  Labs, ECG and CT's were reviewed and signed by PI.  No change to drug dosing. He continues to complain about arthritis pain down right leg.  QOL's completed. Patient to return in 3 months time with new labs and ECG.  CT's and IGG's not due for 6 months for C25.

## 2024-01-08 NOTE — PATIENT INSTRUCTIONS
Please schedule patient for  MRI/MRCP in  March 2024-already ordered.  He gets blood work orders from clinical trial.  Follow-up in 3 months

## 2024-01-08 NOTE — PROGRESS NOTES
HPI:  Clinical trial nurse is in the room with patient .  Patient has been on Zanubrutinib on clinical trial for  IGVH mutated CLL and he has been tolerating his zanubrutinib without much problem.  No bleeding in spite of taking baby aspirin for coronary stent in LAD.  He is not on Plavix anymore.  No atrial fibrillation.  No other symptoms secondary to zanubrutinib.  No symptoms secondary to CLL.    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  Previously he had 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.  Negative for trisomy 12 and 11 q deletion.  Mutated Ig VH.   CLL was diagnosed few years ago and after a long period of observation he was started on acalabrutinib in June 2020 when platelet counts dropped below 100,000, stage IV CLL.  He responded but in June or July 2021 acalabrutinib was discontinued because patient went on aspirin and Plavix post MI and 1 stent in LAD.  Patient went off Plavix on 06/01/2022 .  He is on baby aspirin.  He wanted to go back to therapy.  He qualified for clinical trial for zanubrutinib and he wanted to get started on therapy and zanubrutinib was started in July 2022.  Patient has some tiredness and minor arthritic symptoms.    Occasionally low back pain that radiates to right leg laterally.  MRI scan of lumbar spine showed degenerative changes and stenosis and foraminal narrowing.  He was offered to see a spine specialist but he declined.  He states his back pain has lessened.  He has been aware of asymmetric lingual tonsils and he was seen by an ENT specialist.  He states nothing wrong was found.  For carotid artery disease patient has appointment with vascular surgeon.                 Current Outpatient Medications:     aspirin 81 mg chewable tablet, Chew 1 tablet (81 mg total) daily, Disp: 30 tablet, Rfl: 0    atorvastatin (LIPITOR) 40 mg tablet, Take 1 tablet (40 mg total) by mouth daily with dinner, Disp: 30 tablet, Rfl: 1    co-enzyme Q-10 50 MG capsule, Take 100 mg by mouth daily, Disp: , Rfl:      "fosinopril (MONOPRIL) 20 mg tablet, Take 20 mg by mouth daily, Disp: , Rfl:     magnesium gluconate (MAGONATE) 500 mg tablet, Take 500 mg by mouth daily, Disp: , Rfl:     mometasone (NASONEX) 50 mcg/act nasal spray, 2 sprays into each nostril daily, Disp: , Rfl:     Multiple Vitamin (MULTIVITAMIN) tablet, Take 1 tablet by mouth daily, Disp: , Rfl:     sertraline (ZOLOFT) 50 mg tablet, Take 75 mg by mouth daily  , Disp: , Rfl:     Taurine 1000 MG CAPS, Take by mouth, Disp: , Rfl:     Zanubrutinib 80 MG CAPS, Take by mouth, Disp: , Rfl:     nitroglycerin (NITROSTAT) 0.4 mg SL tablet, Place 1 tablet (0.4 mg total) under the tongue every 5 (five) minutes as needed for chest pain (Patient not taking: Reported on 1/8/2024), Disp: 24 tablet, Rfl: 1    Allergies   Allergen Reactions    Sulfa Antibiotics        Oncology History Overview Note   chronic lymphocytic leukemia, diagnosed in August 2017. CLL has mixed good and bad prognostic features, 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, IgVH mutation, lack of CD38 expression, deletion of 13 q14 in 6% and no 11 q deletion..   . There was no trisomy 12. Lymphocytes were CD5 and CD23 positive, dim kappa expression and moderate CD20 expression had splenomegaly on CT scan but not on palpation     CLL (chronic lymphocytic leukemia) ([facility])   3/27/2018 Initial Diagnosis    CLL (chronic lymphocytic leukemia) ([facility])         ROS:  01/08/24 Reviewed 13 systems: See symptoms in HPI::   Presently no neurological, cardiac, pulmonary, GI,  symptoms.  Other  symptoms are in HPI.  No  fever, chills, bleeding, bone pains, skin rash, weight loss,   weakness, numbness and gait problem. No frequent infections.  Not unusually sensitive to heat or cold. No swelling of the ankles. No swollen glands.  Patient is not anxious.         /82 (BP Location: Left arm, Patient Position: Sitting, Cuff Size: Adult)   Pulse 65   Temp 97.6 °F (36.4 °C)   Resp 17   Ht 5' 11\" (1.803 m)   Wt 97.5 kg (215 lb)   SpO2 97%   BMI " 29.99 kg/m²     Physical Exam:  Alert and oriented and not in distress.  Vitals are above.  No icterus.  There is no oral thrush.  No palpable neck mass.  Lung fields are clear to percussion and auscultation.  Heart rate is regular.  Liver and spleen are not palpably enlarged.  Soft and nontender abdomen.  No ascites.  There is no edema of the ankles.  There is no calf tenderness.  There is no focal neurological deficit, no skin rash, no palpable lymphadenopathy,  no clubbing.    Patient is not anxious.  Performance status 1.    IMAGING:  OSSEOUS STRUCTURES:  No acute fracture or destructive osseous lesion.  Degenerative changes of the spine are noted with osteophytes.     IMPRESSION:     Axillary adenopathy is mildly improved.  Right subpectoral nodes are similar.  Splenomegaly improved.  Splenic lesion resolved.  Mediastinal calcified nodes stable.  Small pelvic nodes are noted.  One may be slightly larger on the right.                 Workstation performed: [id number]          Imaging    CT chest abdomen pelvis w contrast (Order: [id number]) - 6/29/2022    IMPRESSION:        1.  Soft tissue fullness in the right oropharynx and right vallecula possibly pooled secretion versus mucosal mass lesion in this patient with a history of chronic lymphocytic leukemia.  Direct visualization/ENT assessment advised.  2.  A few right periparotid/intraparotid subcentimeter lymph nodes or other nodules can be evaluated on follow-up imaging.  3.  Although not enlarged by size criteria there are more numerous than typically seen bilateral axillary and supraclavicular lymph nodes, left greater than right, potentially related to the patient's leukemia.  Further clinical assessment and imaging   surveillance advised.           Workstation performed: [id number]          Imaging    IMPRESSION:     No significant interval change from previous examination.  No cervical lymphadenopathy by size criteria.     Asymmetric prominence of right  lingual tonsil, unchanged.     Workstation performed: [id number]        Imaging    CT soft tissue neck w contrast (Order: [id number]) - 3/30/2023    OSSEOUS STRUCTURES:  Degenerative disease throughout the thoracic and lumbar spine.  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.  Advanced osteoarthritis in the right hip.  No evidence of recent fracture or destructive lesion.     IMPRESSION:     No evidence for recurrent lymphoma and specifically, no significant lymphadenopathy in the chest, abdomen or pelvis. See above description of unchanged nontarget lesions.     Reidentified simple appearing pancreatic head cyst better evaluate on recent pancreatic protocol MRI.     Increasing size of a right inguinal hernia which contains peritoneal fat, a nonobstructed loop of small bowel, and intermittently, a portion of anterior right urinary bladder dome.     Prostatomegaly.     Workstation performed: [id number]        Imaging    CT chest abdomen pelvis w contrast (Order: [id number]) - 3/30/2023    IMPRESSION:     1.  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 For simple cyst(s) less than 1.5 cm, recommend followup every 2 year for 5 times or to age 80, whichever comes first. Followup can stop at age 80   or can switch over to 80 year or older algorithm. Recommend next followup in 2 years. Preferred imaging modality: abdomen MRI and MRCP with and without IV contrast, or triple phase abdomen CT with IV contrast, or abdomen MRI and MRCP without IV   contrast.     2.  Hepatic segment 5 lesion with features suggesting hemangioma when correlating with recent CT, noting that it cannot be completely characterized without intravenous contrast.  Recommend attention on follow-up MRI with and without contrast as   recommended above.     The study was marked in EPIC for significant notification.                 Workstation performed: [id number]        Imaging    MRI abdomen wo  contrast and mrcp (Order: [id number]) - 1/23/2023          LABS:      Results for orders placed or performed in visit on 12/22/23   CBC and differential   Result Value Ref Range    WBC 20.13 (H) 4.31 - 10.16 Thousand/uL    RBC 5.31 3.88 - 5.62 Million/uL    Hemoglobin 14.9 12.0 - 17.0 g/dL    Hematocrit 47.4 36.5 - 49.3 %    MCV 89 82 - 98 fL    MCH 28.1 26.8 - 34.3 pg    MCHC 31.4 31.4 - 37.4 g/dL    RDW 14.6 11.6 - 15.1 %    MPV 11.4 8.9 - 12.7 fL    Platelets 118 (L) 149 - 390 Thousands/uL   Comprehensive metabolic panel   Result Value Ref Range    Sodium 141 135 - 147 mmol/L    Potassium 4.3 3.5 - 5.3 mmol/L    Chloride 106 96 - 108 mmol/L    CO2 29 21 - 32 mmol/L    ANION GAP 6 mmol/L    BUN 27 (H) 5 - 25 mg/dL    Creatinine 1.16 0.60 - 1.30 mg/dL    Glucose, Fasting 99 65 - 99 mg/dL    Calcium 8.6 8.4 - 10.2 mg/dL    AST 19 13 - 39 U/L    ALT 15 7 - 52 U/L    Alkaline Phosphatase 66 34 - 104 U/L    Total Protein 6.0 (L) 6.4 - 8.4 g/dL    Albumin 3.9 3.5 - 5.0 g/dL    Total Bilirubin 0.51 0.20 - 1.00 mg/dL    eGFR 60 ml/min/1.73sq m   LD,Blood   Result Value Ref Range     140 - 271 U/L   IgG, IgA, IgM   Result Value Ref Range     66 - 433 mg/dL     (L) 635 - 1,741 mg/dL    IGM 31 (L) 45 - 281 mg/dL   ECG 12 lead   Result Value Ref Range    Ventricular Rate 60 BPM    Atrial Rate 60 BPM    OR Interval 156 ms    QRSD Interval 86 ms    QT Interval 412 ms    QTC Interval 412 ms    P Axis 9 degrees    QRS Axis 27 degrees    T Wave Axis 65 degrees   ECG 12 lead   Result Value Ref Range    Ventricular Rate 57 BPM    Atrial Rate 57 BPM    OR Interval 146 ms    QRSD Interval 90 ms    QT Interval 420 ms    QTC Interval 408 ms    P Axis -2 degrees    QRS Axis 31 degrees    T Wave Axis 67 degrees   Manual Differential(PHLEBS Do Not Order)   Result Value Ref Range    Segmented % 16 (L) 43 - 75 %    Lymphocytes % 80 (H) 14 - 44 %    Monocytes % 2 (L) 4 - 12 %    Eosinophils, % 2 0 - 6 %    Basophils % 0 0 -  1 %    Absolute Neutrophils 3.22 1.85 - 7.62 Thousand/uL    Lymphocytes Absolute 16.10 (H) 0.60 - 4.47 Thousand/uL    Monocytes Absolute 0.40 0.00 - 1.22 Thousand/uL    Eosinophils Absolute 0.40 0.00 - 0.40 Thousand/uL    Basophils Absolute 0.00 0.00 - 0.10 Thousand/uL    Total Counted      RBC Morphology Normal     Platelet Estimate Borderline (A) Adequate     Labs, Imaging, & Other studies:   All pertinent labs and imaging studies were personally reviewed          Assessment and plan:    Clinical trial nurse is in the room with patient .  Patient has been on Zanubrutinib on clinical trial for  IGVH mutated CLL and he has been tolerating his zanubrutinib without much problem.  No bleeding in spite of taking baby aspirin for coronary stent in LAD.  He is not on Plavix anymore.  No atrial fibrillation.  No other symptoms secondary to zanubrutinib.  No symptoms secondary to CLL.    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  Previously he had 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.  Negative for trisomy 12 and 11 q deletion.  Mutated Ig VH.   CLL was diagnosed few years ago and after a long period of observation he was started on acalabrutinib in June 2020 when platelet counts dropped below 100,000, stage IV CLL.  He responded but in June or July 2021 acalabrutinib was discontinued because patient went on aspirin and Plavix post MI and 1 stent in LAD.  Patient went off Plavix on 06/01/2022 .  He is on baby aspirin.  He wanted to go back to therapy.  He qualified for clinical trial for zanubrutinib and he wanted to get started on therapy and zanubrutinib was started in July 2022.  Patient has some tiredness and minor arthritic symptoms.    Occasionally low back pain that radiates to right leg laterally.  MRI scan of lumbar spine showed degenerative changes and stenosis and foraminal narrowing.  He was offered to see a spine specialist but he declined.  He states his back pain has lessened.  He has been aware of asymmetric lingual tonsils and  he was seen by an ENT specialist.  He states nothing wrong was found.  For carotid artery disease patient has appointment with vascular surgeon.        Physical examination and test results are as recorded and discussed.  Patient is on zanubrutinib on clinical trial as above and he seems to be responding to therapy and has been tolerating treatment without much problem.  No change in therapy.   All discussed in detail.  Questions answered.  Discussed the importance of  eating healthy foods, diet and activities as prescribed by patient's cardiologist . Health screening tests.  Patient is capable of self-care.  Discussed precautions against Coronavirus and flu and other infections.        See diagnoses, orders and instructions  1. CLL (chronic lymphocytic leukemia) (HCC)      2. Pancreatic cyst      3. Thrombocytopenia (HCC)      4. Status post coronary artery stent placement      5. Hypertension, essential      6. Mixed hyperlipidemia      7. Arthritis      8. IPMN (intraductal papillary mucinous neoplasm)    Please schedule patient for  MRI/MRCP in  March 2024-already ordered.  He gets blood work orders from clinical trial.  Follow-up in 3 months              Patient saw me using dictation device and I will be using it for rest of the dictation and there could be mistakes in dictation.  Patient is encouraged to contact my office for the mistakes              .       Patient voiced understanding and agreed      Counseling / Coordination of Care   .  Provided counseling and support

## 2024-01-17 DIAGNOSIS — Z00.6 ENCOUNTER FOR EXAMINATION FOR NORMAL COMPARISON OR CONTROL IN CLINICAL RESEARCH PROGRAM: ICD-10-CM

## 2024-02-05 ENCOUNTER — APPOINTMENT (OUTPATIENT)
Dept: LAB | Facility: CLINIC | Age: 79
End: 2024-02-05

## 2024-02-05 DIAGNOSIS — Z00.6 ENCOUNTER FOR EXAMINATION FOR NORMAL COMPARISON OR CONTROL IN CLINICAL RESEARCH PROGRAM: ICD-10-CM

## 2024-02-05 PROCEDURE — 36415 COLL VENOUS BLD VENIPUNCTURE: CPT

## 2024-02-14 ENCOUNTER — CONSULT (OUTPATIENT)
Dept: VASCULAR SURGERY | Facility: CLINIC | Age: 79
End: 2024-02-14
Payer: COMMERCIAL

## 2024-02-14 VITALS
BODY MASS INDEX: 30.1 KG/M2 | WEIGHT: 215 LBS | HEIGHT: 71 IN | DIASTOLIC BLOOD PRESSURE: 70 MMHG | SYSTOLIC BLOOD PRESSURE: 132 MMHG

## 2024-02-14 DIAGNOSIS — N18.31 STAGE 3A CHRONIC KIDNEY DISEASE (HCC): Primary | ICD-10-CM

## 2024-02-14 DIAGNOSIS — I65.22 STENOSIS OF LEFT CAROTID ARTERY: ICD-10-CM

## 2024-02-14 PROCEDURE — 99205 OFFICE O/P NEW HI 60 MIN: CPT | Performed by: SURGERY

## 2024-02-14 NOTE — PROGRESS NOTES
Assessment/Plan:    Left carotid stenosis  78-year-old male with CLL.  He gets periodic CAT scans and had a recent CT neck with contrast.  This demonstrated a heavily calcified lesion at the proximal left internal carotid artery and he was subsequently referred to vascular surgery.  He does not have any prior neck surgery or neck radiation recent neurologic symptoms or recent visual symptoms he is a non-smoker and he is already taking aspirin and statin he denies any family history of aortic aneurysms or lower extremity peripheral arterial disease symptoms.  On reviewing his CAT scan the lesion is likely in the moderate range although it is not a CTA protocol and this is somewhat difficult to say with a high degree of certainty I reviewed the results of his testing with him and  we will send him for a carotid duplex in the next few weeks and schedule a virtual visit with him to review the results.  If his lesion is intact severe and carotid surgery is considered I will have him come back for an in person visit to discuss.       Diagnoses and all orders for this visit:    Stage 3a chronic kidney disease (HCC)    Stenosis of left carotid artery  -     Ambulatory Referral to Vascular Surgery  -     VAS carotid complete study; Future          Subjective:      Patient ID: Holden Schmidt is a 78 y.o. male.    New patient, referred for carotid artery stenosis and presents today for evaluation. CT neck done 12/20/23. Denies any s/s of CVA.     HPI    The following portions of the patient's history were reviewed and updated as appropriate: allergies, current medications, past family history, past medical history, past social history, past surgical history, and problem list.    Review of Systems   Constitutional: Negative.    HENT: Negative.     Eyes: Negative.    Respiratory: Negative.     Cardiovascular: Negative.    Gastrointestinal: Negative.    Endocrine: Negative.    Genitourinary: Negative.    Musculoskeletal:  Positive  "for arthralgias.   Skin: Negative.    Allergic/Immunologic: Negative.    Neurological: Negative.    Hematological: Negative.    Psychiatric/Behavioral: Negative.           I have reviewed the ROS above and made changes as needed.        Objective:      /70 (BP Location: Left arm, Patient Position: Sitting)   Ht 5' 11\" (1.803 m)   Wt 97.5 kg (215 lb)   BMI 29.99 kg/m²          Physical Exam      General  Exam: alert, awake, oriented, no distress, consistent with stated age    Integumentary  Exam: warm, dry, no gross lesions, no bruises and normal color    Head and Neck  Exam: supple, no bruits, trachea midline, no JVD, no mass or palpable nodes    Eye  Exam: extraoccular movements intact, no scleral icterus, sclera clear, pupils equal round and reactive to light    ENMT  Exam: oral mucosa pink and moist    Chest and Lung  Exam: chest normal without deformity, bilaterally expansive, clear to auscultation    Cardiovascular  Exam: regular rate, regular rhythm, no murmurs, no rubs or gallops    Adbomen  Exam: soft, non-tender, non-distended, no pulsatile abdominal masses, no abdominal bruit    Peripheral Vascular  Exam: no clubbing of the digits of the upper extremity, no cyanosis, no edema, both feet are warm, radial pulses 2+ bilaterally, skin well perfused, without and no varicosities.    No widened popliteal pulse noted bilaterally    Upper Extremity:  Palpation: Radial pulse- Bilateral 2+    Lower Extremity:  Palpation: Femoral pulse- Bilateral 2+         Popliteal pulse - Bilateral 2+        Dorsalis Pedis - Bilateral 2+              Neurologic  Exam:alert, non-focal, oriented x 3, cranial nerves II-XII grossly intact                        "

## 2024-02-14 NOTE — ASSESSMENT & PLAN NOTE
78-year-old male with CLL.  He gets periodic CAT scans and had a recent CT neck with contrast.  This demonstrated a heavily calcified lesion at the proximal left internal carotid artery and he was subsequently referred to vascular surgery.  He does not have any prior neck surgery or neck radiation recent neurologic symptoms or recent visual symptoms he is a non-smoker and he is already taking aspirin and statin he denies any family history of aortic aneurysms or lower extremity peripheral arterial disease symptoms.  On reviewing his CAT scan the lesion is likely in the moderate range although it is not a CTA protocol and this is somewhat difficult to say with a high degree of certainty I reviewed the results of his testing with him and  we will send him for a carotid duplex in the next few weeks and schedule a virtual visit with him to review the results.  If his lesion is intact severe and carotid surgery is considered I will have him come back for an in person visit to discuss.

## 2024-02-26 LAB
APOB+LDLR+PCSK9 GENE MUT ANL BLD/T: NOT DETECTED
BRCA1+BRCA2 DEL+DUP + FULL MUT ANL BLD/T: NOT DETECTED
MLH1+MSH2+MSH6+PMS2 GN DEL+DUP+FUL M: NOT DETECTED

## 2024-03-14 ENCOUNTER — HOSPITAL ENCOUNTER (OUTPATIENT)
Dept: VASCULAR ULTRASOUND | Facility: HOSPITAL | Age: 79
Discharge: HOME | End: 2024-03-14
Attending: SURGERY
Payer: COMMERCIAL

## 2024-03-14 DIAGNOSIS — I65.22 STENOSIS OF LEFT CAROTID ARTERY: ICD-10-CM

## 2024-03-14 PROCEDURE — 93880 EXTRACRANIAL BILAT STUDY: CPT | Performed by: SURGERY

## 2024-03-14 PROCEDURE — 93880 EXTRACRANIAL BILAT STUDY: CPT

## 2024-03-29 ENCOUNTER — HOSPITAL ENCOUNTER (OUTPATIENT)
Dept: MRI IMAGING | Facility: HOSPITAL | Age: 79
Discharge: HOME | End: 2024-03-29
Attending: INTERNAL MEDICINE
Payer: COMMERCIAL

## 2024-03-29 DIAGNOSIS — K86.2 PANCREATIC CYST: ICD-10-CM

## 2024-03-29 PROCEDURE — A9585 GADOBUTROL INJECTION: HCPCS | Performed by: INTERNAL MEDICINE

## 2024-03-29 PROCEDURE — 74183 MRI ABD W/O CNTR FLWD CNTR: CPT

## 2024-03-29 RX ORDER — GADOBUTROL 604.72 MG/ML
10 INJECTION INTRAVENOUS
Status: COMPLETED | OUTPATIENT
Start: 2024-03-29 | End: 2024-03-29

## 2024-03-29 RX ADMIN — GADOBUTROL 10 ML: 604.72 INJECTION INTRAVENOUS at 08:05

## 2024-04-02 ENCOUNTER — APPOINTMENT (OUTPATIENT)
Dept: LAB | Facility: CLINIC | Age: 79
End: 2024-04-02

## 2024-04-02 ENCOUNTER — LAB (OUTPATIENT)
Dept: LAB | Facility: CLINIC | Age: 79
End: 2024-04-02

## 2024-04-02 DIAGNOSIS — Z00.6 CLINICAL TRIAL PARTICIPANT: ICD-10-CM

## 2024-04-02 DIAGNOSIS — C91.10 CLL (CHRONIC LYMPHOCYTIC LEUKEMIA) (HCC): ICD-10-CM

## 2024-04-02 LAB
ALBUMIN SERPL BCP-MCNC: 3.9 G/DL (ref 3.5–5)
ALP SERPL-CCNC: 62 U/L (ref 34–104)
ALT SERPL W P-5'-P-CCNC: 12 U/L (ref 7–52)
ANION GAP SERPL CALCULATED.3IONS-SCNC: 5 MMOL/L (ref 4–13)
AST SERPL W P-5'-P-CCNC: 14 U/L (ref 13–39)
ATRIAL RATE: 65 BPM
BASOPHILS # BLD AUTO: 0.07 THOUSANDS/ÂΜL (ref 0–0.1)
BASOPHILS NFR BLD AUTO: 1 % (ref 0–1)
BILIRUB SERPL-MCNC: 0.48 MG/DL (ref 0.2–1)
BUN SERPL-MCNC: 33 MG/DL (ref 5–25)
CALCIUM SERPL-MCNC: 8.5 MG/DL (ref 8.4–10.2)
CHLORIDE SERPL-SCNC: 105 MMOL/L (ref 96–108)
CO2 SERPL-SCNC: 28 MMOL/L (ref 21–32)
CREAT SERPL-MCNC: 1.22 MG/DL (ref 0.6–1.3)
EOSINOPHIL # BLD AUTO: 0.48 THOUSAND/ÂΜL (ref 0–0.61)
EOSINOPHIL NFR BLD AUTO: 3 % (ref 0–6)
ERYTHROCYTE [DISTWIDTH] IN BLOOD BY AUTOMATED COUNT: 15.3 % (ref 11.6–15.1)
GFR SERPL CREATININE-BSD FRML MDRD: 56 ML/MIN/1.73SQ M
GLUCOSE P FAST SERPL-MCNC: 107 MG/DL (ref 65–99)
HCT VFR BLD AUTO: 47.6 % (ref 36.5–49.3)
HGB BLD-MCNC: 14.6 G/DL (ref 12–17)
IMM GRANULOCYTES # BLD AUTO: 0.03 THOUSAND/UL (ref 0–0.2)
IMM GRANULOCYTES NFR BLD AUTO: 0 % (ref 0–2)
LYMPHOCYTES # BLD AUTO: 10.24 THOUSANDS/ÂΜL (ref 0.6–4.47)
LYMPHOCYTES NFR BLD AUTO: 67 % (ref 14–44)
MCH RBC QN AUTO: 27.6 PG (ref 26.8–34.3)
MCHC RBC AUTO-ENTMCNC: 30.7 G/DL (ref 31.4–37.4)
MCV RBC AUTO: 90 FL (ref 82–98)
MONOCYTES # BLD AUTO: 0.75 THOUSAND/ÂΜL (ref 0.17–1.22)
MONOCYTES NFR BLD AUTO: 5 % (ref 4–12)
NEUTROPHILS # BLD AUTO: 3.7 THOUSANDS/ÂΜL (ref 1.85–7.62)
NEUTS SEG NFR BLD AUTO: 24 % (ref 43–75)
NRBC BLD AUTO-RTO: 0 /100 WBCS
P AXIS: 5 DEGREES
PLATELET # BLD AUTO: 105 THOUSANDS/UL (ref 149–390)
PLATELET BLD QL SMEAR: ABNORMAL
PMV BLD AUTO: 10.6 FL (ref 8.9–12.7)
POTASSIUM SERPL-SCNC: 4.5 MMOL/L (ref 3.5–5.3)
PR INTERVAL: 144 MS
PROT SERPL-MCNC: 5.8 G/DL (ref 6.4–8.4)
QRS AXIS: 26 DEGREES
QRSD INTERVAL: 90 MS
QT INTERVAL: 394 MS
QTC INTERVAL: 409 MS
RBC # BLD AUTO: 5.29 MILLION/UL (ref 3.88–5.62)
RBC MORPH BLD: NORMAL
SODIUM SERPL-SCNC: 138 MMOL/L (ref 135–147)
T WAVE AXIS: 57 DEGREES
VENTRICULAR RATE: 65 BPM
WBC # BLD AUTO: 15.27 THOUSAND/UL (ref 4.31–10.16)

## 2024-04-02 PROCEDURE — 36415 COLL VENOUS BLD VENIPUNCTURE: CPT

## 2024-04-02 PROCEDURE — 80053 COMPREHEN METABOLIC PANEL: CPT

## 2024-04-02 PROCEDURE — 93010 ELECTROCARDIOGRAM REPORT: CPT | Performed by: INTERNAL MEDICINE

## 2024-04-02 PROCEDURE — 93005 ELECTROCARDIOGRAM TRACING: CPT

## 2024-04-02 PROCEDURE — 85025 COMPLETE CBC W/AUTO DIFF WBC: CPT

## 2024-04-08 ENCOUNTER — TELEPHONE (OUTPATIENT)
Dept: HEMATOLOGY ONCOLOGY | Facility: CLINIC | Age: 79
End: 2024-04-08

## 2024-04-08 ENCOUNTER — OFFICE VISIT (OUTPATIENT)
Dept: HEMATOLOGY ONCOLOGY | Facility: CLINIC | Age: 79
End: 2024-04-08

## 2024-04-08 ENCOUNTER — DOCUMENTATION (OUTPATIENT)
Dept: OTHER | Facility: HOSPITAL | Age: 79
End: 2024-04-08

## 2024-04-08 VITALS
OXYGEN SATURATION: 95 % | DIASTOLIC BLOOD PRESSURE: 72 MMHG | WEIGHT: 214 LBS | HEART RATE: 68 BPM | BODY MASS INDEX: 29.96 KG/M2 | SYSTOLIC BLOOD PRESSURE: 130 MMHG | TEMPERATURE: 97.8 F | RESPIRATION RATE: 17 BRPM | HEIGHT: 71 IN

## 2024-04-08 DIAGNOSIS — I65.22 STENOSIS OF LEFT CAROTID ARTERY: ICD-10-CM

## 2024-04-08 DIAGNOSIS — Z95.5 STATUS POST CORONARY ARTERY STENT PLACEMENT: ICD-10-CM

## 2024-04-08 DIAGNOSIS — M19.90 ARTHRITIS: ICD-10-CM

## 2024-04-08 DIAGNOSIS — K86.2 PANCREATIC CYST: ICD-10-CM

## 2024-04-08 DIAGNOSIS — D69.6 THROMBOCYTOPENIA (HCC): ICD-10-CM

## 2024-04-08 DIAGNOSIS — E78.2 MIXED HYPERLIPIDEMIA: ICD-10-CM

## 2024-04-08 DIAGNOSIS — D49.0 IPMN (INTRADUCTAL PAPILLARY MUCINOUS NEOPLASM): ICD-10-CM

## 2024-04-08 DIAGNOSIS — C91.10 CLL (CHRONIC LYMPHOCYTIC LEUKEMIA) (HCC): Primary | ICD-10-CM

## 2024-04-08 DIAGNOSIS — I10 HYPERTENSION, ESSENTIAL: ICD-10-CM

## 2024-04-08 NOTE — PROGRESS NOTES
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  Patient is on Zanubrutinib.  Clinical trial nurse Urvashi is in the room with the patient .  Patient has been tolerating his zanubrutinib without much problem.  No bleeding in spite of taking baby aspirin for coronary stent in LAD.  He is not on Plavix anymore.  No atrial fibrillation.  No other symptoms secondary to zanubrutinib.  No symptoms secondary to CLL.    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  Previously he had 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.  Negative for trisomy 12 and 11 q deletion.  Mutated Ig VH.   CLL was diagnosed few years ago and after a long period of observation he was started on acalabrutinib in June 2020 when platelet counts dropped below 100,000, stage IV CLL.  He responded but in June or July 2021 acalabrutinib was discontinued because patient went on aspirin and Plavix post MI and 1 stent in LAD.  Patient went off Plavix on 06/01/2022 .  He is on baby aspirin.  He wanted to go back to therapy.  He qualified for clinical trial for zanubrutinib and he wanted to get started on therapy and zanubrutinib was started in July 2022.  Patient has some tiredness and minor arthritic symptoms.    Occasionally low back pain that radiates to right leg laterally.  MRI scan of lumbar spine showed degenerative changes and stenosis and foraminal narrowing.  He was offered to see a spine specialist but he declined.  He states his back pain has lessened.  He has been aware of asymmetric lingual tonsils and he was seen by an ENT specialist.  He states nothing wrong was found.  For carotid artery disease patient had an appointment with vascular surgeon.                 Current Outpatient Medications:   •  aspirin 81 mg chewable tablet, Chew 1 tablet (81 mg total) daily, Disp: 30 tablet, Rfl: 0  •  atorvastatin (LIPITOR) 40 mg tablet, Take 1 tablet (40 mg total) by mouth daily with dinner, Disp: 30 tablet, Rfl: 1  •  co-enzyme Q-10 50 MG  capsule, Take 100 mg by mouth daily, Disp: , Rfl:   •  fosinopril (MONOPRIL) 20 mg tablet, Take 20 mg by mouth daily, Disp: , Rfl:   •  magnesium gluconate (MAGONATE) 500 mg tablet, Take 500 mg by mouth daily, Disp: , Rfl:   •  mometasone (NASONEX) 50 mcg/act nasal spray, 2 sprays into each nostril daily, Disp: , Rfl:   •  Multiple Vitamin (MULTIVITAMIN) tablet, Take 1 tablet by mouth daily, Disp: , Rfl:   •  nitroglycerin (NITROSTAT) 0.4 mg SL tablet, Place 1 tablet (0.4 mg total) under the tongue every 5 (five) minutes as needed for chest pain, Disp: 24 tablet, Rfl: 1  •  sertraline (ZOLOFT) 50 mg tablet, Take 75 mg by mouth daily  , Disp: , Rfl:   •  Zanubrutinib 80 MG CAPS, Take by mouth, Disp: , Rfl:   •  Taurine 1000 MG CAPS, Take by mouth, Disp: , Rfl:     Allergies   Allergen Reactions   • Sulfa Antibiotics        Oncology History Overview Note   chronic lymphocytic leukemia, diagnosed in August 2017. CLL has mixed good and bad prognostic features, 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, IgVH mutation, lack of CD38 expression, deletion of 13 q14 in 6% and no 11 q deletion..   . There was no trisomy 12. Lymphocytes were CD5 and CD23 positive, dim kappa expression and moderate CD20 expression had splenomegaly on CT scan but not on palpation     CLL (chronic lymphocytic leukemia) ([facility])   3/27/2018 Initial Diagnosis    CLL (chronic lymphocytic leukemia) ([facility])     1/8/2024 -  Cancer Staged    Staging form: Chronic Lymphocytic Leukemia, AJCC 8th Edition  - Clinical stage from 1/8/2024: Modified Razo Stage IV (Modified Razo risk: High, Lymphocytosis: Present, Adenopathy: Present, Organomegaly: Absent, Anemia: Absent, Thrombocytopenia: Present) - Signed by Carroll Castro MD on 1/8/2024  Stage prefix: Initial diagnosis  Absolute lymphocyte count (ALC) (cells/uL): 82,250  Hemoglobin (Hgb) (g/dL): 14.1  Platelet count (x10E9/L of blood): 89,000           ROS:  04/08/24 Reviewed 13 systems: See symptoms in HPI::   Presently no neurological,  "cardiac, pulmonary, GI,  symptoms.  Other  symptoms are in HPI.  No other symptoms like fever, chills, bleeding, bone pains, skin rash, weight loss,   weakness, numbness and gait problem. No frequent infections.  Not unusually sensitive to heat or cold. No swelling of the ankles. No swollen glands.  Patient is not anxious.         /72 (BP Location: Left arm, Patient Position: Sitting, Cuff Size: Adult)   Pulse 68   Temp 97.8 °F (36.6 °C)   Resp 17   Ht 5' 11\" (1.803 m)   Wt 97.1 kg (214 lb)   SpO2 95%   BMI 29.85 kg/m²     Physical Exam:  Patient is alert and oriented.  Patient is not in distress.  Vital signs are above.  There is no icterus.  No oral thrush.  There is no palpable neck mass.  Clear lung fields.  Regular heart rate. Liver and spleen are not palpably enlarged.  Soft and nontender abdomen.  No ascites.  There is no edema of the ankles.  There is no calf tenderness.  There is no focal neurological deficit, no skin rash, no palpable lymphadenopathy,  no clubbing.    Patient is not anxious.  Performance status 1.    IMAGING:      CT chest abdomen pelvis w contrast  Status: Final result     PACS Images     Show images for CT chest abdomen pelvis w contrast  Study Result    Narrative & Impression   CT CHEST, ABDOMEN AND PELVIS WITH IV CONTRAST     INDICATION:   Z00.6: Encounter for examination for normal comparison and control in clinical research program  C91.10: Chronic lymphocytic leukemia of B-cell type not having achieved remission.     COMPARISON:  CT Chest abdomen pelvis 6/22/23     TECHNIQUE: CT examination of the chest, abdomen and pelvis was performed. Multiplanar 2D reformatted images were created from the source data.     This examination, like all CT scans performed in the [facility], was performed utilizing techniques to minimize radiation dose exposure, including the use of iterative reconstruction and automated exposure control. Radiation dose length   product " (DLP) for this visit:  1678 mGy-cm     IV Contrast:  100 mL of iohexol (OMNIPAQUE)  Enteric Contrast: Enteric contrast was administered.     FINDINGS:     RECIST evaluation:     No target lesions.     Nontarget lesions:     1.  Right axillary lymph node 1.2 x 0.9 (series 3 image 12). Previously 1.2 x 0.9 cm.  2.  Right subpectoral lymph node 0.6 x 0.4 cm (series 3 image 16). Previously 0.8 x 0.5 cm.  3.  Left axillary lymph node barely detectable at 0.3 x 0.2 cm (series 3 image 24). Previously 0.4 x 0.2 cm.  4.  Left subpectoral lymph node 0.7 x 0.4 cm (series 3 image 12). Previously 0.7 x 0.4 cm.  5.  Right common iliac lymph node measures 1.2 x 0.8 cm (series 3 image 191). Previously 1.2 x 0.8 cm.     CHEST     LUNGS:  Stable juxtapleural scarring and calcification in lateral right upper lobe since August 2018. Mild para-osteophyte fibrotic change medial right lower lobe. No focal consolidation. Central airways are clear.     PLEURA:  Unremarkable.     HEART/GREAT VESSELS: Heart size normal. Coronary artery calcification. No pericardial effusion. No thoracic aortic aneurysm.     MEDIASTINUM AND GALI: No enlarged lymphadenopathy. Small calcified mediastinal and right hilar nodes are again noted.     CHEST WALL AND LOWER NECK:  Unremarkable.     ABDOMEN     LIVER/BILIARY TREE: Stable hepatic cysts. Stable 10 mm hypodensity in the right lobe with peripheral nodular enhancement favored to represent a hemangioma.     GALLBLADDER:  No calcified gallstones. No pericholecystic inflammatory change.     SPLEEN: Calcified granulomas.     PANCREAS: Stable 12 mm cyst in the pancreatic head image 3/129. No significant pancreatic ductal dilatation or distal atrophy.     ADRENAL GLANDS:  Unremarkable.     KIDNEYS/URETERS: Stable areas of scarring in the right kidney. Bilateral renal sinus cysts.     STOMACH AND BOWEL: There is colonic diverticulosis without evidence of acute diverticulitis. No bowel obstruction.     APPENDIX:  Surgically absent.     ABDOMINOPELVIC CAVITY:  No ascites.  No pneumoperitoneum.  No lymphadenopathy.     VESSELS: Atherosclerotic changes are present.  No evidence of aneurysm.     PELVIS     REPRODUCTIVE ORGANS: The prostate is enlarged.     URINARY BLADDER: Similar appearance of mild thickening of the right anterior dome which is felt to be related to intermittent pulling into a prominent right inguinal hernia sac.     ABDOMINAL WALL/INGUINAL REGIONS: Right inguinal hernia containing a loop of unobstructed small bowel.     OSSEOUS STRUCTURES:  No acute fracture or destructive osseous lesion.  Spinal degenerative changes are noted.     IMPRESSION:     1. No evidence of recurrent lymphoma or enlarged adenopathy.     2. Stable appearance of pancreatic head cyst previously evaluated by MRI. Recommend 2-year follow-up.     3. Right inguinal hernia containing small bowel without obstruction. Thickening of the right anterior dome of the urinary bladder is similar to prior and likely related to intermittent pulling into the right inguinal hernia.        This examination demonstrates a finding requiring imaging follow-up and was logged as such in Epic.     Workstation performed: [id number]        Imaging    CT chest abdomen pelvis w contrast (Order: [id number]) - 12/20/2023              MPRESSION:     Since January 23, 2023:  1.  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  2.  Unchanged 1.2 cm hepatic hemangioma.     Workstation performed: [id number]        Imaging    MRI abdomen w wo contrast and mrcp (Order: [id number]) - 3/29/2024    LABS:      Results for orders placed or performed in visit on 04/02/24   Comprehensive metabolic panel   Result Value Ref Range    Sodium 138 135 - 147 mmol/L    Potassium 4.5 3.5 - 5.3 mmol/L    Chloride 105 96 - 108 mmol/L    CO2 28 21 - 32 mmol/L    ANION GAP 5 4 - 13 mmol/L     BUN 33 (H) 5 - 25 mg/dL    Creatinine 1.22 0.60 - 1.30 mg/dL    Glucose, Fasting 107 (H) 65 - 99 mg/dL    Calcium 8.5 8.4 - 10.2 mg/dL    AST 14 13 - 39 U/L    ALT 12 7 - 52 U/L    Alkaline Phosphatase 62 34 - 104 U/L    Total Protein 5.8 (L) 6.4 - 8.4 g/dL    Albumin 3.9 3.5 - 5.0 g/dL    Total Bilirubin 0.48 0.20 - 1.00 mg/dL    eGFR 56 ml/min/1.73sq m   CBC and differential   Result Value Ref Range    WBC 15.27 (H) 4.31 - 10.16 Thousand/uL    RBC 5.29 3.88 - 5.62 Million/uL    Hemoglobin 14.6 12.0 - 17.0 g/dL    Hematocrit 47.6 36.5 - 49.3 %    MCV 90 82 - 98 fL    MCH 27.6 26.8 - 34.3 pg    MCHC 30.7 (L) 31.4 - 37.4 g/dL    RDW 15.3 (H) 11.6 - 15.1 %    MPV 10.6 8.9 - 12.7 fL    Platelets 105 (L) 149 - 390 Thousands/uL    nRBC 0 /100 WBCs    Neutrophils Relative 24 (L) 43 - 75 %    Immature Grans % 0 0 - 2 %    Lymphocytes Relative 67 (H) 14 - 44 %    Monocytes Relative 5 4 - 12 %    Eosinophils Relative 3 0 - 6 %    Basophils Relative 1 0 - 1 %    Neutrophils Absolute 3.70 1.85 - 7.62 Thousands/µL    Absolute Immature Grans 0.03 0.00 - 0.20 Thousand/uL    Absolute Lymphocytes 10.24 (H) 0.60 - 4.47 Thousands/µL    Absolute Monocytes 0.75 0.17 - 1.22 Thousand/µL    Eosinophils Absolute 0.48 0.00 - 0.61 Thousand/µL    Basophils Absolute 0.07 0.00 - 0.10 Thousands/µL   ECG 12 lead   Result Value Ref Range    Ventricular Rate 65 BPM    Atrial Rate 65 BPM    ID Interval 144 ms    QRSD Interval 90 ms    QT Interval 394 ms    QTC Interval 409 ms    P Axis 5 degrees    QRS Axis 26 degrees    T Wave Axis 57 degrees   Smear Review(Phlebs Do Not Order)   Result Value Ref Range    RBC Morphology Normal     Platelet Estimate Decreased (A) Adequate     Labs, Imaging, & Other studies:   All pertinent labs and imaging studies were personally reviewed          Assessment and plan:     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  Patient is on Zanubrutinib.  Clinical trial nurse Urvashi is in  the room with the patient .  Patient has been tolerating his zanubrutinib without much problem.  No bleeding in spite of taking baby aspirin for coronary stent in LAD.  He is not on Plavix anymore.  No atrial fibrillation.  No other symptoms secondary to zanubrutinib.  No symptoms secondary to CLL.    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  Previously he had 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.  Negative for trisomy 12 and 11 q deletion.  Mutated Ig VH.   CLL was diagnosed few years ago and after a long period of observation he was started on acalabrutinib in June 2020 when platelet counts dropped below 100,000, stage IV CLL.  He responded but in June or July 2021 acalabrutinib was discontinued because patient went on aspirin and Plavix post MI and 1 stent in LAD.  Patient went off Plavix on 06/01/2022 .  He is on baby aspirin.  He wanted to go back to therapy.  He qualified for clinical trial for zanubrutinib and he wanted to get started on therapy and zanubrutinib was started in July 2022.  Patient has some tiredness and minor arthritic symptoms.    Occasionally low back pain that radiates to right leg laterally.  MRI scan of lumbar spine showed degenerative changes and stenosis and foraminal narrowing.  He was offered to see a spine specialist but he declined.  He states his back pain has lessened.  He has been aware of asymmetric lingual tonsils and he was seen by an ENT specialist.  He states nothing wrong was found.  For carotid artery disease patient had an appointment with vascular surgeon.   Physical examination and test results are as recorded and discussed.  Patient is on zanubrutinib on clinical trial as above and he seems to be responding to therapy and has been tolerating treatment without much problem.  No change in therapy.   All discussed in detail.  Questions answered.  Discussed the importance of  eating healthy foods, diet and activities as prescribed by patient's cardiologist . Health screening tests.  Patient is  capable of self-care.  Discussed precautions against Coronavirus and flu and other infections.        See diagnoses, orders and instructions  1. CLL (chronic lymphocytic leukemia) (HCC)    - CBC and differential; Future  - Comprehensive metabolic panel; Future  - LD,Blood; Future    2. Pancreatic cyst      3. Thrombocytopenia (HCC)      4. Status post coronary artery stent placement      5. Hypertension, essential      6. Mixed hyperlipidemia      7. IPMN (intraductal papillary mucinous neoplasm)      8. Arthritis      9. Stenosis of left carotid artery        Blood work and CT scans prior to next visit in 3 months          Patient saw me using dictation device and I will be using it for rest of the dictation and there could be mistakes in dictation.  Patient is encouraged to contact my office for the mistakes              .       Patient voiced understanding and agreed      Counseling / Coordination of Care   .  Provided counseling and support

## 2024-04-08 NOTE — PROGRESS NOTES
Patient seen today by Dr. Castro for Cycle 22 of [id number] trial. Collected 5 pill bottles from Cycles 19, 20, 21 and diaries signed by patient. New diaries and pills were dispensed for Cycles 22, 23, 24. Labs, ECG signed reviewed and signed by PI. Con meds and AE's reviewed. Patient reported doing well, confirmed arthritis pain remained stable but mild. Patient made aware scans will be required prior to his next visit.

## 2024-04-18 DIAGNOSIS — C91.10 CLL (CHRONIC LYMPHOCYTIC LEUKEMIA) (HCC): Primary | ICD-10-CM

## 2024-06-28 ENCOUNTER — HOSPITAL ENCOUNTER (OUTPATIENT)
Dept: CT IMAGING | Facility: HOSPITAL | Age: 79
Discharge: HOME | End: 2024-06-28
Attending: INTERNAL MEDICINE

## 2024-06-28 ENCOUNTER — TELEPHONE (OUTPATIENT)
Age: 79
End: 2024-06-28

## 2024-06-28 DIAGNOSIS — C91.10 CLL (CHRONIC LYMPHOCYTIC LEUKEMIA) (HCC): ICD-10-CM

## 2024-06-28 DIAGNOSIS — C91.10 CLL (CHRONIC LYMPHOCYTIC LEUKEMIA) (HCC): Primary | ICD-10-CM

## 2024-06-28 DIAGNOSIS — K86.2 PANCREATIC CYST: ICD-10-CM

## 2024-06-28 DIAGNOSIS — R93.89 ABNORMAL CT SCAN, NECK: ICD-10-CM

## 2024-06-28 PROCEDURE — 70491 CT SOFT TISSUE NECK W/DYE: CPT

## 2024-06-28 PROCEDURE — 74177 CT ABD & PELVIS W/CONTRAST: CPT

## 2024-06-28 PROCEDURE — 71260 CT THORAX DX C+: CPT

## 2024-06-28 RX ADMIN — IOHEXOL 1 ML: 350 INJECTION, SOLUTION INTRAVENOUS at 10:32

## 2024-06-28 NOTE — TELEPHONE ENCOUNTER
Luz Maria called in from [place] [facility], asking for clarification on the CT order, the patient does come in today starting at 1030. She said the order should be for a  CT Chest, Abdomen and Pelvis w/ Con and BOTH PO and IV Contrast. Due to it being a research patient and to reflect every other scan he has done. She also stated she can reached back out at through the [employer] secure chat if needed

## 2024-07-03 ENCOUNTER — APPOINTMENT (OUTPATIENT)
Dept: LAB | Facility: CLINIC | Age: 79
End: 2024-07-03
Payer: COMMERCIAL

## 2024-07-03 DIAGNOSIS — C91.10 CLL (CHRONIC LYMPHOCYTIC LEUKEMIA) (HCC): ICD-10-CM

## 2024-07-03 LAB
ALBUMIN SERPL BCG-MCNC: 3.9 G/DL (ref 3.5–5)
ALP SERPL-CCNC: 65 U/L (ref 34–104)
ALT SERPL W P-5'-P-CCNC: 12 U/L (ref 7–52)
ANION GAP SERPL CALCULATED.3IONS-SCNC: 4 MMOL/L (ref 4–13)
ANISOCYTOSIS BLD QL SMEAR: PRESENT
AST SERPL W P-5'-P-CCNC: 15 U/L (ref 13–39)
BASOPHILS # BLD MANUAL: 0 THOUSAND/UL (ref 0–0.1)
BASOPHILS NFR MAR MANUAL: 0 % (ref 0–1)
BILIRUB SERPL-MCNC: 0.52 MG/DL (ref 0.2–1)
BUN SERPL-MCNC: 34 MG/DL (ref 5–25)
CALCIUM SERPL-MCNC: 8.8 MG/DL (ref 8.4–10.2)
CHLORIDE SERPL-SCNC: 106 MMOL/L (ref 96–108)
CO2 SERPL-SCNC: 28 MMOL/L (ref 21–32)
CREAT SERPL-MCNC: 1.38 MG/DL (ref 0.6–1.3)
EOSINOPHIL # BLD MANUAL: 0 THOUSAND/UL (ref 0–0.4)
EOSINOPHIL NFR BLD MANUAL: 0 % (ref 0–6)
ERYTHROCYTE [DISTWIDTH] IN BLOOD BY AUTOMATED COUNT: 14.8 % (ref 11.6–15.1)
GFR SERPL CREATININE-BSD FRML MDRD: 48 ML/MIN/1.73SQ M
GLUCOSE P FAST SERPL-MCNC: 100 MG/DL (ref 65–99)
HCT VFR BLD AUTO: 47.8 % (ref 36.5–49.3)
HGB BLD-MCNC: 14.7 G/DL (ref 12–17)
IGA SERPL-MCNC: 230 MG/DL (ref 66–433)
IGG SERPL-MCNC: 596 MG/DL (ref 635–1741)
IGM SERPL-MCNC: 27 MG/DL (ref 45–281)
LDH SERPL-CCNC: 141 U/L (ref 140–271)
LG PLATELETS BLD QL SMEAR: PRESENT
LYMPHOCYTES # BLD AUTO: 67 % (ref 14–44)
LYMPHOCYTES # BLD AUTO: 9.6 THOUSAND/UL (ref 0.6–4.47)
MCH RBC QN AUTO: 27.6 PG (ref 26.8–34.3)
MCHC RBC AUTO-ENTMCNC: 30.8 G/DL (ref 31.4–37.4)
MCV RBC AUTO: 90 FL (ref 82–98)
MONOCYTES # BLD AUTO: 0.82 THOUSAND/UL (ref 0–1.22)
MONOCYTES NFR BLD: 6 % (ref 4–12)
NEUTROPHILS # BLD MANUAL: 3.29 THOUSAND/UL (ref 1.85–7.62)
NEUTS SEG NFR BLD AUTO: 24 % (ref 43–75)
PLATELET # BLD AUTO: 99 THOUSANDS/UL (ref 149–390)
PLATELET BLD QL SMEAR: ABNORMAL
PMV BLD AUTO: 11.3 FL (ref 8.9–12.7)
POTASSIUM SERPL-SCNC: 4.9 MMOL/L (ref 3.5–5.3)
PROT SERPL-MCNC: 6.3 G/DL (ref 6.4–8.4)
RBC # BLD AUTO: 5.32 MILLION/UL (ref 3.88–5.62)
RBC MORPH BLD: PRESENT
SODIUM SERPL-SCNC: 138 MMOL/L (ref 135–147)
VARIANT LYMPHS # BLD AUTO: 3 %
WBC # BLD AUTO: 13.72 THOUSAND/UL (ref 4.31–10.16)

## 2024-07-03 PROCEDURE — 85007 BL SMEAR W/DIFF WBC COUNT: CPT

## 2024-07-03 PROCEDURE — 93005 ELECTROCARDIOGRAM TRACING: CPT

## 2024-07-03 PROCEDURE — 36415 COLL VENOUS BLD VENIPUNCTURE: CPT

## 2024-07-03 PROCEDURE — 80053 COMPREHEN METABOLIC PANEL: CPT

## 2024-07-03 PROCEDURE — 85027 COMPLETE CBC AUTOMATED: CPT

## 2024-07-03 PROCEDURE — 82784 ASSAY IGA/IGD/IGG/IGM EACH: CPT

## 2024-07-03 PROCEDURE — 83615 LACTATE (LD) (LDH) ENZYME: CPT

## 2024-07-04 LAB
ATRIAL RATE: 54 BPM
P AXIS: 48 DEGREES
PR INTERVAL: 150 MS
QRS AXIS: 6 DEGREES
QRSD INTERVAL: 92 MS
QT INTERVAL: 434 MS
QTC INTERVAL: 411 MS
T WAVE AXIS: 45 DEGREES
VENTRICULAR RATE: 54 BPM

## 2024-07-08 ENCOUNTER — OFFICE VISIT (OUTPATIENT)
Dept: HEMATOLOGY ONCOLOGY | Facility: CLINIC | Age: 79
End: 2024-07-08

## 2024-07-08 ENCOUNTER — DOCUMENTATION (OUTPATIENT)
Dept: OTHER | Facility: HOSPITAL | Age: 79
End: 2024-07-08

## 2024-07-08 VITALS
TEMPERATURE: 97.8 F | RESPIRATION RATE: 17 BRPM | HEART RATE: 69 BPM | SYSTOLIC BLOOD PRESSURE: 144 MMHG | DIASTOLIC BLOOD PRESSURE: 84 MMHG | HEIGHT: 71 IN | WEIGHT: 210 LBS | BODY MASS INDEX: 29.4 KG/M2 | OXYGEN SATURATION: 94 %

## 2024-07-08 DIAGNOSIS — C91.10 CLL (CHRONIC LYMPHOCYTIC LEUKEMIA) (HCC): Primary | ICD-10-CM

## 2024-07-08 DIAGNOSIS — I65.22 STENOSIS OF LEFT CAROTID ARTERY: ICD-10-CM

## 2024-07-08 DIAGNOSIS — D49.0 IPMN (INTRADUCTAL PAPILLARY MUCINOUS NEOPLASM): ICD-10-CM

## 2024-07-08 DIAGNOSIS — Z95.5 STATUS POST CORONARY ARTERY STENT PLACEMENT: ICD-10-CM

## 2024-07-08 DIAGNOSIS — M19.90 ARTHRITIS: ICD-10-CM

## 2024-07-08 DIAGNOSIS — E78.2 MIXED HYPERLIPIDEMIA: ICD-10-CM

## 2024-07-08 DIAGNOSIS — D69.6 THROMBOCYTOPENIA (HCC): ICD-10-CM

## 2024-07-08 DIAGNOSIS — K86.2 PANCREATIC CYST: ICD-10-CM

## 2024-07-08 DIAGNOSIS — R93.89 ABNORMAL CT SCAN, NECK: ICD-10-CM

## 2024-07-08 DIAGNOSIS — I10 HYPERTENSION, ESSENTIAL: ICD-10-CM

## 2024-07-08 NOTE — PROGRESS NOTES
Patient seen today by Dr. Castro for Cycle 25 of [id number] trial. Collected 5 pill bottles from Cycles 22, 23, 24 and diaries which were signed by patient. New diaries and pills were dispensed for Cycles 25, 26, 27. Labs, ECG  and Ct's reviewed and signed by PI. Con meds and AE's reviewed. Patient reported he is doing very well and has no complaints at this time. Patient made aware he will need labs and ECG completed prior to next appointment. QOLs completed at this time. I will see him back in office in 3 months time.

## 2024-07-10 NOTE — PROGRESS NOTES
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  Clinical trial nurse Urvashi is in the room with the patient .  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  Patient is not symptomatic from thrombocytopenia.  No bleeding, bruising or petechiae.  Patient has been tolerating his zanubrutinib without much problem.  No bleeding in spite of taking baby aspirin for coronary stent in LAD.  He is not on Plavix anymore.  No atrial fibrillation.  No other symptoms secondary to zanubrutinib.  No symptoms secondary to CLL.    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  Previously he had 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.  Negative for trisomy 12 and 11 q deletion.  Mutated Ig VH.   CLL was diagnosed few years ago and after a long period of observation he was started on acalabrutinib in June 2020 when platelet counts dropped below 100,000, stage IV CLL.  He responded but in June or July 2021 acalabrutinib was discontinued because patient went on aspirin and Plavix post MI and 1 stent in LAD.  Patient went off Plavix on 06/01/2022 .  He is on baby aspirin.  He wanted to go back to therapy.  He qualified for clinical trial for zanubrutinib and he wanted to get started on therapy and zanubrutinib was started in July 2022.  Patient has some tiredness and minor arthritic symptoms.  Occasionally he has some tingling in the arms below the elbows.  Occasionally low back pain that radiates to right leg laterally.  MRI scan of lumbar spine showed degenerative changes and stenosis and foraminal narrowing.  He was offered to see a spine specialist but he declined.  He states his back pain has lessened.  He has been aware of asymmetric lingual tonsils and he was seen by an ENT specialist.  He states nothing wrong was found.  For carotid artery disease patient had an appointment with vascular  surgeon.                 Current Outpatient Medications:   •  aspirin 81 mg chewable tablet, Chew 1 tablet (81 mg total) daily, Disp: 30 tablet, Rfl: 0  •  atorvastatin (LIPITOR) 40 mg tablet, Take 1 tablet (40 mg total) by mouth daily with dinner, Disp: 30 tablet, Rfl: 1  •  co-enzyme Q-10 50 MG capsule, Take 100 mg by mouth daily, Disp: , Rfl:   •  fosinopril (MONOPRIL) 20 mg tablet, Take 20 mg by mouth daily, Disp: , Rfl:   •  magnesium gluconate (MAGONATE) 500 mg tablet, Take 500 mg by mouth daily, Disp: , Rfl:   •  mometasone (NASONEX) 50 mcg/act nasal spray, 2 sprays into each nostril daily, Disp: , Rfl:   •  Multiple Vitamin (MULTIVITAMIN) tablet, Take 1 tablet by mouth daily, Disp: , Rfl:   •  nitroglycerin (NITROSTAT) 0.4 mg SL tablet, Place 1 tablet (0.4 mg total) under the tongue every 5 (five) minutes as needed for chest pain, Disp: 24 tablet, Rfl: 1  •  sertraline (ZOLOFT) 50 mg tablet, Take 75 mg by mouth daily  , Disp: , Rfl:   •  Zanubrutinib 80 MG CAPS, Take by mouth, Disp: , Rfl:   •  Taurine 1000 MG CAPS, Take by mouth, Disp: , Rfl:     Allergies   Allergen Reactions   • Sulfa Antibiotics        Oncology History Overview Note   chronic lymphocytic leukemia, diagnosed in August 2017. CLL has mixed good and bad prognostic features, 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, IgVH mutation, lack of CD38 expression, deletion of 13 q14 in 6% and no 11 q deletion..   . There was no trisomy 12. Lymphocytes were CD5 and CD23 positive, dim kappa expression and moderate CD20 expression had splenomegaly on CT scan but not on palpation     CLL (chronic lymphocytic leukemia) (HCC)   3/27/2018 Initial Diagnosis    CLL (chronic lymphocytic leukemia) (HCC)     1/8/2024 -  Cancer Staged    Staging form: Chronic Lymphocytic Leukemia, AJCC 8th Edition  - Clinical stage from 1/8/2024: Modified Razo Stage IV (Modified Razo risk: High, Lymphocytosis: Present, Adenopathy: Present, Organomegaly: Absent, Anemia: Absent, Thrombocytopenia: Present) -  "Signed by Carroll Castro MD on 1/8/2024  Stage prefix: Initial diagnosis  Absolute lymphocyte count (ALC) (cells/uL): 82,250  Hemoglobin (Hgb) (g/dL): 14.1  Platelet count (x10E9/L of blood): 89,000           ROS:  07/10/24 Reviewed 13 systems: See symptoms in HPI::   Presently no neurological, cardiac, pulmonary, GI,  symptoms.  Other  symptoms are in HPI.  No fever, chills, bleeding, bone pains, skin rash, weight loss,   weakness, numbness and gait problem. No frequent infections.  Not unusually sensitive to heat or cold. No swelling of the ankles. No swollen glands.  Patient is not anxious.         /84 (BP Location: Left arm, Patient Position: Sitting, Cuff Size: Adult)   Pulse 69   Temp 97.8 °F (36.6 °C)   Resp 17   Ht 5' 11\" (1.803 m)   Wt 95.3 kg (210 lb)   SpO2 94%   BMI 29.29 kg/m²     Physical Exam:  Alert and oriented and not in distress.  Vitals are above.  No icterus.  No oral thrush.  No palpable neck mass.  Lung fields are clear to percussion and auscultation.  Heart rate is regular.  There is no palpable abdominal mass.  Abdomen is soft and nontender.  There is no ascites.  No edema of the ankles.  No calf tenderness.  No focal neurological deficit, no skin rash, no palpable lymphadenopathy,  no clubbing.    Patient is not anxious.  Performance status 1.    IMAGING:      IMPRESSION:     No new or enlarging lymph nodes in the chest abdomen or pelvis.     Stable size of cystic pancreatic head lesion.              Workstation performed: [id number]        Imaging    CT chest abdomen pelvis w contrast (Order: [id number]) - 6/28/2024      Narrative & Impression   CT NECK WITH CONTRAST     INDICATION:   C91.10: Chronic lymphocytic leukemia of B-cell type not having achieved remission.     COMPARISON: Neck CT 12/20/2023.     TECHNIQUE:  Axial, sagittal, and coronal 2D reformatted images were created from the axial source data and submitted for interpretation.     Radiation dose length product " (DLP) for this visit:  419 mGy-cm .  This examination, like all CT scans performed in the [facility], was performed utilizing techniques to minimize radiation dose exposure, including the use of iterative   reconstruction and automated exposure control.     IV Contrast:  1 mL of iohexol (OMNIPAQUE)     IMAGE QUALITY:  Diagnostic.     FINDINGS:     VISUALIZED BRAIN PARENCHYMA:  No acute abnormality, noting this examination is not tailored for assessment.     VISUALIZED ORBITS: No acute abnormality.     PARANASAL SINUSES: Scattered mild mucosal thickening.     NASAL CAVITY, NASOPHARYNX AND NECK: Redemonstrated asymmetric prominence of the right lingual tonsil.     THYROID GLAND: No dominant nodule.     PAROTID AND SUBMANDIBULAR GLANDS: No focal mass.     LYMPH NODES:  No pathologic cervical lymph nodes by imaging criteria.     VASCULAR STRUCTURES: Atherosclerosis. No acute abnormality, noting this examination is not tailored for assessment.     THORACIC INLET: Please refer to concurrent CT of the chest, abdomen and pelvis     BONY STRUCTURES: No acute fracture. Bilateral mandibular joselin identified again.     IMPRESSION:     1. No cervical lymphadenopathy by imaging criteria.     2. Redemonstrated asymmetric prominence of the right lingual tonsil, for which correlation with direct visualization is again recommended.     The study was marked in EPIC for significant notification.           Workstation performed: [id number]        Imaging    CT soft tissue neck w contrast (Order: [id number]) - 6/28/2024          MPRESSION:     Since January 23, 2023:  1.  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  2.  Unchanged 1.2 cm hepatic hemangioma.     Workstation performed: [id number]        Imaging    MRI abdomen w wo contrast and mrcp (Order: [id number]) - 3/29/2024    LABS:      Results for orders  placed or performed in visit on 07/03/24   CBC and differential   Result Value Ref Range    WBC 13.72 (H) 4.31 - 10.16 Thousand/uL    RBC 5.32 3.88 - 5.62 Million/uL    Hemoglobin 14.7 12.0 - 17.0 g/dL    Hematocrit 47.8 36.5 - 49.3 %    MCV 90 82 - 98 fL    MCH 27.6 26.8 - 34.3 pg    MCHC 30.8 (L) 31.4 - 37.4 g/dL    RDW 14.8 11.6 - 15.1 %    MPV 11.3 8.9 - 12.7 fL    Platelets 99 (L) 149 - 390 Thousands/uL   Comprehensive metabolic panel   Result Value Ref Range    Sodium 138 135 - 147 mmol/L    Potassium 4.9 3.5 - 5.3 mmol/L    Chloride 106 96 - 108 mmol/L    CO2 28 21 - 32 mmol/L    ANION GAP 4 4 - 13 mmol/L    BUN 34 (H) 5 - 25 mg/dL    Creatinine 1.38 (H) 0.60 - 1.30 mg/dL    Glucose, Fasting 100 (H) 65 - 99 mg/dL    Calcium 8.8 8.4 - 10.2 mg/dL    AST 15 13 - 39 U/L    ALT 12 7 - 52 U/L    Alkaline Phosphatase 65 34 - 104 U/L    Total Protein 6.3 (L) 6.4 - 8.4 g/dL    Albumin 3.9 3.5 - 5.0 g/dL    Total Bilirubin 0.52 0.20 - 1.00 mg/dL    eGFR 48 ml/min/1.73sq m   LD,Blood   Result Value Ref Range     140 - 271 U/L   IgG, IgA, IgM   Result Value Ref Range     66 - 433 mg/dL     (L) 635 - 1,741 mg/dL    IGM 27 (L) 45 - 281 mg/dL   Manual Differential(PHLEBS Do Not Order)   Result Value Ref Range    Segmented % 24 (L) 43 - 75 %    Lymphocytes % 67 (H) 14 - 44 %    Monocytes % 6 4 - 12 %    Eosinophils % 0 0 - 6 %    Basophils % 0 0 - 1 %    Atypical Lymphocytes % 3 (H) <=0 %    Absolute Neutrophils 3.29 1.85 - 7.62 Thousand/uL    Absolute Lymphocytes 9.60 (H) 0.60 - 4.47 Thousand/uL    Absolute Monocytes 0.82 0.00 - 1.22 Thousand/uL    Absolute Eosinophils 0.00 0.00 - 0.40 Thousand/uL    Absolute Basophils 0.00 0.00 - 0.10 Thousand/uL    Total Counted      RBC Morphology Present     Platelet Estimate Decreased (A) Adequate    Large Platelet Present     Anisocytosis Present      Labs, Imaging, & Other studies:   All pertinent labs and imaging studies were personally  reviewed          Assessment and plan:     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  Clinical trial nurse Urvashi is in the room with the patient .  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  Patient is not symptomatic from thrombocytopenia.  No bleeding, bruising or petechiae.  Patient has been tolerating his zanubrutinib without much problem.  No bleeding in spite of taking baby aspirin for coronary stent in LAD.  He is not on Plavix anymore.  No atrial fibrillation.  No other symptoms secondary to zanubrutinib.  No symptoms secondary to CLL.    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  Previously he had 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.  Negative for trisomy 12 and 11 q deletion.  Mutated Ig VH.   CLL was diagnosed few years ago and after a long period of observation he was started on acalabrutinib in June 2020 when platelet counts dropped below 100,000, stage IV CLL.  He responded but in June or July 2021 acalabrutinib was discontinued because patient went on aspirin and Plavix post MI and 1 stent in LAD.  Patient went off Plavix on 06/01/2022 .  He is on baby aspirin.  He wanted to go back to therapy.  He qualified for clinical trial for zanubrutinib and he wanted to get started on therapy and zanubrutinib was started in July 2022.  Patient has some tiredness and minor arthritic symptoms.  Occasionally he has some tingling in the arms below the elbows.  Occasionally low back pain that radiates to right leg laterally.  MRI scan of lumbar spine showed degenerative changes and stenosis and foraminal narrowing.  He was offered to see a spine specialist but he declined.  He states his back pain has lessened.  He has been aware of asymmetric lingual tonsils and he was seen by an ENT specialist.  He states nothing wrong was found.  For carotid artery disease  patient had an appointment with vascular surgeon.   Physical examination and test results are as recorded and discussed.  Patient is on zanubrutinib on clinical trial as above and he seems to be responding to therapy and has been tolerating treatment without much problem.  No change in therapy.  To monitor patient's counts especially thrombocytopenia.  All discussed in detail.  Questions answered.  Discussed the importance of  eating healthy foods, diet and activities as prescribed by patient's cardiologist . Health screening tests.  Patient is capable of self-care.  Goal is remission and prolongation of survival from CLL.  Pancreatic cyst is being monitored.  Discussed precautions against Coronavirus and flu and other infections.        See diagnoses, orders and instructions  1. CLL (chronic lymphocytic leukemia) (HCC)    - CBC and differential; Future  - Comprehensive metabolic panel; Future  - LD,Blood; Future  - Uric acid; Future  - IgG; Future    2. Thrombocytopenia (HCC)      3. Pancreatic cyst      4. Abnormal CT scan, neck      5. Status post coronary artery stent placement      6. Hypertension, essential      7. Mixed hyperlipidemia      8. IPMN (intraductal papillary mucinous neoplasm)      9. Arthritis      10. Stenosis of left carotid artery      Blood work and EKG prior to next visit in 3 months.  Shanta will be putting order for EKG.        Patient saw me using dictation device and I will be using it for rest of the dictation and there could be mistakes in dictation.  Patient is encouraged to contact my office for the mistakes              .       Patient voiced understanding and agreed      Counseling / Coordination of Care   .  Provided counseling and support

## 2024-07-17 ENCOUNTER — TELEPHONE (OUTPATIENT)
Dept: ADMINISTRATIVE | Facility: HOSPITAL | Age: 79
End: 2024-07-17

## 2024-07-17 ENCOUNTER — TELEMEDICINE (OUTPATIENT)
Dept: VASCULAR SURGERY | Facility: CLINIC | Age: 79
End: 2024-07-17

## 2024-07-17 DIAGNOSIS — I65.22 STENOSIS OF LEFT CAROTID ARTERY: Primary | ICD-10-CM

## 2024-07-17 PROCEDURE — NC001 PR NO CHARGE: Performed by: SURGERY

## 2024-07-17 NOTE — TELEPHONE ENCOUNTER
This is a reminder; patient is due for office visit . Please call patient and schedule the following by the dates provided.    Patient's appointment(s) are due on or after 1/17/25 .    Dopplers  [] Abdominal Aorta Iliac (AOIL)  [x] Carotid (CV) scheduled for 1/17/25  [] Celiac and/or Mesenteric  [] Endovascular Aortic Repair (EVAR)   [] Hemodialysis Access (HD)   [] Lower Limb Arterial (MERE)  [] Lower Limb Venous (LEV)  [] Lower Limb Venous Duplex with Reflux (LEVDR)  [] Renal Artery  [] Upper Limb Arterial (UEA)    [] Upper Limb Venous (UEV)              [] MANUEL and Waveform analysis     Advanced Imaging   [] CTA head/neck    [] CTA abdomen    [] CTA abdomen & pelvis    [] CT abdomen with/ without contrast  [] CT abdomen with contrast  [] CT abdomen without contrast    [] CT abdomen & pelvis with/ without contrast  [] CT abdomen & pelvis with contrast  [] CT abdomen & pelvis without contrast    Office Visit   [] New patient, patient last seen over 3 years ago  [] Risk factor modification (RFM)   [x] Follow up   [] Lost to follow up (LTFU)

## 2024-07-17 NOTE — PROGRESS NOTES
Pt did not answer the phone for virtual visit. Will order carotid duplex in 6 months for surveillance of moderate left ICA stenosis.

## 2024-08-20 ENCOUNTER — OFFICE VISIT (OUTPATIENT)
Dept: CARDIOLOGY CLINIC | Facility: CLINIC | Age: 79
End: 2024-08-20
Payer: COMMERCIAL

## 2024-08-20 VITALS
TEMPERATURE: 97.9 F | DIASTOLIC BLOOD PRESSURE: 70 MMHG | WEIGHT: 211 LBS | RESPIRATION RATE: 16 BRPM | OXYGEN SATURATION: 97 % | SYSTOLIC BLOOD PRESSURE: 142 MMHG | HEIGHT: 71 IN | HEART RATE: 62 BPM | BODY MASS INDEX: 29.54 KG/M2

## 2024-08-20 DIAGNOSIS — I25.10 CAD (CORONARY ARTERY DISEASE): ICD-10-CM

## 2024-08-20 DIAGNOSIS — I21.3 STEMI (ST ELEVATION MYOCARDIAL INFARCTION) (HCC): ICD-10-CM

## 2024-08-20 DIAGNOSIS — I10 HYPERTENSION, ESSENTIAL: Primary | ICD-10-CM

## 2024-08-20 DIAGNOSIS — E78.5 HYPERLIPIDEMIA, UNSPECIFIED HYPERLIPIDEMIA TYPE: Chronic | ICD-10-CM

## 2024-08-20 PROCEDURE — 99214 OFFICE O/P EST MOD 30 MIN: CPT

## 2024-08-20 RX ORDER — AMLODIPINE BESYLATE 5 MG/1
5 TABLET ORAL DAILY
Qty: 30 TABLET | Refills: 2 | Status: SHIPPED | OUTPATIENT
Start: 2024-08-20

## 2024-08-20 RX ORDER — ASPIRIN 81 MG/1
81 TABLET, CHEWABLE ORAL DAILY
Qty: 90 TABLET | Refills: 3 | Status: SHIPPED | OUTPATIENT
Start: 2024-08-20

## 2024-08-20 RX ORDER — ATORVASTATIN CALCIUM 40 MG/1
40 TABLET, FILM COATED ORAL
Qty: 90 TABLET | Refills: 3 | Status: SHIPPED | OUTPATIENT
Start: 2024-08-20

## 2024-08-20 NOTE — PROGRESS NOTES
Holden Schmidt  1945  [id number]  [facility] [address] [facility] [address] [phone]    1. Hypertension, essential  amLODIPine (NORVASC) 5 mg tablet          Summary/Discussion:  Coronary artery disease:  - with prior MI s/p PIC to the LAD in 7/2021  LHC (7/2021): residual 60% stenosis in the distal RCA  continue with medical management  - echo (6/2022): LV wall thickness increased, mild concentric hypertrophy, LVEF 60%, normal systolic function, no RWMA, G1DD, normal RV function, no significant valvular disease  - without symptoms of angina   - continue aspirin and statin. PRN SL nitroglycerin prescribed   - historically not on beta-blocker due to hx of bradycardia    Hypertension:  - sub-optimally controlled, 142/70  prior documentation of SBP in the 140s with reports noticing elevated blood pressures recently.  He admits to not being as active over the last several months which could be contributing to his recent elevation in his blood pressures.  Reports a low-sodium diet  - recommend initiation of amlodipine 5 mg daily  - continue fosinopril 20 mg daily  creatinine slightly elevated on most recent lab work.  repeat CMP ordered by his oncologist  - lifestyle modification   - close blood pressure monitoring     Dyslipidemia:  - lipid profile (5/2024): total cholesterol 152, triglycerides 130, HDL 40, LDL 86  - suboptimal LDL--continue statin with LDL goal <70  - encouraged low cholesterol, mediterranean diet, and annual lipid follow up    CKD III:  - creatinine as noted above  - repeat CMP    CLL:  - remains on his investigational trial for this  - EKGs are followed regularly in this regard and have been reviewed prior to visit  predominant rhythm sinus rhythm/sinus bradycardia  PACs noted in 4/2023  not on beta-blocker as noted above  remains asymptomatic  LVEF preserved    Interval History: Holden Schmidt is a 79 y.o. year old male with history of CAD s/p  PCI to LAD in 7/2021, HTN, HLD, CKD III, and CLL who presents to the office today for routine follow up.    Since his last office visit he has no significant cardiac complaints. He denies any chest pain/pressure/discomfort or shortness of breath. He denies lower extremity edema, orthopnea, and PND. He denies lightheadedness, dizziness, and syncope. He denies palpitations.  He has noticed a slight increase in his blood pressures recently with reports of not being as active and used to walk 1 mile every day with his dog.  He has intentions to start exercising regularly again.      No further cardiac testing indicated at this time.    He will RTO in 1 year with Dr. Stringer or sooner if necessary. He will call with any concerns.       Medical Problems       Problem List       Lymphocytosis    Chills    Acute kidney insufficiency    Hyperlipidemia, unspecified (Chronic)    Hypertension, essential (Chronic)    Nausea    CLL (chronic lymphocytic leukemia) (HCC) (Chronic)    Thrombocytopenia (HCC)    Serum potassium elevated    Anxiety    Coronary artery disease involving native coronary artery of native heart without angina pectoris    ST elevation myocardial infarction involving left anterior descending (LAD) coronary artery (HCC)    Status post coronary artery stent placement    Pancreatic cyst    Arthritis    IPMN (intraductal papillary mucinous neoplasm)    Stage 3a chronic kidney disease (HCC)    Lab Results   Component Value Date    EGFR 48 07/03/2024    EGFR 56 04/02/2024    EGFR 60 12/22/2023    CREATININE 1.38 (H) 07/03/2024    CREATININE 1.22 04/02/2024    CREATININE 1.16 12/22/2023         Stenosis of left carotid artery    Left carotid stenosis    Abnormal CT scan, neck        Past Medical History:   Diagnosis Date    Anxiety     Hypertension     Leukemia (HCC)      Social History     Socioeconomic History    Marital status: /Civil Union     Spouse name: Not on file    Number of children: Not on file     Years of education: Not on file    Highest education level: Not on file   Occupational History    Not on file   Tobacco Use    Smoking status: Never    Smokeless tobacco: Never   Vaping Use    Vaping status: Not on file   Substance and Sexual Activity    Alcohol use: Not Currently     Comment: minimal    Drug use: No    Sexual activity: Not on file   Other Topics Concern    Not on file   Social History Narrative    Not on file     Social Determinants of Health     Financial Resource Strain: Medium Risk (2/27/2024)    [facility]    Overall Financial Resource Strain (CARDIA)     Difficulty of Paying Living Expenses: Somewhat hard   Food Insecurity: No Food Insecurity (2/27/2024)    [facility]    Hunger Vital Sign     Worried About Running Out of Food in the Last Year: Never true     Ran Out of Food in the Last Year: Never true   Transportation Needs: No Transportation Needs (2/27/2024)    [facility] from [facility], [facility]    PRAPARE - Transportation     Lack of Transportation (Medical): No     Lack of Transportation (Non-Medical): No   Physical Activity: Not on file   Stress: No Stress Concern Present (2/27/2024)    [facility] from [facility]    [employer] - Occupational Stress Questionnaire     Feeling of Stress : Not at all   Social Connections: Feeling Socially Integrated (2/27/2024)    [facility] from [facility]    OASIS : Social Isolation     How often do you feel lonely or isolated from those around you?: Never   Intimate Partner Violence: Not At Risk (2/27/2024)    [facility] from [facility], [facility]    Humiliation, Afraid, Rape, and Kick questionnaire     Fear of Current or Ex-Partner: No      Emotionally Abused: No     Physically Abused: No     Sexually Abused: No   Housing Stability: Low Risk  (2/27/2024)    [facility]    Housing Stability Vital Sign     Unable to Pay for Housing in the Last Year: No     Number of Places Lived in the Last Year: 1     Unstable Housing in the Last Year: No      Family History   Problem Relation Age of Onset    No Known Problems Mother     No Known Problems Father      Past Surgical History:   Procedure Laterality Date    APPENDECTOMY      COLONOSCOPY      TONSILLECTOMY         Current Outpatient Medications:     amLODIPine (NORVASC) 5 mg tablet, Take 1 tablet (5 mg total) by mouth daily, Disp: 30 tablet, Rfl: 2    aspirin 81 mg chewable tablet, Chew 1 tablet (81 mg total) daily, Disp: 30 tablet, Rfl: 0    atorvastatin (LIPITOR) 40 mg tablet, Take 1 tablet (40 mg total) by mouth daily with dinner, Disp: 30 tablet, Rfl: 1    co-enzyme Q-10 50 MG capsule, Take 100 mg by mouth daily, Disp: , Rfl:     fosinopril (MONOPRIL) 20 mg tablet, Take 20 mg by mouth daily, Disp: , Rfl:     magnesium gluconate (MAGONATE) 500 mg tablet, Take 500 mg by mouth daily, Disp: , Rfl:     mometasone (NASONEX) 50 mcg/act nasal spray, 2 sprays into each nostril daily, Disp: , Rfl:     Multiple Vitamin (MULTIVITAMIN) tablet, Take 1 tablet by mouth daily, Disp: , Rfl:     sertraline (ZOLOFT) 50 mg tablet, Take 75 mg by mouth daily  , Disp: , Rfl:     Zanubrutinib 80 MG CAPS, Take by mouth, Disp: , Rfl:     nitroglycerin (NITROSTAT) 0.4 mg SL tablet, Place 1 tablet (0.4 mg total) under the tongue every 5 (five) minutes as needed for chest pain (Patient not taking: Reported on 8/20/2024), Disp: 24 tablet, Rfl: 1    Taurine 1000 MG CAPS, Take by mouth, Disp: , Rfl:   Allergies   Allergen Reactions    Sulfa Antibiotics        Labs:     Chemistry        Component Value Date/Time    K 4.9 07/03/2024 0714     07/03/2024 0714    CO2 28  "07/03/2024 0714    BUN 34 (H) 07/03/2024 0714    CREATININE 1.38 (H) 07/03/2024 0714        Component Value Date/Time    CALCIUM 8.8 07/03/2024 0714    ALKPHOS 65 07/03/2024 0714    AST 15 07/03/2024 0714    ALT 12 07/03/2024 0714            No results found for: \"CHOL\"  Lab Results   Component Value Date    HDL 39 (L) 05/24/2023    HDL 37 (L) 10/14/2021    HDL 41 07/02/2021     Lab Results   Component Value Date    LDLCALC 70 05/24/2023    LDLCALC 76 10/14/2021    LDLCALC 80 07/02/2021     Lab Results   Component Value Date    TRIG 78 05/24/2023    TRIG 113 10/14/2021    TRIG 173 (H) 07/02/2021     No results found for: \"CHOLHDL\"    Imaging: No results found.    ECG:  n/a    Review of Systems   Constitutional: Negative.   HENT: Negative.     Eyes: Negative.    Cardiovascular:  Negative for chest pain, dyspnea on exertion, irregular heartbeat, leg swelling, near-syncope, orthopnea, palpitations, paroxysmal nocturnal dyspnea and syncope.   Respiratory:  Negative for shortness of breath and sleep disturbances due to breathing.    Endocrine: Negative.    Hematologic/Lymphatic: Negative for bleeding problem.   Skin: Negative.    Musculoskeletal: Negative.    Gastrointestinal: Negative.    Genitourinary: Negative.    Neurological:  Negative for dizziness and light-headedness.   Psychiatric/Behavioral: Negative.     Allergic/Immunologic: Negative.        Vitals:    08/20/24 0825   BP: 142/70   Pulse: 62   Resp: 16   Temp: 97.9 °F (36.6 °C)   SpO2: 97%     Vitals:    08/20/24 0825   Weight: 95.7 kg (211 lb)     Height: 5' 11\" (180.3 cm)   Body mass index is 29.43 kg/m².    Physical Exam  Vitals and nursing note reviewed.   Constitutional:       General: He is not in acute distress.  HENT:      Head: Normocephalic.      Nose: Nose normal.      Mouth/Throat:      Mouth: Mucous membranes are moist.      Pharynx: Oropharynx is clear.   Cardiovascular:      Rate and Rhythm: Normal rate and regular rhythm.      Heart sounds: No " murmur heard.  Pulmonary:      Effort: Pulmonary effort is normal.      Breath sounds: Normal breath sounds.   Musculoskeletal:         General: Normal range of motion.      Cervical back: Normal range of motion.      Right lower leg: No edema.      Left lower leg: No edema.   Skin:     General: Skin is warm and dry.      Coloration: Skin is pale.   Neurological:      Mental Status: He is alert and oriented to person, place, and time.   Psychiatric:         Behavior: Behavior normal.

## 2024-09-11 DIAGNOSIS — I10 HYPERTENSION, ESSENTIAL: ICD-10-CM

## 2024-09-11 RX ORDER — AMLODIPINE BESYLATE 5 MG/1
5 TABLET ORAL DAILY
Qty: 90 TABLET | Refills: 1 | Status: SHIPPED | OUTPATIENT
Start: 2024-09-11

## 2024-09-16 ENCOUNTER — TELEPHONE (OUTPATIENT)
Dept: HEMATOLOGY ONCOLOGY | Facility: CLINIC | Age: 79
End: 2024-09-16

## 2024-09-16 NOTE — TELEPHONE ENCOUNTER
Spoke with patient regarding need to reschedule upcoming appointment with Dr. Castro from 10/10 to 10/28 at 4:20pm due to provider availability.  Patient verbalized understanding.

## 2024-09-27 DIAGNOSIS — C91.10 CLL (CHRONIC LYMPHOCYTIC LEUKEMIA) (HCC): Primary | ICD-10-CM

## 2024-09-29 DIAGNOSIS — R93.89 ABNORMAL CT SCAN, NECK: ICD-10-CM

## 2024-09-29 DIAGNOSIS — C91.10 CLL (CHRONIC LYMPHOCYTIC LEUKEMIA) (HCC): Primary | ICD-10-CM

## 2024-09-29 DIAGNOSIS — K86.2 PANCREATIC CYST: ICD-10-CM

## 2024-09-29 DIAGNOSIS — D49.0 IPMN (INTRADUCTAL PAPILLARY MUCINOUS NEOPLASM): ICD-10-CM

## 2024-10-10 ENCOUNTER — DOCUMENTATION (OUTPATIENT)
Dept: OTHER | Facility: HOSPITAL | Age: 79
End: 2024-10-10

## 2024-10-10 NOTE — PROGRESS NOTES
I met patient in the Med/Onc waiting room in  for a [id number] pill dispense only visit as his office visit was rescheduled to a later date and he would run out of medication.  Patient returned 5 pill bottles for C25-C27 along with pill diaries.  Pill bottles returned to investigation pharmacy.  5 pill bottles dispensed for C28-C30 along with new pill diaries. Patient will return next Friday for office visit.  He will have CT's, labs and ECG completed prior to visit.

## 2024-10-14 ENCOUNTER — APPOINTMENT (OUTPATIENT)
Dept: LAB | Facility: CLINIC | Age: 79
End: 2024-10-14
Payer: COMMERCIAL

## 2024-10-14 DIAGNOSIS — C91.10 CLL (CHRONIC LYMPHOCYTIC LEUKEMIA) (HCC): ICD-10-CM

## 2024-10-14 LAB
ALBUMIN SERPL BCG-MCNC: 3.8 G/DL (ref 3.5–5)
ALP SERPL-CCNC: 63 U/L (ref 34–104)
ALT SERPL W P-5'-P-CCNC: 15 U/L (ref 7–52)
ANION GAP SERPL CALCULATED.3IONS-SCNC: 7 MMOL/L (ref 4–13)
AST SERPL W P-5'-P-CCNC: 18 U/L (ref 13–39)
ATRIAL RATE: 59 BPM
BASOPHILS # BLD MANUAL: 0.13 THOUSAND/UL (ref 0–0.1)
BASOPHILS NFR MAR MANUAL: 1 % (ref 0–1)
BILIRUB SERPL-MCNC: 0.58 MG/DL (ref 0.2–1)
BUN SERPL-MCNC: 27 MG/DL (ref 5–25)
CALCIUM SERPL-MCNC: 8.7 MG/DL (ref 8.4–10.2)
CHLORIDE SERPL-SCNC: 105 MMOL/L (ref 96–108)
CO2 SERPL-SCNC: 27 MMOL/L (ref 21–32)
CREAT SERPL-MCNC: 1.27 MG/DL (ref 0.6–1.3)
EOSINOPHIL # BLD MANUAL: 0.26 THOUSAND/UL (ref 0–0.4)
EOSINOPHIL NFR BLD MANUAL: 2 % (ref 0–6)
ERYTHROCYTE [DISTWIDTH] IN BLOOD BY AUTOMATED COUNT: 14.7 % (ref 11.6–15.1)
GFR SERPL CREATININE-BSD FRML MDRD: 53 ML/MIN/1.73SQ M
GLUCOSE P FAST SERPL-MCNC: 106 MG/DL (ref 65–99)
HCT VFR BLD AUTO: 45.9 % (ref 36.5–49.3)
HGB BLD-MCNC: 14.4 G/DL (ref 12–17)
IGG SERPL-MCNC: 593 MG/DL (ref 635–1741)
LDH SERPL-CCNC: 178 U/L (ref 140–271)
LG PLATELETS BLD QL SMEAR: PRESENT
LYMPHOCYTES # BLD AUTO: 59 % (ref 14–44)
LYMPHOCYTES # BLD AUTO: 8.38 THOUSAND/UL (ref 0.6–4.47)
MCH RBC QN AUTO: 27.9 PG (ref 26.8–34.3)
MCHC RBC AUTO-ENTMCNC: 31.4 G/DL (ref 31.4–37.4)
MCV RBC AUTO: 89 FL (ref 82–98)
MONOCYTES # BLD AUTO: 0.52 THOUSAND/UL (ref 0–1.22)
MONOCYTES NFR BLD: 4 % (ref 4–12)
NEUTROPHILS # BLD MANUAL: 3.8 THOUSAND/UL (ref 1.85–7.62)
NEUTS SEG NFR BLD AUTO: 29 % (ref 43–75)
PLATELET # BLD AUTO: 104 THOUSANDS/UL (ref 149–390)
PLATELET BLD QL SMEAR: ABNORMAL
PMV BLD AUTO: 11.6 FL (ref 8.9–12.7)
POTASSIUM SERPL-SCNC: 4.7 MMOL/L (ref 3.5–5.3)
PR INTERVAL: 152 MS
PROT SERPL-MCNC: 5.8 G/DL (ref 6.4–8.4)
QRS AXIS: 7 DEGREES
QRSD INTERVAL: 84 MS
QT INTERVAL: 396 MS
QTC INTERVAL: 392 MS
RBC # BLD AUTO: 5.16 MILLION/UL (ref 3.88–5.62)
RBC MORPH BLD: NORMAL
SODIUM SERPL-SCNC: 139 MMOL/L (ref 135–147)
T WAVE AXIS: 45 DEGREES
URATE SERPL-MCNC: 4.7 MG/DL (ref 3.5–8.5)
VARIANT LYMPHS # BLD AUTO: 5 %
VENTRICULAR RATE: 59 BPM
WBC # BLD AUTO: 13.1 THOUSAND/UL (ref 4.31–10.16)

## 2024-10-14 PROCEDURE — 85027 COMPLETE CBC AUTOMATED: CPT

## 2024-10-14 PROCEDURE — 85007 BL SMEAR W/DIFF WBC COUNT: CPT

## 2024-10-14 PROCEDURE — 82784 ASSAY IGA/IGD/IGG/IGM EACH: CPT

## 2024-10-14 PROCEDURE — 80053 COMPREHEN METABOLIC PANEL: CPT

## 2024-10-14 PROCEDURE — 83615 LACTATE (LD) (LDH) ENZYME: CPT

## 2024-10-14 PROCEDURE — 36415 COLL VENOUS BLD VENIPUNCTURE: CPT

## 2024-10-14 PROCEDURE — 84550 ASSAY OF BLOOD/URIC ACID: CPT

## 2024-10-14 PROCEDURE — 93010 ELECTROCARDIOGRAM REPORT: CPT | Performed by: INTERNAL MEDICINE

## 2024-10-15 ENCOUNTER — HOSPITAL ENCOUNTER (OUTPATIENT)
Dept: CT IMAGING | Facility: HOSPITAL | Age: 79
Discharge: HOME | End: 2024-10-15
Attending: INTERNAL MEDICINE
Payer: COMMERCIAL

## 2024-10-15 DIAGNOSIS — K86.2 PANCREATIC CYST: ICD-10-CM

## 2024-10-15 DIAGNOSIS — C91.10 CLL (CHRONIC LYMPHOCYTIC LEUKEMIA) (HCC): ICD-10-CM

## 2024-10-15 DIAGNOSIS — D49.0 IPMN (INTRADUCTAL PAPILLARY MUCINOUS NEOPLASM): ICD-10-CM

## 2024-10-15 DIAGNOSIS — R93.89 ABNORMAL CT SCAN, NECK: ICD-10-CM

## 2024-10-15 PROCEDURE — 70491 CT SOFT TISSUE NECK W/DYE: CPT

## 2024-10-15 PROCEDURE — 71260 CT THORAX DX C+: CPT

## 2024-10-15 PROCEDURE — 74177 CT ABD & PELVIS W/CONTRAST: CPT

## 2024-10-15 RX ADMIN — IOHEXOL 100 ML: 350 INJECTION, SOLUTION INTRAVENOUS at 08:57

## 2024-10-18 ENCOUNTER — OFFICE VISIT (OUTPATIENT)
Dept: HEMATOLOGY ONCOLOGY | Facility: CLINIC | Age: 79
End: 2024-10-18

## 2024-10-18 VITALS
RESPIRATION RATE: 18 BRPM | BODY MASS INDEX: 30.38 KG/M2 | WEIGHT: 217 LBS | TEMPERATURE: 97.5 F | OXYGEN SATURATION: 98 % | HEART RATE: 75 BPM | HEIGHT: 71 IN | SYSTOLIC BLOOD PRESSURE: 154 MMHG | DIASTOLIC BLOOD PRESSURE: 82 MMHG

## 2024-10-18 DIAGNOSIS — E78.2 MIXED HYPERLIPIDEMIA: ICD-10-CM

## 2024-10-18 DIAGNOSIS — C91.10 CLL (CHRONIC LYMPHOCYTIC LEUKEMIA) (HCC): Primary | ICD-10-CM

## 2024-10-18 DIAGNOSIS — D69.6 THROMBOCYTOPENIA (HCC): ICD-10-CM

## 2024-10-18 DIAGNOSIS — I49.1 PREMATURE ATRIAL COMPLEXES: ICD-10-CM

## 2024-10-18 DIAGNOSIS — Z95.5 STATUS POST CORONARY ARTERY STENT PLACEMENT: ICD-10-CM

## 2024-10-18 DIAGNOSIS — M19.90 ARTHRITIS: ICD-10-CM

## 2024-10-18 DIAGNOSIS — R93.89 ABNORMAL CT SCAN, NECK: ICD-10-CM

## 2024-10-18 DIAGNOSIS — K86.2 PANCREATIC CYST: ICD-10-CM

## 2024-10-18 DIAGNOSIS — C91.10 CLL (CHRONIC LYMPHOCYTIC LEUKEMIA) (HCC): ICD-10-CM

## 2024-10-18 DIAGNOSIS — I70.90 ATHEROSCLEROSIS: ICD-10-CM

## 2024-10-18 DIAGNOSIS — I10 HYPERTENSION, ESSENTIAL: ICD-10-CM

## 2024-10-18 DIAGNOSIS — I65.22 STENOSIS OF LEFT CAROTID ARTERY: ICD-10-CM

## 2024-10-18 DIAGNOSIS — D49.0 IPMN (INTRADUCTAL PAPILLARY MUCINOUS NEOPLASM): ICD-10-CM

## 2024-10-18 NOTE — PROGRESS NOTES
HPI: Clinical trial nurse was in the room.  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  Patient has been on Zanubrutinib on clinical trial since July 2022. 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  This time platelet count is 104,000.  Patient is not symptomatic from thrombocytopenia.  No bleeding, bruising or petechiae.  Patient has been tolerating his zanubrutinib without much problem.  No bleeding in spite of taking baby aspirin for coronary stent in LAD.  He is not on Plavix anymore.  No atrial fibrillation.  No other symptoms secondary to zanubrutinib.  No symptoms secondary to CLL.    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  Previously he had 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.  Negative for trisomy 12 and 11 q deletion.  Mutated Ig VH.   CLL was diagnosed few years ago and after a long period of observation he was started on acalabrutinib in June 2020 when platelet counts dropped below 100,000, stage IV CLL.  He responded but in June or July 2021 acalabrutinib was discontinued because patient went on aspirin and Plavix post MI and 1 stent in LAD.  Patient went off Plavix on 06/01/2022 .  He is on baby aspirin.  He wanted to go back to therapy.  He qualified for clinical trial for zanubrutinib and he wanted to get started on therapy and zanubrutinib was started in July 2022.  Patient has some tiredness and minor arthritic symptoms.  Occasionally he has some tingling in the arms below the elbows.  Patient has asymmetrical lingual tonsil and he was seen by ENT specialist.  He has atherosclerotic carotid blood vessels and he follows with vascular surgeon.  Occasionally low back pain that radiates to right leg laterally.  MRI scan of lumbar spine showed degenerative changes and stenosis and foraminal narrowing.  He was offered to see a spine specialist but he declined.  He states his back pain has  lessened.        Current Outpatient Medications:     amLODIPine (NORVASC) 5 mg tablet, TAKE 1 TABLET (5 MG TOTAL) BY MOUTH DAILY., Disp: 90 tablet, Rfl: 1    aspirin 81 mg chewable tablet, Chew 1 tablet (81 mg total) daily, Disp: 90 tablet, Rfl: 3    atorvastatin (LIPITOR) 40 mg tablet, Take 1 tablet (40 mg total) by mouth daily with dinner, Disp: 90 tablet, Rfl: 3    co-enzyme Q-10 50 MG capsule, Take 100 mg by mouth daily, Disp: , Rfl:     fosinopril (MONOPRIL) 20 mg tablet, Take 20 mg by mouth daily, Disp: , Rfl:     magnesium gluconate (MAGONATE) 500 mg tablet, Take 500 mg by mouth daily, Disp: , Rfl:     mometasone (NASONEX) 50 mcg/act nasal spray, 2 sprays into each nostril daily, Disp: , Rfl:     Multiple Vitamin (MULTIVITAMIN) tablet, Take 1 tablet by mouth daily, Disp: , Rfl:     sertraline (ZOLOFT) 50 mg tablet, Take 75 mg by mouth daily  , Disp: , Rfl:     Zanubrutinib 80 MG CAPS, Take by mouth, Disp: , Rfl:     nitroglycerin (NITROSTAT) 0.4 mg SL tablet, Place 1 tablet (0.4 mg total) under the tongue every 5 (five) minutes as needed for chest pain (Patient not taking: Reported on 8/20/2024), Disp: 24 tablet, Rfl: 1    Taurine 1000 MG CAPS, Take by mouth, Disp: , Rfl:     Allergies   Allergen Reactions    Sulfa Antibiotics        Oncology History Overview Note   chronic lymphocytic leukemia, diagnosed in August 2017. CLL has mixed good and bad prognostic features, 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, IgVH mutation, lack of CD38 expression, deletion of 13 q14 in 6% and no 11 q deletion..   . There was no trisomy 12. Lymphocytes were CD5 and CD23 positive, dim kappa expression and moderate CD20 expression had splenomegaly on CT scan but not on palpation     CLL (chronic lymphocytic leukemia) ([facility])   3/27/2018 Initial Diagnosis    CLL (chronic lymphocytic leukemia) ([facility])     1/8/2024 -  Cancer Staged    Staging form: Chronic Lymphocytic Leukemia, AJCC 8th Edition  - Clinical stage from 1/8/2024: Modified Razo Stage IV (Modified  "Razo risk: High, Lymphocytosis: Present, Adenopathy: Present, Organomegaly: Absent, Anemia: Absent, Thrombocytopenia: Present) - Signed by Carroll Castro MD on 1/8/2024  Stage prefix: Initial diagnosis  Absolute lymphocyte count (ALC) (cells/uL): 82,250  Hemoglobin (Hgb) (g/dL): 14.1  Platelet count (x10E9/L of blood): 89,000           ROS:  10/18/24 Reviewed 13 systems: See symptoms in HPI::   Presently no neurological, cardiac, pulmonary, GI,  symptoms.  Other  symptoms are in HPI.  No symptoms like fever, chills, bleeding, bone pains, skin rash, weight loss,   weakness, numbness and gait problem. No frequent infections.  Not unusually sensitive to heat or cold. No swelling of the ankles. No swollen glands.  Patient is not anxious.         /82 (BP Location: Left arm, Patient Position: Sitting, Cuff Size: Adult)   Pulse 75   Temp 97.5 °F (36.4 °C) (Temporal)   Resp 18   Ht 5' 11\" (1.803 m)   Wt 98.4 kg (217 lb)   SpO2 98%   BMI 30.27 kg/m²     Physical Exam:  Patient is alert and oriented.  Patient is not in distress.  Vital signs are above.  There is no icterus.  There is no oral thrush.  There is no palpable neck mass.  Clear lung fields.  Regular heart rate.  No palp abdominal mass.  Soft and nontender abdomen.  No ascites.  No edema of the ankles.  No calf tenderness.  There is no focal neurological deficit, no skin rash, no palpable lymphadenopathy,  no clubbing.    Patient is not anxious.  Performance status 1.    IMAGING:      IMPRESSION:     No new or enlarging lymph nodes in the chest abdomen or pelvis.     Stable size of cystic pancreatic head lesion.              Workstation performed: [id number]        Imaging    CT chest abdomen pelvis w contrast (Order: [id number]) - 6/28/2024      Narrative & Impression   CT NECK WITH CONTRAST     INDICATION:   C91.10: Chronic lymphocytic leukemia of B-cell type not having achieved remission.     COMPARISON: Neck CT 12/20/2023.     TECHNIQUE:  Axial, " sagittal, and coronal 2D reformatted images were created from the axial source data and submitted for interpretation.     Radiation dose length product (DLP) for this visit:  419 mGy-cm .  This examination, like all CT scans performed in the [facility], was performed utilizing techniques to minimize radiation dose exposure, including the use of iterative   reconstruction and automated exposure control.     IV Contrast:  1 mL of iohexol (OMNIPAQUE)     IMAGE QUALITY:  Diagnostic.     FINDINGS:     VISUALIZED BRAIN PARENCHYMA:  No acute abnormality, noting this examination is not tailored for assessment.     VISUALIZED ORBITS: No acute abnormality.     PARANASAL SINUSES: Scattered mild mucosal thickening.     NASAL CAVITY, NASOPHARYNX AND NECK: Redemonstrated asymmetric prominence of the right lingual tonsil.     THYROID GLAND: No dominant nodule.     PAROTID AND SUBMANDIBULAR GLANDS: No focal mass.     LYMPH NODES:  No pathologic cervical lymph nodes by imaging criteria.     VASCULAR STRUCTURES: Atherosclerosis. No acute abnormality, noting this examination is not tailored for assessment.     THORACIC INLET: Please refer to concurrent CT of the chest, abdomen and pelvis     BONY STRUCTURES: No acute fracture. Bilateral mandibular joselin identified again.     IMPRESSION:     1. No cervical lymphadenopathy by imaging criteria.     2. Redemonstrated asymmetric prominence of the right lingual tonsil, for which correlation with direct visualization is again recommended.     The study was marked in EPIC for significant notification.           Workstation performed: [id number]        Imaging    CT soft tissue neck w contrast (Order: [id number]) - 6/28/2024          MPRESSION:     Since January 23, 2023:  1.  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  2.  Unchanged 1.2 cm hepatic hemangioma.      Workstation performed: [id number]        Imaging    MRI abdomen w wo contrast and mrcp (Order: [id number]) - 3/29/2024    LABS:      Results for orders placed or performed in visit on 10/14/24   ECG 12 lead    Collection Time: 10/14/24  7:03 AM   Result Value Ref Range    Ventricular Rate 59 BPM    Atrial Rate 59 BPM    WA Interval 152 ms    QRSD Interval 84 ms    QT Interval 396 ms    QTC Interval 392 ms    P Axis  degrees    QRS Axis 7 degrees    T Wave Axis 45 degrees     Labs, Imaging, & Other studies:   All pertinent labs and imaging studies were personally reviewed  CBC and differential  Status: Final result      Contains abnormal data CBC and differential  Order: [id number]   Status: Final result       Visible to patient: Yes (seen)       Next appt: 01/06/2025 at 12:40 PM in Hematology and Oncology (Carroll Castro MD)       Dx: CLL (chronic lymphocytic leukemia) ([facility])    0 Result Notes            Component  Ref Range & Units 10/14/24  7:01 AM 7/3/24  7:14 AM 4/2/24  6:52 AM 12/22/23  6:36 AM 9/26/23  6:22 AM 6/28/23  6:22 AM 3/31/23  6:32 AM   WBC  4.31 - 10.16 Thousand/uL 13.10 High  13.72 High  15.27 High  20.13 High  26.28 High  26.11 High  33.43 High Panic  CM   RBC  3.88 - 5.62 Million/uL 5.16 5.32 5.29 5.31 5.19 5.30 5.33   Hemoglobin  12.0 - 17.0 g/dL 14.4 14.7 14.6 14.9 14.2 14.8 14.8   Hematocrit  36.5 - 49.3 % 45.9 47.8 47.6 47.4 46.4 47.9 47.7   MCV  82 - 98 fL 89 90 90 89 89 90 90   MCH  26.8 - 34.3 pg 27.9 27.6 27.6 28.1 27.4 27.9 27.8   MCHC  31.4 - 37.4 g/dL 31.4 30.8 Low  30.7 Low  31.4 30.6 Low  30.9 Low  31.0 Low    RDW  11.6 - 15.1 % 14.7 14.8 15.3 High  14.6 14.5 15.0 14.8   MPV  8.9 - 12.7 fL 11.6 11.3 10.6 11.4 11.4 11.2 11.7   Platelets  149 - 390 Thousands/uL 104 Low  99 Low   Low   Low  141 Low   Low  124 Low    Comment: Results verified by repeatManual Review of Smear Performed   nRBC   0 R       Absolute Lymphocytes   10.24 High  R       Absolute Monocytes    0.75 R       Eosinophils Absolute   0.48 R       Basophils Absolute   0.07 R       Segmented %   24 Low  R       Immature Grans %   0 R       Lymphocytes %   67 High  R       Monocytes %   5 R       Eosinophils Relative   3 R       Basophils Relative   1 R       Absolute Neutrophils   3.70 R       Absolute Immature Grans   0.03 R                  Specimen Collected: 10/14/24  7:01 AM Last Resulted: 10/14/24 12:35 PM             Manual Differential(PHLEBS Do Not Order)  Status: Final result      Contains abnormal data Manual Differential(PHLEBS Do Not Order)  Order: [id number] - Reflex for Order [id number]   Status: Final result       Visible to patient: Yes (seen)       Next appt: 01/06/2025 at 12:40 PM in Hematology and Oncology (Carroll Castro MD)       Dx: CLL (chronic lymphocytic leukemia) ([facility])    0 Result Notes            Component  Ref Range & Units 10/14/24  7:01 AM 7/3/24  7:14 AM 4/2/24  6:52 AM 4/2/24  6:52 AM 12/22/23  6:36 AM 9/26/23  6:22 AM 6/28/23  6:22 AM   Segmented %  43 - 75 % 29 Low  24 Low   24 Low  16 Low  23 Low  13 Low    Lymphocytes %  14 - 44 % 59 High  67 High   67 High  80 High  42 81 High    Monocytes %  4 - 12 % 4 6  5 2 Low  2 Low  3 Low    Eosinophils %  0 - 6 % 2 0  3 2 0 0   Basophils %  0 - 1 % 1 0  1 0 1 0   Atypical Lymphocytes %  <=0 % 5 High  3 High     32 High  3 High    Absolute Neutrophils  1.85 - 7.62 Thousand/uL 3.80 3.29  3.70 R 3.22 6.04 3.39   Absolute Lymphocytes  0.60 - 4.47 Thousand/uL 8.38 High  9.60 High   10.24 High  R 16.10 High  19.45 High  21.15 High    Absolute Monocytes  0.00 - 1.22 Thousand/uL 0.52 0.82  0.75 R 0.40 0.53 0.78   Absolute Eosinophils  0.00 - 0.40 Thousand/uL 0.26 0.00  0.48 R 0.40 0.00 0.00   Absolute Basophils  0.00 - 0.10 Thousand/uL 0.13 High  0.00  0.07 R 0.00 0.26 High  0.00   Total Counted          RBC Morphology Normal Present Normal  Normal Normal Normal   Platelet Estimate  Adequate Decreased Abnormal  Decreased Abnormal  Decreased  Abnormal   Borderline Abnormal  Adequate Borderline Abnormal    Large Platelet Present Present    Present                        Comprehensive metabolic panel  Status: Final result      Contains abnormal data Comprehensive metabolic panel  Order: [id number]   Status: Final result       Visible to patient: Yes (seen)       Next appt: 01/06/2025 at 12:40 PM in Hematology and Oncology (Carroll Castro MD)       Dx: CLL (chronic lymphocytic leukemia) ([facility])    0 Result Notes       1 Patient Communication            Component  Ref Range & Units 10/14/24  7:01 AM 7/3/24  7:14 AM 4/2/24  6:52 AM 12/22/23  6:36 AM 9/26/23  6:22 AM 6/28/23  6:22 AM 3/31/23  6:32 AM   Sodium  135 - 147 mmol/L 139 138 138 141 139 139 139   Potassium  3.5 - 5.3 mmol/L 4.7 4.9 4.5 4.3 4.2 4.7 4.4   Chloride  96 - 108 mmol/L 105 106 105 106 104 105 106   CO2  21 - 32 mmol/L 27 28 28 29 28 30 28   ANION GAP  4 - 13 mmol/L 7 4 5 6 R 7 R 4 R 5   BUN  5 - 25 mg/dL 27 High  34 High  33 High  27 High  23 26 High  29 High    Creatinine  0.60 - 1.30 mg/dL 1.27 1.38 High  CM 1.22 CM 1.16 CM 1.19 CM 1.06 CM 1.12 CM   Comment: Standardized to IDMS reference method   Glucose, Fasting  65 - 99 mg/dL 106 High  100 High  107 High  99 97 108 High  97   Calcium  8.4 - 10.2 mg/dL 8.7 8.8 8.5 8.6 8.8 8.5 8.8   AST  13 - 39 U/L 18 15 14 19 16 16 18   ALT  7 - 52 U/L 15 12 CM 12 CM 15 CM 13 CM 15 CM 15 CM   Comment: Specimen collection should occur prior to Sulfasalazine administration due to the potential for falsely depressed results.   Alkaline Phosphatase  34 - 104 U/L 63 65 62 66 73 65 66   Total Protein  6.4 - 8.4 g/dL 5.8 Low  6.3 Low  5.8 Low  6.0 Low  6.2 Low  6.0 Low  6.2 Low    Albumin  3.5 - 5.0 g/dL 3.8 3.9 3.9 3.9 3.9 3.8 3.8   Total Bilirubin  0.20 - 1.00 mg/dL 0.58 0.52 CM 0.48 CM 0.51 CM 0.49 CM 0.53 CM 0.68 CM   Comment: Use of this assay is not recommended for patients undergoing treatment with eltrombopag due to the potential for falsely elevated  results.  N-acetyl-p-benzoquinone imine (metabolite of Acetaminophen) will generate erroneously low results in samples for patients that have taken an overdose of Acetaminophen.   eGFR  ml/min/1.73sq m 53 48 56 60 58 67 63                  Component  Ref Range & Units 10/14/24  7:01 AM 7/3/24  7:14 AM 12/22/23  6:36 AM 6/28/23  6:22 AM 3/31/23  6:32 AM 1/3/23  6:57 AM 11/28/22  6:24 AM   LD  140 - 271 U/L 178 141 152 141 146 175 154                             Component  Ref Range & Units 10/14/24  7:01 AM 6/28/23  6:22 AM 3/31/23  6:32 AM 1/3/23  6:57 AM 11/28/22  6:24 AM 10/31/22  6:19 AM 9/30/22  7:18 AM   Uric Acid  3.5 - 8.5 mg/dL 4.7 4.7 CM 5.1 CM 3.7 CM 3.6 CM 3.7 CM 3.5 CM   Comment: Specimen collection should occur prior to Metamizole administration due to the potential for falsely depressed results.                  Component  Ref Range & Units 10/14/24  7:01 AM 7/3/24  7:14 AM 12/22/23  6:36 AM 6/28/23  6:22 AM 3/31/23  6:32 AM 1/3/23  6:57 AM 11/28/22  6:24 AM   IGG  635 - 1,741 mg/dL 593 Low  596 Low  629 Low  685.0 Low  R 645.0 Low  R 647.0 Low  R 641.0 Low  R                                          Assessment and plan:  Clinical trial nurse was in the room.  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  Patient has been on Zanubrutinib on clinical trial since July 2022. 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  This time platelet count is 104,000.  Patient is not symptomatic from thrombocytopenia.  No bleeding, bruising or petechiae.  Patient has been tolerating his zanubrutinib without much problem.  No bleeding in spite of taking baby aspirin for coronary stent in LAD.  He is not on Plavix anymore.  No atrial fibrillation.  No other symptoms secondary to zanubrutinib.  No symptoms secondary to CLL.    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  Previously he had 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.  Negative  for trisomy 12 and 11 q deletion.  Mutated Ig VH.   CLL was diagnosed few years ago and after a long period of observation he was started on acalabrutinib in June 2020 when platelet counts dropped below 100,000, stage IV CLL.  He responded but in June or July 2021 acalabrutinib was discontinued because patient went on aspirin and Plavix post MI and 1 stent in LAD.  Patient went off Plavix on 06/01/2022 .  He is on baby aspirin.  He wanted to go back to therapy.  He qualified for clinical trial for zanubrutinib and he wanted to get started on therapy and zanubrutinib was started in July 2022.  Patient has some tiredness and minor arthritic symptoms.  Occasionally he has some tingling in the arms below the elbows.  Patient has asymmetrical lingual tonsil and he was seen by ENT specialist.  He has atherosclerotic carotid blood vessels and he follows with vascular surgeon.  Occasionally low back pain that radiates to right leg laterally.  MRI scan of lumbar spine showed degenerative changes and stenosis and foraminal narrowing.  He was offered to see a spine specialist but he declined.  He states his back pain has lessened.        Physical examination and test results are as recorded and discussed.    No change in Zanubrutinib therapy.  To monitor patient's counts especially thrombocytopenia.  All discussed in detail.  Questions answered.  Discussed the importance of  eating healthy foods, diet and activities as prescribed by patient's cardiologist . Health screening tests.  Patient is capable of self-care.  Goal is remission and prolongation of survival from CLL.  Pancreatic cyst is being monitored.  Patient has seen ENT specialist for asymmetric lingual tonsils and follows with vascular surgery for atherosclerotic carotid arteries.  Discussed precautions against Coronavirus and flu and other infections.        See diagnoses, orders and instructions  1. CLL (chronic lymphocytic leukemia) (HCC)      2. Abnormal CT scan,  neck      3. Pancreatic cyst      4. IPMN (intraductal papillary mucinous neoplasm)      5. Status post coronary artery stent placement      6. Thrombocytopenia (HCC)      7. Hypertension, essential      8. Mixed hyperlipidemia      9. Stenosis of left carotid artery      10. Arthritis          Blood work and visit in 3 months.    I used a dictation device to dictate this note and there could be mistakes in my note and for that patient may contact my office              .       Patient voiced understanding and agreed      Counseling / Coordination of Care   .  Provided counseling and support

## 2024-10-18 NOTE — PROGRESS NOTES
"Patient seen today in the Med/Onc office by Dr. Castro for Cycle 28 of [id number] trial. Pill bottles and diaries for Cycles 25, 26, 27 were collected on 10/10/2024. New diaries and pills were dispensed for Cycles 28, 29, 30 on 10/10/2024. Labs, ECG  and Ct's reviewed and signed by PI. Con meds and AE's reviewed. Patient reported he is doing very well and has no complaints at this time. Patient will follow up with Vascular regarding \"atherosclerosis with heavy plaque burden left carotid bulb and proximal left internal carotid artery\" which is not related to study drug. Patient made aware he will need labs and ECG completed prior to next appointment.  I will see him back in for another office visit in 3 months time with new labs and ECG per Dr. Castro.        "

## 2024-10-31 ENCOUNTER — NEW PATIENT COMPREHENSIVE (OUTPATIENT)
Dept: URBAN - METROPOLITAN AREA CLINIC 6 | Facility: CLINIC | Age: 79
End: 2024-10-31

## 2024-10-31 DIAGNOSIS — H25.813: ICD-10-CM

## 2024-10-31 DIAGNOSIS — H11.041: ICD-10-CM

## 2024-10-31 PROCEDURE — 92004 COMPRE OPH EXAM NEW PT 1/>: CPT

## 2024-10-31 PROCEDURE — 92015 DETERMINE REFRACTIVE STATE: CPT

## 2024-10-31 ASSESSMENT — VISUAL ACUITY
OU_SC: J1
OU_CC: J1
OD_PH: 20/50+2
OS_CC: 20/60+1
OS_PH: 20/50+1
OD_CC: 20/60+1

## 2024-10-31 ASSESSMENT — TONOMETRY
OS_IOP_MMHG: 18
OD_IOP_MMHG: 17

## 2024-12-30 ENCOUNTER — APPOINTMENT (OUTPATIENT)
Dept: LAB | Facility: CLINIC | Age: 79
End: 2024-12-30

## 2024-12-30 DIAGNOSIS — I70.90 ATHEROSCLEROSIS: ICD-10-CM

## 2024-12-30 DIAGNOSIS — C91.10 CLL (CHRONIC LYMPHOCYTIC LEUKEMIA) (HCC): ICD-10-CM

## 2024-12-30 LAB
ALBUMIN SERPL BCG-MCNC: 3.9 G/DL (ref 3.5–5)
ALP SERPL-CCNC: 72 U/L (ref 34–104)
ALT SERPL W P-5'-P-CCNC: 15 U/L (ref 7–52)
ANION GAP SERPL CALCULATED.3IONS-SCNC: 4 MMOL/L (ref 4–13)
AST SERPL W P-5'-P-CCNC: 16 U/L (ref 13–39)
ATRIAL RATE: 59 BPM
BASOPHILS # BLD MANUAL: 0 THOUSAND/UL (ref 0–0.1)
BASOPHILS NFR MAR MANUAL: 0 % (ref 0–1)
BILIRUB SERPL-MCNC: 0.58 MG/DL (ref 0.2–1)
BUN SERPL-MCNC: 23 MG/DL (ref 5–25)
CALCIUM SERPL-MCNC: 8.4 MG/DL (ref 8.4–10.2)
CHLORIDE SERPL-SCNC: 106 MMOL/L (ref 96–108)
CHOLEST SERPL-MCNC: 161 MG/DL (ref ?–200)
CO2 SERPL-SCNC: 30 MMOL/L (ref 21–32)
CREAT SERPL-MCNC: 1.2 MG/DL (ref 0.6–1.3)
EOSINOPHIL # BLD MANUAL: 0.25 THOUSAND/UL (ref 0–0.4)
EOSINOPHIL NFR BLD MANUAL: 2 % (ref 0–6)
ERYTHROCYTE [DISTWIDTH] IN BLOOD BY AUTOMATED COUNT: 14.6 % (ref 11.6–15.1)
GFR SERPL CREATININE-BSD FRML MDRD: 57 ML/MIN/1.73SQ M
GLUCOSE P FAST SERPL-MCNC: 110 MG/DL (ref 65–99)
HCT VFR BLD AUTO: 47.8 % (ref 36.5–49.3)
HDLC SERPL-MCNC: 42 MG/DL
HGB BLD-MCNC: 14.9 G/DL (ref 12–17)
IGA SERPL-MCNC: 240 MG/DL (ref 66–433)
IGG SERPL-MCNC: 623 MG/DL (ref 635–1741)
IGM SERPL-MCNC: 30 MG/DL (ref 45–281)
LDH SERPL-CCNC: 141 U/L (ref 140–271)
LDLC SERPL CALC-MCNC: 90 MG/DL (ref 0–100)
LG PLATELETS BLD QL SMEAR: PRESENT
LYMPHOCYTES # BLD AUTO: 68 % (ref 14–44)
LYMPHOCYTES # BLD AUTO: 8.74 THOUSAND/UL (ref 0.6–4.47)
MCH RBC QN AUTO: 27.8 PG (ref 26.8–34.3)
MCHC RBC AUTO-ENTMCNC: 31.2 G/DL (ref 31.4–37.4)
MCV RBC AUTO: 89 FL (ref 82–98)
MONOCYTES # BLD AUTO: 0.5 THOUSAND/UL (ref 0–1.22)
MONOCYTES NFR BLD: 4 % (ref 4–12)
NEUTROPHILS # BLD MANUAL: 3 THOUSAND/UL (ref 1.85–7.62)
NEUTS SEG NFR BLD AUTO: 24 % (ref 43–75)
NONHDLC SERPL-MCNC: 119 MG/DL
P AXIS: 62 DEGREES
PLATELET # BLD AUTO: 105 THOUSANDS/UL (ref 149–390)
PLATELET BLD QL SMEAR: ABNORMAL
PMV BLD AUTO: 11.6 FL (ref 8.9–12.7)
POTASSIUM SERPL-SCNC: 4.2 MMOL/L (ref 3.5–5.3)
PR INTERVAL: 168 MS
PROT SERPL-MCNC: 5.8 G/DL (ref 6.4–8.4)
QRS AXIS: 0 DEGREES
QRSD INTERVAL: 82 MS
QT INTERVAL: 410 MS
QTC INTERVAL: 407 MS
RBC # BLD AUTO: 5.36 MILLION/UL (ref 3.88–5.62)
RBC MORPH BLD: NORMAL
SODIUM SERPL-SCNC: 140 MMOL/L (ref 135–147)
T WAVE AXIS: 39 DEGREES
TRIGL SERPL-MCNC: 144 MG/DL (ref ?–150)
URATE SERPL-MCNC: 4.9 MG/DL (ref 3.5–8.5)
VARIANT LYMPHS # BLD AUTO: 2 %
VENTRICULAR RATE: 59 BPM
WBC # BLD AUTO: 12.48 THOUSAND/UL (ref 4.31–10.16)

## 2024-12-30 PROCEDURE — 85027 COMPLETE CBC AUTOMATED: CPT

## 2024-12-30 PROCEDURE — 80061 LIPID PANEL: CPT

## 2024-12-30 PROCEDURE — 83615 LACTATE (LD) (LDH) ENZYME: CPT

## 2024-12-30 PROCEDURE — 84550 ASSAY OF BLOOD/URIC ACID: CPT

## 2024-12-30 PROCEDURE — 85007 BL SMEAR W/DIFF WBC COUNT: CPT

## 2024-12-30 PROCEDURE — 82784 ASSAY IGA/IGD/IGG/IGM EACH: CPT

## 2024-12-30 PROCEDURE — 80053 COMPREHEN METABOLIC PANEL: CPT

## 2024-12-30 PROCEDURE — 36415 COLL VENOUS BLD VENIPUNCTURE: CPT

## 2025-01-06 ENCOUNTER — OFFICE VISIT (OUTPATIENT)
Dept: HEMATOLOGY ONCOLOGY | Facility: CLINIC | Age: 80
End: 2025-01-06

## 2025-01-06 ENCOUNTER — DOCUMENTATION (OUTPATIENT)
Dept: OTHER | Facility: HOSPITAL | Age: 80
End: 2025-01-06

## 2025-01-06 VITALS
WEIGHT: 216 LBS | SYSTOLIC BLOOD PRESSURE: 138 MMHG | TEMPERATURE: 97.3 F | HEIGHT: 71 IN | OXYGEN SATURATION: 98 % | RESPIRATION RATE: 18 BRPM | DIASTOLIC BLOOD PRESSURE: 80 MMHG | BODY MASS INDEX: 30.24 KG/M2 | HEART RATE: 67 BPM

## 2025-01-06 DIAGNOSIS — N18.31 STAGE 3A CHRONIC KIDNEY DISEASE (HCC): ICD-10-CM

## 2025-01-06 DIAGNOSIS — C91.10 CLL (CHRONIC LYMPHOCYTIC LEUKEMIA) (HCC): Primary | ICD-10-CM

## 2025-01-06 DIAGNOSIS — I10 HYPERTENSION, ESSENTIAL: ICD-10-CM

## 2025-01-06 DIAGNOSIS — M19.90 ARTHRITIS: ICD-10-CM

## 2025-01-06 DIAGNOSIS — I65.22 STENOSIS OF LEFT CAROTID ARTERY: ICD-10-CM

## 2025-01-06 DIAGNOSIS — R93.89 ABNORMAL CT SCAN, NECK: ICD-10-CM

## 2025-01-06 DIAGNOSIS — D69.6 THROMBOCYTOPENIA (HCC): ICD-10-CM

## 2025-01-06 DIAGNOSIS — E78.5 HYPERLIPIDEMIA, UNSPECIFIED HYPERLIPIDEMIA TYPE: ICD-10-CM

## 2025-01-06 DIAGNOSIS — K86.2 PANCREATIC CYST: ICD-10-CM

## 2025-01-06 DIAGNOSIS — Z95.5 STATUS POST CORONARY ARTERY STENT PLACEMENT: ICD-10-CM

## 2025-01-06 DIAGNOSIS — E78.2 MIXED HYPERLIPIDEMIA: ICD-10-CM

## 2025-01-06 PROCEDURE — G2211 COMPLEX E/M VISIT ADD ON: HCPCS | Performed by: INTERNAL MEDICINE

## 2025-01-06 PROCEDURE — 99214 OFFICE O/P EST MOD 30 MIN: CPT | Performed by: INTERNAL MEDICINE

## 2025-01-06 NOTE — PROGRESS NOTES
Name: Holden Schmidt      : 1945      MRN: [id number]  Encounter Provider: Carroll Castro MD  Encounter Date: 2025   Encounter department: [facility] [place]  :  Assessment & Plan  CLL (chronic lymphocytic leukemia) (HCC)  Patient continues to respond to Zanubrutinib.  No change in therapy.       Abnormal CT scan, neck  Patient has already seen ENT specialist for asymmetry in the tonsils.  For carotid artery stenosis he follows with vascular surgeon       Pancreatic cyst  Stable and is being monitored.       Status post coronary artery stent placement  He follows with his cardiologist       Stenosis of left carotid artery  He follows with vascular surgeon       Thrombocytopenia (HCC)  Being monitored and stable       Hypertension, essential  Follows with primary physician       Mixed hyperlipidemia  Follows with primary physician       Arthritis  Especially in the neck.  Patient does not want to see a specialist.       Hyperlipidemia, unspecified hyperlipidemia type  Follows with primary physician       Stage 3a chronic kidney disease ([facility])  Lab Results   Component Value Date    EGFR 57 2024    EGFR 53 10/14/2024    EGFR 48 2024    CREATININE 1.20 2024    CREATININE 1.27 10/14/2024    CREATININE 1.38 (H) 2024   Follows with primary physician       No change in therapy, Zanubrutinib.  Blood work and scans prior to next visit in 3 months.  Intravenous contrast for CT scans only if eGFR more than 45.  Labs and scans will be ordered by clinical trial team and they will make this comment about EGFR.  Patient continues to respond to Zanubrutinib and has been tolerating Zanubrutinib without much problem.  Patient condition, counts, CLL parameters are being monitored.  Goal is to keep CLL in remission.  Patient is capable of self-care.  He has follow-up ointment with vascular surgeon for carotid artery stenosis.  He will continue to follow-up with his  primary physician and other consultants.  He is capable of self-care.  All discussed in detail.  Questions answered.  See diagnoses, orders and instructions above.  Provided counseling and support.  I used a dictation device to dictate this note and there could be mistakes in my note and for that patient may contact my office.  Diet and activities as tolerated.  Patient to avoid falls and trauma.    History of Present Illness   Chief Complaint   Patient presents with   • Follow-up   CLL was diagnosed few years ago and after a long period of observation he was started on acalabrutinib in June 2020 when platelet counts dropped below 100,000, stage IV CLL. He responded but in June or July 2021 acalabrutinib was discontinued because patient went on aspirin and Plavix post MI and 1 stent in LAD. Patient went off Plavix on 06/01/2022 . He is on baby aspirin. He wanted to go back to therapy. He qualified for clinical trial for zanubrutinib and he wanted to get started on therapy and zanubrutinib was started in July 2022.   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  This time platelet count is 105,000.  Patient is not symptomatic from thrombocytopenia.  No bleeding, bruising or petechiae.  Patient has been tolerating his zanubrutinib without much problem.  No bleeding in spite of taking baby aspirin for coronary stent in LAD.  He is not on Plavix anymore.  No atrial fibrillation.  No other symptoms secondary to zanubrutinib.  No symptoms secondary to CLL.    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  Previously he had 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.  Negative for trisomy 12 and 11 q deletion.  Mutated Ig VH.   Patient has some tiredness and arthritic symptoms especially in cervical spine, lumbar spine and left shoulder.  Occasionally he has some tingling in the arms below the elbows. Occasionally low back pain .  MRI scan of lumbar  spine showed degenerative changes and stenosis and foraminal narrowing.  He was offered to see a spine specialist but he declined.  He states his back pain has lessened. He does not want to see specialists.  Patient has asymmetrical lingual tonsil and he was seen by ENT specialist.  He has atherosclerotic carotid blood vessels and he follows with vascular surgeon.  Patient was feeling more tired 2 weeks ago.  He is feeling better now, less tired  Oncology History   Cancer Staging   CLL (chronic lymphocytic leukemia) ([facility])  Staging form: Chronic Lymphocytic Leukemia, AJCC 8th Edition  - Clinical stage from 1/8/2024: Modified Razo Stage IV (Modified Razo risk: High, Lymphocytosis: Present, Adenopathy: Present, Organomegaly: Absent, Anemia: Absent, Thrombocytopenia: Present) - Signed by Carroll Castro MD on 1/8/2024  Stage prefix: Initial diagnosis  Absolute lymphocyte count (ALC) (cells/uL): 82,250  Hemoglobin (Hgb) (g/dL): 14.1  Platelet count (x10E9/L of blood): 89,000  Oncology History Overview Note   chronic lymphocytic leukemia, diagnosed in August 2017. CLL has mixed good and bad prognostic features, 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, IgVH mutation, lack of CD38 expression, deletion of 13 q14 in 6% and no 11 q deletion..   . There was no trisomy 12. Lymphocytes were CD5 and CD23 positive, dim kappa expression and moderate CD20 expression had splenomegaly on CT scan but not on palpation     CLL (chronic lymphocytic leukemia) ([facility])   3/27/2018 Initial Diagnosis    CLL (chronic lymphocytic leukemia) ([facility])     1/8/2024 -  Cancer Staged    Staging form: Chronic Lymphocytic Leukemia, AJCC 8th Edition  - Clinical stage from 1/8/2024: Modified Razo Stage IV (Modified Razo risk: High, Lymphocytosis: Present, Adenopathy: Present, Organomegaly: Absent, Anemia: Absent, Thrombocytopenia: Present) - Signed by Carroll Castro MD on 1/8/2024  Stage prefix: Initial diagnosis  Absolute lymphocyte count (ALC) (cells/uL): 82,250  Hemoglobin (Hgb)  (g/dL): 14.1  Platelet count (x10E9/L of blood): 89,000          Pertinent Medical History   01/07/25:   CLL was diagnosed few years ago and after a long period of observation he was started on acalabrutinib in June 2020 when platelet counts dropped below 100,000, stage IV CLL. He responded but in June or July 2021 acalabrutinib was discontinued because patient went on aspirin and Plavix post MI and 1 stent in LAD. Patient went off Plavix on 06/01/2022 . He is on baby aspirin. He wanted to go back to therapy. He qualified for clinical trial for zanubrutinib and he wanted to get started on therapy and zanubrutinib was started in July 2022.   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  This time platelet count is 105,000.  Patient is not symptomatic from thrombocytopenia.  No bleeding, bruising or petechiae.  Patient has been tolerating his zanubrutinib without much problem.  No bleeding in spite of taking baby aspirin for coronary stent in LAD.  He is not on Plavix anymore.  No atrial fibrillation.  No other symptoms secondary to zanubrutinib.  No symptoms secondary to CLL.    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  Previously he had 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.  Negative for trisomy 12 and 11 q deletion.  Mutated Ig VH.   Patient has some tiredness and arthritic symptoms especially in cervical spine, lumbar spine and left shoulder.  Occasionally he has some tingling in the arms below the elbows. Occasionally low back pain .  MRI scan of lumbar spine showed degenerative changes and stenosis and foraminal narrowing.  He was offered to see a spine specialist but he declined.  He states his back pain has lessened. He does not want to see specialists.  Patient has asymmetrical lingual tonsil and he was seen by ENT specialist.  He has atherosclerotic carotid blood vessels and he follows with vascular surgeon.  Patient was feeling  "more tired 2 weeks ago.  He is feeling better now, less tired  Review of Systems.  Reviewed 12 systems.  Symptoms are in HPI.  Presently no other neurological, cardiac, pulmonary, GI,  symptoms.  Other  symptoms are in HPI.  No symptoms like fever, chills, bleeding, bone pains, skin rash, weight loss,   weakness, numbness and gait problem. No frequent infections.  Not unusually sensitive to heat or cold. No swelling of the ankles. No swollen glands.  Patient is not anxious.        Objective   /80 (BP Location: Left arm, Patient Position: Sitting, Cuff Size: Adult)   Pulse 67   Temp (!) 97.3 °F (36.3 °C) (Temporal)   Resp 18   Ht 5' 11\" (1.803 m)   Wt 98 kg (216 lb)   SpO2 98%   BMI 30.13 kg/m²     Pain Screenin  ECOG   1  Physical Exam  Constitutional:       General: He is not in acute distress.     Appearance: Normal appearance.   HENT:      Head: Normocephalic and atraumatic.      Mouth/Throat:      Comments: No oral thrush.  Eyes:      General: No scleral icterus.  Cardiovascular:      Rate and Rhythm: Normal rate and regular rhythm.      Heart sounds: Normal heart sounds. No murmur heard.  Pulmonary:      Effort: Pulmonary effort is normal. No respiratory distress.      Breath sounds: Normal breath sounds. No rhonchi or rales.   Abdominal:      General: Abdomen is flat. There is no distension.      Palpations: Abdomen is soft. There is no mass.      Tenderness: There is no abdominal tenderness.      Comments: No hepatosplenomegaly   Musculoskeletal:         General: No swelling. Normal range of motion.      Cervical back: Normal range of motion. No rigidity.      Right lower leg: No edema.      Left lower leg: No edema.      Comments: No calf tenderness   Lymphadenopathy:      Cervical: No cervical adenopathy.   Skin:     Coloration: Skin is not jaundiced.      Findings: No bruising or rash.   Neurological:      General: No focal deficit present.      Mental Status: He is alert and oriented to " person, place, and time.      Motor: No weakness.      Coordination: Coordination normal.      Gait: Gait normal.   Psychiatric:         Mood and Affect: Mood normal.         Behavior: Behavior normal.         Thought Content: Thought content normal.         Labs: I have reviewed the following labs:  Lab Results   Component Value Date/Time    WBC 12.48 (H) 12/30/2024 06:30 AM    RBC 5.36 12/30/2024 06:30 AM    Hemoglobin 14.9 12/30/2024 06:30 AM    Hematocrit 47.8 12/30/2024 06:30 AM    MCV 89 12/30/2024 06:30 AM    MCH 27.8 12/30/2024 06:30 AM    RDW 14.6 12/30/2024 06:30 AM    Platelets 105 (L) 12/30/2024 06:30 AM    Lymphocytes % 68 (H) 12/30/2024 06:30 AM    Monocytes % 4 12/30/2024 06:30 AM    Eosinophils % 2 12/30/2024 06:30 AM    Basophils % 0 12/30/2024 06:30 AM     Lab Results   Component Value Date/Time    Potassium 4.2 12/30/2024 06:30 AM    Chloride 106 12/30/2024 06:30 AM    CO2 30 12/30/2024 06:30 AM    BUN 23 12/30/2024 06:30 AM    Creatinine 1.20 12/30/2024 06:30 AM    Glucose, Fasting 110 (H) 12/30/2024 06:30 AM    Calcium 8.4 12/30/2024 06:30 AM    AST 16 12/30/2024 06:30 AM    ALT 15 12/30/2024 06:30 AM    Alkaline Phosphatase 72 12/30/2024 06:30 AM    Total Protein 5.8 (L) 12/30/2024 06:30 AM    Albumin 3.9 12/30/2024 06:30 AM    Total Bilirubin 0.58 12/30/2024 06:30 AM    eGFR 57 12/30/2024 06:30 AM     CT soft tissue neck w contrast  Status: Final result     PACS Images     Show images for CT soft tissue neck w contrast  Study Result    Narrative & Impression   CT NECK WITH CONTRAST     INDICATION:   C91.10: Chronic lymphocytic leukemia of B-cell type not having achieved remission  R93.89: Abnormal findings on diagnostic imaging of other specified body structures.     COMPARISON: Relevant examinations most recent 3/30/2023 CT neck soft tissues.     TECHNIQUE:  Axial, sagittal, and coronal 2D reformatted images were created from the axial source data and submitted for interpretation.      Radiation dose length product (DLP) for this visit:  626.61 mGy-cm .  This examination, like all CT scans performed in the [facility], was performed utilizing techniques to minimize radiation dose exposure, including the use of iterative   reconstruction and automated exposure control.     IV Contrast:  100 mL of iohexol (OMNIPAQUE)     IMAGE QUALITY:  Diagnostic.     FINDINGS:     INCLUDED PORTIONS OF THE BRAIN: No significant abnormality evident.     INCLUDED PORTIONS OF THE ORBITS: No significant abnormality evident.     INCLUDED PORTIONS OF THE PARANASAL SINUSES: Mild mucosal thickening bilateral maxillary sinuses; similar. No other significant abnormality evident.     NASAL CAVITY AND NASOPHARYNX: No significant abnormality evident.     TEMPORAL BONES:  No significant abnormality evident.     ORAL CAVITY AND OROPHARYNX: Again noted asymmetric lingual tonsillar volume; greater volume on the right. These findings do not appear significantly changed when compared to all prior CTs I have available back to the 6/29/2022 examination. No other   significant abnormality evident.     DENTITION:  No significant abnormality evident.     SUPRAHYOID NECK: No significant abnormality evident.     INFRAHYOID NECK: No significant abnormality evident in the aryepiglottic folds, piriform sinus, glottis or supraglottic regions.     THYROID GLAND: No significant abnormality evident.     PAROTID AND SUBMANDIBULAR GLANDS: No significant abnormality evident.     LYMPH NODES: No lymphadenopathy evident.     VASCULAR STRUCTURES: Atherosclerosis with heavy plaque burden at the left carotid bulb and proximal left internal carotid artery where there could be associated hemodynamically significant stenosis of the left internal carotid artery present. Findings   similar. No other significant abnormality evident.     THORACIC INLET: Please refer to the report from dedicated CT chest of this date for thoracic findings..      BONY STRUCTURES: Again noted advanced hypertrophic cervical spondylosis with high-grade neural foraminal narrowing at multiple levels most notably on the left at C3-C4, bilaterally at C4-C5, on the right at C5-C6, and on the right at C6-C7 where there   could be neural encroachment upon exiting nerve roots. No other significant abnormality evident.     IMPRESSION:     No significant interval change evident. No discrete mass or lymphadenopathy evident.     Asymmetric lingual tonsillar volume; greater on the right and for which direct visualization assessment is advised.     Atherosclerosis with heavy plaque burden left carotid bulb and proximal left internal carotid artery. There could be hemodynamically significant stenosis of the proximal left internal carotid artery. Recommend duplex carotid ultrasound for further   assessment.     Hypertrophic cervical spondylosis with high-grade neural foraminal narrowing at multiple levels.     Mild mucosal thickening bilateral maxillary sinuses.     Findings on this examination flagged as significant with follow-up recommendation on Epic.        Edmond Morrison M.D., FACR  Senior Member, [employer]ad[employer]     Workstation performed: [id number]        Imaging    CT soft tissue neck w contrast (Order: [id number]) - 10/15/24    CT chest abdomen pelvis w contrast  Status: Final result     PACS Images     Show images for CT chest abdomen pelvis w contrast  Study Result    Narrative & Impression         CT CHEST, ABDOMEN AND PELVIS WITH IV CONTRAST     INDICATION:   Chronic lymphocytic leukemia of B-cell type not having achieved remission. Cyst of pancreas. Neoplasm of unspecified behavior of digestive system. .     COMPARISON: 6/28/2024.     TECHNIQUE: CT examination of the chest, abdomen and pelvis was performed. Multiplanar 2D reformatted images were created from the source data.     This examination, like all CT scans performed in the [facility],  was performed utilizing techniques to minimize radiation dose exposure, including the use of iterative reconstruction and automated exposure control. Radiation dose length   product (DLP) for this visit:     IV Contrast:  Enteric Contrast: Enteric contrast was administered.     FINDINGS:     TARGET LESIONS:  None  NONTARGET LESIONS:  1. Right axillary lymph node: 1.2 x 0.9 cm image 11 series 3: 1.3 x 1 cm on prior  2. Right subpectoral lymph node: 0.6 x 0.4 cm image 21 series 3: 0.6 x 0.5 cm on prior  3. Left subpectoral lymph node: 0.7 x 0.4 cm image 12 series 3: 0.7 x 0.5 cm on prior  4. Right common iliac lymph node: 1.4 x 0.9 cm image 190 series 3: 1.3 x 0.8 cm on prior     CHEST     LUNGS: Stable subpleural scarring in the right upper lobe laterally. Cluster of micronodules in the right upper lobe. No noncalcified pulmonary nodules.  There is no tracheal or endobronchial lesion.     PLEURA:  Unremarkable.     HEART/GREAT VESSELS: Heart is unremarkable for patient's age. Three-vessel coronary artery calcification. No thoracic aortic aneurysm.     MEDIASTINUM AND GALI:  Small hiatal hernia noted.  No mediastinal or hilar lymphadenopathy. Calcified mediastinal and right hilar lymph nodes.     CHEST WALL AND LOWER NECK:  Unremarkable.     ABDOMEN     LIVER/BILIARY TREE:  There are one or more hepatic simple cyst(s) present.  No CT evidence of suspicious solid hepatic mass.  Normal hepatic contours.  No biliary dilatation.     GALLBLADDER:  No calcified gallstones. No pericholecystic inflammatory change.     SPLEEN: Stable splenomegaly measuring 14 cm.     PANCREAS: Stable 1.3 cm thin-walled cyst in the pancreatic head. No pancreatic duct dilation..     ADRENAL GLANDS:  Unremarkable.     KIDNEYS/URETERS:  One or more simple renal cyst(s) is noted.  Otherwise unremarkable kidneys.  No hydronephrosis.     STOMACH AND BOWEL:  There is colonic diverticulosis without evidence of acute diverticulitis.     APPENDIX:  No  findings to suggest appendicitis.     ABDOMINOPELVIC CAVITY:  No ascites.  No pneumoperitoneum.  No lymphadenopathy.     VESSELS:  Atherosclerotic changes are present.  No evidence of aneurysm.     PELVIS     REPRODUCTIVE ORGANS:  The prostate is enlarged.     URINARY BLADDER: Mild bladder wall thickening.     ABDOMINAL WALL/INGUINAL REGIONS:  There is a small fat-containing umbilical hernia. Fat-containing right inguinal hernia. Small fat-containing left inguinal hernia     OSSEOUS STRUCTURES:  No acute fracture or destructive osseous lesion.  Spinal degenerative changes are noted.     IMPRESSION:     1. No significant change since previous study. Stable nontarget lesions. Stable splenomegaly measuring 14 cm.     2. Stable pancreatic cyst measuring 1.3 cm. No pancreatic duct dilation.     3. Remote granulomatous disease. No noncalcified nodules     4. Bowel containing right inguinal hernia. Small fat-containing left inguinal hernia     Electronically signed: 10/16/2024 06:37 AM Bong Watkins MD     Imaging    CT chest abdomen pelvis w contrast (Order: [id number]) - 10/15/2024    Result History    CT chest abdomen pelvis w contrast (Order #[id number]) on 10/16/2024 - Order Result History Report

## 2025-01-06 NOTE — ASSESSMENT & PLAN NOTE
Patient has already seen ENT specialist for asymmetry in the tonsils.  For carotid artery stenosis he follows with vascular surgeon

## 2025-01-06 NOTE — ASSESSMENT & PLAN NOTE
Lab Results   Component Value Date    EGFR 57 12/30/2024    EGFR 53 10/14/2024    EGFR 48 07/03/2024    CREATININE 1.20 12/30/2024    CREATININE 1.27 10/14/2024    CREATININE 1.38 (H) 07/03/2024   Follows with primary physician

## 2025-01-06 NOTE — PROGRESS NOTES
Patient seen today by Dr. Castro for Cycle 31 of [id number] trial. Collected 5 pill bottles from Cycles 30, 29, 28 and diaries which were signed by patient. New diaries and pills were dispensed for Cycles 31, 32, 33. Labs, ECG  and Ct's reviewed and signed by PI. Con meds and AE's reviewed. Patient reported he is doing well, and only complained of recent cold acquired after the holidays. Patient made aware he will need labs and ECG completed prior to next appointment. QOLs completed at this time. I will see him back in office in 3 months time. Orders for labs will be placed by IGNACIO Velez, and per Dr. Castro CT scan to be completed with IV contrast only if eGRF >45 with instructions placed in comment.

## 2025-01-06 NOTE — PATIENT INSTRUCTIONS
No change in therapy, Zanubrutinib.  Blood work and scans prior to next visit in 3 months.  Intravenous contrast for CT scans only if eGFR more than 45.  Labs and scans will be ordered by clinical trial team and they will make this comment about EGFR.

## 2025-01-17 ENCOUNTER — HOSPITAL ENCOUNTER (OUTPATIENT)
Dept: VASCULAR ULTRASOUND | Facility: HOSPITAL | Age: 80
Discharge: HOME | End: 2025-01-17
Attending: SURGERY
Payer: COMMERCIAL

## 2025-01-17 DIAGNOSIS — I65.22 STENOSIS OF LEFT CAROTID ARTERY: ICD-10-CM

## 2025-01-17 PROCEDURE — 93880 EXTRACRANIAL BILAT STUDY: CPT

## 2025-01-27 DIAGNOSIS — Z00.6 CLINICAL TRIAL PARTICIPANT: Primary | ICD-10-CM

## 2025-01-27 DIAGNOSIS — C91.10 CLL (CHRONIC LYMPHOCYTIC LEUKEMIA) (HCC): ICD-10-CM

## 2025-01-28 ENCOUNTER — OFFICE VISIT (OUTPATIENT)
Dept: VASCULAR SURGERY | Facility: CLINIC | Age: 80
End: 2025-01-28
Payer: COMMERCIAL

## 2025-01-28 VITALS
OXYGEN SATURATION: 98 % | BODY MASS INDEX: 30.66 KG/M2 | WEIGHT: 219 LBS | SYSTOLIC BLOOD PRESSURE: 138 MMHG | DIASTOLIC BLOOD PRESSURE: 72 MMHG | HEIGHT: 71 IN | RESPIRATION RATE: 18 BRPM | HEART RATE: 62 BPM

## 2025-01-28 DIAGNOSIS — I65.22 STENOSIS OF LEFT CAROTID ARTERY: Primary | ICD-10-CM

## 2025-01-28 DIAGNOSIS — I65.22 LEFT CAROTID STENOSIS: ICD-10-CM

## 2025-01-28 PROCEDURE — 99213 OFFICE O/P EST LOW 20 MIN: CPT | Performed by: NURSE PRACTITIONER

## 2025-01-28 NOTE — ASSESSMENT & PLAN NOTE
79-year-old male with HTN, HLD, CAD s/p LAD stent, CLL, CKD, and bilateral carotid artery stenosis left greater than right presents to review imaging and [facility] visit.    -Presents without complaints.  Denies TIA or strokelike symptoms.  Reports occasional dizziness secondary to middle ear/allergies  -Neuroexam intact  Carotid duplex 1/17/2025 reviewed without significant change or progression of disease  Right: <50% ICA stenosis  Left: 50 to 69% ICA stenosis velocities 175/48.  Ratio 2.21    Plan:  -Carotid duplex in 6 months  -Return to office in 1 year  -Continue daily aspirin 81 mg  -Continue daily statin  -Call 911 or go to the ED with TIA or strokelike symptoms discussed in office today.

## 2025-01-28 NOTE — PROGRESS NOTES
Name: Holden Schmidt      : 1945      MRN: [id number]  Encounter Provider: ISRRAEL Fisher  Encounter Date: 2025   Encounter department: [facility] [place]  :  Assessment & Plan  Left carotid stenosis  79-year-old male with HTN, HLD, CAD s/p LAD stent, CLL, CKD, and bilateral carotid artery stenosis left greater than right presents to review imaging and [facility] visit.    -Presents without complaints.  Denies TIA or strokelike symptoms.  Reports occasional dizziness secondary to middle ear/allergies  -Neuroexam intact  Carotid duplex 2025 reviewed without significant change or progression of disease  Right: <50% ICA stenosis  Left: 50 to 69% ICA stenosis velocities 175/48.  Ratio 2.21    Plan:  -Carotid duplex in 6 months  -Return to office in 1 year  -Continue daily aspirin 81 mg  -Continue daily statin  -Call 911 or go to the ED with TIA or strokelike symptoms discussed in office today.         Stenosis of left carotid artery  As above  Orders:    VAS carotid complete study; Future        History of Present Illness   Cc: Patient had a CV on 25. Pt denies TIA or CVA symptoms.  HPI  Holden Schmidt is a 79 y.o. male who presents to review imaging and [facility]    Presents without complaint.  Imaging reviewed without significant change or progression of disease.  Maintained on OMT with aspirin and statin    See assessment and plan    History obtained from: patient    Review of Systems   Constitutional: Negative.    HENT: Negative.     Eyes: Negative.    Respiratory: Negative.     Cardiovascular: Negative.    Gastrointestinal: Negative.    Endocrine: Negative.    Genitourinary: Negative.    Musculoskeletal: Negative.    Skin: Negative.    Allergic/Immunologic: Negative.    Neurological: Negative.    Hematological: Negative.    Psychiatric/Behavioral: Negative.       Medical History Reviewed by provider this encounter:  Tobacco  Allergies  Meds  Problems  Med Hx  Surg Hx  Fam Hx      .  Past Medical History   Past Medical History:   Diagnosis Date    Anxiety     Hypertension     Leukemia (HCC)      Past Surgical History:   Procedure Laterality Date    APPENDECTOMY      COLONOSCOPY      TONSILLECTOMY       Family History   Problem Relation Age of Onset    No Known Problems Mother     No Known Problems Father       reports that he has never smoked. He has never used smokeless tobacco. He reports that he does not currently use alcohol. He reports that he does not use drugs.  Current Outpatient Medications on File Prior to Visit   Medication Sig Dispense Refill    amLODIPine (NORVASC) 5 mg tablet TAKE 1 TABLET (5 MG TOTAL) BY MOUTH DAILY. 90 tablet 1    aspirin 81 mg chewable tablet Chew 1 tablet (81 mg total) daily 90 tablet 3    atorvastatin (LIPITOR) 40 mg tablet Take 1 tablet (40 mg total) by mouth daily with dinner 90 tablet 3    co-enzyme Q-10 50 MG capsule Take 100 mg by mouth daily      fosinopril (MONOPRIL) 20 mg tablet Take 20 mg by mouth daily      magnesium gluconate (MAGONATE) 500 mg tablet Take 500 mg by mouth daily      mometasone (NASONEX) 50 mcg/act nasal spray 2 sprays into each nostril daily      Multiple Vitamin (MULTIVITAMIN) tablet Take 1 tablet by mouth daily      nitroglycerin (NITROSTAT) 0.4 mg SL tablet Place 1 tablet (0.4 mg total) under the tongue every 5 (five) minutes as needed for chest pain 24 tablet 1    sertraline (ZOLOFT) 50 mg tablet Take 75 mg by mouth daily        Zanubrutinib 80 MG CAPS Take by mouth      Taurine 1000 MG CAPS Take by mouth       No current facility-administered medications on file prior to visit.     Allergies   Allergen Reactions    Sulfa Antibiotics       Current Outpatient Medications on File Prior to Visit   Medication Sig Dispense Refill    amLODIPine (NORVASC) 5 mg tablet TAKE 1 TABLET (5 MG TOTAL) BY MOUTH DAILY. 90 tablet 1    aspirin 81 mg chewable tablet Chew 1 tablet (81 mg total) daily 90 tablet 3    atorvastatin (LIPITOR) 40 mg  "tablet Take 1 tablet (40 mg total) by mouth daily with dinner 90 tablet 3    co-enzyme Q-10 50 MG capsule Take 100 mg by mouth daily      fosinopril (MONOPRIL) 20 mg tablet Take 20 mg by mouth daily      magnesium gluconate (MAGONATE) 500 mg tablet Take 500 mg by mouth daily      mometasone (NASONEX) 50 mcg/act nasal spray 2 sprays into each nostril daily      Multiple Vitamin (MULTIVITAMIN) tablet Take 1 tablet by mouth daily      nitroglycerin (NITROSTAT) 0.4 mg SL tablet Place 1 tablet (0.4 mg total) under the tongue every 5 (five) minutes as needed for chest pain 24 tablet 1    sertraline (ZOLOFT) 50 mg tablet Take 75 mg by mouth daily        Zanubrutinib 80 MG CAPS Take by mouth      Taurine 1000 MG CAPS Take by mouth       No current facility-administered medications on file prior to visit.      Social History     Tobacco Use    Smoking status: Never    Smokeless tobacco: Never   Vaping Use    Vaping status: Not on file   Substance and Sexual Activity    Alcohol use: Not Currently     Comment: minimal    Drug use: No    Sexual activity: Not on file        Objective   /72 (BP Location: Right arm, Patient Position: Sitting)   Pulse 62   Resp 18   Ht 5' 11\" (1.803 m)   Wt 99.3 kg (219 lb)   SpO2 98%   BMI 30.54 kg/m²      Physical Exam  Vitals reviewed.   Constitutional:       General: He is not in acute distress.     Appearance: Normal appearance.   HENT:      Head: Normocephalic and atraumatic.   Cardiovascular:      Pulses: Normal pulses.           Carotid pulses are 2+ on the right side and 2+ on the left side.       Radial pulses are 2+ on the right side and 2+ on the left side.   Pulmonary:      Effort: Pulmonary effort is normal. No respiratory distress.   Musculoskeletal:      Cervical back: Normal range of motion.   Skin:     General: Skin is warm.   Neurological:      General: No focal deficit present.      Mental Status: He is alert and oriented to person, place, and time.      Cranial " Nerves: No cranial nerve deficit.      Sensory: No sensory deficit.      Motor: No weakness.   Psychiatric:         Behavior: Behavior normal.         Administrative Statements   I have spent a total time of 20 minutes in caring for this patient on the day of the visit/encounter including Diagnostic results, Instructions for management, Patient and family education, Importance of tx compliance, Impressions, Documenting in the medical record, Reviewing / ordering tests, medicine, procedures  , and Obtaining or reviewing history  .

## 2025-01-28 NOTE — PATIENT INSTRUCTIONS
Carotid duplex in 6 months-will call with any significant findings or urgent follow-up after imaging.  Return to office in 1 year    Continue daily aspirin  Continue daily statin  Call 911 or go to the ED with TIA or strokelike symptoms we discussed in office today.

## 2025-03-10 ENCOUNTER — TELEPHONE (OUTPATIENT)
Dept: NON INVASIVE DIAGNOSTICS | Facility: HOSPITAL | Age: 80
End: 2025-03-10

## 2025-03-10 NOTE — TELEPHONE ENCOUNTER
Call placed to patient to verify that patient does not have iodinated contrast allergy as listed. Patient states he does not have an iodinated contrast allergy. Date, time, and location of upcoming CT of abdomen and pelvis and soft tissue of the neck with contrast reviewed with patient.

## 2025-03-13 DIAGNOSIS — I10 HYPERTENSION, ESSENTIAL: ICD-10-CM

## 2025-03-14 DIAGNOSIS — I10 HYPERTENSION, ESSENTIAL: ICD-10-CM

## 2025-03-14 RX ORDER — AMLODIPINE BESYLATE 5 MG/1
5 TABLET ORAL DAILY
Qty: 90 TABLET | Refills: 1 | OUTPATIENT
Start: 2025-03-14

## 2025-03-14 RX ORDER — AMLODIPINE BESYLATE 5 MG/1
5 TABLET ORAL DAILY
Qty: 90 TABLET | Refills: 1 | Status: SHIPPED | OUTPATIENT
Start: 2025-03-14

## 2025-03-24 ENCOUNTER — HOSPITAL ENCOUNTER (OUTPATIENT)
Dept: CT IMAGING | Facility: HOSPITAL | Age: 80
Discharge: HOME | End: 2025-03-24
Attending: INTERNAL MEDICINE
Payer: COMMERCIAL

## 2025-03-24 DIAGNOSIS — Z00.6 CLINICAL TRIAL PARTICIPANT: ICD-10-CM

## 2025-03-24 PROCEDURE — 70491 CT SOFT TISSUE NECK W/DYE: CPT

## 2025-03-24 PROCEDURE — 71260 CT THORAX DX C+: CPT

## 2025-03-24 PROCEDURE — 74177 CT ABD & PELVIS W/CONTRAST: CPT

## 2025-03-24 RX ADMIN — IOHEXOL 100 ML: 350 INJECTION, SOLUTION INTRAVENOUS at 10:06

## 2025-03-31 ENCOUNTER — APPOINTMENT (OUTPATIENT)
Dept: LAB | Facility: CLINIC | Age: 80
End: 2025-03-31
Payer: COMMERCIAL

## 2025-03-31 DIAGNOSIS — Z00.6 CLINICAL TRIAL PARTICIPANT: ICD-10-CM

## 2025-03-31 LAB
ALBUMIN SERPL BCG-MCNC: 4 G/DL (ref 3.5–5)
ALP SERPL-CCNC: 66 U/L (ref 34–104)
ALT SERPL W P-5'-P-CCNC: 14 U/L (ref 7–52)
ANION GAP SERPL CALCULATED.3IONS-SCNC: 5 MMOL/L (ref 4–13)
AST SERPL W P-5'-P-CCNC: 17 U/L (ref 13–39)
ATRIAL RATE: 59 BPM
BASOPHILS # BLD AUTO: 0.07 THOUSANDS/ÂΜL (ref 0–0.1)
BASOPHILS NFR BLD AUTO: 1 % (ref 0–1)
BILIRUB SERPL-MCNC: 0.58 MG/DL (ref 0.2–1)
BUN SERPL-MCNC: 28 MG/DL (ref 5–25)
CALCIUM SERPL-MCNC: 8.7 MG/DL (ref 8.4–10.2)
CHLORIDE SERPL-SCNC: 104 MMOL/L (ref 96–108)
CHOLEST SERPL-MCNC: 162 MG/DL (ref ?–200)
CO2 SERPL-SCNC: 28 MMOL/L (ref 21–32)
CREAT SERPL-MCNC: 1.3 MG/DL (ref 0.6–1.3)
EOSINOPHIL # BLD AUTO: 0.27 THOUSAND/ÂΜL (ref 0–0.61)
EOSINOPHIL NFR BLD AUTO: 2 % (ref 0–6)
ERYTHROCYTE [DISTWIDTH] IN BLOOD BY AUTOMATED COUNT: 14.6 % (ref 11.6–15.1)
GFR SERPL CREATININE-BSD FRML MDRD: 52 ML/MIN/1.73SQ M
GLUCOSE P FAST SERPL-MCNC: 109 MG/DL (ref 65–99)
HCT VFR BLD AUTO: 47.5 % (ref 36.5–49.3)
HDLC SERPL-MCNC: 44 MG/DL
HGB BLD-MCNC: 15.2 G/DL (ref 12–17)
IGA SERPL-MCNC: 263 MG/DL (ref 66–433)
IGG SERPL-MCNC: 656 MG/DL (ref 635–1741)
IGM SERPL-MCNC: 39 MG/DL (ref 45–281)
IMM GRANULOCYTES # BLD AUTO: 0.03 THOUSAND/UL (ref 0–0.2)
IMM GRANULOCYTES NFR BLD AUTO: 0 % (ref 0–2)
LDLC SERPL CALC-MCNC: 93 MG/DL (ref 0–100)
LYMPHOCYTES # BLD AUTO: 7.77 THOUSANDS/ÂΜL (ref 0.6–4.47)
LYMPHOCYTES NFR BLD AUTO: 59 % (ref 14–44)
MCH RBC QN AUTO: 28 PG (ref 26.8–34.3)
MCHC RBC AUTO-ENTMCNC: 32 G/DL (ref 31.4–37.4)
MCV RBC AUTO: 88 FL (ref 82–98)
MONOCYTES # BLD AUTO: 0.63 THOUSAND/ÂΜL (ref 0.17–1.22)
MONOCYTES NFR BLD AUTO: 5 % (ref 4–12)
NEUTROPHILS # BLD AUTO: 4.27 THOUSANDS/ÂΜL (ref 1.85–7.62)
NEUTS SEG NFR BLD AUTO: 33 % (ref 43–75)
NONHDLC SERPL-MCNC: 118 MG/DL
NRBC BLD AUTO-RTO: 0 /100 WBCS
P AXIS: 44 DEGREES
PLATELET # BLD AUTO: 112 THOUSANDS/UL (ref 149–390)
PMV BLD AUTO: 11.7 FL (ref 8.9–12.7)
POTASSIUM SERPL-SCNC: 4.3 MMOL/L (ref 3.5–5.3)
PR INTERVAL: 174 MS
PROT SERPL-MCNC: 6.5 G/DL (ref 6.4–8.4)
QRS AXIS: 4 DEGREES
QRSD INTERVAL: 78 MS
QT INTERVAL: 412 MS
QTC INTERVAL: 409 MS
RBC # BLD AUTO: 5.42 MILLION/UL (ref 3.88–5.62)
SODIUM SERPL-SCNC: 137 MMOL/L (ref 135–147)
T WAVE AXIS: 47 DEGREES
TRIGL SERPL-MCNC: 126 MG/DL (ref ?–150)
VENTRICULAR RATE: 59 BPM
WBC # BLD AUTO: 13.04 THOUSAND/UL (ref 4.31–10.16)

## 2025-03-31 PROCEDURE — 82784 ASSAY IGA/IGD/IGG/IGM EACH: CPT

## 2025-03-31 PROCEDURE — 85025 COMPLETE CBC W/AUTO DIFF WBC: CPT

## 2025-03-31 PROCEDURE — 80053 COMPREHEN METABOLIC PANEL: CPT

## 2025-03-31 PROCEDURE — 36415 COLL VENOUS BLD VENIPUNCTURE: CPT

## 2025-03-31 PROCEDURE — 93010 ELECTROCARDIOGRAM REPORT: CPT | Performed by: STUDENT IN AN ORGANIZED HEALTH CARE EDUCATION/TRAINING PROGRAM

## 2025-03-31 PROCEDURE — 93005 ELECTROCARDIOGRAM TRACING: CPT

## 2025-03-31 PROCEDURE — 80061 LIPID PANEL: CPT

## 2025-04-07 ENCOUNTER — OFFICE VISIT (OUTPATIENT)
Dept: HEMATOLOGY ONCOLOGY | Facility: CLINIC | Age: 80
End: 2025-04-07

## 2025-04-07 ENCOUNTER — TELEPHONE (OUTPATIENT)
Age: 80
End: 2025-04-07

## 2025-04-07 ENCOUNTER — DOCUMENTATION (OUTPATIENT)
Dept: OTHER | Facility: HOSPITAL | Age: 80
End: 2025-04-07

## 2025-04-07 VITALS
WEIGHT: 221 LBS | HEIGHT: 71 IN | HEART RATE: 73 BPM | OXYGEN SATURATION: 96 % | DIASTOLIC BLOOD PRESSURE: 70 MMHG | BODY MASS INDEX: 30.94 KG/M2 | RESPIRATION RATE: 18 BRPM | SYSTOLIC BLOOD PRESSURE: 128 MMHG | TEMPERATURE: 97.7 F

## 2025-04-07 DIAGNOSIS — R93.89 ABNORMAL CT SCAN, NECK: ICD-10-CM

## 2025-04-07 DIAGNOSIS — C91.10 CLL (CHRONIC LYMPHOCYTIC LEUKEMIA) (HCC): Primary | ICD-10-CM

## 2025-04-07 DIAGNOSIS — D69.6 THROMBOCYTOPENIA (HCC): ICD-10-CM

## 2025-04-07 DIAGNOSIS — C91.10 CLL (CHRONIC LYMPHOCYTIC LEUKEMIA) (HCC): ICD-10-CM

## 2025-04-07 DIAGNOSIS — M19.90 ARTHRITIS: ICD-10-CM

## 2025-04-07 DIAGNOSIS — Z95.5 STATUS POST CORONARY ARTERY STENT PLACEMENT: ICD-10-CM

## 2025-04-07 DIAGNOSIS — I10 HYPERTENSION, ESSENTIAL: ICD-10-CM

## 2025-04-07 DIAGNOSIS — E78.2 MIXED HYPERLIPIDEMIA: ICD-10-CM

## 2025-04-07 DIAGNOSIS — Z00.6 CLINICAL TRIAL PARTICIPANT: Primary | ICD-10-CM

## 2025-04-07 DIAGNOSIS — I65.22 STENOSIS OF LEFT CAROTID ARTERY: ICD-10-CM

## 2025-04-07 DIAGNOSIS — K86.2 PANCREATIC CYST: ICD-10-CM

## 2025-04-07 NOTE — ASSESSMENT & PLAN NOTE
Abnormality in the tonsillar area.  He was seen by ENT previously and there was no abnormality on examination.  Abnormality continues to show on CT scans without any change.  He is being referred back to ENT for another look.  Orders:    Ambulatory Referral to Otolaryngology; Future

## 2025-04-07 NOTE — TELEPHONE ENCOUNTER
Cher from Dr. Castro's office called to assist patient.  Pt was seen in [facility] in 2022. Provided her with the number and name of doctor he had seen.

## 2025-04-07 NOTE — PROGRESS NOTES
Name: Holden Schmidt      : 1945      MRN: [id number]  Encounter Provider: Carroll Castro MD  Encounter Date: 2025   Encounter department: [facility] [place]  :  Assessment & Plan  CLL (chronic lymphocytic leukemia) (HCC)  Patient is on Zanubrutinib on clinical trial and he continues to respond  Orders:    Ambulatory Referral to Otolaryngology; Future    Abnormal CT scan, neck  Abnormality in the tonsillar area.  He was seen by ENT previously and there was no abnormality on examination.  Abnormality continues to show on CT scans without any change.  He is being referred back to ENT for another look.  Orders:    Ambulatory Referral to Otolaryngology; Future    Pancreatic cyst  Being monitored       Status post coronary artery stent placement  He follows with his cardiologist.  No chest pain.       Stenosis of left carotid artery  He follows with vascular       Thrombocytopenia (HCC)  Probably part of CLL       Hypertension, essential  Being managed by PCP       Mixed hyperlipidemia  Being managed by PCP        Arthritis  Minor       Blood work and CT scans per protocol.  Intravenous contrast only if eGFR is more than 45.  Referral to ENT within a month.  Follow-up in 3 months.    Patient continues to respond to Zanubrutinib on clinical trial.  Chronic modest thrombocytopenia.  No history of bleeding.  No symptoms comes from CLL or Zanubrutinib.  Goal is remission from CLL and prolongation of survival.  Patient is capable of self-care.  All discussed in detail.  Questions answered.  I suggested eating healthy foods, staying active but to avoid falls and trauma.  Suggested health screening tests. Patient to continue to follow-up with primary physician and other consultants.  Provided counseling and support.  I used a dictation device to dictate this note and there could be mistakes in my note and for that patient may contact my office.        History of Present Illness    Chief Complaint   Patient presents with    Follow-up   Follow-up for CLL and thrombocytopenia and patient has been on Zanubrutinib and prior to that he was on acalabrutinib and that was discontinued because patient went on aspirin and Plavix post MI and he had 1 stent.  He is not on Plavix anymore.  He is on aspirin 81 mg daily.  No bleeding on aspirin and Zanubrutinib.  No atrial fibrillation.  No headache.  No uncontrolled hypertension.  He is not symptomatic from CLL heart from Zanubrutinib.  Thrombocytopenia is modest and is persisting.  Persistent abnormality in the tonsillar area on CT scan and he will be referred again to ENT for another look.  CLL was diagnosed few years ago and after a long period of observation he was started on acalabrutinib in June 2020 when platelet counts dropped below 100,000, stage IV CLL. He responded but in June or July 2021 acalabrutinib was discontinued because patient went on aspirin and Plavix post MI and 1 stent in LAD. Patient went off Plavix on 06/01/2022 . He is on baby aspirin. He wanted to go back to therapy. He qualified for clinical trial for zanubrutinib and he wanted to get started on therapy and zanubrutinib was started in July 2022.   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  This time platelet count is 105,000.  Patient is not symptomatic from thrombocytopenia.  No bleeding, bruising or petechiae.  Patient has been tolerating his zanubrutinib without much problem.  No bleeding in spite of taking baby aspirin for coronary stent in LAD.  He is not on Plavix anymore.  No atrial fibrillation.  No other symptoms secondary to zanubrutinib.  No symptoms secondary to CLL.    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  Previously he had 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.  Negative for trisomy 12 and 11 q deletion.  Mutated Ig VH.   Patient has some tiredness and arthritic symptoms.  "  Pertinent Medical History   See details in HPI.  04/07/25:      Review of Systems  Reviewed 12 systems.  Symptoms are as in HPI.  No fevers, chills, bleeding, bone pains, skin rash, weight loss, night sweats and no swelling of the ankles and no swollen glands.  No other neurological, cardiac, pulmonary, GI and  symptoms other than listed in HPI.      Objective   /70 (BP Location: Left arm, Patient Position: Sitting, Cuff Size: Adult)   Pulse 73   Temp 97.7 °F (36.5 °C) (Temporal)   Resp 18   Ht 5' 11\" (1.803 m)   Wt 100 kg (221 lb)   SpO2 96%   BMI 30.82 kg/m²     Physical Exam  Vitals reviewed.   Constitutional:       General: He is not in acute distress.     Appearance: Normal appearance. He is not ill-appearing.   HENT:      Head: Normocephalic and atraumatic.      Mouth/Throat:      Mouth: Mucous membranes are moist.      Comments: No thrush.  I could not visualize an abnormality in the tonsil area.  Eyes:      General: No scleral icterus.     Conjunctiva/sclera: Conjunctivae normal.   Cardiovascular:      Rate and Rhythm: Normal rate and regular rhythm.      Heart sounds: Normal heart sounds. No murmur heard.  Pulmonary:      Effort: Pulmonary effort is normal. No respiratory distress.      Breath sounds: Normal breath sounds. No rhonchi or rales.   Abdominal:      General: Abdomen is flat. There is no distension.      Palpations: Abdomen is soft. There is no mass.      Tenderness: There is no abdominal tenderness.      Comments: No ascites.    Musculoskeletal:         General: No swelling or tenderness.      Cervical back: Normal range of motion. No rigidity or tenderness.      Right lower leg: No edema.      Left lower leg: No edema.      Comments: No calf tenderness.   Lymphadenopathy:      Cervical: No cervical adenopathy.      Upper Body:      Right upper body: No supraclavicular or axillary adenopathy.      Left upper body: No supraclavicular or axillary adenopathy.   Skin:     General: " Skin is warm.      Coloration: Skin is not jaundiced or pale.      Findings: No bruising or rash.      Nails: There is no clubbing.   Neurological:      General: No focal deficit present.      Mental Status: He is alert and oriented to person, place, and time.      Motor: No weakness.      Coordination: Coordination normal.      Gait: Gait normal.   Psychiatric:         Mood and Affect: Mood normal.         Behavior: Behavior normal.         Thought Content: Thought content normal.     ECOG 1    Labs: I have reviewed the following labs:  Results for orders placed or performed in visit on 03/31/25   CBC and differential   Result Value Ref Range    WBC 13.04 (H) 4.31 - 10.16 Thousand/uL    RBC 5.42 3.88 - 5.62 Million/uL    Hemoglobin 15.2 12.0 - 17.0 g/dL    Hematocrit 47.5 36.5 - 49.3 %    MCV 88 82 - 98 fL    MCH 28.0 26.8 - 34.3 pg    MCHC 32.0 31.4 - 37.4 g/dL    RDW 14.6 11.6 - 15.1 %    MPV 11.7 8.9 - 12.7 fL    Platelets 112 (L) 149 - 390 Thousands/uL    nRBC 0 /100 WBCs    Segmented % 33 (L) 43 - 75 %    Immature Grans % 0 0 - 2 %    Lymphocytes % 59 (H) 14 - 44 %    Monocytes % 5 4 - 12 %    Eosinophils Relative 2 0 - 6 %    Basophils Relative 1 0 - 1 %    Absolute Neutrophils 4.27 1.85 - 7.62 Thousands/µL    Absolute Immature Grans 0.03 0.00 - 0.20 Thousand/uL    Absolute Lymphocytes 7.77 (H) 0.60 - 4.47 Thousands/µL    Absolute Monocytes 0.63 0.17 - 1.22 Thousand/µL    Eosinophils Absolute 0.27 0.00 - 0.61 Thousand/µL    Basophils Absolute 0.07 0.00 - 0.10 Thousands/µL   Comprehensive metabolic panel   Result Value Ref Range    Sodium 137 135 - 147 mmol/L    Potassium 4.3 3.5 - 5.3 mmol/L    Chloride 104 96 - 108 mmol/L    CO2 28 21 - 32 mmol/L    ANION GAP 5 4 - 13 mmol/L    BUN 28 (H) 5 - 25 mg/dL    Creatinine 1.30 0.60 - 1.30 mg/dL    Glucose, Fasting 109 (H) 65 - 99 mg/dL    Calcium 8.7 8.4 - 10.2 mg/dL    AST 17 13 - 39 U/L    ALT 14 7 - 52 U/L    Alkaline Phosphatase 66 34 - 104 U/L    Total Protein  6.5 6.4 - 8.4 g/dL    Albumin 4.0 3.5 - 5.0 g/dL    Total Bilirubin 0.58 0.20 - 1.00 mg/dL    eGFR 52 ml/min/1.73sq m   Lipid panel   Result Value Ref Range    Cholesterol 162 See Comment mg/dL    Triglycerides 126 See Comment mg/dL    HDL, Direct 44 >=40 mg/dL    LDL Calculated 93 0 - 100 mg/dL    Non-HDL-Chol (CHOL-HDL) 118 mg/dl   IgG, IgA, IgM   Result Value Ref Range     66 - 433 mg/dL     635 - 1,741 mg/dL    IGM 39 (L) 45 - 281 mg/dL     CBC and differential  Status: Final result      Contains abnormal data CBC and differential  Order: [id number]   Status: Final result       Next appt: 07/28/2025 at 03:00 PM in Cardiology Imaging ([facility])       Dx: Clinical trial participant    Test Result Released: Yes (seen)    0 Result Notes            Component  Ref Range & Units (hover) 3/31/25  6:31 AM 12/30/24  6:30 AM 12/30/24  6:30 AM 10/14/24  7:01 AM 10/14/24  7:01 AM 7/3/24  7:14 AM 7/3/24  7:14 AM   WBC 13.04 High   12.48 High   13.10 High   13.72 High    RBC 5.42  5.36  5.16  5.32   Hemoglobin 15.2  14.9  14.4  14.7   Hematocrit 47.5  47.8  45.9  47.8   MCV 88  89  89  90   MCH 28.0  27.8  27.9  27.6   MCHC 32.0  31.2 Low   31.4  30.8 Low    RDW 14.6  14.6  14.7  14.8   MPV 11.7  11.6  11.6  11.3   Platelets 112 Low   105 Low   104 Low  CM  99 Low  CM   Comment: Results verified by repeat   nRBC 0         Segmented % 33 Low  24 Low   29 Low   24 Low     Immature Grans % 0         Lymphocytes % 59 High  68 High   59 High   67 High     Monocytes % 5 4  4  6    Eosinophils Relative 2 2  2  0    Basophils Relative 1 0  1  0    Absolute Neutrophils 4.27 3.00 R  3.80 R  3.29 R    Absolute Immature Grans 0.03         Absolute Lymphocytes 7.77 High  8.74 High  R  8.38 High  R  9.60 High  R    Absolute Monocytes 0.63 0.50 R  0.52 R  0.82 R    Eosinophils Absolute 0.27 0.25 R  0.26 R  0.00 R    Basophils Absolute 0.07 0.00 R  0.13 High  R  0.00 R    Atypical Lymphocytes %  2 High  R  5 High  R  3  High  R    Total Counted          RBC Morphology  Normal  Normal  Present    Platelet Estimate  Borderline Abnormal  R  Decreased Abnormal  R  Decreased Abnormal  R    Large Platelet  Present  Present  Present    Anisocytosis      Present              Specimen Collected: 03/31/25  6:31 AM Last Resulted: 03/31/25  7:19 AM     CT soft tissue neck w contrast  Status: Final result     PACS Images - GE     Show images for CT soft tissue neck w contrast  PACS Images - [facility]     Show images for CT soft tissue neck w contrast  Study Result    Narrative & Impression   CT NECK WITH CONTRAST     INDICATION:   CLL on zanubrutinib     COMPARISON: 10/15/2024, 6/28/2024,, 3/30/2023 chest CT from 3/24/2025     TECHNIQUE:  Axial, sagittal, and coronal 2D reformatted images were created from the axial source data and submitted for interpretation.     Radiation dose length product (DLP) for this visit:  747.74 mGy-cm .  This examination, like all CT scans performed in the [facility] include [facility] beds at the [facility], was performed utilizing techniques to minimize radiation dose exposure, including the use of iterative   reconstruction and automated exposure control.     IV Contrast:  100 mL of iohexol     IMAGE QUALITY:  Diagnostic.     FINDINGS:     VISUALIZED BRAIN PARENCHYMA:  No acute intracranial pathology of the visualized brain parenchyma.     VISUALIZED ORBITS: Normal visualized orbits.     PARANASAL SINUSES: Normal visualized paranasal sinuses.     NASAL CAVITY AND NASOPHARYNX:  Normal.     SUPRAHYOID NECK: Stable asymmetric prominence of the right greater than left lingual tonsils, effacing the vallecula, with stable hypodensities involving both lingual tonsils. Epiglottis appears normal. Oral cavity base of tongue otherwise appears   grossly normal.     INFRAHYOID NECK:  Aryepiglottic folds and piriform sinuses are normal.  Normal glottis and subglottic airway.     THYROID GLAND: Thyroid gland appears grossly normal.     PAROTID AND  SUBMANDIBULAR GLANDS:  Normal.     LYMPH NODES:  No pathologic or enlarged adenopathy.     VASCULAR STRUCTURES: Atherosclerotic plaque involves the left greater than right carotid bifurcations, grossly stable.     THORACIC INLET: Lateral scarring involving the visualized right upper lobe.     BONY STRUCTURES: No acute fracture or destructive osseous lesion. Multilevel cervical and upper thoracic spondylosis.     IMPRESSION:     No cervical lymphadenopathy identified.     Stable asymmetric prominence of the right greater than left lingual tonsils, effacing the vallecula, with stable hypodensities involving both lingual tonsils. Correlation with prior direct visualization findings is recommended.           Workstation performed: [id number]        Imaging    CT soft tissue neck w contrast (Order: [id number]) - 3/24/2025    CT chest abdomen pelvis w contrast  Status: Final result     PACS Images - GE     Show images for CT chest abdomen pelvis w contrast  PACS Images - [facility]     Show images for CT chest abdomen pelvis w contrast  Study Result    Narrative & Impression   CT CHEST, ABDOMEN AND PELVIS WITH IV CONTRAST     INDICATION: Z00.6: Encounter for examination for normal comparison and control in clinical research program. Chronic lymphocytic leukemia on Zanubrutinib. Clinical trial participant.     COMPARISON: CT chest/abdomen/pelvis 10/15/2024.     TECHNIQUE: CT examination of the chest, abdomen and pelvis was performed. Multiplanar 2D reformatted images were created from the source data.     This examination, like all CT scans performed in the [facility], was performed utilizing techniques to minimize radiation dose exposure, including the use of iterative reconstruction and automated exposure control. Radiation dose length   product (DLP) for this visit: 1842.3 mGy-cm     IV Contrast: 100 mL of iohexol  Enteric Contrast: Administered.     FINDINGS:     RECIST 1.1     TARGET LESIONS:  None      NONTARGET LESIONS:  1. Right axillary lymph node, 1.2 x 1.0  cm (series 3, image 14), previously 1.2 x 0.9 cm.  2. Right subpectoral lymph node, 0.6 x 0.4 cm (series 3, image 19), previously 0.6 x 0.4 cm.  3. Left subpectoral lymph node, 0.7 x 0.4 cm (series 3, image 12), previously 0.7 x 0.4 cm.  4. Right common iliac lymph node, 1.4 x 0.9 cm (series 3, image 191), previously 1.4 x 0.9 cm.     CHEST     LUNGS: Lungs are clear. No tracheal or endobronchial lesion. Osteophyte-induced adjacent atelectasis and fibrosis in the medial right lower lobe. Right upper lobe lateral scarring and calcified granulomas. A 3 mm left lower lobe solid pulmonary   micronodule (9/137), stable since 6/29/2022, therefore consistent with a benign etiology. No new or enlarging pulmonary nodules.     PLEURA: Unremarkable.     HEART/GREAT VESSELS: The heart is nonenlarged. Three-vessel coronary artery calcifications. No thoracic aortic aneurysm.     MEDIASTINUM AND GALI: Small hiatal hernia. No mediastinal or hilar lymphadenopathy.     CHEST WALL AND LOWER NECK: Stable benign 2 mm right thyroid lobe nodule (3/8) since 8/5/2017. Incidental discovery of one or more thyroid nodule(s) measuring less than 1.5 cm and without suspicious features is noted in this patient who is above 35 years   old; according to guidelines published in the February 2015 white paper on incidental thyroid nodules in the Journal of the American College of Radiology (JACR), no further evaluation is recommended.     ABDOMEN     LIVER/BILIARY TREE: Subcentimeter hypoattenuating lesion(s), too small to characterize but statistically likely benign, which do not require follow-up (ACR White Paper 2017). No suspicious mass. Normal hepatic contours. Benign left hepatic lobe cyst.   Stable 1.2 cm segment 5 hepatic hemangioma, best seen on previous MRI 3/29/2024.     GALLBLADDER: No calcified gallstones. No pericholecystic inflammatory change.     SPLEEN: Calcified  granulomata. No suspicious mass. No splenomegaly. Spleen measures 10.6 cm craniocaudally.     PANCREAS: Cystic pancreatic lesion as follows:  -Dominant cyst size: 1.4 cm (series 3, image 133). Unchanged since 9/26/2022, previously measuring 1.1 cm on 8/5/2017.  -Dominant cyst location: Pancreatic head.  -Additional cysts: None.  -Communication with main pancreatic duct: Dominant cyst communicates with main pancreatic duct.  -Main duct dilation: None.  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  -High risk stigmata: None.  -Worrisome features: None.  -(Please note the above only takes into account imaging high risk stigmata/worrisome features. Clinical/laboratory feature assessment by gastroenterologist/surgical oncologist is suggested.)     -Management recommendation: Follow-up 6 months once, then every 18 months. Currently, patient has demonstrated stability since 9/26/2022. Recommend next follow-up in 18 months. Endpoint determined by clinician. Preferred imaging modality: abdomen MRI and   MRCP with and without IV contrast, or triple phase abdomen CT with IV contrast, or abdomen MRI and MRCP without IV contrast.     Management and follow up recommendations for cystic pancreatic lesions are based on Institutional consensus and international evidence-based Kyoto guidelines for the management of intraductal papillary mucinous neoplasm of the pancreas. Pancreatology 24   (2024) [phone].     ADRENAL GLANDS: Unremarkable.     KIDNEYS/URETERS: No hydronephrosis or urinary tract calculi. Subcentimeter hypoattenuating renal lesion(s), too small to characterize but statistically likely benign, which do not warrant follow-up (Radiology June 2019). Multiple left renal sinus cysts.   Multifocal right renal cortical scarring.     STOMACH AND BOWEL: Colonic diverticulosis without findings of acute diverticulitis.     APPENDIX: Surgically absent.     ABDOMINOPELVIC CAVITY:  No ascites. No pneumoperitoneum. No pathologically enlarged lymph nodes.     VESSELS: Atherosclerosis without abdominal aortic aneurysm. Small caliber inferior vena cava may be related to patient's volume status. Circumaortic left renal vein.     PELVIS     REPRODUCTIVE ORGANS: Enlarged prostate.     URINARY BLADDER: Unremarkable.     ABDOMINAL WALL/INGUINAL REGIONS: Small fat-containing umbilical and left inguinal hernias. Moderate right inguinal hernia containing nondilated small and large bowel.     BONES: No acute fracture or suspicious osseous lesion. Spinal degenerative changes. Degenerative changes of the right shoulder. Advanced right hip osteoarthritis. Moderate left hip osteoarthritis.     IMPRESSION:     Stable nontarget lesions. No new or progressive lymphadenopathy in the chest, abdomen, and pelvis. No splenomegaly.     Stable 1.4 cm pancreatic head cystic lesion since 9/26/2022. No main pancreatic duct dilation. Management recommendation: Follow-up 6 months once, then every 18 months. Currently, patient has demonstrated stability since 9/26/2022. Recommend next   follow-up in 18 months. Endpoint determined by clinician. Preferred imaging modality: abdomen MRI and MRCP with and without IV contrast, or triple phase abdomen CT with IV contrast, or abdomen MRI and MRCP without IV contrast.        Resident: Bhavesh Lowery I, the attending radiologist, have reviewed the images and agree with the final report above.     Workstation performed: [id number]        Imaging    CT chest abdomen pelvis w contrast (Order: [id number]) - 3/24/2025

## 2025-04-07 NOTE — PROGRESS NOTES
Patient seen today by Dr. Castro for Cycle 34 of [id number] trial. Collected 5 pill bottles from Cycles 31,32 and 33 and diaries which were signed by patient. New diaries and pills were dispensed for Cycles 34,35 and 36. Labs, ECG  and Ct's reviewed and signed by PI. Con meds and AE's reviewed. Patient reported he is doing well and has no complaints at this time. Patient made aware he will need labs and ECG completed prior to next appointment. I will see him back in office in 3 months time. No scans needed at that time. For scans contrast only if eGFR is >45.                   Sedation Plan    ASA 2     Mallampati class: II.    Risks, benefits, and alternatives discussed with patient.

## 2025-04-07 NOTE — ASSESSMENT & PLAN NOTE
Patient is on Zanubrutinib on clinical trial and he continues to respond  Orders:    Ambulatory Referral to Otolaryngology; Future

## 2025-04-07 NOTE — PATIENT INSTRUCTIONS
Blood work and CT scans per protocol.  Intravenous contrast only if eGFR is more than 45.  Referral to ENT within a month.  Follow-up in 3 months.

## 2025-06-22 ENCOUNTER — HOSPITAL ENCOUNTER (OUTPATIENT)
Dept: VASCULAR ULTRASOUND | Facility: HOSPITAL | Age: 80
Discharge: HOME | End: 2025-06-22
Payer: COMMERCIAL

## 2025-06-22 DIAGNOSIS — I65.22 STENOSIS OF LEFT CAROTID ARTERY: ICD-10-CM

## 2025-06-22 PROCEDURE — 93880 EXTRACRANIAL BILAT STUDY: CPT

## 2025-06-22 PROCEDURE — 93880 EXTRACRANIAL BILAT STUDY: CPT | Performed by: SURGERY

## 2025-06-30 ENCOUNTER — APPOINTMENT (OUTPATIENT)
Dept: LAB | Facility: CLINIC | Age: 80
End: 2025-06-30
Attending: INTERNAL MEDICINE
Payer: COMMERCIAL

## 2025-06-30 DIAGNOSIS — C91.10 CLL (CHRONIC LYMPHOCYTIC LEUKEMIA) (HCC): ICD-10-CM

## 2025-06-30 DIAGNOSIS — Z00.6 CLINICAL TRIAL PARTICIPANT: ICD-10-CM

## 2025-06-30 LAB
ALBUMIN SERPL BCG-MCNC: 4 G/DL (ref 3.5–5)
ALP SERPL-CCNC: 63 U/L (ref 34–104)
ALT SERPL W P-5'-P-CCNC: 14 U/L (ref 7–52)
ANION GAP SERPL CALCULATED.3IONS-SCNC: 5 MMOL/L (ref 4–13)
AST SERPL W P-5'-P-CCNC: 16 U/L (ref 13–39)
ATRIAL RATE: 58 BPM
BASOPHILS # BLD AUTO: 0.05 THOUSANDS/ÂΜL (ref 0–0.1)
BASOPHILS NFR BLD AUTO: 0 % (ref 0–1)
BILIRUB SERPL-MCNC: 0.58 MG/DL (ref 0.2–1)
BUN SERPL-MCNC: 25 MG/DL (ref 5–25)
CALCIUM SERPL-MCNC: 8.9 MG/DL (ref 8.4–10.2)
CHLORIDE SERPL-SCNC: 105 MMOL/L (ref 96–108)
CO2 SERPL-SCNC: 28 MMOL/L (ref 21–32)
CREAT SERPL-MCNC: 1.21 MG/DL (ref 0.6–1.3)
EOSINOPHIL # BLD AUTO: 0.23 THOUSAND/ÂΜL (ref 0–0.61)
EOSINOPHIL NFR BLD AUTO: 2 % (ref 0–6)
ERYTHROCYTE [DISTWIDTH] IN BLOOD BY AUTOMATED COUNT: 14.6 % (ref 11.6–15.1)
GFR SERPL CREATININE-BSD FRML MDRD: 56 ML/MIN/1.73SQ M
GLUCOSE P FAST SERPL-MCNC: 105 MG/DL (ref 65–99)
HCT VFR BLD AUTO: 45.4 % (ref 36.5–49.3)
HGB BLD-MCNC: 15 G/DL (ref 12–17)
IMM GRANULOCYTES # BLD AUTO: 0.02 THOUSAND/UL (ref 0–0.2)
IMM GRANULOCYTES NFR BLD AUTO: 0 % (ref 0–2)
LYMPHOCYTES # BLD AUTO: 6.45 THOUSANDS/ÂΜL (ref 0.6–4.47)
LYMPHOCYTES NFR BLD AUTO: 58 % (ref 14–44)
MCH RBC QN AUTO: 28.8 PG (ref 26.8–34.3)
MCHC RBC AUTO-ENTMCNC: 33 G/DL (ref 31.4–37.4)
MCV RBC AUTO: 87 FL (ref 82–98)
MONOCYTES # BLD AUTO: 0.57 THOUSAND/ÂΜL (ref 0.17–1.22)
MONOCYTES NFR BLD AUTO: 5 % (ref 4–12)
NEUTROPHILS # BLD AUTO: 3.85 THOUSANDS/ÂΜL (ref 1.85–7.62)
NEUTS SEG NFR BLD AUTO: 35 % (ref 43–75)
NRBC BLD AUTO-RTO: 0 /100 WBCS
P AXIS: -33 DEGREES
PLATELET # BLD AUTO: 108 THOUSANDS/UL (ref 149–390)
PLATELET BLD QL SMEAR: ABNORMAL
PMV BLD AUTO: 12 FL (ref 8.9–12.7)
POTASSIUM SERPL-SCNC: 4.4 MMOL/L (ref 3.5–5.3)
PR INTERVAL: 154 MS
PROT SERPL-MCNC: 6.3 G/DL (ref 6.4–8.4)
QRS AXIS: 5 DEGREES
QRSD INTERVAL: 82 MS
QT INTERVAL: 410 MS
QTC INTERVAL: 403 MS
RBC # BLD AUTO: 5.2 MILLION/UL (ref 3.88–5.62)
RBC MORPH BLD: NORMAL
SODIUM SERPL-SCNC: 138 MMOL/L (ref 135–147)
T WAVE AXIS: 50 DEGREES
VENTRICULAR RATE: 58 BPM
WBC # BLD AUTO: 11.17 THOUSAND/UL (ref 4.31–10.16)

## 2025-06-30 PROCEDURE — 80053 COMPREHEN METABOLIC PANEL: CPT

## 2025-06-30 PROCEDURE — 36415 COLL VENOUS BLD VENIPUNCTURE: CPT

## 2025-06-30 PROCEDURE — 85025 COMPLETE CBC W/AUTO DIFF WBC: CPT

## 2025-06-30 PROCEDURE — 93010 ELECTROCARDIOGRAM REPORT: CPT | Performed by: INTERNAL MEDICINE

## 2025-07-07 ENCOUNTER — OFFICE VISIT (OUTPATIENT)
Dept: HEMATOLOGY ONCOLOGY | Facility: CLINIC | Age: 80
End: 2025-07-07

## 2025-07-07 ENCOUNTER — DOCUMENTATION (OUTPATIENT)
Dept: OTHER | Facility: HOSPITAL | Age: 80
End: 2025-07-07

## 2025-07-07 VITALS
OXYGEN SATURATION: 97 % | TEMPERATURE: 97.8 F | BODY MASS INDEX: 30.24 KG/M2 | HEIGHT: 71 IN | DIASTOLIC BLOOD PRESSURE: 74 MMHG | SYSTOLIC BLOOD PRESSURE: 148 MMHG | WEIGHT: 216 LBS | HEART RATE: 68 BPM | RESPIRATION RATE: 18 BRPM

## 2025-07-07 DIAGNOSIS — K86.2 PANCREATIC CYST: ICD-10-CM

## 2025-07-07 DIAGNOSIS — I10 HYPERTENSION, ESSENTIAL: ICD-10-CM

## 2025-07-07 DIAGNOSIS — Z00.6 CLINICAL TRIAL PARTICIPANT: Primary | ICD-10-CM

## 2025-07-07 DIAGNOSIS — C91.10 CLL (CHRONIC LYMPHOCYTIC LEUKEMIA) (HCC): Primary | ICD-10-CM

## 2025-07-07 DIAGNOSIS — I65.22 STENOSIS OF LEFT CAROTID ARTERY: ICD-10-CM

## 2025-07-07 DIAGNOSIS — M19.90 ARTHRITIS: ICD-10-CM

## 2025-07-07 DIAGNOSIS — E78.2 MIXED HYPERLIPIDEMIA: ICD-10-CM

## 2025-07-07 DIAGNOSIS — R93.89 ABNORMAL CT SCAN, NECK: ICD-10-CM

## 2025-07-07 DIAGNOSIS — D69.6 THROMBOCYTOPENIA (HCC): ICD-10-CM

## 2025-07-07 DIAGNOSIS — C91.10 CLL (CHRONIC LYMPHOCYTIC LEUKEMIA) (HCC): ICD-10-CM

## 2025-07-07 DIAGNOSIS — Z95.5 STATUS POST CORONARY ARTERY STENT PLACEMENT: ICD-10-CM

## 2025-07-07 NOTE — PROGRESS NOTES
Patient seen today by Dr. Castro for Cycle 37 of [id number] trial. Collected 5 pill bottles from Cycles 34,35 and 36 and diaries which were signed by patient. New diaries and pills were dispensed for Cycles 37,38 and 39. QOL's completed.  Labs and ECG  reviewed and signed by PI. Con meds and AE's reviewed. Patient reported he is doing well and has no complaints at this time. Patient made aware that study is closing and transition to commercial drug will be initiated by Med/Onc office prior to next scheduled visit.  I will see him back in office in 3 months time with labs, ECG and scans at that time. For scans contrast only if eGFR is >45.

## 2025-07-07 NOTE — ASSESSMENT & PLAN NOTE
Per clinical trial protocol patient has been on Zanubrutinib with very good results and patient has been tolerating Zanubrutinib without much problem.  Not complaining of headache and there is no bleeding or atrial fibrillation and no uncontrolled hypertension.  No other symptoms secondary to CLL or Zanubrutinib.

## 2025-07-07 NOTE — PROGRESS NOTES
Name: Holden Schmidt      : 1945      MRN: [id number]  Encounter Provider: Carroll Castro MD  Encounter Date: 2025   Encounter department: [facility] [place]  :  Assessment & Plan  CLL (chronic lymphocytic leukemia) (HCC)  Per clinical trial protocol patient has been on Zanubrutinib with very good results and patient has been tolerating Zanubrutinib without much problem.  Not complaining of headache and there is no bleeding or atrial fibrillation and no uncontrolled hypertension.  No other symptoms secondary to CLL or Zanubrutinib.       Thrombocytopenia (HCC)  Mild and stable       Abnormal CT scan, neck  Patient was evaluated by ENT and there was no abnormality on ENT examination       Pancreatic cyst  Being monitored.       Status post coronary artery stent placement  Follows with his cardiologist.       Stenosis of left carotid artery  Follows with vascular       Hypertension, essential  Being managed by PCP       Mixed hyperlipidemia  Being managed by PCP       Arthritis  Being managed by PCP       No change in Zanubrutinib .  Blood work and CT scans prior to next visit in 3 months.  See diagnosis, orders and instructions above.  He is being continued on Zanubrutinib.  Hematologically stable.  Goal is continuation of remission response.  Patient is capable of self-care.  All discussed in detail.  Questions answered.  Clinical protocol person was in the room with me during evaluation and making management plan.  I suggested eating healthy foods, staying active but to avoid falls and trauma.  Suggested health screening tests. Patient to continue to follow-up with primary physician and other consultants.  Provided counseling and support.  I used a dictation device to dictate this note and there could be mistakes in my note and for that patient may contact my office.      History of Present Illness   Chief Complaint   Patient presents with    Follow-up   Patient had  stage IV CLL with thrombocytopenia and initial treatment was acalabrutinib and that was started in 2020..  When patient had MI and 1 stent in LAD in 2022 and he was started on Plavix and aspirin.  On 6/1/2022 Plavix was discontinued.  Baby aspirin was continued and he was registered into clinical trial and Zanubrutinib was started.  CLL had 13 q. deletion by FISH and 17P deletion.  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  No symptoms secondary to CLL or Zanubrutinib.  CLL was responding to acalabrutinib and now it is responding to Zanubrutinib.  No cardiac symptoms.  No headache.  No uncontrolled high blood pressure.  No bleeding or bruising and no atrial fibrillation/palpitations.  Other problems are listed in assessment section.  Patient has some arthritic symptoms.  Oncology History   Cancer Staging   CLL (chronic lymphocytic leukemia) ([facility])  Staging form: Chronic Lymphocytic Leukemia, AJCC 8th Edition  - Clinical stage from 1/8/2024: Modified Razo Stage IV (Modified Razo risk: High, Lymphocytosis: Present, Adenopathy: Present, Organomegaly: Absent, Anemia: Absent, Thrombocytopenia: Present) - Signed by Carroll Castro MD on 1/8/2024  Stage prefix: Initial diagnosis  Absolute lymphocyte count (ALC) (cells/uL): 82,250  Hemoglobin (Hgb) (g/dL): 14.1  Platelet count (x10E9/L of blood): 89,000  Oncology History Overview Note   chronic lymphocytic leukemia, diagnosed in August 2017. CLL has mixed good and bad prognostic features, 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, IgVH mutation, lack of CD38 expression, deletion of 13 q14 in 6% and no 11 q deletion..   . There was no trisomy 12. Lymphocytes were CD5 and CD23 positive, dim kappa expression and moderate CD20 expression had splenomegaly on CT scan but not on palpation     CLL (chronic lymphocytic leukemia) ([facility])   3/27/2018 Initial Diagnosis    CLL (chronic lymphocytic leukemia) ([facility])     1/8/2024 -  Cancer Staged    Staging form: Chronic Lymphocytic Leukemia, AJCC 8th Edition  - Clinical stage from 1/8/2024:  "Modified Razo Stage IV (Modified Razo risk: High, Lymphocytosis: Present, Adenopathy: Present, Organomegaly: Absent, Anemia: Absent, Thrombocytopenia: Present) - Signed by Carroll Castro MD on 1/8/2024  Stage prefix: Initial diagnosis  Absolute lymphocyte count (ALC) (cells/uL): 82,250  Hemoglobin (Hgb) (g/dL): 14.1  Platelet count (x10E9/L of blood): 89,000          Pertinent Medical History   See details in HPI.  07/07/25:      Review of Systems  Reviewed 12 systems.  Symptoms are in HPI.  No other neurological, cardiac, pulmonary, GI and  symptoms other than listed in HPI.  No other symptoms other than listed in HPI.      Objective   /74 (BP Location: Left arm, Patient Position: Sitting, Cuff Size: Adult)   Pulse 68   Temp 97.8 °F (36.6 °C) (Temporal)   Resp 18   Ht 5' 11\" (1.803 m)   Wt 98 kg (216 lb)   SpO2 97%   BMI 30.13 kg/m²     Pain Screening:  Pain Score: 0-No pain  ECOG   1  Physical Exam  Vitals reviewed.   Constitutional:       General: He is not in acute distress.     Appearance: Normal appearance. He is not ill-appearing.   HENT:      Head: Normocephalic and atraumatic.      Mouth/Throat:      Mouth: Mucous membranes are moist.      Comments: No thrush.    Eyes:      General: No scleral icterus.        Right eye: No discharge.         Left eye: No discharge.      Conjunctiva/sclera: Conjunctivae normal.       Cardiovascular:      Rate and Rhythm: Normal rate and regular rhythm.      Heart sounds: Normal heart sounds. No murmur heard.  Pulmonary:      Effort: Pulmonary effort is normal. No respiratory distress.      Breath sounds: Normal breath sounds. No wheezing, rhonchi or rales.   Abdominal:      General: Abdomen is flat. There is no distension.      Palpations: Abdomen is soft. There is no mass.      Tenderness: There is no abdominal tenderness.      Comments: No ascites.      Musculoskeletal:         General: No swelling or tenderness. Normal range of motion.      Cervical back: " Normal range of motion. No rigidity or tenderness.      Right lower leg: No edema.      Left lower leg: No edema.      Comments: No calf tenderness.   Lymphadenopathy:      Cervical: No cervical adenopathy.      Upper Body:      Right upper body: No supraclavicular or axillary adenopathy.      Left upper body: No supraclavicular or axillary adenopathy.     Skin:     General: Skin is warm.      Coloration: Skin is not jaundiced or pale.      Findings: No bruising or rash.      Nails: There is no clubbing.     Neurological:      General: No focal deficit present.      Mental Status: He is alert and oriented to person, place, and time.      Motor: No weakness.      Coordination: Coordination normal.      Gait: Gait normal.     Psychiatric:         Mood and Affect: Mood normal.         Behavior: Behavior normal.         Thought Content: Thought content normal.         Judgment: Judgment normal.         Labs: I have reviewed the following labs:  Lab Results   Component Value Date/Time    WBC 11.17 (H) 06/30/2025 06:28 AM    RBC 5.20 06/30/2025 06:28 AM    Hemoglobin 15.0 06/30/2025 06:28 AM    Hematocrit 45.4 06/30/2025 06:28 AM    MCV 87 06/30/2025 06:28 AM    MCH 28.8 06/30/2025 06:28 AM    RDW 14.6 06/30/2025 06:28 AM    Platelets 108 (L) 06/30/2025 06:28 AM    Segmented % 35 (L) 06/30/2025 06:28 AM    Lymphocytes % 58 (H) 06/30/2025 06:28 AM    Monocytes % 5 06/30/2025 06:28 AM    Eosinophils Relative 2 06/30/2025 06:28 AM    Basophils Relative 0 06/30/2025 06:28 AM    Immature Grans % 0 06/30/2025 06:28 AM    Absolute Neutrophils 3.85 06/30/2025 06:28 AM     Lab Results   Component Value Date/Time    Potassium 4.4 06/30/2025 06:28 AM    Chloride 105 06/30/2025 06:28 AM    CO2 28 06/30/2025 06:28 AM    BUN 25 06/30/2025 06:28 AM    Creatinine 1.21 06/30/2025 06:28 AM    Glucose, Fasting 105 (H) 06/30/2025 06:28 AM    Calcium 8.9 06/30/2025 06:28 AM    AST 16 06/30/2025 06:28 AM    ALT 14 06/30/2025 06:28 AM     Alkaline Phosphatase 63 06/30/2025 06:28 AM    Total Protein 6.3 (L) 06/30/2025 06:28 AM    Albumin 4.0 06/30/2025 06:28 AM    Total Bilirubin 0.58 06/30/2025 06:28 AM    eGFR 56 06/30/2025 06:28 AM     Component  Ref Range & Units (hover) 3/31/25  6:31 AM 12/30/24  6:30 AM 10/14/24  7:01 AM 7/3/24  7:14 AM 12/22/23  6:36 AM 6/28/23  6:22 AM 3/31/23  6:32 AM    240  230 233 225.0 R 210.0 R    623 Low  593 Low  596 Low  629 Low  685.0 Low  R 645.0 Low  R   IGM 39 Low  30 Low   27 Low  31 Low  33.0 Low  R 36.0 Low  R             Specimen Collected: 03/31/25  6:31 AM Last Resulted: 03/31/25  2:55 PM         Component  Ref Range & Units (hover) 3/31/25  6:31 AM 12/30/24  6:30 AM 5/24/23  6:59 AM 10/14/21  6:30 AM 7/2/21 12:12 PM 5/11/21  6:50 AM 3/28/19  7:59 AM   Cholesterol 162 161   R,  R,  R,  R, CM   Comment: Cholesterol:   Smear Review(Phlebs Do Not Order)  Status: Final result      Contains abnormal data Smear Review(Phlebs Do Not Order)  Order: [id number] - Reflex for Order [id number]   Status: Final result       Next appt: 08/22/2025 at 09:20 AM in Cardiology (Kirit Stringer MD)       Dx: CLL (chronic lymphocytic leukemia) (H...    Test Result Released: Yes (seen)    0 Result Notes            Component  Ref Range & Units (hover) 6/30/25  6:28 AM 12/30/24  6:30 AM 10/14/24  7:01 AM 7/3/24  7:14 AM 4/2/24  6:52 AM 12/22/23  6:36 AM 9/26/23  6:22 AM   RBC Morphology Normal Normal Normal Present Normal Normal Normal   Platelet Estimate Borderline Abnormal  Borderline Abnormal  Decreased Abnormal  Decreased Abnormal  Decreased Abnormal  Borderline Abnormal  Adequate             Specimen Collected: 06/30/25  6:28 AM Last Resulted: 06/30/25  8:02 AM        Lab Flowsheet        Order Details        View Encounter        Lab and Collection Details        Routing        Result History     View All Conversations on this Encounter     Result Care Coordination      Patient  Communication     Add Comments   Seen Back to Top      Contains abnormal data CBC and differential  Order: [id number]   Status: Final result       Next appt: 08/22/2025 at 09:20 AM in Cardiology (Kirit Stringer MD)       Dx: CLL (chronic lymphocytic leukemia) (H...    Test Result Released: Yes (seen)    0 Result Notes             Component  Ref Range & Units (hover) 6/30/25  6:28 AM 3/31/25  6:31 AM 12/30/24  6:30 AM 12/30/24  6:30 AM 10/14/24  7:01 AM 10/14/24  7:01 AM 7/3/24  7:14 AM 7/3/24  7:14 AM   WBC 11.17 High  13.04 High   12.48 High   13.10 High   13.72 High    RBC 5.20 5.42  5.36  5.16  5.32   Hemoglobin 15.0 15.2  14.9  14.4  14.7   Hematocrit 45.4 47.5  47.8  45.9  47.8   MCV 87 88  89  89  90   MCH 28.8 28.0  27.8  27.9  27.6   MCHC 33.0 32.0  31.2 Low   31.4  30.8 Low    RDW 14.6 14.6  14.6  14.7  14.8   MPV 12.0 11.7  11.6  11.6  11.3   Platelets 108 Low  112 Low  CM  105 Low   104 Low  CM  99 Low  CM   Comment: Manual Review of Smear Performed   nRBC 0 0         Segmented % 35 Low  33 Low  24 Low   29 Low   24 Low     Immature Grans % 0 0         Lymphocytes % 58 High  59 High  68 High   59 High   67 High     Monocytes % 5 5 4  4  6    Eosinophils Relative 2 2 2  2  0    Basophils Relative 0 1 0  1  0    Absolute Neutrophils 3.85 4.27 3.00 R  3.80 R  3.29 R    Absolute Immature Grans 0.02 0.03         Absolute Lymphocytes 6.45 High  7.77 High  8.74 High  R  8.38 High  R  9.60 High  R    Absolute Monocytes 0.57 0.63 0.50 R  0.52 R  0.82 R    Eosinophils Absolute 0.23 0.27 0.25 R  0.26 R  0.00 R    Basophils Absolute 0.05 0.07 0.00 R  0.13 High  R  0.00 R    Atypical Lymphocytes %   2 High  R  5 High  R  3 High  R         VAS carotid complete study  Status: Final result     PACS Images - GE     Show images for VAS carotid complete study  PACS Images - Sectra     Show images for VAS carotid complete study    VAS carotid complete study: Result Notes     ISRRAEL Fisher  6/24/2025 12:56 PM EDT        Letter sent            Study Result    Result Text   [facility]  .  PARADISE FUENTES is a 80 year old M who was referred by JANEL PORTER.       Indications:   6 month surveillance of carotid artery disease.  Patient is asymptomatic at this time.  Risk Factors:   The patient reports hyperlipidemia, renal disease and CAD.    Clinical:   Right BP: 156/68, Left BP: 148/72     FINDINGS:     Main                                Segment Impression  PSV  EDV  Ratio  Direction of Flow  Right                                                                                    Prox CCA                                       150   28                               Mid CCA                                        112   27                               Dist CCA                                        93   18                               Prox. ICA                             1 - 49%   94   21   0.84                        Mid. ICA                                        84   23   0.75                        Dist. ICA                                       82   27   0.73                        Ext Carotid                                    139   11                               Prox Vert.                                      63   21                 Antegrade     Right Subclavian Artery                        128    4                            Left                                                                                     Prox CCA                                       137   24                               Mid CCA                                        120   16                               Dist CCA                                        89   20                               Prox. ICA                             1 - 49%  159   47   1.33                        Mid. ICA                                       140   37   1.17                        Dist. ICA                                       91   23   0.76                         Ext Carotid                                    132   14                               Prox Vert                                       62   18                 Antegrade     Left Subclavian Artery                         159    0                               CONCLUSION:      RIGHT:    There is <50% stenosis noted in the internal carotid artery.  Plaque is heterogenous and smooth.    Vertebral artery flow is antegrade.  There is no significant subclavian artery disease.    LEFT:    There is <50% stenosis noted in the internal carotid artery.  Plaque is heterogenous and irregular with calcified shadow present.     Vertebral artery flow is antegrade.  There is no significant subclavian artery disease.         Compared to previous study on 01/17/25, unable to reproduce 50-69% stenosis on the left. No other significant change identified.  Recommend repeat duplex in 2 years to check on plaque progression          SIGNATURE  Signed by KARLY MAC MD, RPVI on 2025-06-22 13:52:34  [email or web address]   CT soft tissue neck w contrast  Status: Final result     PACS Images - GE     Show images for CT soft tissue neck w contrast  PACS Images - Sectra     Show images for CT soft tissue neck w contrast  Study Result    Narrative & Impression   CT NECK WITH CONTRAST     INDICATION:   CLL on zanubrutinib     COMPARISON: 10/15/2024, 6/28/2024,, 3/30/2023 chest CT from 3/24/2025     TECHNIQUE:  Axial, sagittal, and coronal 2D reformatted images were created from the axial source data and submitted for interpretation.     Radiation dose length product (DLP) for this visit:  747.74 mGy-cm .  This examination, like all CT scans performed in the [facility] license [facility], was performed utilizing techniques to minimize radiation dose exposure, including the use of iterative   reconstruction and automated exposure control.      IV Contrast:  100 mL of iohexol     IMAGE QUALITY:  Diagnostic.     FINDINGS:     VISUALIZED BRAIN PARENCHYMA:  No acute intracranial pathology of the visualized brain parenchyma.     VISUALIZED ORBITS: Normal visualized orbits.     PARANASAL SINUSES: Normal visualized paranasal sinuses.     NASAL CAVITY AND NASOPHARYNX:  Normal.     SUPRAHYOID NECK: Stable asymmetric prominence of the right greater than left lingual tonsils, effacing the vallecula, with stable hypodensities involving both lingual tonsils. Epiglottis appears normal. Oral cavity base of tongue otherwise appears   grossly normal.     INFRAHYOID NECK:  Aryepiglottic folds and piriform sinuses are normal.  Normal glottis and subglottic airway.     THYROID GLAND: Thyroid gland appears grossly normal.     PAROTID AND SUBMANDIBULAR GLANDS:  Normal.     LYMPH NODES:  No pathologic or enlarged adenopathy.     VASCULAR STRUCTURES: Atherosclerotic plaque involves the left greater than right carotid bifurcations, grossly stable.     THORACIC INLET: Lateral scarring involving the visualized right upper lobe.     BONY STRUCTURES: No acute fracture or destructive osseous lesion. Multilevel cervical and upper thoracic spondylosis.     IMPRESSION:     No cervical lymphadenopathy identified.     Stable asymmetric prominence of the right greater than left lingual tonsils, effacing the vallecula, with stable hypodensities involving both lingual tonsils. Correlation with prior direct visualization findings is recommended.           Workstation performed: [id number]        Imaging    CT soft tissue neck w contrast (Order: [id number]) - 3/24/20    CT chest abdomen pelvis w contrast  Status: Final result     PACS Images - GE     Show images for CT chest abdomen pelvis w contrast  PACS Images - Sect[facility]     Show images for CT chest abdomen pelvis w contrast  Study Result    Narrative & Impression   CT CHEST, ABDOMEN AND PELVIS WITH IV CONTRAST     INDICATION: Z00.6: Encounter  for examination for normal comparison and control in clinical research program. Chronic lymphocytic leukemia on Zanubrutinib. Clinical trial participant.     COMPARISON: CT chest/abdomen/pelvis 10/15/2024.     TECHNIQUE: CT examination of the chest, abdomen and pelvis was performed. Multiplanar 2D reformatted images were created from the source data.     This examination, like all CT scans performed in the [facility], was performed utilizing techniques to minimize radiation dose exposure, including the use of iterative reconstruction and automated exposure control. Radiation dose length   product (DLP) for this visit: 1842.3 mGy-cm     IV Contrast: 100 mL of iohexol  Enteric Contrast: Administered.     FINDINGS:     RECIST 1.1     TARGET LESIONS:  None     NONTARGET LESIONS:  1. Right axillary lymph node, 1.2 x 1.0  cm (series 3, image 14), previously 1.2 x 0.9 cm.  2. Right subpectoral lymph node, 0.6 x 0.4 cm (series 3, image 19), previously 0.6 x 0.4 cm.  3. Left subpectoral lymph node, 0.7 x 0.4 cm (series 3, image 12), previously 0.7 x 0.4 cm.  4. Right common iliac lymph node, 1.4 x 0.9 cm (series 3, image 191), previously 1.4 x 0.9 cm.     CHEST     LUNGS: Lungs are clear. No tracheal or endobronchial lesion. Osteophyte-induced adjacent atelectasis and fibrosis in the medial right lower lobe. Right upper lobe lateral scarring and calcified granulomas. A 3 mm left lower lobe solid pulmonary   micronodule (9/137), stable since 6/29/2022, therefore consistent with a benign etiology. No new or enlarging pulmonary nodules.     PLEURA: Unremarkable.     HEART/GREAT VESSELS: The heart is nonenlarged. Three-vessel coronary artery calcifications. No thoracic aortic aneurysm.     MEDIASTINUM AND GALI: Small hiatal hernia. No mediastinal or hilar lymphadenopathy.     CHEST WALL AND LOWER NECK: Stable benign 2 mm right thyroid lobe nodule (3/8) since 8/5/2017. Incidental discovery of one or more thyroid  nodule(s) measuring less than 1.5 cm and without suspicious features is noted in this patient who is above 35 years   old; according to guidelines published in the February 2015 white paper on incidental thyroid nodules in the Journal of the American College of Radiology (JACR), no further evaluation is recommended.     ABDOMEN     LIVER/BILIARY TREE: Subcentimeter hypoattenuating lesion(s), too small to characterize but statistically likely benign, which do not require follow-up (ACR White Paper 2017). No suspicious mass. Normal hepatic contours. Benign left hepatic lobe cyst.   Stable 1.2 cm segment 5 hepatic hemangioma, best seen on previous MRI 3/29/2024.     GALLBLADDER: No calcified gallstones. No pericholecystic inflammatory change.     SPLEEN: Calcified granulomata. No suspicious mass. No splenomegaly. Spleen measures 10.6 cm craniocaudally.     PANCREAS: Cystic pancreatic lesion as follows:  -Dominant cyst size: 1.4 cm (series 3, image 133). Unchanged since 9/26/2022, previously measuring 1.1 cm on 8/5/2017.  -Dominant cyst location: Pancreatic head.  -Additional cysts: None.  -Communication with main pancreatic duct: Dominant cyst communicates with main pancreatic duct.  -Main duct dilation: None.  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  -High risk stigmata: None.  -Worrisome features: None.  -(Please note the above only takes into account imaging high risk stigmata/worrisome features. Clinical/laboratory feature assessment by gastroenterologist/surgical oncologist is suggested.)     -Management recommendation: Follow-up 6 months once, then every 18 months. Currently, patient has demonstrated stability since 9/26/2022. Recommend next follow-up in 18 months. Endpoint determined by clinician. Preferred imaging modality: abdomen MRI and   MRCP with and without IV contrast, or triple phase abdomen CT with IV contrast, or abdomen MRI and MRCP without IV  contrast.     Management and follow up recommendations for cystic pancreatic lesions are based on Institutional consensus and international evidence-based Kyoto guidelines for the management of intraductal papillary mucinous neoplasm of the pancreas. Pancreatology 24   (2024) [phone].     ADRENAL GLANDS: Unremarkable.     KIDNEYS/URETERS: No hydronephrosis or urinary tract calculi. Subcentimeter hypoattenuating renal lesion(s), too small to characterize but statistically likely benign, which do not warrant follow-up (Radiology June 2019). Multiple left renal sinus cysts.   Multifocal right renal cortical scarring.     STOMACH AND BOWEL: Colonic diverticulosis without findings of acute diverticulitis.     APPENDIX: Surgically absent.     ABDOMINOPELVIC CAVITY: No ascites. No pneumoperitoneum. No pathologically enlarged lymph nodes.     VESSELS: Atherosclerosis without abdominal aortic aneurysm. Small caliber inferior vena cava may be related to patient's volume status. Circumaortic left renal vein.     PELVIS     REPRODUCTIVE ORGANS: Enlarged prostate.     URINARY BLADDER: Unremarkable.     ABDOMINAL WALL/INGUINAL REGIONS: Small fat-containing umbilical and left inguinal hernias. Moderate right inguinal hernia containing nondilated small and large bowel.     BONES: No acute fracture or suspicious osseous lesion. Spinal degenerative changes. Degenerative changes of the right shoulder. Advanced right hip osteoarthritis. Moderate left hip osteoarthritis.     IMPRESSION:     Stable nontarget lesions. No new or progressive lymphadenopathy in the chest, abdomen, and pelvis. No splenomegaly.     Stable 1.4 cm pancreatic head cystic lesion since 9/26/2022. No main pancreatic duct dilation. Management recommendation: Follow-up 6 months once, then every 18 months. Currently, patient has demonstrated stability since 9/26/2022. Recommend next   follow-up in 18 months. Endpoint determined by clinician. Preferred imaging modality:  abdomen MRI and MRCP with and without IV contrast, or triple phase abdomen CT with IV contrast, or abdomen MRI and MRCP without IV contrast.        Resident: Bhavesh Lowery I, the attending radiologist, have reviewed the images and agree with the final report above.     Workstation performed: [id number]        Imaging    CT chest abdomen pelvis w contrast (Order: [id number]) - 3/24/202

## 2025-07-07 NOTE — PATIENT INSTRUCTIONS
No change in Zanubrutinib and baby aspirin.  Blood work and CT scans prior to next visit in 3 months.

## 2025-07-24 NOTE — ED NOTES
EKG completed at 11:13  Viewed and signed by Dr Jonn Simmonds   Copy on chart     Tang Wahl  06/08/18 1163 It is important that you follow up with your primary care provider or specialist if indicated for further evaluation, workup, and treatment as necessary. The exam and treatment you received in Emergency Department was for an urgent problem and NOT INTENDED AS COMPLETE CARE. It is important that you FOLLOW UP with a doctor for ongoing care. If your symptoms become WORSE or you DO NOT IMPROVE and you are unable to reach your health care provider, you should RETURN to the Emergency Department.

## 2025-08-22 ENCOUNTER — OFFICE VISIT (OUTPATIENT)
Dept: CARDIOLOGY CLINIC | Facility: CLINIC | Age: 80
End: 2025-08-22

## 2025-08-22 VITALS
OXYGEN SATURATION: 96 % | WEIGHT: 215 LBS | DIASTOLIC BLOOD PRESSURE: 80 MMHG | TEMPERATURE: 96.9 F | HEART RATE: 66 BPM | HEIGHT: 71 IN | BODY MASS INDEX: 30.1 KG/M2 | SYSTOLIC BLOOD PRESSURE: 126 MMHG

## 2025-08-22 DIAGNOSIS — I21.02 ST ELEVATION MYOCARDIAL INFARCTION INVOLVING LEFT ANTERIOR DESCENDING (LAD) CORONARY ARTERY (HCC): ICD-10-CM

## 2025-08-22 DIAGNOSIS — N18.31 STAGE 3A CHRONIC KIDNEY DISEASE (HCC): ICD-10-CM

## 2025-08-22 DIAGNOSIS — I10 HYPERTENSION, ESSENTIAL: Chronic | ICD-10-CM

## 2025-08-22 DIAGNOSIS — I25.10 CORONARY ARTERY DISEASE INVOLVING NATIVE CORONARY ARTERY OF NATIVE HEART WITHOUT ANGINA PECTORIS: Primary | ICD-10-CM

## 2025-08-22 DIAGNOSIS — I65.22 LEFT CAROTID STENOSIS: ICD-10-CM

## 2025-08-22 DIAGNOSIS — E78.5 HYPERLIPIDEMIA, UNSPECIFIED HYPERLIPIDEMIA TYPE: ICD-10-CM
